# Patient Record
Sex: MALE | Race: WHITE | NOT HISPANIC OR LATINO | Employment: FULL TIME | URBAN - METROPOLITAN AREA
[De-identification: names, ages, dates, MRNs, and addresses within clinical notes are randomized per-mention and may not be internally consistent; named-entity substitution may affect disease eponyms.]

---

## 2017-03-15 ENCOUNTER — LAB (OUTPATIENT)
Dept: INTERNAL MEDICINE | Facility: CLINIC | Age: 62
End: 2017-03-15

## 2017-03-15 DIAGNOSIS — Z00.00 PERIODIC HEALTH ASSESSMENT, GENERAL SCREENING, ADULT: ICD-10-CM

## 2017-03-15 DIAGNOSIS — Z12.5 PROSTATE CANCER SCREENING: ICD-10-CM

## 2017-03-15 DIAGNOSIS — Z00.00 PERIODIC HEALTH ASSESSMENT, GENERAL SCREENING, ADULT: Primary | ICD-10-CM

## 2017-03-15 DIAGNOSIS — E34.9 TESTOSTERONE DEFICIENCY: ICD-10-CM

## 2017-03-15 LAB
ALBUMIN SERPL-MCNC: 4.05 G/DL (ref 3.4–4.6)
ALBUMIN/GLOB SERPL: 1.3 G/DL
ALP SERPL-CCNC: 57 U/L (ref 46–116)
ALT SERPL W P-5'-P-CCNC: 55 U/L (ref 16–63)
ANION GAP SERPL CALCULATED.3IONS-SCNC: 7 MMOL/L
AST SERPL-CCNC: 37 U/L (ref 7–37)
BILIRUB SERPL-MCNC: 0.6 MG/DL (ref 0.2–1)
BUN BLD-MCNC: 17 MG/DL (ref 6–22)
BUN/CREAT SERPL: 18.9 (ref 7–25)
CALCIUM SPEC-SCNC: 9 MG/DL (ref 8.6–10.5)
CHLORIDE SERPL-SCNC: 100 MMOL/L (ref 95–107)
CHOLEST SERPL-MCNC: 178 MG/DL (ref 0–200)
CO2 SERPL-SCNC: 30 MMOL/L (ref 23–32)
CREAT BLD-MCNC: 0.9 MG/DL (ref 0.7–1.3)
DEPRECATED RDW RBC AUTO: 40.1 FL (ref 37–54)
ERYTHROCYTE [DISTWIDTH] IN BLOOD BY AUTOMATED COUNT: 11.7 % (ref 11.5–15)
GFR SERPL CREATININE-BSD FRML MDRD: 86 ML/MIN/1.73
GLOBULIN UR ELPH-MCNC: 3.1 GM/DL
GLUCOSE BLD-MCNC: 95 MG/DL (ref 70–100)
HCT VFR BLD AUTO: 42.2 % (ref 40.1–51)
HDLC SERPL-MCNC: 65 MG/DL (ref 40–81)
HGB BLD-MCNC: 14 G/DL (ref 13.7–17.5)
LDLC SERPL CALC-MCNC: 101 MG/DL (ref 0–100)
LDLC/HDLC SERPL: 1.56 {RATIO}
MCH RBC QN AUTO: 31.3 PG (ref 26–34)
MCHC RBC AUTO-ENTMCNC: 33.2 G/DL (ref 31–37)
MCV RBC AUTO: 94.4 FL (ref 80–100)
PLATELET # BLD AUTO: 280 10*3/MM3 (ref 150–450)
PMV BLD AUTO: 9.3 FL (ref 6–12)
POTASSIUM BLD-SCNC: 4.2 MMOL/L (ref 3.3–5.3)
PROT SERPL-MCNC: 7.1 G/DL (ref 6.3–8.4)
PSA SERPL-MCNC: 2.03 NG/ML (ref 0.13–4)
RBC # BLD AUTO: 4.47 10*6/MM3 (ref 4.63–6.08)
SODIUM BLD-SCNC: 137 MMOL/L (ref 136–145)
TRIGL SERPL-MCNC: 58 MG/DL (ref 0–150)
VLDLC SERPL-MCNC: 11.6 MG/DL
WBC NRBC COR # BLD: 4.65 10*3/MM3 (ref 5–10)

## 2017-03-15 PROCEDURE — 80061 LIPID PANEL: CPT

## 2017-03-15 PROCEDURE — 36415 COLL VENOUS BLD VENIPUNCTURE: CPT

## 2017-03-15 PROCEDURE — 84153 ASSAY OF PSA TOTAL: CPT

## 2017-03-15 PROCEDURE — 85027 COMPLETE CBC AUTOMATED: CPT

## 2017-03-15 PROCEDURE — 80053 COMPREHEN METABOLIC PANEL: CPT

## 2017-03-16 LAB
CONV COMMENT: NORMAL
TESTOST SERPL-MCNC: 663 NG/DL (ref 348–1197)

## 2017-08-15 ENCOUNTER — TELEPHONE (OUTPATIENT)
Dept: INTERNAL MEDICINE | Facility: CLINIC | Age: 62
End: 2017-08-15

## 2017-08-15 NOTE — TELEPHONE ENCOUNTER
----- Message from Mai Bee sent at 8/15/2017  9:18 AM EDT -----  Contact: pt  Pt calling says that JUBLIA 10 % solution, will cost $600. Pt would like to know if there is a coupon he can get for this medication. Please advise.     # [phone]    [facility] [place] [address] [address] AT [facility] [address]  - [phone]  - [phone] FX

## 2017-08-18 RX ORDER — EFINACONAZOLE 100 MG/ML
1 SOLUTION TOPICAL DAILY
Qty: 8 ML | Refills: 0 | Status: SHIPPED | OUTPATIENT
Start: 2017-08-18 | End: 2018-01-13 | Stop reason: SDUPTHER

## 2017-08-18 NOTE — TELEPHONE ENCOUNTER
----- Message from Beata Major MA sent at 8/18/2017  8:20 AM EDT -----  Pt calling for refill one time to local pharmacy so he can use a coupon    Jublia    [facility] [place] #[phone]

## 2018-01-15 RX ORDER — EFINACONAZOLE 100 MG/ML
SOLUTION TOPICAL
Qty: 8 ML | Refills: 1 | Status: SHIPPED | OUTPATIENT
Start: 2018-01-15 | End: 2018-06-22

## 2018-02-14 ENCOUNTER — OFFICE VISIT (OUTPATIENT)
Dept: INTERNAL MEDICINE | Facility: CLINIC | Age: 63
End: 2018-02-14

## 2018-02-14 VITALS
BODY MASS INDEX: 19.88 KG/M2 | TEMPERATURE: 97.4 F | HEIGHT: 73 IN | DIASTOLIC BLOOD PRESSURE: 68 MMHG | OXYGEN SATURATION: 98 % | HEART RATE: 51 BPM | WEIGHT: 150 LBS | RESPIRATION RATE: 15 BRPM | SYSTOLIC BLOOD PRESSURE: 108 MMHG

## 2018-02-14 DIAGNOSIS — R35.1 NOCTURIA: ICD-10-CM

## 2018-02-14 DIAGNOSIS — Z12.5 PROSTATE CANCER SCREENING: ICD-10-CM

## 2018-02-14 DIAGNOSIS — D64.9 ANEMIA, UNSPECIFIED TYPE: ICD-10-CM

## 2018-02-14 DIAGNOSIS — Z00.00 ANNUAL PHYSICAL EXAM: Primary | ICD-10-CM

## 2018-02-14 DIAGNOSIS — Z12.11 COLON CANCER SCREENING: ICD-10-CM

## 2018-02-14 DIAGNOSIS — R35.0 FREQUENT URINATION: ICD-10-CM

## 2018-02-14 DIAGNOSIS — N52.9 ERECTILE DYSFUNCTION, UNSPECIFIED ERECTILE DYSFUNCTION TYPE: ICD-10-CM

## 2018-02-14 DIAGNOSIS — E78.49 OTHER HYPERLIPIDEMIA: ICD-10-CM

## 2018-02-14 LAB
ALBUMIN SERPL-MCNC: 4 G/DL (ref 3.5–5.2)
ALBUMIN/GLOB SERPL: 1.4 G/DL
ALP SERPL-CCNC: 50 U/L (ref 39–117)
ALT SERPL-CCNC: 40 U/L (ref 1–41)
AST SERPL-CCNC: 34 U/L (ref 1–40)
BACTERIA UR QL AUTO: ABNORMAL /HPF
BASOPHILS # BLD AUTO: 0.04 10*3/MM3 (ref 0–0.2)
BASOPHILS NFR BLD AUTO: 0.7 % (ref 0–2)
BILIRUB SERPL-MCNC: 0.4 MG/DL (ref 0.1–1.2)
BILIRUB UR QL STRIP: NEGATIVE
BUN SERPL-MCNC: 20 MG/DL (ref 8–23)
BUN/CREAT SERPL: 21.7 (ref 7–25)
CALCIUM SERPL-MCNC: 9.1 MG/DL (ref 8.6–10.5)
CHLORIDE SERPL-SCNC: 99 MMOL/L (ref 98–107)
CHOLEST SERPL-MCNC: 180 MG/DL (ref 0–200)
CLARITY UR: CLEAR
CO2 SERPL-SCNC: 28.1 MMOL/L (ref 22–29)
COLOR UR: YELLOW
CREAT SERPL-MCNC: 0.92 MG/DL (ref 0.76–1.27)
DEPRECATED RDW RBC AUTO: 40.8 FL (ref 37–54)
EOSINOPHIL # BLD AUTO: 0.34 10*3/MM3 (ref 0–0.7)
EOSINOPHIL NFR BLD AUTO: 6.4 % (ref 0–5)
ERYTHROCYTE [DISTWIDTH] IN BLOOD BY AUTOMATED COUNT: 11.9 % (ref 11.5–15)
GLOBULIN SER CALC-MCNC: 2.8 GM/DL
GLUCOSE SERPL-MCNC: 91 MG/DL (ref 65–99)
GLUCOSE UR STRIP-MCNC: NEGATIVE MG/DL
HCT VFR BLD AUTO: 40 % (ref 40.1–51)
HDLC SERPL-MCNC: 61 MG/DL (ref 40–60)
HEMOCCULT STL QL IA: NEGATIVE
HGB BLD-MCNC: 13.5 G/DL (ref 13.7–17.5)
HGB UR QL STRIP.AUTO: NEGATIVE
HYALINE CASTS UR QL AUTO: ABNORMAL /LPF
KETONES UR QL STRIP: NEGATIVE
LDLC SERPL CALC-MCNC: 110 MG/DL (ref 0–100)
LEUKOCYTE ESTERASE UR QL STRIP.AUTO: ABNORMAL
LYMPHOCYTES # BLD AUTO: 1.35 10*3/MM3 (ref 0.8–7)
LYMPHOCYTES NFR BLD AUTO: 25.2 % (ref 10–60)
MCH RBC QN AUTO: 32.1 PG (ref 26–34)
MCHC RBC AUTO-ENTMCNC: 33.8 G/DL (ref 31–37)
MCV RBC AUTO: 95 FL (ref 80–100)
MONOCYTES # BLD AUTO: 0.58 10*3/MM3 (ref 0–1)
MONOCYTES NFR BLD AUTO: 10.8 % (ref 0–13)
NEUTROPHILS # BLD AUTO: 3.04 10*3/MM3 (ref 1–11)
NEUTROPHILS NFR BLD AUTO: 56.9 % (ref 30–85)
NITRITE UR QL STRIP: NEGATIVE
PH UR STRIP.AUTO: 7 [PH] (ref 5–8)
PLATELET # BLD AUTO: 261 10*3/MM3 (ref 150–450)
PMV BLD AUTO: 9.3 FL (ref 6–12)
POTASSIUM SERPL-SCNC: 4.5 MMOL/L (ref 3.5–5.2)
PROT SERPL-MCNC: 6.8 G/DL (ref 6–8.5)
PROT UR QL STRIP: NEGATIVE
PSA SERPL-MCNC: 2.32 NG/ML (ref 0–4)
RBC # BLD AUTO: 4.21 10*6/MM3 (ref 4.63–6.08)
RBC # UR: ABNORMAL /HPF
REF LAB TEST METHOD: ABNORMAL
SODIUM SERPL-SCNC: 137 MMOL/L (ref 136–145)
SP GR UR STRIP: 1.01 (ref 1–1.03)
SQUAMOUS #/AREA URNS HPF: ABNORMAL /HPF
TRIGL SERPL-MCNC: 44 MG/DL (ref 0–150)
UROBILINOGEN UR QL STRIP: ABNORMAL
VLDLC SERPL-MCNC: 8.8 MG/DL (ref 5–40)
WBC NRBC COR # BLD: 5.35 10*3/MM3 (ref 5–10)
WBC UR QL AUTO: ABNORMAL /HPF

## 2018-02-14 PROCEDURE — 85025 COMPLETE CBC W/AUTO DIFF WBC: CPT | Performed by: INTERNAL MEDICINE

## 2018-02-14 PROCEDURE — 99396 PREV VISIT EST AGE 40-64: CPT | Performed by: INTERNAL MEDICINE

## 2018-02-14 PROCEDURE — G0103 PSA SCREENING: HCPCS | Performed by: INTERNAL MEDICINE

## 2018-02-14 PROCEDURE — 82274 ASSAY TEST FOR BLOOD FECAL: CPT | Performed by: INTERNAL MEDICINE

## 2018-02-14 PROCEDURE — 81001 URINALYSIS AUTO W/SCOPE: CPT | Performed by: INTERNAL MEDICINE

## 2018-02-14 RX ORDER — TAMSULOSIN HYDROCHLORIDE 0.4 MG/1
1 CAPSULE ORAL NIGHTLY
Qty: 30 CAPSULE | Refills: 12 | Status: SHIPPED | OUTPATIENT
Start: 2018-02-14 | End: 2018-06-22

## 2018-02-14 NOTE — PROGRESS NOTES
"Sharita Waterman is a 62 y.o. male here for   Chief Complaint   Patient presents with   • Annual Exam     New Patient   .    Vitals:    02/14/18 1013   BP: 108/68   BP Location: Left arm   Patient Position: Sitting   Cuff Size: Adult   Pulse: 51   Resp: 15   Temp: 97.4 °F (36.3 °C)   TempSrc: Temporal Artery    SpO2: 98%   Weight: 68 kg (150 lb)   Height: 185.4 cm (73\")       Body mass index is 19.79 kg/(m^2).    Urinary Frequency    This is a chronic problem. The current episode started more than 1 year ago. The problem has been unchanged. There has been no fever. Associated symptoms include frequency. Pertinent negatives include no chills, flank pain, hematuria, nausea, sweats, urgency or vomiting.        The following portions of the patient's history were reviewed and updated as appropriate: allergies, current medications, past social history and problem list.    Review of Systems   Constitutional: Negative for appetite change, chills, fatigue, fever and unexpected weight change.   HENT: Positive for postnasal drip. Negative for congestion, ear pain, mouth sores, sinus pain, sore throat, tinnitus, trouble swallowing and voice change.    Eyes: Negative for pain and visual disturbance.   Respiratory: Negative for cough, choking, shortness of breath and wheezing.    Cardiovascular: Negative for chest pain, palpitations and leg swelling.   Gastrointestinal: Negative for abdominal pain, blood in stool, constipation, diarrhea, nausea and vomiting.   Endocrine: Negative for cold intolerance, heat intolerance, polydipsia and polyuria.   Genitourinary: Positive for enuresis (2-3x) and frequency. Negative for difficulty urinating, dysuria, flank pain, hematuria, testicular pain and urgency.   Musculoskeletal: Negative for arthralgias, back pain, gait problem, joint swelling, myalgias, neck pain and neck stiffness.   Skin: Negative for color change and rash.   Allergic/Immunologic: Positive for environmental " allergies. Negative for food allergies and immunocompromised state.   Neurological: Negative for dizziness, tremors, seizures, syncope, speech difficulty, weakness, numbness and headaches.   Hematological: Negative for adenopathy. Does not bruise/bleed easily.   Psychiatric/Behavioral: Negative for agitation, confusion, decreased concentration, dysphoric mood, sleep disturbance and suicidal ideas. The patient is not nervous/anxious.        Objective   Physical Exam   Constitutional: He is oriented to person, place, and time. He appears well-developed and well-nourished. No distress.   HENT:   Head: Normocephalic and atraumatic.   Right Ear: External ear normal.   Left Ear: External ear normal.   Nose: Nose normal.   Mouth/Throat: Oropharynx is clear and moist.   Eyes: Conjunctivae and EOM are normal. Pupils are equal, round, and reactive to light. Right eye exhibits no discharge. Left eye exhibits no discharge. No scleral icterus.   Neck: Normal range of motion. Neck supple. No JVD present. No tracheal deviation present. No thyromegaly present.   Cardiovascular: Normal rate, regular rhythm, normal heart sounds and intact distal pulses.  Exam reveals no gallop and no friction rub.    No murmur heard.  Pulmonary/Chest: Effort normal and breath sounds normal. No respiratory distress. He has no wheezes. He has no rales. He exhibits no tenderness.   Abdominal: Soft. Bowel sounds are normal. He exhibits no distension and no mass. There is no tenderness. There is no rebound and no guarding. No hernia.   Genitourinary: Rectum normal and penis normal. Rectal exam shows guaiac negative stool. Prostate is enlarged.   Musculoskeletal: Normal range of motion. He exhibits no edema.   Lymphadenopathy:     He has no cervical adenopathy.   Neurological: He is alert and oriented to person, place, and time. He has normal reflexes. No cranial nerve deficit. He exhibits normal muscle tone. Coordination normal.   Skin: Skin is warm and  dry. No rash noted. No erythema.   Psychiatric: He has a normal mood and affect. His behavior is normal. Judgment and thought content normal.   Vitals reviewed.      Assessment/Plan   Diagnoses and all orders for this visit:    Annual physical exam  -     CBC Auto Differential; Future  -     Comprehensive Metabolic Panel; Future  -     Lipid Panel; Future  -     POC FECAL OCCULT BLOOD BY IMMUNOASSAY  -     Urinalysis With / Microscopic If Indicated - Urine, Clean Catch; Future  -     CBC Auto Differential  -     Comprehensive Metabolic Panel  -     Lipid Panel  -     Urinalysis With / Microscopic If Indicated - Urine, Clean Catch  -     Urinalysis, Microscopic Only - Urine, Clean Catch; Future  -     Urinalysis, Microscopic Only - Urine, Clean Catch    Frequent urination  Comments:  trial of flomax - will see urologist if sx persist    Other hyperlipidemia  Comments:  need diet & exercise    Erectile dysfunction, unspecified erectile dysfunction type  Comments:  ok to use occ viagra (but not on same day as flomax)    Nocturia  Comments:  will see urol if sx persist  Orders:  -     tamsulosin (FLOMAX) 0.4 MG capsule 24 hr capsule; Take 1 capsule by mouth Every Night.    Prostate cancer screening  -     PSA Screen; Future  -     PSA Screen    Colon cancer screening  -     POC FECAL OCCULT BLOOD BY IMMUNOASSAY       He needs Tdap & new shingles vaccine.     He had normal c-scope 2015 - I need old records.

## 2018-02-15 DIAGNOSIS — D64.9 ANEMIA, UNSPECIFIED TYPE: Primary | ICD-10-CM

## 2018-02-15 LAB
FERRITIN SERPL-MCNC: 166.9 NG/ML (ref 30–400)
FOLATE SERPL-MCNC: >20 NG/ML (ref 4.78–24.2)
SPECIMEN STATUS: NORMAL
VIT B12 SERPL-MCNC: 883 PG/ML (ref 211–946)

## 2018-02-16 LAB
REF LAB TEST METHOD: NORMAL
REF LAB TEST METHOD: NORMAL

## 2018-06-19 ENCOUNTER — TELEPHONE (OUTPATIENT)
Dept: INTERNAL MEDICINE | Facility: CLINIC | Age: 63
End: 2018-06-19

## 2018-06-19 NOTE — TELEPHONE ENCOUNTER
----- Message from Myrna BORIS Escamilla sent at 6/19/2018  9:31 AM EDT -----  Contact: Susan, spouse - Dr Gant's pt - RE: LAB order / Poss APPT Friday  Susan, spouse calling and would like to know if pt can have labs drawn for Testosterone & TSH. Pt informs is feeling sluggish, muscle aches, insomnia, feels awful (crappy). Pt comes in [facility] Thursday night and leaves for [place] on Friday evening. Could you please put lab order in pt's chart for pt to come have labs drawn on Friday while in [facility]? Pt wanted to be seen but no appt is available w/  or Kadie.Please advise. Thanks      Susan, spouse  # [phone]

## 2018-06-19 NOTE — TELEPHONE ENCOUNTER
Patients wife would like for her  to have his Testosterone and TSH checked due to him complaining insomnia, sluggish. Patient is a  and is seen via our occupational medicine. He travels a lot internationally.     Please advise.    Thank you,    Scott

## 2018-06-20 NOTE — TELEPHONE ENCOUNTER
I spoke to Mr. Mcintosh's wife and stated he would need to be seen prior to any testing.     I worked him in for 6/22/18 @ 8 AM.

## 2018-06-22 ENCOUNTER — OFFICE VISIT (OUTPATIENT)
Dept: INTERNAL MEDICINE | Facility: CLINIC | Age: 63
End: 2018-06-22

## 2018-06-22 VITALS
SYSTOLIC BLOOD PRESSURE: 102 MMHG | BODY MASS INDEX: 20.28 KG/M2 | HEIGHT: 73 IN | WEIGHT: 153 LBS | HEART RATE: 51 BPM | OXYGEN SATURATION: 100 % | DIASTOLIC BLOOD PRESSURE: 70 MMHG

## 2018-06-22 DIAGNOSIS — R35.1 NOCTURIA: ICD-10-CM

## 2018-06-22 DIAGNOSIS — R53.82 CHRONIC FATIGUE: ICD-10-CM

## 2018-06-22 DIAGNOSIS — F51.01 PRIMARY INSOMNIA: ICD-10-CM

## 2018-06-22 DIAGNOSIS — E78.49 OTHER HYPERLIPIDEMIA: Primary | ICD-10-CM

## 2018-06-22 DIAGNOSIS — R61 NIGHT SWEAT: ICD-10-CM

## 2018-06-22 LAB
ALBUMIN SERPL-MCNC: 4.3 G/DL (ref 3.5–5.2)
ALBUMIN/GLOB SERPL: 1.4 G/DL
ALP SERPL-CCNC: 51 U/L (ref 39–117)
ALT SERPL W P-5'-P-CCNC: 30 U/L (ref 1–41)
ANION GAP SERPL CALCULATED.3IONS-SCNC: 12 MMOL/L
AST SERPL-CCNC: 24 U/L (ref 1–40)
BASOPHILS # BLD AUTO: 0.02 10*3/MM3 (ref 0–0.2)
BASOPHILS NFR BLD AUTO: 0.4 % (ref 0–2)
BILIRUB SERPL-MCNC: 0.5 MG/DL (ref 0.1–1.2)
BUN BLD-MCNC: 18 MG/DL (ref 8–23)
BUN/CREAT SERPL: 18.6 (ref 7–25)
CALCIUM SPEC-SCNC: 9.5 MG/DL (ref 8.6–10.5)
CHLORIDE SERPL-SCNC: 100 MMOL/L (ref 98–107)
CHOLEST SERPL-MCNC: 185 MG/DL (ref 0–200)
CO2 SERPL-SCNC: 26 MMOL/L (ref 22–29)
CREAT BLD-MCNC: 0.97 MG/DL (ref 0.76–1.27)
CRP SERPL-MCNC: 0.28 MG/DL (ref 0–0.5)
DEPRECATED RDW RBC AUTO: 40.9 FL (ref 37–54)
EOSINOPHIL # BLD AUTO: 0.24 10*3/MM3 (ref 0–0.7)
EOSINOPHIL NFR BLD AUTO: 5.3 % (ref 0–5)
ERYTHROCYTE [DISTWIDTH] IN BLOOD BY AUTOMATED COUNT: 11.9 % (ref 11.5–15)
ERYTHROCYTE [SEDIMENTATION RATE] IN BLOOD: 9 MM/HR (ref 0–20)
GFR SERPL CREATININE-BSD FRML MDRD: 78 ML/MIN/1.73
GLOBULIN UR ELPH-MCNC: 3 GM/DL
GLUCOSE BLD-MCNC: 91 MG/DL (ref 65–99)
HCT VFR BLD AUTO: 41.3 % (ref 40.1–51)
HDLC SERPL-MCNC: 59 MG/DL (ref 40–60)
HGB BLD-MCNC: 14.1 G/DL (ref 13.7–17.5)
LDLC SERPL CALC-MCNC: 116 MG/DL (ref 0–100)
LDLC/HDLC SERPL: 1.97 {RATIO}
LYMPHOCYTES # BLD AUTO: 1.19 10*3/MM3 (ref 0.8–7)
LYMPHOCYTES NFR BLD AUTO: 26.4 % (ref 10–60)
MCH RBC QN AUTO: 32.5 PG (ref 26–34)
MCHC RBC AUTO-ENTMCNC: 34.1 G/DL (ref 31–37)
MCV RBC AUTO: 95.2 FL (ref 80–100)
MONOCYTES # BLD AUTO: 0.57 10*3/MM3 (ref 0–1)
MONOCYTES NFR BLD AUTO: 12.7 % (ref 0–13)
NEUTROPHILS # BLD AUTO: 2.48 10*3/MM3 (ref 1–11)
NEUTROPHILS NFR BLD AUTO: 55.2 % (ref 30–85)
PLATELET # BLD AUTO: 277 10*3/MM3 (ref 150–450)
PMV BLD AUTO: 9.6 FL (ref 6–12)
POTASSIUM BLD-SCNC: 4.2 MMOL/L (ref 3.5–5.2)
PROT SERPL-MCNC: 7.3 G/DL (ref 6–8.5)
RBC # BLD AUTO: 4.34 10*6/MM3 (ref 4.63–6.08)
SODIUM BLD-SCNC: 138 MMOL/L (ref 136–145)
TRIGL SERPL-MCNC: 49 MG/DL (ref 0–150)
TSH SERPL DL<=0.05 MIU/L-ACNC: 1.5 MIU/ML (ref 0.27–4.2)
VLDLC SERPL-MCNC: 9.8 MG/DL (ref 5–40)
WBC NRBC COR # BLD: 4.5 10*3/MM3 (ref 5–10)

## 2018-06-22 PROCEDURE — 36415 COLL VENOUS BLD VENIPUNCTURE: CPT | Performed by: INTERNAL MEDICINE

## 2018-06-22 PROCEDURE — 80053 COMPREHEN METABOLIC PANEL: CPT | Performed by: INTERNAL MEDICINE

## 2018-06-22 PROCEDURE — 86140 C-REACTIVE PROTEIN: CPT | Performed by: INTERNAL MEDICINE

## 2018-06-22 PROCEDURE — 85025 COMPLETE CBC W/AUTO DIFF WBC: CPT | Performed by: INTERNAL MEDICINE

## 2018-06-22 PROCEDURE — 84443 ASSAY THYROID STIM HORMONE: CPT | Performed by: INTERNAL MEDICINE

## 2018-06-22 PROCEDURE — 85652 RBC SED RATE AUTOMATED: CPT | Performed by: INTERNAL MEDICINE

## 2018-06-22 PROCEDURE — 99214 OFFICE O/P EST MOD 30 MIN: CPT | Performed by: INTERNAL MEDICINE

## 2018-06-22 PROCEDURE — 80061 LIPID PANEL: CPT | Performed by: INTERNAL MEDICINE

## 2018-06-22 NOTE — PROGRESS NOTES
"Sharita Waterman is a 62 y.o. male here for   Chief Complaint   Patient presents with   • Insomnia   • Fatigue   .    Vitals:    06/22/18 0811   BP: 102/70   BP Location: Left arm   Patient Position: Sitting   Cuff Size: Adult   Pulse: 51   SpO2: 100%   Weight: 69.4 kg (153 lb)   Height: 185.4 cm (73\")       Body mass index is 20.19 kg/m².    Insomnia   This is a chronic problem. The current episode started more than 1 month ago. The problem occurs intermittently. The problem has been gradually worsening. Associated symptoms include diaphoresis (nightly) and fatigue. Pertinent negatives include no abdominal pain, anorexia, arthralgias, change in bowel habit, chest pain, chills, congestion, coughing, fever, headaches, joint swelling, myalgias, nausea, numbness, rash, sore throat, swollen glands, visual change, vomiting or weakness.   Fatigue   This is a chronic problem. The current episode started more than 1 month ago. The problem has been unchanged. Associated symptoms include diaphoresis (nightly) and fatigue. Pertinent negatives include no abdominal pain, anorexia, arthralgias, change in bowel habit, chest pain, chills, congestion, coughing, fever, headaches, joint swelling, myalgias, nausea, numbness, rash, sore throat, swollen glands, visual change, vomiting or weakness.        The following portions of the patient's history were reviewed and updated as appropriate: allergies, current medications, past social history and problem list.    Review of Systems   Constitutional: Positive for diaphoresis (nightly) and fatigue. Negative for chills and fever.   HENT: Negative for congestion and sore throat.    Respiratory: Negative for cough, shortness of breath and wheezing.    Cardiovascular: Negative for chest pain, palpitations and leg swelling.   Gastrointestinal: Negative for abdominal pain, anorexia, change in bowel habit, nausea and vomiting.   Musculoskeletal: Negative for arthralgias, joint swelling " and myalgias.   Skin: Negative for rash.   Neurological: Negative for weakness, numbness and headaches.   Psychiatric/Behavioral: Positive for sleep disturbance. Negative for dysphoric mood. The patient has insomnia. The patient is not nervous/anxious.        Objective   Physical Exam   Constitutional: He appears well-developed and well-nourished. No distress.   Cardiovascular: Normal rate, regular rhythm and normal heart sounds.    Pulmonary/Chest: No respiratory distress. He has no wheezes. He has no rales. He exhibits no tenderness.   Musculoskeletal: He exhibits no edema.   Psychiatric: He has a normal mood and affect. His behavior is normal.   Nursing note and vitals reviewed.      Assessment/Plan   Diagnoses and all orders for this visit:    Other hyperlipidemia  Comments:  mild - need routine rechk  Orders:  -     Comprehensive Metabolic Panel; Future  -     Lipid Panel; Future    Nocturia  Comments:  call if worse    Chronic fatigue  Comments:  labs today - call if sx persist  Orders:  -     CBC Auto Differential; Future  -     TSH Rfx On Abnormal To Free T4; Future  -     C-reactive Protein; Future  -     Sedimentation Rate; Future  -     Testosterone, Free, Total; Future    Night sweat  Comments:  labs today - will need CXR if sx persist  Orders:  -     CBC Auto Differential; Future  -     TSH Rfx On Abnormal To Free T4; Future  -     C-reactive Protein; Future  -     Sedimentation Rate; Future    Primary insomnia  Comments:  call if worse

## 2018-06-24 LAB
TESTOST FREE SERPL-MCNC: 10.9 PG/ML (ref 6.6–18.1)
TESTOST SERPL-MCNC: 646 NG/DL (ref 264–916)

## 2018-07-24 ENCOUNTER — TELEPHONE (OUTPATIENT)
Dept: INTERNAL MEDICINE | Facility: CLINIC | Age: 63
End: 2018-07-24

## 2018-07-24 DIAGNOSIS — N52.9 ERECTILE DYSFUNCTION, UNSPECIFIED ERECTILE DYSFUNCTION TYPE: Primary | ICD-10-CM

## 2018-07-24 RX ORDER — SILDENAFIL 100 MG/1
100 TABLET, FILM COATED ORAL DAILY PRN
Qty: 10 TABLET | Refills: 1 | Status: ON HOLD | OUTPATIENT
Start: 2018-07-24 | End: 2019-01-01

## 2018-07-24 NOTE — TELEPHONE ENCOUNTER
----- Message from Marcy Chen sent at 7/24/2018  9:59 AM EDT -----  Contact: Patient  Patient requesting refill on    VIAGRA 100 MG tablet [[id number]] DISCONTINUED   Order Details   Dose, Route, Frequency: As Directed   Dispense Quantity:  10 tablet Refills:  4        Would like the generic form. Please advise    Patient:[phone]    Pharmacy:[place] [address] [address] [facility] [address]  - [phone] [facility] [phone] FX

## 2019-01-01 ENCOUNTER — OFFICE VISIT (OUTPATIENT)
Dept: NEUROLOGY | Facility: CLINIC | Age: 64
End: 2019-01-01

## 2019-01-01 ENCOUNTER — OFFICE VISIT (OUTPATIENT)
Dept: ONCOLOGY | Facility: CLINIC | Age: 64
End: 2019-01-01

## 2019-01-01 ENCOUNTER — LAB (OUTPATIENT)
Dept: LAB | Facility: HOSPITAL | Age: 64
End: 2019-01-01

## 2019-01-01 ENCOUNTER — DOCUMENTATION (OUTPATIENT)
Dept: RADIATION ONCOLOGY | Facility: HOSPITAL | Age: 64
End: 2019-01-01

## 2019-01-01 ENCOUNTER — TELEPHONE (OUTPATIENT)
Dept: NEUROLOGY | Facility: CLINIC | Age: 64
End: 2019-01-01

## 2019-01-01 ENCOUNTER — DOCUMENTATION (OUTPATIENT)
Dept: ONCOLOGY | Facility: CLINIC | Age: 64
End: 2019-01-01

## 2019-01-01 ENCOUNTER — APPOINTMENT (OUTPATIENT)
Dept: CT IMAGING | Facility: HOSPITAL | Age: 64
End: 2019-01-01

## 2019-01-01 ENCOUNTER — APPOINTMENT (OUTPATIENT)
Dept: RADIATION ONCOLOGY | Facility: HOSPITAL | Age: 64
End: 2019-01-01

## 2019-01-01 ENCOUNTER — ANESTHESIA (OUTPATIENT)
Dept: PERIOP | Facility: HOSPITAL | Age: 64
End: 2019-01-01

## 2019-01-01 ENCOUNTER — APPOINTMENT (OUTPATIENT)
Dept: MRI IMAGING | Facility: HOSPITAL | Age: 64
End: 2019-01-01

## 2019-01-01 ENCOUNTER — APPOINTMENT (OUTPATIENT)
Dept: ONCOLOGY | Facility: CLINIC | Age: 64
End: 2019-01-01

## 2019-01-01 ENCOUNTER — OFFICE VISIT (OUTPATIENT)
Dept: NEUROSURGERY | Facility: CLINIC | Age: 64
End: 2019-01-01

## 2019-01-01 ENCOUNTER — OFFICE VISIT (OUTPATIENT)
Dept: INTERNAL MEDICINE | Facility: CLINIC | Age: 64
End: 2019-01-01

## 2019-01-01 ENCOUNTER — HOSPITAL ENCOUNTER (INPATIENT)
Facility: HOSPITAL | Age: 64
LOS: 11 days | Discharge: NURSING, REHAB OR LONG-TERM CARE | End: 2019-04-16
Attending: EMERGENCY MEDICINE | Admitting: NEUROLOGICAL SURGERY

## 2019-01-01 ENCOUNTER — ANESTHESIA EVENT (OUTPATIENT)
Dept: PERIOP | Facility: HOSPITAL | Age: 64
End: 2019-01-01

## 2019-01-01 ENCOUNTER — APPOINTMENT (OUTPATIENT)
Dept: LAB | Facility: HOSPITAL | Age: 64
End: 2019-01-01

## 2019-01-01 ENCOUNTER — HOSPITAL ENCOUNTER (INPATIENT)
Facility: HOSPITAL | Age: 64
LOS: 3 days | Discharge: HOME | End: 2019-04-19
Attending: PHYSICAL MEDICINE & REHABILITATION | Admitting: PHYSICAL MEDICINE & REHABILITATION

## 2019-01-01 ENCOUNTER — TRANSCRIBE ORDERS (OUTPATIENT)
Dept: ADMINISTRATIVE | Facility: HOSPITAL | Age: 64
End: 2019-01-01

## 2019-01-01 ENCOUNTER — TELEPHONE (OUTPATIENT)
Dept: OTHER | Facility: HOSPITAL | Age: 64
End: 2019-01-01

## 2019-01-01 ENCOUNTER — TELEPHONE (OUTPATIENT)
Dept: INTERNAL MEDICINE | Facility: CLINIC | Age: 64
End: 2019-01-01

## 2019-01-01 ENCOUNTER — OFFICE VISIT (OUTPATIENT)
Dept: RADIATION ONCOLOGY | Facility: HOSPITAL | Age: 64
End: 2019-01-01

## 2019-01-01 ENCOUNTER — TELEPHONE (OUTPATIENT)
Dept: NEUROSURGERY | Facility: CLINIC | Age: 64
End: 2019-01-01

## 2019-01-01 ENCOUNTER — RADIATION ONCOLOGY WEEKLY ASSESSMENT (OUTPATIENT)
Dept: RADIATION ONCOLOGY | Facility: HOSPITAL | Age: 64
End: 2019-01-01

## 2019-01-01 ENCOUNTER — LAB (OUTPATIENT)
Dept: OTHER | Facility: HOSPITAL | Age: 64
End: 2019-01-01

## 2019-01-01 ENCOUNTER — DOCUMENTATION (OUTPATIENT)
Dept: NUTRITION | Facility: HOSPITAL | Age: 64
End: 2019-01-01

## 2019-01-01 ENCOUNTER — LAB (OUTPATIENT)
Dept: INTERNAL MEDICINE | Facility: CLINIC | Age: 64
End: 2019-01-01

## 2019-01-01 ENCOUNTER — HOSPITAL ENCOUNTER (OUTPATIENT)
Dept: MRI IMAGING | Facility: HOSPITAL | Age: 64
Discharge: HOME | End: 2019-09-04
Admitting: INTERNAL MEDICINE

## 2019-01-01 ENCOUNTER — TELEPHONE (OUTPATIENT)
Dept: GENERAL RADIOLOGY | Facility: HOSPITAL | Age: 64
End: 2019-01-01

## 2019-01-01 ENCOUNTER — TELEPHONE (OUTPATIENT)
Dept: RADIATION ONCOLOGY | Facility: HOSPITAL | Age: 64
End: 2019-01-01

## 2019-01-01 ENCOUNTER — HOSPITAL ENCOUNTER (OUTPATIENT)
Dept: MRI IMAGING | Facility: HOSPITAL | Age: 64
Discharge: HOME | End: 2019-07-22
Admitting: PHYSICIAN ASSISTANT

## 2019-01-01 VITALS
OXYGEN SATURATION: 100 % | SYSTOLIC BLOOD PRESSURE: 130 MMHG | DIASTOLIC BLOOD PRESSURE: 79 MMHG | HEART RATE: 56 BPM | HEIGHT: 73 IN | BODY MASS INDEX: 19.34 KG/M2

## 2019-01-01 VITALS
SYSTOLIC BLOOD PRESSURE: 108 MMHG | TEMPERATURE: 98.6 F | WEIGHT: 167.6 LBS | OXYGEN SATURATION: 98 % | BODY MASS INDEX: 22.95 KG/M2 | HEART RATE: 54 BPM | DIASTOLIC BLOOD PRESSURE: 72 MMHG

## 2019-01-01 VITALS
OXYGEN SATURATION: 97 % | HEIGHT: 73 IN | TEMPERATURE: 97.8 F | WEIGHT: 146.61 LBS | SYSTOLIC BLOOD PRESSURE: 112 MMHG | BODY MASS INDEX: 19.43 KG/M2 | DIASTOLIC BLOOD PRESSURE: 65 MMHG | HEART RATE: 51 BPM | RESPIRATION RATE: 18 BRPM

## 2019-01-01 VITALS
OXYGEN SATURATION: 98 % | BODY MASS INDEX: 23.03 KG/M2 | TEMPERATURE: 97 F | HEIGHT: 72 IN | HEART RATE: 52 BPM | SYSTOLIC BLOOD PRESSURE: 111 MMHG | WEIGHT: 170 LBS | RESPIRATION RATE: 16 BRPM | DIASTOLIC BLOOD PRESSURE: 73 MMHG

## 2019-01-01 VITALS
SYSTOLIC BLOOD PRESSURE: 118 MMHG | HEART RATE: 74 BPM | HEIGHT: 72 IN | DIASTOLIC BLOOD PRESSURE: 64 MMHG | WEIGHT: 163 LBS | OXYGEN SATURATION: 94 % | BODY MASS INDEX: 22.08 KG/M2

## 2019-01-01 VITALS
SYSTOLIC BLOOD PRESSURE: 102 MMHG | RESPIRATION RATE: 16 BRPM | TEMPERATURE: 98.1 F | BODY MASS INDEX: 20.99 KG/M2 | HEIGHT: 72 IN | DIASTOLIC BLOOD PRESSURE: 69 MMHG | HEART RATE: 59 BPM | OXYGEN SATURATION: 100 % | WEIGHT: 155 LBS

## 2019-01-01 VITALS
WEIGHT: 163.2 LBS | BODY MASS INDEX: 24.08 KG/M2 | HEIGHT: 71 IN | SYSTOLIC BLOOD PRESSURE: 128 MMHG | HEART RATE: 57 BPM | BODY MASS INDEX: 22.11 KG/M2 | SYSTOLIC BLOOD PRESSURE: 122 MMHG | DIASTOLIC BLOOD PRESSURE: 78 MMHG | HEIGHT: 72 IN | OXYGEN SATURATION: 97 % | HEART RATE: 56 BPM | TEMPERATURE: 96.8 F | RESPIRATION RATE: 15 BRPM | WEIGHT: 172 LBS | DIASTOLIC BLOOD PRESSURE: 70 MMHG | OXYGEN SATURATION: 99 %

## 2019-01-01 VITALS
SYSTOLIC BLOOD PRESSURE: 100 MMHG | WEIGHT: 152 LBS | BODY MASS INDEX: 20.59 KG/M2 | HEIGHT: 72 IN | DIASTOLIC BLOOD PRESSURE: 65 MMHG

## 2019-01-01 VITALS
DIASTOLIC BLOOD PRESSURE: 68 MMHG | WEIGHT: 153.7 LBS | HEART RATE: 59 BPM | BODY MASS INDEX: 20.37 KG/M2 | SYSTOLIC BLOOD PRESSURE: 110 MMHG | OXYGEN SATURATION: 98 % | HEIGHT: 73 IN

## 2019-01-01 VITALS
BODY MASS INDEX: 23.02 KG/M2 | RESPIRATION RATE: 18 BRPM | HEIGHT: 73 IN | SYSTOLIC BLOOD PRESSURE: 114 MMHG | DIASTOLIC BLOOD PRESSURE: 71 MMHG | WEIGHT: 173.72 LBS | HEART RATE: 64 BPM | TEMPERATURE: 98.3 F | OXYGEN SATURATION: 96 %

## 2019-01-01 VITALS
BODY MASS INDEX: 23.01 KG/M2 | WEIGHT: 168 LBS | DIASTOLIC BLOOD PRESSURE: 65 MMHG | SYSTOLIC BLOOD PRESSURE: 110 MMHG | HEART RATE: 57 BPM | OXYGEN SATURATION: 97 %

## 2019-01-01 VITALS
HEART RATE: 60 BPM | BODY MASS INDEX: 22.28 KG/M2 | OXYGEN SATURATION: 98 % | WEIGHT: 164.5 LBS | SYSTOLIC BLOOD PRESSURE: 98 MMHG | RESPIRATION RATE: 16 BRPM | HEIGHT: 72 IN | DIASTOLIC BLOOD PRESSURE: 60 MMHG | TEMPERATURE: 98.4 F

## 2019-01-01 VITALS
BODY MASS INDEX: 21.16 KG/M2 | DIASTOLIC BLOOD PRESSURE: 80 MMHG | OXYGEN SATURATION: 99 % | WEIGHT: 156 LBS | TEMPERATURE: 98.3 F | SYSTOLIC BLOOD PRESSURE: 118 MMHG | HEART RATE: 77 BPM

## 2019-01-01 VITALS
HEART RATE: 58 BPM | WEIGHT: 155 LBS | DIASTOLIC BLOOD PRESSURE: 63 MMHG | HEIGHT: 72 IN | BODY MASS INDEX: 20.99 KG/M2 | SYSTOLIC BLOOD PRESSURE: 96 MMHG

## 2019-01-01 VITALS
SYSTOLIC BLOOD PRESSURE: 128 MMHG | HEART RATE: 67 BPM | OXYGEN SATURATION: 93 % | BODY MASS INDEX: 24.5 KG/M2 | DIASTOLIC BLOOD PRESSURE: 74 MMHG | WEIGHT: 175 LBS | HEIGHT: 71 IN

## 2019-01-01 VITALS
HEART RATE: 56 BPM | SYSTOLIC BLOOD PRESSURE: 110 MMHG | OXYGEN SATURATION: 95 % | DIASTOLIC BLOOD PRESSURE: 77 MMHG | TEMPERATURE: 97.2 F | WEIGHT: 165 LBS | BODY MASS INDEX: 22.59 KG/M2

## 2019-01-01 VITALS
HEART RATE: 54 BPM | WEIGHT: 170 LBS | DIASTOLIC BLOOD PRESSURE: 78 MMHG | BODY MASS INDEX: 23.03 KG/M2 | SYSTOLIC BLOOD PRESSURE: 125 MMHG | HEIGHT: 72 IN | OXYGEN SATURATION: 98 % | TEMPERATURE: 97.3 F

## 2019-01-01 VITALS
HEART RATE: 63 BPM | WEIGHT: 175 LBS | SYSTOLIC BLOOD PRESSURE: 128 MMHG | DIASTOLIC BLOOD PRESSURE: 83 MMHG | HEIGHT: 71 IN | BODY MASS INDEX: 24.5 KG/M2

## 2019-01-01 VITALS
SYSTOLIC BLOOD PRESSURE: 110 MMHG | BODY MASS INDEX: 23.03 KG/M2 | HEIGHT: 72 IN | WEIGHT: 170 LBS | DIASTOLIC BLOOD PRESSURE: 70 MMHG | HEART RATE: 66 BPM | OXYGEN SATURATION: 97 %

## 2019-01-01 VITALS
HEIGHT: 72 IN | WEIGHT: 175 LBS | SYSTOLIC BLOOD PRESSURE: 110 MMHG | DIASTOLIC BLOOD PRESSURE: 72 MMHG | BODY MASS INDEX: 23.7 KG/M2

## 2019-01-01 VITALS
SYSTOLIC BLOOD PRESSURE: 114 MMHG | DIASTOLIC BLOOD PRESSURE: 68 MMHG | HEIGHT: 72 IN | BODY MASS INDEX: 22.35 KG/M2 | HEART RATE: 62 BPM | WEIGHT: 165 LBS

## 2019-01-01 VITALS
SYSTOLIC BLOOD PRESSURE: 112 MMHG | HEART RATE: 57 BPM | DIASTOLIC BLOOD PRESSURE: 74 MMHG | HEIGHT: 72 IN | BODY MASS INDEX: 22.94 KG/M2 | RESPIRATION RATE: 16 BRPM | OXYGEN SATURATION: 99 % | TEMPERATURE: 97.9 F | WEIGHT: 169.4 LBS

## 2019-01-01 DIAGNOSIS — N52.9 ERECTILE DYSFUNCTION, UNSPECIFIED ERECTILE DYSFUNCTION TYPE: ICD-10-CM

## 2019-01-01 DIAGNOSIS — C71.9 GLIOBLASTOMA (HCC): ICD-10-CM

## 2019-01-01 DIAGNOSIS — C71.9 GLIOBLASTOMA MULTIFORME (HCC): Primary | ICD-10-CM

## 2019-01-01 DIAGNOSIS — R74.8 ELEVATED LIVER ENZYMES: ICD-10-CM

## 2019-01-01 DIAGNOSIS — C71.9 GBM (GLIOBLASTOMA MULTIFORME) (HCC): Primary | ICD-10-CM

## 2019-01-01 DIAGNOSIS — R35.1 NOCTURIA: Primary | ICD-10-CM

## 2019-01-01 DIAGNOSIS — J01.90 ACUTE SINUSITIS, RECURRENCE NOT SPECIFIED, UNSPECIFIED LOCATION: Primary | ICD-10-CM

## 2019-01-01 DIAGNOSIS — C71.9 GLIOBLASTOMA (HCC): Primary | ICD-10-CM

## 2019-01-01 DIAGNOSIS — R12 HEARTBURN: ICD-10-CM

## 2019-01-01 DIAGNOSIS — R35.0 FREQUENCY OF URINATION: ICD-10-CM

## 2019-01-01 DIAGNOSIS — Z09 SURGERY FOLLOW-UP EXAMINATION: Primary | ICD-10-CM

## 2019-01-01 DIAGNOSIS — C71.1 GLIOBLASTOMA OF FRONTAL LOBE (HCC): Primary | ICD-10-CM

## 2019-01-01 DIAGNOSIS — R35.1 NOCTURIA: ICD-10-CM

## 2019-01-01 DIAGNOSIS — E78.49 OTHER HYPERLIPIDEMIA: ICD-10-CM

## 2019-01-01 DIAGNOSIS — Z96.0 URINARY CATHETER PRESENT: Primary | ICD-10-CM

## 2019-01-01 DIAGNOSIS — G93.5 NEOPLASM OF BRAIN CAUSING MASS EFFECT ON ADJACENT STRUCTURES (HCC): Primary | ICD-10-CM

## 2019-01-01 DIAGNOSIS — L98.9 SKIN LESION: Primary | ICD-10-CM

## 2019-01-01 DIAGNOSIS — E78.49 OTHER HYPERLIPIDEMIA: Primary | ICD-10-CM

## 2019-01-01 DIAGNOSIS — M54.50 ACUTE MIDLINE LOW BACK PAIN WITHOUT SCIATICA: ICD-10-CM

## 2019-01-01 DIAGNOSIS — Z09 SURGERY FOLLOW-UP EXAMINATION: ICD-10-CM

## 2019-01-01 DIAGNOSIS — D49.6 NEOPLASM OF BRAIN CAUSING MASS EFFECT ON ADJACENT STRUCTURES (HCC): Primary | ICD-10-CM

## 2019-01-01 DIAGNOSIS — R35.0 FREQUENT URINATION: ICD-10-CM

## 2019-01-01 DIAGNOSIS — G93.89 BRAIN MASS: ICD-10-CM

## 2019-01-01 DIAGNOSIS — Z12.11 COLON CANCER SCREENING: ICD-10-CM

## 2019-01-01 DIAGNOSIS — D64.9 ANEMIA, UNSPECIFIED TYPE: ICD-10-CM

## 2019-01-01 DIAGNOSIS — C71.9 GBM (GLIOBLASTOMA MULTIFORME) (HCC): ICD-10-CM

## 2019-01-01 DIAGNOSIS — R26.2 DIFFICULTY WALKING: ICD-10-CM

## 2019-01-01 DIAGNOSIS — G93.89 RIGHT FRONTAL LOBE MASS: ICD-10-CM

## 2019-01-01 DIAGNOSIS — M54.6 ACUTE BILATERAL THORACIC BACK PAIN: ICD-10-CM

## 2019-01-01 DIAGNOSIS — M54.50 ACUTE BILATERAL LOW BACK PAIN WITHOUT SCIATICA: Primary | ICD-10-CM

## 2019-01-01 DIAGNOSIS — C71.1 GLIOBLASTOMA OF FRONTAL LOBE (HCC): ICD-10-CM

## 2019-01-01 DIAGNOSIS — F51.01 PRIMARY INSOMNIA: ICD-10-CM

## 2019-01-01 DIAGNOSIS — R51.9 ACUTE INTRACTABLE HEADACHE, UNSPECIFIED HEADACHE TYPE: Primary | ICD-10-CM

## 2019-01-01 LAB
ABO GROUP BLD: NORMAL
ALBUMIN SERPL-MCNC: 3.7 G/DL (ref 3.5–5.2)
ALBUMIN SERPL-MCNC: 3.8 G/DL (ref 3.5–5.2)
ALBUMIN SERPL-MCNC: 3.9 G/DL (ref 3.5–5.2)
ALBUMIN SERPL-MCNC: 4.1 G/DL (ref 3.5–5.2)
ALBUMIN SERPL-MCNC: 4.1 G/DL (ref 3.5–5.2)
ALBUMIN SERPL-MCNC: 4.5 G/DL (ref 3.5–5.2)
ALBUMIN/GLOB SERPL: 1.3 G/DL
ALBUMIN/GLOB SERPL: 1.4 G/DL (ref 1.1–2.4)
ALBUMIN/GLOB SERPL: 1.5 G/DL (ref 1.1–2.4)
ALBUMIN/GLOB SERPL: 1.6 G/DL
ALBUMIN/GLOB SERPL: 1.6 G/DL
ALBUMIN/GLOB SERPL: 1.7 G/DL
ALP SERPL-CCNC: 47 U/L (ref 38–116)
ALP SERPL-CCNC: 48 U/L (ref 39–117)
ALP SERPL-CCNC: 52 U/L (ref 39–117)
ALP SERPL-CCNC: 53 U/L (ref 38–116)
ALP SERPL-CCNC: 71 U/L (ref 39–117)
ALP SERPL-CCNC: 77 U/L (ref 39–117)
ALT SERPL W P-5'-P-CCNC: 111 U/L (ref 1–41)
ALT SERPL W P-5'-P-CCNC: 39 U/L (ref 1–41)
ALT SERPL W P-5'-P-CCNC: 403 U/L (ref 1–41)
ALT SERPL W P-5'-P-CCNC: 47 U/L (ref 0–41)
ALT SERPL W P-5'-P-CCNC: 55 U/L (ref 0–41)
ALT SERPL W P-5'-P-CCNC: 78 U/L (ref 1–41)
ANION GAP SERPL CALCULATED.3IONS-SCNC: 10.7 MMOL/L
ANION GAP SERPL CALCULATED.3IONS-SCNC: 11.2 MMOL/L
ANION GAP SERPL CALCULATED.3IONS-SCNC: 12.4 MMOL/L
ANION GAP SERPL CALCULATED.3IONS-SCNC: 13 MMOL/L
ANION GAP SERPL CALCULATED.3IONS-SCNC: 14.1 MMOL/L
ANION GAP SERPL CALCULATED.3IONS-SCNC: 6.3 MMOL/L
ANION GAP SERPL CALCULATED.3IONS-SCNC: 8.9 MMOL/L
ANION GAP SERPL CALCULATED.3IONS-SCNC: 9.5 MMOL/L
ANION GAP SERPL CALCULATED.3IONS-SCNC: 9.9 MMOL/L
APPEARANCE UR: CLEAR
AST SERPL-CCNC: 22 U/L (ref 1–40)
AST SERPL-CCNC: 24 U/L (ref 0–40)
AST SERPL-CCNC: 25 U/L (ref 0–40)
AST SERPL-CCNC: 34 U/L (ref 1–40)
AST SERPL-CCNC: 69 U/L (ref 1–40)
AST SERPL-CCNC: 91 U/L (ref 1–40)
BASE EXCESS BLDA CALC-SCNC: 0 MMOL/L (ref -5–5)
BASOPHILS # BLD AUTO: 0.01 10*3/MM3 (ref 0–0.2)
BASOPHILS # BLD AUTO: 0.02 10*3/MM3 (ref 0–0.2)
BASOPHILS # BLD AUTO: 0.03 10*3/MM3 (ref 0–0.2)
BASOPHILS # BLD AUTO: 0.04 10*3/MM3 (ref 0–0.2)
BASOPHILS # BLD AUTO: 0.05 10*3/MM3 (ref 0–0.2)
BASOPHILS NFR BLD AUTO: 0.1 % (ref 0–1.5)
BASOPHILS NFR BLD AUTO: 0.2 % (ref 0–1.5)
BASOPHILS NFR BLD AUTO: 0.2 % (ref 0–1.5)
BASOPHILS NFR BLD AUTO: 0.3 % (ref 0–1.5)
BASOPHILS NFR BLD AUTO: 0.5 % (ref 0–1.5)
BASOPHILS NFR BLD AUTO: 0.5 % (ref 0–1.5)
BASOPHILS NFR BLD AUTO: 1 % (ref 0–1.5)
BILIRUB SERPL-MCNC: 0.2 MG/DL (ref 0.2–1.2)
BILIRUB SERPL-MCNC: 0.2 MG/DL (ref 0.2–1.2)
BILIRUB SERPL-MCNC: 0.3 MG/DL (ref 0.1–1.2)
BILIRUB SERPL-MCNC: 0.3 MG/DL (ref 0.2–1.2)
BILIRUB SERPL-MCNC: 0.5 MG/DL (ref 0.2–1.2)
BILIRUB SERPL-MCNC: 0.6 MG/DL (ref 0.1–1.2)
BILIRUB UR QL STRIP: NEGATIVE
BILIRUB UR QL STRIP: NEGATIVE
BLD GP AB SCN SERPL QL: NEGATIVE
BUN BLD-MCNC: 17 MG/DL (ref 8–23)
BUN BLD-MCNC: 17 MG/DL (ref 8–23)
BUN BLD-MCNC: 19 MG/DL (ref 6–20)
BUN BLD-MCNC: 19 MG/DL (ref 8–23)
BUN BLD-MCNC: 19 MG/DL (ref 8–23)
BUN BLD-MCNC: 20 MG/DL (ref 8–23)
BUN BLD-MCNC: 21 MG/DL (ref 8–23)
BUN BLD-MCNC: 22 MG/DL (ref 6–20)
BUN BLD-MCNC: 28 MG/DL (ref 8–23)
BUN/CREAT SERPL: 18.5 (ref 7–25)
BUN/CREAT SERPL: 19.8 (ref 7.3–30)
BUN/CREAT SERPL: 21.6 (ref 7.3–30)
BUN/CREAT SERPL: 23.8 (ref 7–25)
BUN/CREAT SERPL: 23.9 (ref 7–25)
BUN/CREAT SERPL: 25 (ref 7–25)
BUN/CREAT SERPL: 25.7 (ref 7–25)
BUN/CREAT SERPL: 27.6 (ref 7–25)
BUN/CREAT SERPL: 33.7 (ref 7–25)
CALCIUM SPEC-SCNC: 8.4 MG/DL (ref 8.6–10.5)
CALCIUM SPEC-SCNC: 8.6 MG/DL (ref 8.6–10.5)
CALCIUM SPEC-SCNC: 8.8 MG/DL (ref 8.6–10.5)
CALCIUM SPEC-SCNC: 8.8 MG/DL (ref 8.6–10.5)
CALCIUM SPEC-SCNC: 8.9 MG/DL (ref 8.6–10.5)
CALCIUM SPEC-SCNC: 9.2 MG/DL (ref 8.6–10.5)
CALCIUM SPEC-SCNC: 9.6 MG/DL (ref 8.5–10.2)
CALCIUM SPEC-SCNC: 9.7 MG/DL (ref 8.6–10.5)
CALCIUM SPEC-SCNC: 9.8 MG/DL (ref 8.5–10.2)
CHLORIDE SERPL-SCNC: 100 MMOL/L (ref 98–107)
CHLORIDE SERPL-SCNC: 100 MMOL/L (ref 98–107)
CHLORIDE SERPL-SCNC: 101 MMOL/L (ref 98–107)
CHLORIDE SERPL-SCNC: 102 MMOL/L (ref 98–107)
CHLORIDE SERPL-SCNC: 103 MMOL/L (ref 98–107)
CHLORIDE SERPL-SCNC: 103 MMOL/L (ref 98–107)
CHLORIDE SERPL-SCNC: 98 MMOL/L (ref 98–107)
CHLORIDE SERPL-SCNC: 99 MMOL/L (ref 98–107)
CHLORIDE SERPL-SCNC: 99 MMOL/L (ref 98–107)
CHOLEST SERPL-MCNC: 185 MG/DL (ref 0–200)
CLARITY UR: CLEAR
CO2 BLDA-SCNC: 24 MMOL/L (ref 24–29)
CO2 SERPL-SCNC: 23.9 MMOL/L (ref 22–29)
CO2 SERPL-SCNC: 25.1 MMOL/L (ref 22–29)
CO2 SERPL-SCNC: 26 MMOL/L (ref 22–29)
CO2 SERPL-SCNC: 26.6 MMOL/L (ref 22–29)
CO2 SERPL-SCNC: 27.5 MMOL/L (ref 22–29)
CO2 SERPL-SCNC: 27.8 MMOL/L (ref 22–29)
CO2 SERPL-SCNC: 28.3 MMOL/L (ref 22–29)
CO2 SERPL-SCNC: 29.1 MMOL/L (ref 22–29)
CO2 SERPL-SCNC: 29.7 MMOL/L (ref 22–29)
COLOR UR: YELLOW
COLOR UR: YELLOW
CONV BACTERIA IN URINE MICRO: NORMAL /HPF
CREAT BLD-MCNC: 0.71 MG/DL (ref 0.76–1.27)
CREAT BLD-MCNC: 0.74 MG/DL (ref 0.76–1.27)
CREAT BLD-MCNC: 0.76 MG/DL (ref 0.76–1.27)
CREAT BLD-MCNC: 0.8 MG/DL (ref 0.76–1.27)
CREAT BLD-MCNC: 0.8 MG/DL (ref 0.76–1.27)
CREAT BLD-MCNC: 0.83 MG/DL (ref 0.76–1.27)
CREAT BLD-MCNC: 0.92 MG/DL (ref 0.76–1.27)
CREAT BLD-MCNC: 0.96 MG/DL (ref 0.7–1.3)
CREAT BLD-MCNC: 1.02 MG/DL (ref 0.7–1.3)
CREAT BLDA-MCNC: 1 MG/DL (ref 0.6–1.3)
CREAT BLDA-MCNC: 1.3 MG/DL (ref 0.6–1.3)
CYTO UR: NORMAL
DEPRECATED RDW RBC AUTO: 40.5 FL (ref 37–54)
DEPRECATED RDW RBC AUTO: 41.9 FL (ref 37–54)
DEPRECATED RDW RBC AUTO: 43.1 FL (ref 37–54)
DEPRECATED RDW RBC AUTO: 43.4 FL (ref 37–54)
DEPRECATED RDW RBC AUTO: 44 FL (ref 37–54)
DEPRECATED RDW RBC AUTO: 48.2 FL (ref 37–54)
DEPRECATED RDW RBC AUTO: 49.3 FL (ref 37–54)
DEPRECATED RDW RBC AUTO: 49.4 FL (ref 37–54)
DEPRECATED RDW RBC AUTO: 50.7 FL (ref 37–54)
DEPRECATED RDW RBC AUTO: 50.7 FL (ref 37–54)
DEPRECATED RDW RBC AUTO: 51.6 FL (ref 37–54)
DEPRECATED RDW RBC AUTO: 51.9 FL (ref 37–54)
EOSINOPHIL # BLD AUTO: 0 10*3/MM3 (ref 0–0.4)
EOSINOPHIL # BLD AUTO: 0 10*3/MM3 (ref 0–0.4)
EOSINOPHIL # BLD AUTO: 0.02 10*3/MM3 (ref 0–0.4)
EOSINOPHIL # BLD AUTO: 0.03 10*3/MM3 (ref 0–0.4)
EOSINOPHIL # BLD AUTO: 0.04 10*3/MM3 (ref 0–0.4)
EOSINOPHIL # BLD AUTO: 0.05 10*3/MM3 (ref 0–0.4)
EOSINOPHIL # BLD AUTO: 0.05 10*3/MM3 (ref 0–0.4)
EOSINOPHIL # BLD AUTO: 0.06 10*3/MM3 (ref 0–0.4)
EOSINOPHIL # BLD AUTO: 0.08 10*3/MM3 (ref 0–0.4)
EOSINOPHIL # BLD AUTO: 0.08 10*3/MM3 (ref 0–0.4)
EOSINOPHIL # BLD AUTO: 0.31 10*3/MM3 (ref 0–0.4)
EOSINOPHIL NFR BLD AUTO: 0 % (ref 0.3–6.2)
EOSINOPHIL NFR BLD AUTO: 0 % (ref 0.3–6.2)
EOSINOPHIL NFR BLD AUTO: 0.2 % (ref 0.3–6.2)
EOSINOPHIL NFR BLD AUTO: 0.2 % (ref 0.3–6.2)
EOSINOPHIL NFR BLD AUTO: 0.3 % (ref 0.3–6.2)
EOSINOPHIL NFR BLD AUTO: 0.3 % (ref 0.3–6.2)
EOSINOPHIL NFR BLD AUTO: 0.5 % (ref 0.3–6.2)
EOSINOPHIL NFR BLD AUTO: 0.6 % (ref 0.3–6.2)
EOSINOPHIL NFR BLD AUTO: 0.6 % (ref 0.3–6.2)
EOSINOPHIL NFR BLD AUTO: 0.7 % (ref 0.3–6.2)
EOSINOPHIL NFR BLD AUTO: 6 % (ref 0.3–6.2)
EPI CELLS #/AREA URNS HPF: NORMAL /HPF
ERYTHROCYTE [DISTWIDTH] IN BLOOD BY AUTOMATED COUNT: 11.9 % (ref 12.3–15.4)
ERYTHROCYTE [DISTWIDTH] IN BLOOD BY AUTOMATED COUNT: 12 % (ref 12.3–15.4)
ERYTHROCYTE [DISTWIDTH] IN BLOOD BY AUTOMATED COUNT: 12.2 % (ref 12.3–15.4)
ERYTHROCYTE [DISTWIDTH] IN BLOOD BY AUTOMATED COUNT: 12.2 % (ref 12.3–15.4)
ERYTHROCYTE [DISTWIDTH] IN BLOOD BY AUTOMATED COUNT: 12.4 % (ref 12.3–15.4)
ERYTHROCYTE [DISTWIDTH] IN BLOOD BY AUTOMATED COUNT: 13.5 % (ref 12.3–15.4)
ERYTHROCYTE [DISTWIDTH] IN BLOOD BY AUTOMATED COUNT: 14 % (ref 12.3–15.4)
ERYTHROCYTE [DISTWIDTH] IN BLOOD BY AUTOMATED COUNT: 14 % (ref 12.3–15.4)
ERYTHROCYTE [DISTWIDTH] IN BLOOD BY AUTOMATED COUNT: 14.2 % (ref 12.3–15.4)
ERYTHROCYTE [DISTWIDTH] IN BLOOD BY AUTOMATED COUNT: 14.2 % (ref 12.3–15.4)
ERYTHROCYTE [DISTWIDTH] IN BLOOD BY AUTOMATED COUNT: 14.3 % (ref 12.3–15.4)
ERYTHROCYTE [DISTWIDTH] IN BLOOD BY AUTOMATED COUNT: 14.3 % (ref 12.3–15.4)
ERYTHROCYTE [SEDIMENTATION RATE] IN BLOOD: 32 MM/HR (ref 0–20)
GFR SERPL CREATININE-BSD FRML MDRD: 103 ML/MIN/1.73
GFR SERPL CREATININE-BSD FRML MDRD: 106 ML/MIN/1.73
GFR SERPL CREATININE-BSD FRML MDRD: 112 ML/MIN/1.73
GFR SERPL CREATININE-BSD FRML MDRD: 74 ML/MIN/1.73
GFR SERPL CREATININE-BSD FRML MDRD: 79 ML/MIN/1.73
GFR SERPL CREATININE-BSD FRML MDRD: 83 ML/MIN/1.73
GFR SERPL CREATININE-BSD FRML MDRD: 93 ML/MIN/1.73
GFR SERPL CREATININE-BSD FRML MDRD: 97 ML/MIN/1.73
GFR SERPL CREATININE-BSD FRML MDRD: 97 ML/MIN/1.73
GLOBULIN UR ELPH-MCNC: 2.2 GM/DL
GLOBULIN UR ELPH-MCNC: 2.4 GM/DL
GLOBULIN UR ELPH-MCNC: 2.8 GM/DL (ref 1.8–3.5)
GLOBULIN UR ELPH-MCNC: 2.9 GM/DL
GLOBULIN UR ELPH-MCNC: 3 GM/DL (ref 1.8–3.5)
GLOBULIN UR ELPH-MCNC: 3.1 GM/DL
GLUCOSE BLD-MCNC: 104 MG/DL (ref 74–124)
GLUCOSE BLD-MCNC: 105 MG/DL (ref 65–99)
GLUCOSE BLD-MCNC: 109 MG/DL (ref 74–124)
GLUCOSE BLD-MCNC: 110 MG/DL (ref 65–99)
GLUCOSE BLD-MCNC: 119 MG/DL (ref 65–99)
GLUCOSE BLD-MCNC: 135 MG/DL (ref 65–99)
GLUCOSE BLD-MCNC: 163 MG/DL (ref 65–99)
GLUCOSE BLD-MCNC: 91 MG/DL (ref 65–99)
GLUCOSE BLD-MCNC: 95 MG/DL (ref 65–99)
GLUCOSE BLDC GLUCOMTR-MCNC: 100 MG/DL (ref 70–130)
GLUCOSE BLDC GLUCOMTR-MCNC: 108 MG/DL (ref 70–130)
GLUCOSE BLDC GLUCOMTR-MCNC: 115 MG/DL (ref 70–130)
GLUCOSE BLDC GLUCOMTR-MCNC: 116 MG/DL (ref 70–130)
GLUCOSE BLDC GLUCOMTR-MCNC: 122 MG/DL (ref 70–130)
GLUCOSE BLDC GLUCOMTR-MCNC: 122 MG/DL (ref 70–130)
GLUCOSE BLDC GLUCOMTR-MCNC: 123 MG/DL (ref 70–130)
GLUCOSE BLDC GLUCOMTR-MCNC: 126 MG/DL (ref 70–130)
GLUCOSE BLDC GLUCOMTR-MCNC: 126 MG/DL (ref 70–130)
GLUCOSE BLDC GLUCOMTR-MCNC: 128 MG/DL (ref 70–130)
GLUCOSE BLDC GLUCOMTR-MCNC: 133 MG/DL (ref 70–130)
GLUCOSE BLDC GLUCOMTR-MCNC: 141 MG/DL (ref 70–130)
GLUCOSE BLDC GLUCOMTR-MCNC: 142 MG/DL (ref 70–130)
GLUCOSE BLDC GLUCOMTR-MCNC: 143 MG/DL (ref 70–130)
GLUCOSE BLDC GLUCOMTR-MCNC: 143 MG/DL (ref 70–130)
GLUCOSE BLDC GLUCOMTR-MCNC: 149 MG/DL (ref 70–130)
GLUCOSE BLDC GLUCOMTR-MCNC: 150 MG/DL (ref 70–130)
GLUCOSE BLDC GLUCOMTR-MCNC: 162 MG/DL (ref 70–130)
GLUCOSE BLDC GLUCOMTR-MCNC: 168 MG/DL (ref 70–130)
GLUCOSE BLDC GLUCOMTR-MCNC: 169 MG/DL (ref 70–130)
GLUCOSE BLDC GLUCOMTR-MCNC: 173 MG/DL (ref 70–130)
GLUCOSE BLDC GLUCOMTR-MCNC: 96 MG/DL (ref 70–130)
GLUCOSE BLDC GLUCOMTR-MCNC: 99 MG/DL (ref 70–130)
GLUCOSE UR QL: NEGATIVE
GLUCOSE UR STRIP-MCNC: NEGATIVE MG/DL
HCO3 BLDA-SCNC: 23 MMOL/L (ref 22–26)
HCT VFR BLD AUTO: 34 % (ref 37.5–51)
HCT VFR BLD AUTO: 35.2 % (ref 37.5–51)
HCT VFR BLD AUTO: 37.2 % (ref 37.5–51)
HCT VFR BLD AUTO: 38.2 % (ref 37.5–51)
HCT VFR BLD AUTO: 38.5 % (ref 37.5–51)
HCT VFR BLD AUTO: 40.1 % (ref 37.5–51)
HCT VFR BLD AUTO: 40.2 % (ref 37.5–51)
HCT VFR BLD AUTO: 40.4 % (ref 37.5–51)
HCT VFR BLD AUTO: 41.5 % (ref 37.5–51)
HCT VFR BLD AUTO: 41.9 % (ref 37.5–51)
HCT VFR BLD AUTO: 42.1 % (ref 37.5–51)
HCT VFR BLD AUTO: 42.7 % (ref 37.5–51)
HCT VFR BLDA CALC: 31 % (ref 38–51)
HDLC SERPL-MCNC: 61 MG/DL (ref 40–60)
HEMOCCULT STL QL IA: NEGATIVE
HGB BLD-MCNC: 11.2 G/DL (ref 13–17.7)
HGB BLD-MCNC: 11.5 G/DL (ref 13–17.7)
HGB BLD-MCNC: 12.3 G/DL (ref 13–17.7)
HGB BLD-MCNC: 12.4 G/DL (ref 13–17.7)
HGB BLD-MCNC: 12.7 G/DL (ref 13–17.7)
HGB BLD-MCNC: 13 G/DL (ref 13–17.7)
HGB BLD-MCNC: 13.6 G/DL (ref 13–17.7)
HGB BLD-MCNC: 13.8 G/DL (ref 13–17.7)
HGB BLD-MCNC: 13.9 G/DL (ref 13–17.7)
HGB BLD-MCNC: 13.9 G/DL (ref 13–17.7)
HGB BLD-MCNC: 14 G/DL (ref 13–17.7)
HGB BLD-MCNC: 14.2 G/DL (ref 13–17.7)
HGB BLDA-MCNC: 10.5 G/DL (ref 12–17)
HGB UR QL STRIP.AUTO: NEGATIVE
HGB UR QL STRIP: NEGATIVE
HOLD SPECIMEN: NORMAL
HOLD SPECIMEN: NORMAL
IMM GRANULOCYTES # BLD AUTO: 0.04 10*3/MM3 (ref 0–0.05)
IMM GRANULOCYTES # BLD AUTO: 0.06 10*3/MM3 (ref 0–0.05)
IMM GRANULOCYTES # BLD AUTO: 0.08 10*3/MM3 (ref 0–0.05)
IMM GRANULOCYTES # BLD AUTO: 0.13 10*3/MM3 (ref 0–0.05)
IMM GRANULOCYTES # BLD AUTO: 0.2 10*3/MM3 (ref 0–0.05)
IMM GRANULOCYTES # BLD AUTO: 0.3 10*3/MM3 (ref 0–0.05)
IMM GRANULOCYTES # BLD AUTO: 0.39 10*3/MM3 (ref 0–0.05)
IMM GRANULOCYTES # BLD AUTO: 0.48 10*3/MM3 (ref 0–0.05)
IMM GRANULOCYTES # BLD AUTO: 0.48 10*3/MM3 (ref 0–0.05)
IMM GRANULOCYTES # BLD AUTO: 0.7 10*3/MM3 (ref 0–0.05)
IMM GRANULOCYTES NFR BLD AUTO: 0.4 % (ref 0–0.5)
IMM GRANULOCYTES NFR BLD AUTO: 0.5 % (ref 0–0.5)
IMM GRANULOCYTES NFR BLD AUTO: 0.5 % (ref 0–0.5)
IMM GRANULOCYTES NFR BLD AUTO: 1.3 % (ref 0–0.5)
IMM GRANULOCYTES NFR BLD AUTO: 1.7 % (ref 0–0.5)
IMM GRANULOCYTES NFR BLD AUTO: 2.4 % (ref 0–0.5)
IMM GRANULOCYTES NFR BLD AUTO: 2.6 % (ref 0–0.5)
IMM GRANULOCYTES NFR BLD AUTO: 4.4 % (ref 0–0.5)
IMM GRANULOCYTES NFR BLD AUTO: 4.7 % (ref 0–0.5)
IMM GRANULOCYTES NFR BLD AUTO: 4.7 % (ref 0–0.5)
INR PPP: 1.16 (ref 0.9–1.1)
KETONES UR QL STRIP: NEGATIVE
KETONES UR QL STRIP: NEGATIVE
LAB AP CASE REPORT: NORMAL
LAB AP DIAGNOSIS COMMENT: NORMAL
LDLC SERPL CALC-MCNC: 112 MG/DL (ref 0–100)
LDLC/HDLC SERPL: 1.84 {RATIO}
LEUKOCYTE ESTERASE UR QL STRIP.AUTO: NEGATIVE
LEUKOCYTE ESTERASE UR QL STRIP: NEGATIVE
LYMPHOCYTES # BLD AUTO: 0.49 10*3/MM3 (ref 0.7–3.1)
LYMPHOCYTES # BLD AUTO: 0.49 10*3/MM3 (ref 0.7–3.1)
LYMPHOCYTES # BLD AUTO: 0.59 10*3/MM3 (ref 0.7–3.1)
LYMPHOCYTES # BLD AUTO: 0.62 10*3/MM3 (ref 0.7–3.1)
LYMPHOCYTES # BLD AUTO: 0.75 10*3/MM3 (ref 0.7–3.1)
LYMPHOCYTES # BLD AUTO: 0.76 10*3/MM3 (ref 0.7–3.1)
LYMPHOCYTES # BLD AUTO: 0.8 10*3/MM3 (ref 0.7–3.1)
LYMPHOCYTES # BLD AUTO: 1.02 10*3/MM3 (ref 0.7–3.1)
LYMPHOCYTES # BLD AUTO: 1.24 10*3/MM3 (ref 0.7–3.1)
LYMPHOCYTES # BLD AUTO: 1.52 10*3/MM3 (ref 0.7–3.1)
LYMPHOCYTES # BLD AUTO: 1.69 10*3/MM3 (ref 0.7–3.1)
LYMPHOCYTES NFR BLD AUTO: 16.6 % (ref 19.6–45.3)
LYMPHOCYTES NFR BLD AUTO: 29.2 % (ref 19.6–45.3)
LYMPHOCYTES NFR BLD AUTO: 4 % (ref 19.6–45.3)
LYMPHOCYTES NFR BLD AUTO: 4.2 % (ref 19.6–45.3)
LYMPHOCYTES NFR BLD AUTO: 4.8 % (ref 19.6–45.3)
LYMPHOCYTES NFR BLD AUTO: 5 % (ref 19.6–45.3)
LYMPHOCYTES NFR BLD AUTO: 6.4 % (ref 19.6–45.3)
LYMPHOCYTES NFR BLD AUTO: 6.9 % (ref 19.6–45.3)
LYMPHOCYTES NFR BLD AUTO: 7.4 % (ref 19.6–45.3)
LYMPHOCYTES NFR BLD AUTO: 7.7 % (ref 19.6–45.3)
LYMPHOCYTES NFR BLD AUTO: 7.8 % (ref 19.6–45.3)
Lab: NORMAL
MCH RBC QN AUTO: 30.8 PG (ref 26.6–33)
MCH RBC QN AUTO: 31.3 PG (ref 26.6–33)
MCH RBC QN AUTO: 31.9 PG (ref 26.6–33)
MCH RBC QN AUTO: 31.9 PG (ref 26.6–33)
MCH RBC QN AUTO: 32 PG (ref 26.6–33)
MCH RBC QN AUTO: 32.1 PG (ref 26.6–33)
MCH RBC QN AUTO: 32.1 PG (ref 26.6–33)
MCH RBC QN AUTO: 32.2 PG (ref 26.6–33)
MCH RBC QN AUTO: 32.6 PG (ref 26.6–33)
MCH RBC QN AUTO: 32.9 PG (ref 26.6–33)
MCHC RBC AUTO-ENTMCNC: 32.2 G/DL (ref 31.5–35.7)
MCHC RBC AUTO-ENTMCNC: 32.2 G/DL (ref 31.5–35.7)
MCHC RBC AUTO-ENTMCNC: 32.7 G/DL (ref 31.5–35.7)
MCHC RBC AUTO-ENTMCNC: 32.8 G/DL (ref 31.5–35.7)
MCHC RBC AUTO-ENTMCNC: 32.9 G/DL (ref 31.5–35.7)
MCHC RBC AUTO-ENTMCNC: 33 G/DL (ref 31.5–35.7)
MCHC RBC AUTO-ENTMCNC: 33 G/DL (ref 31.5–35.7)
MCHC RBC AUTO-ENTMCNC: 33.3 G/DL (ref 31.5–35.7)
MCHC RBC AUTO-ENTMCNC: 33.3 G/DL (ref 31.5–35.7)
MCHC RBC AUTO-ENTMCNC: 33.9 G/DL (ref 31.5–35.7)
MCHC RBC AUTO-ENTMCNC: 33.9 G/DL (ref 31.5–35.7)
MCHC RBC AUTO-ENTMCNC: 34.6 G/DL (ref 31.5–35.7)
MCV RBC AUTO: 94.6 FL (ref 79–97)
MCV RBC AUTO: 95.3 FL (ref 79–97)
MCV RBC AUTO: 95.7 FL (ref 79–97)
MCV RBC AUTO: 95.7 FL (ref 79–97)
MCV RBC AUTO: 96.1 FL (ref 79–97)
MCV RBC AUTO: 96.2 FL (ref 79–97)
MCV RBC AUTO: 96.7 FL (ref 79–97)
MCV RBC AUTO: 96.7 FL (ref 79–97)
MCV RBC AUTO: 96.9 FL (ref 79–97)
MCV RBC AUTO: 98.8 FL (ref 79–97)
MCV RBC AUTO: 99.3 FL (ref 79–97)
MCV RBC AUTO: 99.8 FL (ref 79–97)
MICRO URNS: NORMAL
MICRO URNS: NORMAL
MONOCYTES # BLD AUTO: 0.29 10*3/MM3 (ref 0.1–0.9)
MONOCYTES # BLD AUTO: 0.5 10*3/MM3 (ref 0.1–0.9)
MONOCYTES # BLD AUTO: 0.61 10*3/MM3 (ref 0.1–0.9)
MONOCYTES # BLD AUTO: 0.88 10*3/MM3 (ref 0.1–0.9)
MONOCYTES # BLD AUTO: 0.88 10*3/MM3 (ref 0.1–0.9)
MONOCYTES # BLD AUTO: 1.07 10*3/MM3 (ref 0.1–0.9)
MONOCYTES # BLD AUTO: 1.08 10*3/MM3 (ref 0.1–0.9)
MONOCYTES # BLD AUTO: 1.19 10*3/MM3 (ref 0.1–0.9)
MONOCYTES # BLD AUTO: 1.37 10*3/MM3 (ref 0.1–0.9)
MONOCYTES # BLD AUTO: 1.41 10*3/MM3 (ref 0.1–0.9)
MONOCYTES # BLD AUTO: 1.42 10*3/MM3 (ref 0.1–0.9)
MONOCYTES NFR BLD AUTO: 10.6 % (ref 5–12)
MONOCYTES NFR BLD AUTO: 11.7 % (ref 5–12)
MONOCYTES NFR BLD AUTO: 2.4 % (ref 5–12)
MONOCYTES NFR BLD AUTO: 5.1 % (ref 5–12)
MONOCYTES NFR BLD AUTO: 8.7 % (ref 5–12)
MONOCYTES NFR BLD AUTO: 8.8 % (ref 5–12)
MONOCYTES NFR BLD AUTO: 8.9 % (ref 5–12)
MONOCYTES NFR BLD AUTO: 9.1 % (ref 5–12)
MONOCYTES NFR BLD AUTO: 9.3 % (ref 5–12)
MONOCYTES NFR BLD AUTO: 9.5 % (ref 5–12)
MONOCYTES NFR BLD AUTO: 9.6 % (ref 5–12)
MUCOUS THREADS URNS QL MICRO: PRESENT /HPF
NEUTROPHILS # BLD AUTO: 10.08 10*3/MM3 (ref 1.7–7)
NEUTROPHILS # BLD AUTO: 11.31 10*3/MM3 (ref 1.4–7)
NEUTROPHILS # BLD AUTO: 11.82 10*3/MM3 (ref 1.7–7)
NEUTROPHILS # BLD AUTO: 12.58 10*3/MM3 (ref 1.7–7)
NEUTROPHILS # BLD AUTO: 13.46 10*3/MM3 (ref 1.4–7)
NEUTROPHILS # BLD AUTO: 2.72 10*3/MM3 (ref 1.4–7)
NEUTROPHILS # BLD AUTO: 6.87 10*3/MM3 (ref 1.7–7)
NEUTROPHILS # BLD AUTO: 7.94 10*3/MM3 (ref 1.7–7)
NEUTROPHILS # BLD AUTO: 7.96 10*3/MM3 (ref 1.4–7)
NEUTROPHILS # BLD AUTO: 8.7 10*3/MM3 (ref 1.7–7)
NEUTROPHILS # BLD AUTO: 9.67 10*3/MM3 (ref 1.7–7)
NEUTROPHILS NFR BLD AUTO: 52.1 % (ref 42.7–76)
NEUTROPHILS NFR BLD AUTO: 67.2 % (ref 42.7–76)
NEUTROPHILS NFR BLD AUTO: 78.2 % (ref 42.7–76)
NEUTROPHILS NFR BLD AUTO: 78.6 % (ref 42.7–76)
NEUTROPHILS NFR BLD AUTO: 80.3 % (ref 42.7–76)
NEUTROPHILS NFR BLD AUTO: 80.9 % (ref 42.7–76)
NEUTROPHILS NFR BLD AUTO: 81.9 % (ref 42.7–76)
NEUTROPHILS NFR BLD AUTO: 83.8 % (ref 42.7–76)
NEUTROPHILS NFR BLD AUTO: 86.6 % (ref 42.7–76)
NEUTROPHILS NFR BLD AUTO: 88.3 % (ref 42.7–76)
NEUTROPHILS NFR BLD AUTO: 92.2 % (ref 42.7–76)
NITRITE UR QL STRIP: NEGATIVE
NITRITE UR QL STRIP: NEGATIVE
NRBC BLD AUTO-RTO: 0 /100 WBC (ref 0–0)
NRBC BLD AUTO-RTO: 0 /100 WBC (ref 0–0.2)
PATH REPORT.ADDENDUM SPEC: NORMAL
PATH REPORT.FINAL DX SPEC: NORMAL
PATH REPORT.GROSS SPEC: NORMAL
PCO2 BLDA: 30.3 MM HG (ref 35–45)
PH BLDA: 7.49 PH UNITS (ref 7.35–7.6)
PH UR STRIP.AUTO: 5.5 [PH] (ref 5–7.5)
PH UR STRIP.AUTO: 6.5 [PH] (ref 5–8)
PLATELET # BLD AUTO: 157 10*3/MM3 (ref 140–450)
PLATELET # BLD AUTO: 255 10*3/MM3 (ref 140–450)
PLATELET # BLD AUTO: 260 10*3/MM3 (ref 140–450)
PLATELET # BLD AUTO: 277 10*3/MM3 (ref 140–450)
PLATELET # BLD AUTO: 286 10*3/MM3 (ref 140–450)
PLATELET # BLD AUTO: 288 10*3/MM3 (ref 140–450)
PLATELET # BLD AUTO: 292 10*3/MM3 (ref 140–450)
PLATELET # BLD AUTO: 295 10*3/MM3 (ref 140–450)
PLATELET # BLD AUTO: 300 10*3/MM3 (ref 140–450)
PLATELET # BLD AUTO: 303 10*3/MM3 (ref 140–450)
PLATELET # BLD AUTO: 305 10*3/MM3 (ref 140–450)
PLATELET # BLD AUTO: 307 10*3/MM3 (ref 140–450)
PMV BLD AUTO: 7.8 FL (ref 6–12)
PMV BLD AUTO: 8.2 FL (ref 6–12)
PMV BLD AUTO: 8.2 FL (ref 6–12)
PMV BLD AUTO: 8.3 FL (ref 6–12)
PMV BLD AUTO: 8.5 FL (ref 6–12)
PMV BLD AUTO: 8.5 FL (ref 6–12)
PMV BLD AUTO: 8.6 FL (ref 6–12)
PMV BLD AUTO: 9 FL (ref 6–12)
PMV BLD AUTO: 9.1 FL (ref 6–12)
PMV BLD AUTO: 9.3 FL (ref 6–12)
PMV BLD AUTO: 9.4 FL (ref 6–12)
PMV BLD AUTO: 9.9 FL (ref 6–12)
PO2 BLDA: 97 MMHG (ref 80–105)
POTASSIUM BLD-SCNC: 3.7 MMOL/L (ref 3.5–5.2)
POTASSIUM BLD-SCNC: 3.8 MMOL/L (ref 3.5–5.2)
POTASSIUM BLD-SCNC: 4 MMOL/L (ref 3.5–5.2)
POTASSIUM BLD-SCNC: 4.3 MMOL/L (ref 3.5–5.2)
POTASSIUM BLD-SCNC: 4.3 MMOL/L (ref 3.5–5.2)
POTASSIUM BLD-SCNC: 4.4 MMOL/L (ref 3.5–5.2)
POTASSIUM BLD-SCNC: 4.6 MMOL/L (ref 3.5–5.2)
POTASSIUM BLD-SCNC: 4.8 MMOL/L (ref 3.5–4.7)
POTASSIUM BLD-SCNC: 5 MMOL/L (ref 3.5–4.7)
POTASSIUM BLDA-SCNC: 3.8 MMOL/L (ref 3.5–4.9)
PROT SERPL-MCNC: 5.9 G/DL (ref 6–8.5)
PROT SERPL-MCNC: 6.2 G/DL (ref 6–8.5)
PROT SERPL-MCNC: 6.9 G/DL (ref 6.3–8)
PROT SERPL-MCNC: 7 G/DL (ref 6–8.5)
PROT SERPL-MCNC: 7.1 G/DL (ref 6.3–8)
PROT SERPL-MCNC: 7.4 G/DL (ref 6–8.5)
PROT UR QL STRIP: NEGATIVE
PROTEIN: NEGATIVE
PROTHROMBIN TIME: 14.5 SECONDS (ref 11.7–14.2)
PSA SERPL-MCNC: 2.3 NG/ML (ref 0–4)
RBC # BLD AUTO: 3.51 10*6/MM3 (ref 4.14–5.8)
RBC # BLD AUTO: 3.68 10*6/MM3 (ref 4.14–5.8)
RBC # BLD AUTO: 3.87 10*6/MM3 (ref 4.14–5.8)
RBC # BLD AUTO: 3.98 10*6/MM3 (ref 4.14–5.8)
RBC # BLD AUTO: 3.99 10*6/MM3 (ref 4.14–5.8)
RBC # BLD AUTO: 4.05 10*6/MM3 (ref 4.14–5.8)
RBC # BLD AUTO: 4.17 10*6/MM3 (ref 4.14–5.8)
RBC # BLD AUTO: 4.22 10*6/MM3 (ref 4.14–5.8)
RBC # BLD AUTO: 4.26 10*6/MM3 (ref 4.14–5.8)
RBC # BLD AUTO: 4.29 10*6/MM3 (ref 4.14–5.8)
RBC # BLD AUTO: 4.3 10*6/MM3 (ref 4.14–5.8)
RBC # BLD AUTO: 4.43 10*6/MM3 (ref 4.14–5.8)
RBC #/AREA URNS HPF: NORMAL /HPF
RH BLD: POSITIVE
SAO2 % BLDA: 98 % (ref 95–98)
SODIUM BLD-SCNC: 134 MMOL/L (ref 136–145)
SODIUM BLD-SCNC: 137 MMOL/L (ref 134–145)
SODIUM BLD-SCNC: 137 MMOL/L (ref 136–145)
SODIUM BLD-SCNC: 138 MMOL/L (ref 134–145)
SODIUM BLD-SCNC: 138 MMOL/L (ref 136–145)
SODIUM BLD-SCNC: 139 MMOL/L (ref 136–145)
SODIUM BLD-SCNC: 140 MMOL/L (ref 136–145)
SODIUM BLD-SCNC: 141 MMOL/L (ref 136–145)
SODIUM BLD-SCNC: 141 MMOL/L (ref 136–145)
SP GR UR STRIP: 1.02 (ref 1–1.03)
SP GR UR: 1.01 (ref 1–1.03)
T&S EXPIRATION DATE: NORMAL
TRIGL SERPL-MCNC: 58 MG/DL (ref 0–150)
TSH SERPL DL<=0.05 MIU/L-ACNC: 0.52 MIU/ML (ref 0.27–4.2)
URINALYSIS REFLEX: NORMAL
UROBILINOGEN UR QL STRIP: NORMAL
UROBILINOGEN UR STRIP-MCNC: 0.2 MG/DL (ref 0.2–1)
VLDLC SERPL-MCNC: 11.6 MG/DL (ref 5–40)
WBC #/AREA URNS HPF: NORMAL /HPF
WBC NRBC COR # BLD: 10.15 10*3/MM3 (ref 3.4–10.8)
WBC NRBC COR # BLD: 10.21 10*3/MM3 (ref 3.4–10.8)
WBC NRBC COR # BLD: 11.55 10*3/MM3 (ref 3.4–10.8)
WBC NRBC COR # BLD: 12.26 10*3/MM3 (ref 3.4–10.8)
WBC NRBC COR # BLD: 12.47 10*3/MM3 (ref 3.4–10.8)
WBC NRBC COR # BLD: 14.73 10*3/MM3 (ref 3.4–10.8)
WBC NRBC COR # BLD: 15.16 10*3/MM3 (ref 3.4–10.8)
WBC NRBC COR # BLD: 15.54 10*3/MM3 (ref 3.4–10.8)
WBC NRBC COR # BLD: 16.02 10*3/MM3 (ref 3.4–10.8)
WBC NRBC COR # BLD: 5.21 10*3/MM3 (ref 3.4–10.8)
WBC NRBC COR # BLD: 9.72 10*3/MM3 (ref 3.4–10.8)
WBC NRBC COR # BLD: 9.85 10*3/MM3 (ref 3.4–10.8)
WHOLE BLOOD HOLD SPECIMEN: NORMAL
WHOLE BLOOD HOLD SPECIMEN: NORMAL

## 2019-01-01 PROCEDURE — 77014 CHG CT GUIDANCE RADIATION THERAPY FLDS PLACEMENT: CPT | Performed by: RADIOLOGY

## 2019-01-01 PROCEDURE — 85025 COMPLETE CBC W/AUTO DIFF WBC: CPT | Performed by: INTERNAL MEDICINE

## 2019-01-01 PROCEDURE — 97110 THERAPEUTIC EXERCISES: CPT

## 2019-01-01 PROCEDURE — 25010000002 ALBUMIN HUMAN 5% PER 50 ML: Performed by: NURSE ANESTHETIST, CERTIFIED REGISTERED

## 2019-01-01 PROCEDURE — 77386: CPT | Performed by: RADIOLOGY

## 2019-01-01 PROCEDURE — G0515 COGNITIVE SKILLS DEVELOPMENT: HCPCS

## 2019-01-01 PROCEDURE — 36415 COLL VENOUS BLD VENIPUNCTURE: CPT | Performed by: INTERNAL MEDICINE

## 2019-01-01 PROCEDURE — 77386 CHG INTENSITY MODULATED RADIATION TX DLVR COMPLEX: CPT | Performed by: RADIOLOGY

## 2019-01-01 PROCEDURE — 88334 PATH CONSLTJ SURG CYTO XM EA: CPT | Performed by: NEUROLOGICAL SURGERY

## 2019-01-01 PROCEDURE — 99285 EMERGENCY DEPT VISIT HI MDM: CPT

## 2019-01-01 PROCEDURE — 77300 RADIATION THERAPY DOSE PLAN: CPT | Performed by: RADIOLOGY

## 2019-01-01 PROCEDURE — 97535 SELF CARE MNGMENT TRAINING: CPT

## 2019-01-01 PROCEDURE — 25010000002 HYDROMORPHONE PER 4 MG: Performed by: INTERNAL MEDICINE

## 2019-01-01 PROCEDURE — 97165 OT EVAL LOW COMPLEX 30 MIN: CPT

## 2019-01-01 PROCEDURE — 63710000001 DEXAMETHASONE PER 0.25 MG: Performed by: INTERNAL MEDICINE

## 2019-01-01 PROCEDURE — 99232 SBSQ HOSP IP/OBS MODERATE 35: CPT | Performed by: INTERNAL MEDICINE

## 2019-01-01 PROCEDURE — 0 GADOBENATE DIMEGLUMINE 529 MG/ML SOLUTION: Performed by: INTERNAL MEDICINE

## 2019-01-01 PROCEDURE — 25010000002 PROPOFOL 10 MG/ML EMULSION: Performed by: NURSE ANESTHETIST, CERTIFIED REGISTERED

## 2019-01-01 PROCEDURE — 77338 DESIGN MLC DEVICE FOR IMRT: CPT | Performed by: RADIOLOGY

## 2019-01-01 PROCEDURE — 97166 OT EVAL MOD COMPLEX 45 MIN: CPT | Performed by: OCCUPATIONAL THERAPIST

## 2019-01-01 PROCEDURE — 72110 X-RAY EXAM L-2 SPINE 4/>VWS: CPT | Performed by: RADIOLOGY

## 2019-01-01 PROCEDURE — 80053 COMPREHEN METABOLIC PANEL: CPT | Performed by: INTERNAL MEDICINE

## 2019-01-01 PROCEDURE — 25010000002 DEXAMETHASONE PER 1 MG: Performed by: PHYSICIAN ASSISTANT

## 2019-01-01 PROCEDURE — 82962 GLUCOSE BLOOD TEST: CPT

## 2019-01-01 PROCEDURE — 25010000002 DEXAMETHASONE PER 1 MG: Performed by: NURSE ANESTHETIST, CERTIFIED REGISTERED

## 2019-01-01 PROCEDURE — 25010000002 DEXAMETHASONE PER 1 MG: Performed by: NEUROLOGICAL SURGERY

## 2019-01-01 PROCEDURE — 25010000002 FUROSEMIDE PER 20 MG: Performed by: INTERNAL MEDICINE

## 2019-01-01 PROCEDURE — 36416 COLLJ CAPILLARY BLOOD SPEC: CPT | Performed by: INTERNAL MEDICINE

## 2019-01-01 PROCEDURE — C1713 ANCHOR/SCREW BN/BN,TIS/BN: HCPCS | Performed by: NEUROLOGICAL SURGERY

## 2019-01-01 PROCEDURE — 99214 OFFICE O/P EST MOD 30 MIN: CPT | Performed by: INTERNAL MEDICINE

## 2019-01-01 PROCEDURE — 00B70ZZ EXCISION OF CEREBRAL HEMISPHERE, OPEN APPROACH: ICD-10-PCS | Performed by: NEUROLOGICAL SURGERY

## 2019-01-01 PROCEDURE — 61781 SCAN PROC CRANIAL INTRA: CPT | Performed by: NEUROLOGICAL SURGERY

## 2019-01-01 PROCEDURE — 99213 OFFICE O/P EST LOW 20 MIN: CPT | Performed by: NEUROLOGICAL SURGERY

## 2019-01-01 PROCEDURE — 25010000002 ONDANSETRON PER 1 MG: Performed by: INTERNAL MEDICINE

## 2019-01-01 PROCEDURE — 25010000002 LEVETIRACETAM IN NACL 0.82% 500 MG/100ML SOLUTION: Performed by: PHYSICIAN ASSISTANT

## 2019-01-01 PROCEDURE — 82947 ASSAY GLUCOSE BLOOD QUANT: CPT

## 2019-01-01 PROCEDURE — 77263 THER RADIOLOGY TX PLNG CPLX: CPT | Performed by: RADIOLOGY

## 2019-01-01 PROCEDURE — 77427 RADIATION TX MANAGEMENT X5: CPT | Performed by: RADIOLOGY

## 2019-01-01 PROCEDURE — 80061 LIPID PANEL: CPT | Performed by: INTERNAL MEDICINE

## 2019-01-01 PROCEDURE — 77336 RADIATION PHYSICS CONSULT: CPT | Performed by: RADIOLOGY

## 2019-01-01 PROCEDURE — 25010000002 FENTANYL CITRATE (PF) 100 MCG/2ML SOLUTION: Performed by: NURSE ANESTHETIST, CERTIFIED REGISTERED

## 2019-01-01 PROCEDURE — 85025 COMPLETE CBC W/AUTO DIFF WBC: CPT | Performed by: NEUROLOGICAL SURGERY

## 2019-01-01 PROCEDURE — 99215 OFFICE O/P EST HI 40 MIN: CPT | Performed by: INTERNAL MEDICINE

## 2019-01-01 PROCEDURE — 63710000001 DEXAMETHASONE PER 0.25 MG: Performed by: PHYSICIAN ASSISTANT

## 2019-01-01 PROCEDURE — 97162 PT EVAL MOD COMPLEX 30 MIN: CPT

## 2019-01-01 PROCEDURE — 96125 COGNITIVE TEST BY HC PRO: CPT

## 2019-01-01 PROCEDURE — 71260 CT THORAX DX C+: CPT

## 2019-01-01 PROCEDURE — 80053 COMPREHEN METABOLIC PANEL: CPT | Performed by: PHYSICIAN ASSISTANT

## 2019-01-01 PROCEDURE — 63710000001 DEXAMETHASONE PER 0.25 MG: Performed by: PHYSICAL MEDICINE & REHABILITATION

## 2019-01-01 PROCEDURE — 97116 GAIT TRAINING THERAPY: CPT | Performed by: PHYSICAL THERAPIST

## 2019-01-01 PROCEDURE — 70450 CT HEAD/BRAIN W/O DYE: CPT

## 2019-01-01 PROCEDURE — 99214 OFFICE O/P EST MOD 30 MIN: CPT | Performed by: PSYCHIATRY & NEUROLOGY

## 2019-01-01 PROCEDURE — 81003 URINALYSIS AUTO W/O SCOPE: CPT | Performed by: INTERNAL MEDICINE

## 2019-01-01 PROCEDURE — 25010000002 PHENYLEPHRINE PER 1 ML: Performed by: NURSE ANESTHETIST, CERTIFIED REGISTERED

## 2019-01-01 PROCEDURE — 77334 RADIATION TREATMENT AID(S): CPT | Performed by: RADIOLOGY

## 2019-01-01 PROCEDURE — 25010000002 DEXAMETHASONE SODIUM PHOSPHATE 10 MG/ML SOLUTION 1 ML VIAL: Performed by: EMERGENCY MEDICINE

## 2019-01-01 PROCEDURE — 70553 MRI BRAIN STEM W/O & W/DYE: CPT

## 2019-01-01 PROCEDURE — 85014 HEMATOCRIT: CPT

## 2019-01-01 PROCEDURE — 36415 COLL VENOUS BLD VENIPUNCTURE: CPT

## 2019-01-01 PROCEDURE — 82803 BLOOD GASES ANY COMBINATION: CPT

## 2019-01-01 PROCEDURE — A9577 INJ MULTIHANCE: HCPCS | Performed by: INTERNAL MEDICINE

## 2019-01-01 PROCEDURE — 99024 POSTOP FOLLOW-UP VISIT: CPT | Performed by: NURSE PRACTITIONER

## 2019-01-01 PROCEDURE — 36415 COLL VENOUS BLD VENIPUNCTURE: CPT | Performed by: PSYCHIATRY & NEUROLOGY

## 2019-01-01 PROCEDURE — 85025 COMPLETE CBC W/AUTO DIFF WBC: CPT

## 2019-01-01 PROCEDURE — 25010000003 CEFAZOLIN IN DEXTROSE 2-4 GM/100ML-% SOLUTION: Performed by: NEUROLOGICAL SURGERY

## 2019-01-01 PROCEDURE — 25010000003 LEVETIRACETAM IN NACL 0.75% 1000 MG/100ML SOLUTION: Performed by: NEUROLOGICAL SURGERY

## 2019-01-01 PROCEDURE — 77301 RADIOTHERAPY DOSE PLAN IMRT: CPT | Performed by: RADIOLOGY

## 2019-01-01 PROCEDURE — 63710000001 INSULIN LISPRO (HUMAN) PER 5 UNITS: Performed by: INTERNAL MEDICINE

## 2019-01-01 PROCEDURE — 86900 BLOOD TYPING SEROLOGIC ABO: CPT | Performed by: ANESTHESIOLOGY

## 2019-01-01 PROCEDURE — 93005 ELECTROCARDIOGRAM TRACING: CPT | Performed by: NURSE PRACTITIONER

## 2019-01-01 PROCEDURE — 85018 HEMOGLOBIN: CPT

## 2019-01-01 PROCEDURE — 25010000002 IOPAMIDOL 61 % SOLUTION: Performed by: INTERNAL MEDICINE

## 2019-01-01 PROCEDURE — 99214 OFFICE O/P EST MOD 30 MIN: CPT | Performed by: RADIOLOGY

## 2019-01-01 PROCEDURE — 97161 PT EVAL LOW COMPLEX 20 MIN: CPT

## 2019-01-01 PROCEDURE — 84443 ASSAY THYROID STIM HORMONE: CPT | Performed by: INTERNAL MEDICINE

## 2019-01-01 PROCEDURE — 25010000002 FENTANYL CITRATE (PF) 100 MCG/2ML SOLUTION: Performed by: ANESTHESIOLOGY

## 2019-01-01 PROCEDURE — 80053 COMPREHEN METABOLIC PANEL: CPT

## 2019-01-01 PROCEDURE — 80048 BASIC METABOLIC PNL TOTAL CA: CPT | Performed by: NEUROLOGICAL SURGERY

## 2019-01-01 PROCEDURE — 99232 SBSQ HOSP IP/OBS MODERATE 35: CPT | Performed by: NEUROLOGICAL SURGERY

## 2019-01-01 PROCEDURE — 82565 ASSAY OF CREATININE: CPT

## 2019-01-01 PROCEDURE — 85025 COMPLETE CBC W/AUTO DIFF WBC: CPT | Performed by: PHYSICIAN ASSISTANT

## 2019-01-01 PROCEDURE — 99024 POSTOP FOLLOW-UP VISIT: CPT | Performed by: PHYSICIAN ASSISTANT

## 2019-01-01 PROCEDURE — 99213 OFFICE O/P EST LOW 20 MIN: CPT | Performed by: NURSE PRACTITIONER

## 2019-01-01 PROCEDURE — 61510 CRNEC TREPH EXC BRN TUM STTL: CPT | Performed by: NEUROLOGICAL SURGERY

## 2019-01-01 PROCEDURE — P9041 ALBUMIN (HUMAN),5%, 50ML: HCPCS | Performed by: NURSE ANESTHETIST, CERTIFIED REGISTERED

## 2019-01-01 PROCEDURE — 82274 ASSAY TEST FOR BLOOD FECAL: CPT | Performed by: INTERNAL MEDICINE

## 2019-01-01 PROCEDURE — 99254 IP/OBS CNSLTJ NEW/EST MOD 60: CPT | Performed by: PHYSICIAN ASSISTANT

## 2019-01-01 PROCEDURE — 25010000002 ONDANSETRON PER 1 MG: Performed by: EMERGENCY MEDICINE

## 2019-01-01 PROCEDURE — 97535 SELF CARE MNGMENT TRAINING: CPT | Performed by: OCCUPATIONAL THERAPIST

## 2019-01-01 PROCEDURE — 99254 IP/OBS CNSLTJ NEW/EST MOD 60: CPT | Performed by: INTERNAL MEDICINE

## 2019-01-01 PROCEDURE — 85027 COMPLETE CBC AUTOMATED: CPT | Performed by: NURSE PRACTITIONER

## 2019-01-01 PROCEDURE — 0 GADOBENATE DIMEGLUMINE 529 MG/ML SOLUTION: Performed by: PHYSICIAN ASSISTANT

## 2019-01-01 PROCEDURE — 93010 ELECTROCARDIOGRAM REPORT: CPT | Performed by: INTERNAL MEDICINE

## 2019-01-01 PROCEDURE — 86850 RBC ANTIBODY SCREEN: CPT | Performed by: ANESTHESIOLOGY

## 2019-01-01 PROCEDURE — 86901 BLOOD TYPING SEROLOGIC RH(D): CPT | Performed by: ANESTHESIOLOGY

## 2019-01-01 PROCEDURE — 69990 MICROSURGERY ADD-ON: CPT | Performed by: NEUROLOGICAL SURGERY

## 2019-01-01 PROCEDURE — 99253 IP/OBS CNSLTJ NEW/EST LOW 45: CPT | Performed by: RADIOLOGY

## 2019-01-01 PROCEDURE — 25010000002 HYDROMORPHONE 1 MG/ML SOLUTION: Performed by: EMERGENCY MEDICINE

## 2019-01-01 PROCEDURE — 25010000002 FUROSEMIDE PER 20 MG: Performed by: NURSE ANESTHETIST, CERTIFIED REGISTERED

## 2019-01-01 PROCEDURE — 88331 PATH CONSLTJ SURG 1 BLK 1SPC: CPT | Performed by: NEUROLOGICAL SURGERY

## 2019-01-01 PROCEDURE — 85610 PROTHROMBIN TIME: CPT | Performed by: PHYSICIAN ASSISTANT

## 2019-01-01 PROCEDURE — 25010000002 MIDAZOLAM PER 1 MG: Performed by: ANESTHESIOLOGY

## 2019-01-01 PROCEDURE — 72110 X-RAY EXAM L-2 SPINE 4/>VWS: CPT | Performed by: INTERNAL MEDICINE

## 2019-01-01 PROCEDURE — 99213 OFFICE O/P EST LOW 20 MIN: CPT | Performed by: INTERNAL MEDICINE

## 2019-01-01 PROCEDURE — 99253 IP/OBS CNSLTJ NEW/EST LOW 45: CPT | Performed by: PSYCHIATRY & NEUROLOGY

## 2019-01-01 PROCEDURE — 99233 SBSQ HOSP IP/OBS HIGH 50: CPT | Performed by: INTERNAL MEDICINE

## 2019-01-01 PROCEDURE — 74177 CT ABD & PELVIS W/CONTRAST: CPT

## 2019-01-01 PROCEDURE — 85652 RBC SED RATE AUTOMATED: CPT | Performed by: PHYSICIAN ASSISTANT

## 2019-01-01 PROCEDURE — 25010000002 NEOSTIGMINE PER 0.5 MG: Performed by: NURSE ANESTHETIST, CERTIFIED REGISTERED

## 2019-01-01 PROCEDURE — A9577 INJ MULTIHANCE: HCPCS | Performed by: PHYSICIAN ASSISTANT

## 2019-01-01 PROCEDURE — 85025 COMPLETE CBC W/AUTO DIFF WBC: CPT | Performed by: PSYCHIATRY & NEUROLOGY

## 2019-01-01 PROCEDURE — 99213 OFFICE O/P EST LOW 20 MIN: CPT | Performed by: PSYCHIATRY & NEUROLOGY

## 2019-01-01 PROCEDURE — 80048 BASIC METABOLIC PNL TOTAL CA: CPT | Performed by: INTERNAL MEDICINE

## 2019-01-01 PROCEDURE — G0463 HOSPITAL OUTPT CLINIC VISIT: HCPCS | Performed by: PSYCHIATRY & NEUROLOGY

## 2019-01-01 PROCEDURE — 25010000002 ONDANSETRON PER 1 MG: Performed by: NURSE ANESTHETIST, CERTIFIED REGISTERED

## 2019-01-01 PROCEDURE — 88307 TISSUE EXAM BY PATHOLOGIST: CPT | Performed by: NEUROLOGICAL SURGERY

## 2019-01-01 PROCEDURE — 99215 OFFICE O/P EST HI 40 MIN: CPT | Performed by: PSYCHIATRY & NEUROLOGY

## 2019-01-01 DEVICE — DURAGEN® PLUS DURAL REGENERATION MATRIX , 4 IN X 5 IN
Type: IMPLANTABLE DEVICE | Site: CRANIAL | Status: FUNCTIONAL
Brand: DURAGEN® PLUS

## 2019-01-01 DEVICE — PLT CMF LEVELONE LP NEURO CRV SQ SEG TI 3X2HL .6X1.5MM: Type: IMPLANTABLE DEVICE | Site: CRANIAL | Status: FUNCTIONAL

## 2019-01-01 DEVICE — DURASEAL® DURAL SEALANT SYSTEM 5ML 5 PACK
Type: IMPLANTABLE DEVICE | Site: CRANIAL | Status: FUNCTIONAL
Brand: DURASEAL®

## 2019-01-01 DEVICE — SCRW CRS/DRV DRL FREE MICRO TI 1.5X4MM: Type: IMPLANTABLE DEVICE | Site: CRANIAL | Status: FUNCTIONAL

## 2019-01-01 RX ORDER — ACETAMINOPHEN 325 MG/1
650 TABLET ORAL EVERY 4 HOURS PRN
Status: CANCELLED | OUTPATIENT
Start: 2019-01-01

## 2019-01-01 RX ORDER — ONDANSETRON 4 MG/1
4 TABLET, FILM COATED ORAL EVERY 6 HOURS PRN
Status: DISCONTINUED | OUTPATIENT
Start: 2019-01-01 | End: 2019-01-01 | Stop reason: HOSPADM

## 2019-01-01 RX ORDER — FENTANYL CITRATE 50 UG/ML
50 INJECTION, SOLUTION INTRAMUSCULAR; INTRAVENOUS
Status: DISCONTINUED | OUTPATIENT
Start: 2019-01-01 | End: 2019-01-01 | Stop reason: HOSPADM

## 2019-01-01 RX ORDER — FENTANYL CITRATE 50 UG/ML
INJECTION, SOLUTION INTRAMUSCULAR; INTRAVENOUS
Status: COMPLETED
Start: 2019-01-01 | End: 2019-01-01

## 2019-01-01 RX ORDER — SODIUM CHLORIDE, SODIUM ACETATE ANHYDROUS, SODIUM GLUCONATE, POTASSIUM CHLORIDE, AND MAGNESIUM CHLORIDE 526; 222; 502; 37; 30 MG/100ML; MG/100ML; MG/100ML; MG/100ML; MG/100ML
IRRIGANT IRRIGATION AS NEEDED
Status: DISCONTINUED | OUTPATIENT
Start: 2019-01-01 | End: 2019-01-01 | Stop reason: HOSPADM

## 2019-01-01 RX ORDER — PROMETHAZINE HYDROCHLORIDE 25 MG/ML
12.5 INJECTION, SOLUTION INTRAMUSCULAR; INTRAVENOUS ONCE AS NEEDED
Status: DISCONTINUED | OUTPATIENT
Start: 2019-01-01 | End: 2019-01-01 | Stop reason: HOSPADM

## 2019-01-01 RX ORDER — FLUMAZENIL 0.1 MG/ML
0.2 INJECTION INTRAVENOUS AS NEEDED
Status: DISCONTINUED | OUTPATIENT
Start: 2019-01-01 | End: 2019-01-01 | Stop reason: HOSPADM

## 2019-01-01 RX ORDER — SODIUM CHLORIDE 0.9 % (FLUSH) 0.9 %
1-10 SYRINGE (ML) INJECTION AS NEEDED
Status: DISCONTINUED | OUTPATIENT
Start: 2019-01-01 | End: 2019-01-01 | Stop reason: HOSPADM

## 2019-01-01 RX ORDER — ONDANSETRON 2 MG/ML
INJECTION INTRAMUSCULAR; INTRAVENOUS AS NEEDED
Status: DISCONTINUED | OUTPATIENT
Start: 2019-01-01 | End: 2019-01-01 | Stop reason: SURG

## 2019-01-01 RX ORDER — BUPIVACAINE HYDROCHLORIDE AND EPINEPHRINE 2.5; 5 MG/ML; UG/ML
INJECTION, SOLUTION INFILTRATION; PERINEURAL AS NEEDED
Status: DISCONTINUED | OUTPATIENT
Start: 2019-01-01 | End: 2019-01-01 | Stop reason: HOSPADM

## 2019-01-01 RX ORDER — ACETAMINOPHEN 160 MG
TABLET,DISINTEGRATING ORAL AS NEEDED
Status: DISCONTINUED | OUTPATIENT
Start: 2019-01-01 | End: 2019-01-01 | Stop reason: HOSPADM

## 2019-01-01 RX ORDER — HYDROCODONE BITARTRATE AND ACETAMINOPHEN 5; 325 MG/1; MG/1
1 TABLET ORAL EVERY 6 HOURS PRN
Status: DISCONTINUED | OUTPATIENT
Start: 2019-01-01 | End: 2019-01-01

## 2019-01-01 RX ORDER — SODIUM CHLORIDE 0.9 % (FLUSH) 0.9 %
10 SYRINGE (ML) INJECTION AS NEEDED
Status: DISCONTINUED | OUTPATIENT
Start: 2019-01-01 | End: 2019-01-01 | Stop reason: HOSPADM

## 2019-01-01 RX ORDER — SIMVASTATIN 10 MG
40 TABLET ORAL NIGHTLY
Status: ON HOLD | COMMUNITY
End: 2019-01-01

## 2019-01-01 RX ORDER — BACITRACIN, NEOMYCIN, POLYMYXIN B 400; 3.5; 5 [USP'U]/G; MG/G; [USP'U]/G
OINTMENT TOPICAL AS NEEDED
Status: DISCONTINUED | OUTPATIENT
Start: 2019-01-01 | End: 2019-01-01 | Stop reason: HOSPADM

## 2019-01-01 RX ORDER — ONDANSETRON 4 MG/1
4 TABLET, ORALLY DISINTEGRATING ORAL EVERY 6 HOURS PRN
Status: DISCONTINUED | OUTPATIENT
Start: 2019-01-01 | End: 2019-01-01 | Stop reason: HOSPADM

## 2019-01-01 RX ORDER — TEMOZOLOMIDE 5 MG/1
5 CAPSULE ORAL DAILY
Qty: 42 CAPSULE | Refills: 0 | Status: SHIPPED | OUTPATIENT
Start: 2019-01-01 | End: 2019-01-01 | Stop reason: SDUPTHER

## 2019-01-01 RX ORDER — METOPROLOL TARTRATE 50 MG/1
50 TABLET, FILM COATED ORAL 2 TIMES DAILY
Status: ON HOLD | COMMUNITY
End: 2019-01-01

## 2019-01-01 RX ORDER — ALBUMIN, HUMAN INJ 5% 5 %
SOLUTION INTRAVENOUS
Status: COMPLETED
Start: 2019-01-01 | End: 2019-01-01

## 2019-01-01 RX ORDER — DEXAMETHASONE 2 MG/1
2 TABLET ORAL
COMMUNITY
Start: 2019-01-01

## 2019-01-01 RX ORDER — SODIUM CHLORIDE 0.9 % (FLUSH) 0.9 %
10 SYRINGE (ML) INJECTION AS NEEDED
Status: CANCELLED | OUTPATIENT
Start: 2019-01-01

## 2019-01-01 RX ORDER — MIDAZOLAM HYDROCHLORIDE 1 MG/ML
2 INJECTION INTRAMUSCULAR; INTRAVENOUS
Status: DISCONTINUED | OUTPATIENT
Start: 2019-01-01 | End: 2019-01-01 | Stop reason: HOSPADM

## 2019-01-01 RX ORDER — HYDROMORPHONE HYDROCHLORIDE 1 MG/ML
0.5 INJECTION, SOLUTION INTRAMUSCULAR; INTRAVENOUS; SUBCUTANEOUS
Status: DISCONTINUED | OUTPATIENT
Start: 2019-01-01 | End: 2019-01-01 | Stop reason: HOSPADM

## 2019-01-01 RX ORDER — SODIUM CHLORIDE 0.9 % (FLUSH) 0.9 %
3-10 SYRINGE (ML) INJECTION AS NEEDED
Status: DISCONTINUED | OUTPATIENT
Start: 2019-01-01 | End: 2019-01-01 | Stop reason: HOSPADM

## 2019-01-01 RX ORDER — TAMSULOSIN HYDROCHLORIDE 0.4 MG/1
0.4 CAPSULE ORAL DAILY
Status: DISCONTINUED | OUTPATIENT
Start: 2019-01-01 | End: 2019-01-01 | Stop reason: HOSPADM

## 2019-01-01 RX ORDER — LEVETIRACETAM 5 MG/ML
500 INJECTION INTRAVASCULAR EVERY 12 HOURS SCHEDULED
Status: DISCONTINUED | OUTPATIENT
Start: 2019-01-01 | End: 2019-01-01

## 2019-01-01 RX ORDER — SULFAMETHOXAZOLE AND TRIMETHOPRIM 800; 160 MG/1; MG/1
1 TABLET ORAL EVERY OTHER DAY
COMMUNITY
End: 2019-01-01

## 2019-01-01 RX ORDER — FUROSEMIDE 10 MG/ML
40 INJECTION INTRAMUSCULAR; INTRAVENOUS ONCE
Status: COMPLETED | OUTPATIENT
Start: 2019-01-01 | End: 2019-01-01

## 2019-01-01 RX ORDER — SENNA AND DOCUSATE SODIUM 50; 8.6 MG/1; MG/1
2 TABLET, FILM COATED ORAL 2 TIMES DAILY
Status: DISCONTINUED | OUTPATIENT
Start: 2019-01-01 | End: 2019-01-01 | Stop reason: HOSPADM

## 2019-01-01 RX ORDER — GLYCOPYRROLATE 0.2 MG/ML
INJECTION INTRAMUSCULAR; INTRAVENOUS AS NEEDED
Status: DISCONTINUED | OUTPATIENT
Start: 2019-01-01 | End: 2019-01-01 | Stop reason: SURG

## 2019-01-01 RX ORDER — SENNA AND DOCUSATE SODIUM 50; 8.6 MG/1; MG/1
1 TABLET, FILM COATED ORAL NIGHTLY PRN
Status: CANCELLED | OUTPATIENT
Start: 2019-01-01

## 2019-01-01 RX ORDER — FAMOTIDINE 20 MG/1
20 TABLET, FILM COATED ORAL 2 TIMES DAILY
Status: DISCONTINUED | OUTPATIENT
Start: 2019-01-01 | End: 2019-01-01 | Stop reason: HOSPADM

## 2019-01-01 RX ORDER — TAMSULOSIN HYDROCHLORIDE 0.4 MG/1
0.4 CAPSULE ORAL DAILY
Qty: 30 CAPSULE | Refills: 0 | Status: SHIPPED | OUTPATIENT
Start: 2019-01-01 | End: 2019-01-01 | Stop reason: SDUPTHER

## 2019-01-01 RX ORDER — FAMOTIDINE 10 MG/ML
20 INJECTION, SOLUTION INTRAVENOUS ONCE
Status: COMPLETED | OUTPATIENT
Start: 2019-01-01 | End: 2019-01-01

## 2019-01-01 RX ORDER — DEXAMETHASONE 6 MG/1
6 TABLET ORAL EVERY 12 HOURS SCHEDULED
Qty: 14 TABLET | Refills: 0 | Status: SHIPPED | OUTPATIENT
Start: 2019-01-01 | End: 2019-01-01

## 2019-01-01 RX ORDER — DEXAMETHASONE 4 MG/1
4 TABLET ORAL EVERY 6 HOURS SCHEDULED
Status: DISCONTINUED | OUTPATIENT
Start: 2019-01-01 | End: 2019-01-01 | Stop reason: HOSPADM

## 2019-01-01 RX ORDER — DEXAMETHASONE SODIUM PHOSPHATE 10 MG/ML
INJECTION INTRAMUSCULAR; INTRAVENOUS AS NEEDED
Status: DISCONTINUED | OUTPATIENT
Start: 2019-01-01 | End: 2019-01-01 | Stop reason: SURG

## 2019-01-01 RX ORDER — KETOROLAC TROMETHAMINE 15 MG/ML
10 INJECTION, SOLUTION INTRAMUSCULAR; INTRAVENOUS ONCE
Status: DISCONTINUED | OUTPATIENT
Start: 2019-01-01 | End: 2019-01-01

## 2019-01-01 RX ORDER — HYDROMORPHONE HYDROCHLORIDE 1 MG/ML
0.5 INJECTION, SOLUTION INTRAMUSCULAR; INTRAVENOUS; SUBCUTANEOUS EVERY 4 HOURS PRN
Status: DISCONTINUED | OUTPATIENT
Start: 2019-01-01 | End: 2019-01-01

## 2019-01-01 RX ORDER — ZOLPIDEM TARTRATE 5 MG/1
5 TABLET ORAL NIGHTLY PRN
Status: CANCELLED | OUTPATIENT
Start: 2019-01-01 | End: 2019-01-01

## 2019-01-01 RX ORDER — SENNA AND DOCUSATE SODIUM 50; 8.6 MG/1; MG/1
2 TABLET, FILM COATED ORAL NIGHTLY
Status: DISCONTINUED | OUTPATIENT
Start: 2019-01-01 | End: 2019-01-01

## 2019-01-01 RX ORDER — LABETALOL HYDROCHLORIDE 5 MG/ML
5 INJECTION, SOLUTION INTRAVENOUS
Status: DISCONTINUED | OUTPATIENT
Start: 2019-01-01 | End: 2019-01-01 | Stop reason: HOSPADM

## 2019-01-01 RX ORDER — FAMOTIDINE 20 MG/1
20 TABLET, FILM COATED ORAL 2 TIMES DAILY
Qty: 60 TABLET | Refills: 0 | Status: SHIPPED | OUTPATIENT
Start: 2019-01-01 | End: 2019-01-01

## 2019-01-01 RX ORDER — LEVETIRACETAM 500 MG/1
500 TABLET ORAL EVERY 12 HOURS SCHEDULED
Status: CANCELLED | OUTPATIENT
Start: 2019-01-01

## 2019-01-01 RX ORDER — DEXTROSE MONOHYDRATE 25 G/50ML
25 INJECTION, SOLUTION INTRAVENOUS
Status: DISCONTINUED | OUTPATIENT
Start: 2019-01-01 | End: 2019-01-01

## 2019-01-01 RX ORDER — ROCURONIUM BROMIDE 10 MG/ML
INJECTION, SOLUTION INTRAVENOUS AS NEEDED
Status: DISCONTINUED | OUTPATIENT
Start: 2019-01-01 | End: 2019-01-01 | Stop reason: SURG

## 2019-01-01 RX ORDER — ONDANSETRON 2 MG/ML
4 INJECTION INTRAMUSCULAR; INTRAVENOUS EVERY 6 HOURS PRN
Status: DISCONTINUED | OUTPATIENT
Start: 2019-01-01 | End: 2019-01-01 | Stop reason: HOSPADM

## 2019-01-01 RX ORDER — FENTANYL CITRATE 50 UG/ML
INJECTION, SOLUTION INTRAMUSCULAR; INTRAVENOUS AS NEEDED
Status: DISCONTINUED | OUTPATIENT
Start: 2019-01-01 | End: 2019-01-01 | Stop reason: SURG

## 2019-01-01 RX ORDER — HYDROCODONE BITARTRATE AND ACETAMINOPHEN 5; 325 MG/1; MG/1
2 TABLET ORAL EVERY 6 HOURS PRN
Status: DISCONTINUED | OUTPATIENT
Start: 2019-01-01 | End: 2019-01-01 | Stop reason: HOSPADM

## 2019-01-01 RX ORDER — SODIUM CHLORIDE 0.9 % (FLUSH) 0.9 %
3 SYRINGE (ML) INJECTION EVERY 12 HOURS SCHEDULED
Status: DISCONTINUED | OUTPATIENT
Start: 2019-01-01 | End: 2019-01-01

## 2019-01-01 RX ORDER — CLONIDINE HYDROCHLORIDE 0.1 MG/1
0.1 TABLET ORAL 2 TIMES DAILY
Status: ON HOLD | COMMUNITY
End: 2019-01-01

## 2019-01-01 RX ORDER — FAMOTIDINE 10 MG/ML
20 INJECTION, SOLUTION INTRAVENOUS EVERY 12 HOURS SCHEDULED
Status: DISCONTINUED | OUTPATIENT
Start: 2019-01-01 | End: 2019-01-01

## 2019-01-01 RX ORDER — ONDANSETRON 2 MG/ML
4 INJECTION INTRAMUSCULAR; INTRAVENOUS ONCE
Status: COMPLETED | OUTPATIENT
Start: 2019-01-01 | End: 2019-01-01

## 2019-01-01 RX ORDER — NALOXONE HCL 0.4 MG/ML
0.4 VIAL (ML) INJECTION
Status: DISCONTINUED | OUTPATIENT
Start: 2019-01-01 | End: 2019-01-01 | Stop reason: HOSPADM

## 2019-01-01 RX ORDER — ACETAMINOPHEN 325 MG/1
650 TABLET ORAL EVERY 4 HOURS PRN
Status: DISCONTINUED | OUTPATIENT
Start: 2019-01-01 | End: 2019-01-01 | Stop reason: HOSPADM

## 2019-01-01 RX ORDER — NALOXONE HCL 0.4 MG/ML
0.4 VIAL (ML) INJECTION
Status: DISCONTINUED | OUTPATIENT
Start: 2019-01-01 | End: 2019-01-01

## 2019-01-01 RX ORDER — FAMOTIDINE 20 MG/1
20 TABLET, FILM COATED ORAL 2 TIMES DAILY
COMMUNITY

## 2019-01-01 RX ORDER — ACETAMINOPHEN 325 MG/1
650 TABLET ORAL ONCE AS NEEDED
Status: DISCONTINUED | OUTPATIENT
Start: 2019-01-01 | End: 2019-01-01 | Stop reason: HOSPADM

## 2019-01-01 RX ORDER — CEFAZOLIN SODIUM 2 G/100ML
2 INJECTION, SOLUTION INTRAVENOUS EVERY 8 HOURS
Status: COMPLETED | OUTPATIENT
Start: 2019-01-01 | End: 2019-01-01

## 2019-01-01 RX ORDER — TAMSULOSIN HYDROCHLORIDE 0.4 MG/1
CAPSULE ORAL
Qty: 60 CAPSULE | Refills: 12 | Status: SHIPPED | OUTPATIENT
Start: 2019-01-01

## 2019-01-01 RX ORDER — DOCUSATE SODIUM 100 MG/1
100 CAPSULE, LIQUID FILLED ORAL 2 TIMES DAILY PRN
Status: DISCONTINUED | OUTPATIENT
Start: 2019-01-01 | End: 2019-01-01

## 2019-01-01 RX ORDER — CALCIUM CARBONATE 200(500)MG
2 TABLET,CHEWABLE ORAL 3 TIMES DAILY PRN
Status: DISCONTINUED | OUTPATIENT
Start: 2019-01-01 | End: 2019-01-01

## 2019-01-01 RX ORDER — LOSARTAN POTASSIUM 50 MG/1
100 TABLET ORAL DAILY
Status: ON HOLD | COMMUNITY
End: 2019-01-01

## 2019-01-01 RX ORDER — EPHEDRINE SULFATE 50 MG/ML
5 INJECTION, SOLUTION INTRAVENOUS ONCE AS NEEDED
Status: DISCONTINUED | OUTPATIENT
Start: 2019-01-01 | End: 2019-01-01 | Stop reason: HOSPADM

## 2019-01-01 RX ORDER — SODIUM CHLORIDE 9 MG/ML
100 INJECTION, SOLUTION INTRAVENOUS CONTINUOUS
Status: DISCONTINUED | OUTPATIENT
Start: 2019-01-01 | End: 2019-01-01

## 2019-01-01 RX ORDER — DEXAMETHASONE SODIUM PHOSPHATE 4 MG/ML
INJECTION, SOLUTION INTRA-ARTICULAR; INTRALESIONAL; INTRAMUSCULAR; INTRAVENOUS; SOFT TISSUE
Status: DISPENSED
Start: 2019-01-01 | End: 2019-01-01

## 2019-01-01 RX ORDER — PROMETHAZINE HYDROCHLORIDE 25 MG/1
25 SUPPOSITORY RECTAL ONCE AS NEEDED
Status: DISCONTINUED | OUTPATIENT
Start: 2019-01-01 | End: 2019-01-01 | Stop reason: HOSPADM

## 2019-01-01 RX ORDER — FENTANYL CITRATE 50 UG/ML
50 INJECTION, SOLUTION INTRAMUSCULAR; INTRAVENOUS
Status: CANCELLED | OUTPATIENT
Start: 2019-01-01 | End: 2019-01-01

## 2019-01-01 RX ORDER — DEXAMETHASONE 4 MG/1
4 TABLET ORAL EVERY 6 HOURS SCHEDULED
Status: DISCONTINUED | OUTPATIENT
Start: 2019-01-01 | End: 2019-01-01

## 2019-01-01 RX ORDER — BISACODYL 10 MG
10 SUPPOSITORY, RECTAL RECTAL DAILY PRN
Status: CANCELLED | OUTPATIENT
Start: 2019-01-01

## 2019-01-01 RX ORDER — MIDAZOLAM HYDROCHLORIDE 1 MG/ML
1 INJECTION INTRAMUSCULAR; INTRAVENOUS
Status: DISCONTINUED | OUTPATIENT
Start: 2019-01-01 | End: 2019-01-01 | Stop reason: HOSPADM

## 2019-01-01 RX ORDER — NICOTINE POLACRILEX 4 MG
15 LOZENGE BUCCAL
Status: DISCONTINUED | OUTPATIENT
Start: 2019-01-01 | End: 2019-01-01

## 2019-01-01 RX ORDER — FAMOTIDINE 20 MG/1
20 TABLET, FILM COATED ORAL 2 TIMES DAILY
Status: CANCELLED | OUTPATIENT
Start: 2019-01-01

## 2019-01-01 RX ORDER — DIPHENHYDRAMINE HCL 25 MG
25 CAPSULE ORAL
Status: DISCONTINUED | OUTPATIENT
Start: 2019-01-01 | End: 2019-01-01 | Stop reason: HOSPADM

## 2019-01-01 RX ORDER — METHYLPREDNISOLONE 4 MG/1
TABLET ORAL
Qty: 21 TABLET | Refills: 0 | Status: ON HOLD | OUTPATIENT
Start: 2019-01-01 | End: 2019-01-01

## 2019-01-01 RX ORDER — DEXAMETHASONE 4 MG/1
2 TABLET ORAL
Qty: 60 TABLET | Refills: 1 | Status: SHIPPED | OUTPATIENT
Start: 2019-01-01 | End: 2019-01-01 | Stop reason: SDUPTHER

## 2019-01-01 RX ORDER — WARFARIN SODIUM 5 MG/1
5 TABLET ORAL
Status: ON HOLD | COMMUNITY
End: 2019-01-01

## 2019-01-01 RX ORDER — DILTIAZEM HYDROCHLORIDE 240 MG/1
240 CAPSULE, COATED, EXTENDED RELEASE ORAL DAILY
Status: ON HOLD | COMMUNITY
End: 2019-01-01

## 2019-01-01 RX ORDER — LEVETIRACETAM 500 MG/1
500 TABLET ORAL EVERY 12 HOURS SCHEDULED
Status: DISCONTINUED | OUTPATIENT
Start: 2019-01-01 | End: 2019-01-01 | Stop reason: HOSPADM

## 2019-01-01 RX ORDER — CALCIUM CARBONATE 200(500)MG
2 TABLET,CHEWABLE ORAL 3 TIMES DAILY PRN
Status: DISCONTINUED | OUTPATIENT
Start: 2019-01-01 | End: 2019-01-01 | Stop reason: HOSPADM

## 2019-01-01 RX ORDER — ZOLPIDEM TARTRATE 5 MG/1
5 TABLET ORAL NIGHTLY PRN
Status: DISCONTINUED | OUTPATIENT
Start: 2019-01-01 | End: 2019-01-01 | Stop reason: HOSPADM

## 2019-01-01 RX ORDER — DOCUSATE SODIUM 100 MG/1
100 CAPSULE, LIQUID FILLED ORAL 2 TIMES DAILY
Status: DISCONTINUED | OUTPATIENT
Start: 2019-01-01 | End: 2019-01-01

## 2019-01-01 RX ORDER — DEXAMETHASONE 1 MG
1 TABLET ORAL
Qty: 60 TABLET | Refills: 2 | Status: SHIPPED | OUTPATIENT
Start: 2019-01-01 | End: 2019-01-01

## 2019-01-01 RX ORDER — HYDROCODONE BITARTRATE AND ACETAMINOPHEN 5; 325 MG/1; MG/1
1 TABLET ORAL EVERY 4 HOURS PRN
Status: DISCONTINUED | OUTPATIENT
Start: 2019-01-01 | End: 2019-01-01

## 2019-01-01 RX ORDER — LIDOCAINE HYDROCHLORIDE 10 MG/ML
0.5 INJECTION, SOLUTION EPIDURAL; INFILTRATION; INTRACAUDAL; PERINEURAL ONCE AS NEEDED
Status: DISCONTINUED | OUTPATIENT
Start: 2019-01-01 | End: 2019-01-01 | Stop reason: HOSPADM

## 2019-01-01 RX ORDER — ONDANSETRON 4 MG/1
4 TABLET, ORALLY DISINTEGRATING ORAL EVERY 6 HOURS PRN
Status: CANCELLED | OUTPATIENT
Start: 2019-01-01

## 2019-01-01 RX ORDER — ISOSORBIDE MONONITRATE 30 MG/1
30 TABLET, EXTENDED RELEASE ORAL DAILY
Status: ON HOLD | COMMUNITY
End: 2019-01-01

## 2019-01-01 RX ORDER — ESCITALOPRAM OXALATE 10 MG/1
10 TABLET ORAL DAILY
Status: ON HOLD | COMMUNITY
End: 2019-01-01

## 2019-01-01 RX ORDER — SODIUM CHLORIDE, SODIUM LACTATE, POTASSIUM CHLORIDE, CALCIUM CHLORIDE 600; 310; 30; 20 MG/100ML; MG/100ML; MG/100ML; MG/100ML
9 INJECTION, SOLUTION INTRAVENOUS CONTINUOUS
Status: DISCONTINUED | OUTPATIENT
Start: 2019-01-01 | End: 2019-01-01

## 2019-01-01 RX ORDER — LIDOCAINE HYDROCHLORIDE 20 MG/ML
INJECTION, SOLUTION INFILTRATION; PERINEURAL AS NEEDED
Status: DISCONTINUED | OUTPATIENT
Start: 2019-01-01 | End: 2019-01-01 | Stop reason: SURG

## 2019-01-01 RX ORDER — ALBUMIN, HUMAN INJ 5% 5 %
SOLUTION INTRAVENOUS CONTINUOUS PRN
Status: DISCONTINUED | OUTPATIENT
Start: 2019-01-01 | End: 2019-01-01 | Stop reason: SURG

## 2019-01-01 RX ORDER — ONDANSETRON 4 MG/1
4 TABLET, ORALLY DISINTEGRATING ORAL EVERY 6 HOURS PRN
Qty: 30 TABLET | Refills: 0 | Status: SHIPPED | OUTPATIENT
Start: 2019-01-01 | End: 2019-01-01

## 2019-01-01 RX ORDER — DEXAMETHASONE 4 MG/1
4 TABLET ORAL 2 TIMES DAILY
Qty: 40 TABLET | Refills: 1 | Status: SHIPPED | OUTPATIENT
Start: 2019-01-01 | End: 2019-01-01 | Stop reason: SDUPTHER

## 2019-01-01 RX ORDER — TEMOZOLOMIDE 140 MG/1
140 CAPSULE ORAL DAILY
Qty: 42 CAPSULE | Refills: 0 | Status: SHIPPED | OUTPATIENT
Start: 2019-01-01 | End: 2019-01-01 | Stop reason: SDUPTHER

## 2019-01-01 RX ORDER — SODIUM CHLORIDE 0.9 % (FLUSH) 0.9 %
3-10 SYRINGE (ML) INJECTION AS NEEDED
Status: CANCELLED | OUTPATIENT
Start: 2019-01-01

## 2019-01-01 RX ORDER — ZOLPIDEM TARTRATE 5 MG/1
5 TABLET ORAL NIGHTLY PRN
Qty: 30 TABLET | Refills: 0 | Status: SHIPPED | OUTPATIENT
Start: 2019-01-01 | End: 2019-01-01

## 2019-01-01 RX ORDER — PROPOFOL 10 MG/ML
VIAL (ML) INTRAVENOUS AS NEEDED
Status: DISCONTINUED | OUTPATIENT
Start: 2019-01-01 | End: 2019-01-01 | Stop reason: SURG

## 2019-01-01 RX ORDER — HYDRALAZINE HYDROCHLORIDE 50 MG/1
50 TABLET, FILM COATED ORAL 3 TIMES DAILY
Status: ON HOLD | COMMUNITY
End: 2019-01-01

## 2019-01-01 RX ORDER — OXYCODONE AND ACETAMINOPHEN 7.5; 325 MG/1; MG/1
1 TABLET ORAL ONCE AS NEEDED
Status: DISCONTINUED | OUTPATIENT
Start: 2019-01-01 | End: 2019-01-01 | Stop reason: HOSPADM

## 2019-01-01 RX ORDER — ASPIRIN 81 MG/1
81 TABLET, CHEWABLE ORAL DAILY
Status: ON HOLD | COMMUNITY
End: 2019-01-01

## 2019-01-01 RX ORDER — HYDROCODONE BITARTRATE AND ACETAMINOPHEN 5; 325 MG/1; MG/1
2 TABLET ORAL EVERY 6 HOURS PRN
Status: CANCELLED | OUTPATIENT
Start: 2019-01-01 | End: 2019-01-01

## 2019-01-01 RX ORDER — MANNITOL 20 G/100ML
INJECTION, SOLUTION INTRAVENOUS CONTINUOUS PRN
Status: DISCONTINUED | OUTPATIENT
Start: 2019-01-01 | End: 2019-01-01 | Stop reason: SURG

## 2019-01-01 RX ORDER — DEXAMETHASONE 4 MG/1
4 TABLET ORAL EVERY 6 HOURS SCHEDULED
Status: CANCELLED | OUTPATIENT
Start: 2019-01-01

## 2019-01-01 RX ORDER — DEXAMETHASONE SODIUM PHOSPHATE 4 MG/ML
6 INJECTION, SOLUTION INTRA-ARTICULAR; INTRALESIONAL; INTRAMUSCULAR; INTRAVENOUS; SOFT TISSUE EVERY 6 HOURS
Status: DISCONTINUED | OUTPATIENT
Start: 2019-01-01 | End: 2019-01-01

## 2019-01-01 RX ORDER — PROMETHAZINE HYDROCHLORIDE 25 MG/1
25 TABLET ORAL ONCE AS NEEDED
Status: DISCONTINUED | OUTPATIENT
Start: 2019-01-01 | End: 2019-01-01 | Stop reason: HOSPADM

## 2019-01-01 RX ORDER — HYDROCODONE BITARTRATE AND ACETAMINOPHEN 5; 325 MG/1; MG/1
2 TABLET ORAL EVERY 4 HOURS PRN
Status: DISPENSED | OUTPATIENT
Start: 2019-01-01 | End: 2019-01-01

## 2019-01-01 RX ORDER — CLONIDINE 0.1 MG/24H
1 PATCH, EXTENDED RELEASE TRANSDERMAL WEEKLY
Status: ON HOLD | COMMUNITY
End: 2019-01-01

## 2019-01-01 RX ORDER — NALOXONE HCL 0.4 MG/ML
0.2 VIAL (ML) INJECTION AS NEEDED
Status: DISCONTINUED | OUTPATIENT
Start: 2019-01-01 | End: 2019-01-01 | Stop reason: HOSPADM

## 2019-01-01 RX ORDER — HYDROCODONE BITARTRATE AND ACETAMINOPHEN 5; 325 MG/1; MG/1
1 TABLET ORAL EVERY 6 HOURS PRN
Status: CANCELLED | OUTPATIENT
Start: 2019-01-01 | End: 2019-01-01

## 2019-01-01 RX ORDER — HYDROCODONE BITARTRATE AND ACETAMINOPHEN 7.5; 325 MG/1; MG/1
1 TABLET ORAL ONCE AS NEEDED
Status: DISCONTINUED | OUTPATIENT
Start: 2019-01-01 | End: 2019-01-01 | Stop reason: HOSPADM

## 2019-01-01 RX ORDER — ZOLPIDEM TARTRATE 5 MG/1
5 TABLET ORAL NIGHTLY PRN
Status: DISCONTINUED | OUTPATIENT
Start: 2019-01-01 | End: 2019-01-01

## 2019-01-01 RX ORDER — TEMOZOLOMIDE 140 MG/1
CAPSULE ORAL
Qty: 12 CAPSULE | Refills: 0 | Status: SHIPPED | OUTPATIENT
Start: 2019-01-01 | End: 2019-01-01

## 2019-01-01 RX ORDER — LEVETIRACETAM 500 MG/1
500 TABLET ORAL EVERY 12 HOURS SCHEDULED
Qty: 60 TABLET | Refills: 0 | Status: SHIPPED | OUTPATIENT
Start: 2019-01-01 | End: 2019-01-01

## 2019-01-01 RX ORDER — FUROSEMIDE 40 MG/1
40 TABLET ORAL 2 TIMES DAILY
Status: ON HOLD | COMMUNITY
End: 2019-01-01

## 2019-01-01 RX ORDER — CALCIUM CARBONATE 200(500)MG
2 TABLET,CHEWABLE ORAL 3 TIMES DAILY PRN
Status: CANCELLED | OUTPATIENT
Start: 2019-01-01

## 2019-01-01 RX ORDER — LEVETIRACETAM 10 MG/ML
1000 INJECTION INTRAVASCULAR ONCE
Status: COMPLETED | OUTPATIENT
Start: 2019-01-01 | End: 2019-01-01

## 2019-01-01 RX ORDER — SENNA AND DOCUSATE SODIUM 50; 8.6 MG/1; MG/1
2 TABLET, FILM COATED ORAL 2 TIMES DAILY
Status: CANCELLED | OUTPATIENT
Start: 2019-01-01

## 2019-01-01 RX ORDER — DIPHENHYDRAMINE HYDROCHLORIDE 50 MG/ML
12.5 INJECTION INTRAMUSCULAR; INTRAVENOUS
Status: DISCONTINUED | OUTPATIENT
Start: 2019-01-01 | End: 2019-01-01 | Stop reason: HOSPADM

## 2019-01-01 RX ORDER — HYDRALAZINE HYDROCHLORIDE 20 MG/ML
5 INJECTION INTRAMUSCULAR; INTRAVENOUS
Status: DISCONTINUED | OUTPATIENT
Start: 2019-01-01 | End: 2019-01-01 | Stop reason: HOSPADM

## 2019-01-01 RX ORDER — AMOXICILLIN 875 MG/1
875 TABLET, COATED ORAL 2 TIMES DAILY
Qty: 14 TABLET | Refills: 0 | Status: SHIPPED | OUTPATIENT
Start: 2019-01-01 | End: 2019-01-01

## 2019-01-01 RX ORDER — TAMSULOSIN HYDROCHLORIDE 0.4 MG/1
0.4 CAPSULE ORAL DAILY
Status: CANCELLED | OUTPATIENT
Start: 2019-01-01

## 2019-01-01 RX ORDER — ONDANSETRON 4 MG/1
4 TABLET, FILM COATED ORAL EVERY 6 HOURS PRN
Status: CANCELLED | OUTPATIENT
Start: 2019-01-01

## 2019-01-01 RX ORDER — DEXAMETHASONE SODIUM PHOSPHATE 4 MG/ML
4 INJECTION, SOLUTION INTRA-ARTICULAR; INTRALESIONAL; INTRAMUSCULAR; INTRAVENOUS; SOFT TISSUE EVERY 6 HOURS
Status: DISCONTINUED | OUTPATIENT
Start: 2019-01-01 | End: 2019-01-01

## 2019-01-01 RX ORDER — HYDROXYZINE HYDROCHLORIDE 25 MG/1
25 TABLET, FILM COATED ORAL EVERY 8 HOURS PRN
COMMUNITY
Start: 2019-01-01

## 2019-01-01 RX ORDER — CEFAZOLIN SODIUM 2 G/100ML
2 INJECTION, SOLUTION INTRAVENOUS ONCE
Status: COMPLETED | OUTPATIENT
Start: 2019-01-01 | End: 2019-01-01

## 2019-01-01 RX ORDER — FAMOTIDINE 40 MG/1
40 TABLET, FILM COATED ORAL DAILY
Status: DISCONTINUED | OUTPATIENT
Start: 2019-01-01 | End: 2019-01-01

## 2019-01-01 RX ORDER — ONDANSETRON 2 MG/ML
4 INJECTION INTRAMUSCULAR; INTRAVENOUS ONCE AS NEEDED
Status: DISCONTINUED | OUTPATIENT
Start: 2019-01-01 | End: 2019-01-01 | Stop reason: HOSPADM

## 2019-01-01 RX ORDER — HYDROCODONE BITARTRATE AND ACETAMINOPHEN 5; 325 MG/1; MG/1
2 TABLET ORAL EVERY 6 HOURS PRN
Status: DISCONTINUED | OUTPATIENT
Start: 2019-01-01 | End: 2019-01-01

## 2019-01-01 RX ORDER — DEXAMETHASONE 4 MG/1
4 TABLET ORAL EVERY 6 HOURS SCHEDULED
Qty: 120 TABLET | Refills: 0 | Status: SHIPPED | OUTPATIENT
Start: 2019-01-01 | End: 2019-01-01 | Stop reason: HOSPADM

## 2019-01-01 RX ORDER — LORATADINE 10 MG/1
1 CAPSULE, LIQUID FILLED ORAL DAILY
Qty: 7 EACH | Refills: 0
Start: 2019-01-01 | End: 2019-01-01

## 2019-01-01 RX ORDER — BISACODYL 10 MG
10 SUPPOSITORY, RECTAL RECTAL DAILY PRN
Status: DISCONTINUED | OUTPATIENT
Start: 2019-01-01 | End: 2019-01-01 | Stop reason: HOSPADM

## 2019-01-01 RX ORDER — SENNA AND DOCUSATE SODIUM 50; 8.6 MG/1; MG/1
1 TABLET, FILM COATED ORAL NIGHTLY PRN
Status: DISCONTINUED | OUTPATIENT
Start: 2019-01-01 | End: 2019-01-01 | Stop reason: HOSPADM

## 2019-01-01 RX ORDER — FUROSEMIDE 10 MG/ML
INJECTION INTRAMUSCULAR; INTRAVENOUS AS NEEDED
Status: DISCONTINUED | OUTPATIENT
Start: 2019-01-01 | End: 2019-01-01 | Stop reason: SURG

## 2019-01-01 RX ORDER — DEXAMETHASONE 1 MG
1 TABLET ORAL
Qty: 60 TABLET | Refills: 2 | Status: SHIPPED | OUTPATIENT
Start: 2019-01-01 | End: 2019-01-01 | Stop reason: SDUPTHER

## 2019-01-01 RX ORDER — FONDAPARINUX SODIUM 2.5 MG/.5ML
2.5 INJECTION SUBCUTANEOUS
Status: DISCONTINUED | OUTPATIENT
Start: 2019-01-01 | End: 2019-01-01

## 2019-01-01 RX ORDER — ONDANSETRON 2 MG/ML
4 INJECTION INTRAMUSCULAR; INTRAVENOUS EVERY 6 HOURS PRN
Status: CANCELLED | OUTPATIENT
Start: 2019-01-01

## 2019-01-01 RX ORDER — TEMOZOLOMIDE 5 MG/1
CAPSULE ORAL
Qty: 12 CAPSULE | Refills: 0 | Status: SHIPPED | OUTPATIENT
Start: 2019-01-01 | End: 2019-01-01

## 2019-01-01 RX ORDER — HYDROCODONE BITARTRATE AND ACETAMINOPHEN 5; 325 MG/1; MG/1
1 TABLET ORAL EVERY 6 HOURS PRN
Status: DISCONTINUED | OUTPATIENT
Start: 2019-01-01 | End: 2019-01-01 | Stop reason: HOSPADM

## 2019-01-01 RX ORDER — DIPHENHYDRAMINE HYDROCHLORIDE 50 MG/ML
25 INJECTION INTRAMUSCULAR; INTRAVENOUS ONCE
Status: DISCONTINUED | OUTPATIENT
Start: 2019-01-01 | End: 2019-01-01

## 2019-01-01 RX ADMIN — FAMOTIDINE 20 MG: 20 TABLET, FILM COATED ORAL at 08:15

## 2019-01-01 RX ADMIN — LEVETIRACETAM 500 MG: 500 TABLET, FILM COATED ORAL at 13:09

## 2019-01-01 RX ADMIN — DEXAMETHASONE 4 MG: 4 TABLET ORAL at 23:51

## 2019-01-01 RX ADMIN — FAMOTIDINE 20 MG: 20 TABLET, FILM COATED ORAL at 09:12

## 2019-01-01 RX ADMIN — FAMOTIDINE 20 MG: 20 TABLET, FILM COATED ORAL at 19:56

## 2019-01-01 RX ADMIN — LEVETIRACETAM 500 MG: 500 TABLET, FILM COATED ORAL at 20:43

## 2019-01-01 RX ADMIN — SENNOSIDES AND DOCUSATE SODIUM 2 TABLET: 8.6; 5 TABLET ORAL at 20:46

## 2019-01-01 RX ADMIN — DEXAMETHASONE 4 MG: 4 TABLET ORAL at 00:22

## 2019-01-01 RX ADMIN — DEXAMETHASONE SODIUM PHOSPHATE 10 MG: 10 INJECTION INTRAMUSCULAR; INTRAVENOUS at 07:12

## 2019-01-01 RX ADMIN — LEVETIRACETAM 500 MG: 500 TABLET, FILM COATED ORAL at 09:24

## 2019-01-01 RX ADMIN — SODIUM CHLORIDE 100 ML/HR: 9 INJECTION, SOLUTION INTRAVENOUS at 17:15

## 2019-01-01 RX ADMIN — POLYETHYLENE GLYCOL 3350 17 G: 17 POWDER, FOR SOLUTION ORAL at 09:24

## 2019-01-01 RX ADMIN — FAMOTIDINE 20 MG: 20 TABLET, FILM COATED ORAL at 08:42

## 2019-01-01 RX ADMIN — DEXAMETHASONE SODIUM PHOSPHATE 6 MG: 4 INJECTION, SOLUTION INTRAMUSCULAR; INTRAVENOUS at 16:29

## 2019-01-01 RX ADMIN — POLYETHYLENE GLYCOL 3350 17 G: 17 POWDER, FOR SOLUTION ORAL at 09:39

## 2019-01-01 RX ADMIN — TAMSULOSIN HYDROCHLORIDE 0.4 MG: 0.4 CAPSULE ORAL at 08:28

## 2019-01-01 RX ADMIN — DEXAMETHASONE 4 MG: 4 TABLET ORAL at 17:45

## 2019-01-01 RX ADMIN — HYDROCODONE BITARTRATE AND ACETAMINOPHEN 2 TABLET: 5; 325 TABLET ORAL at 17:51

## 2019-01-01 RX ADMIN — FUROSEMIDE 40 MG: 10 INJECTION, SOLUTION INTRAMUSCULAR; INTRAVENOUS at 00:00

## 2019-01-01 RX ADMIN — DEXAMETHASONE 4 MG: 4 TABLET ORAL at 23:08

## 2019-01-01 RX ADMIN — SODIUM CHLORIDE 100 ML/HR: 9 INJECTION, SOLUTION INTRAVENOUS at 22:22

## 2019-01-01 RX ADMIN — DEXAMETHASONE 4 MG: 4 TABLET ORAL at 11:22

## 2019-01-01 RX ADMIN — DEXAMETHASONE 4 MG: 4 TABLET ORAL at 12:56

## 2019-01-01 RX ADMIN — HYDROCODONE BITARTRATE AND ACETAMINOPHEN 2 TABLET: 5; 325 TABLET ORAL at 16:00

## 2019-01-01 RX ADMIN — ACETAMINOPHEN 650 MG: 325 TABLET, FILM COATED ORAL at 20:20

## 2019-01-01 RX ADMIN — DEXAMETHASONE SODIUM PHOSPHATE 6 MG: 4 INJECTION, SOLUTION INTRAMUSCULAR; INTRAVENOUS at 10:51

## 2019-01-01 RX ADMIN — TAMSULOSIN HYDROCHLORIDE 0.4 MG: 0.4 CAPSULE ORAL at 08:42

## 2019-01-01 RX ADMIN — LEVETIRACETAM 500 MG: 500 TABLET, FILM COATED ORAL at 08:28

## 2019-01-01 RX ADMIN — FAMOTIDINE 20 MG: 20 TABLET, FILM COATED ORAL at 20:32

## 2019-01-01 RX ADMIN — LEVETIRACETAM 500 MG: 5 INJECTION INTRAVENOUS at 08:37

## 2019-01-01 RX ADMIN — HYDROMORPHONE HYDROCHLORIDE 0.5 MG: 1 INJECTION, SOLUTION INTRAMUSCULAR; INTRAVENOUS; SUBCUTANEOUS at 22:41

## 2019-01-01 RX ADMIN — GADOBENATE DIMEGLUMINE 16 ML: 529 INJECTION, SOLUTION INTRAVENOUS at 14:18

## 2019-01-01 RX ADMIN — DEXAMETHASONE 4 MG: 4 TABLET ORAL at 01:13

## 2019-01-01 RX ADMIN — SENNOSIDES AND DOCUSATE SODIUM 2 TABLET: 8.6; 5 TABLET ORAL at 10:32

## 2019-01-01 RX ADMIN — SENNOSIDES AND DOCUSATE SODIUM 2 TABLET: 8.6; 5 TABLET ORAL at 19:57

## 2019-01-01 RX ADMIN — DEXAMETHASONE SODIUM PHOSPHATE 6 MG: 4 INJECTION, SOLUTION INTRAMUSCULAR; INTRAVENOUS at 23:18

## 2019-01-01 RX ADMIN — FENTANYL CITRATE 50 MCG: 50 INJECTION INTRAMUSCULAR; INTRAVENOUS at 13:40

## 2019-01-01 RX ADMIN — HYDROCODONE BITARTRATE AND ACETAMINOPHEN 1 TABLET: 5; 325 TABLET ORAL at 21:56

## 2019-01-01 RX ADMIN — DEXAMETHASONE SODIUM PHOSPHATE 6 MG: 4 INJECTION, SOLUTION INTRAMUSCULAR; INTRAVENOUS at 16:17

## 2019-01-01 RX ADMIN — DEXAMETHASONE 4 MG: 4 TABLET ORAL at 17:10

## 2019-01-01 RX ADMIN — GADOBENATE DIMEGLUMINE 15 ML: 529 INJECTION, SOLUTION INTRAVENOUS at 08:39

## 2019-01-01 RX ADMIN — SODIUM CHLORIDE, POTASSIUM CHLORIDE, SODIUM LACTATE AND CALCIUM CHLORIDE 9 ML/HR: 600; 310; 30; 20 INJECTION, SOLUTION INTRAVENOUS at 09:41

## 2019-01-01 RX ADMIN — HYDROCODONE BITARTRATE AND ACETAMINOPHEN 2 TABLET: 5; 325 TABLET ORAL at 15:54

## 2019-01-01 RX ADMIN — NEOSTIGMINE METHYLSULFATE 3 MG: 1 INJECTION INTRAMUSCULAR; INTRAVENOUS; SUBCUTANEOUS at 13:31

## 2019-01-01 RX ADMIN — DOCUSATE SODIUM 100 MG: 100 CAPSULE, LIQUID FILLED ORAL at 20:28

## 2019-01-01 RX ADMIN — DEXAMETHASONE 4 MG: 4 TABLET ORAL at 05:59

## 2019-01-01 RX ADMIN — HYDROCODONE BITARTRATE AND ACETAMINOPHEN 1 TABLET: 5; 325 TABLET ORAL at 10:51

## 2019-01-01 RX ADMIN — DOCUSATE SODIUM 100 MG: 100 CAPSULE, LIQUID FILLED ORAL at 09:39

## 2019-01-01 RX ADMIN — SENNOSIDES AND DOCUSATE SODIUM 2 TABLET: 8.6; 5 TABLET ORAL at 08:15

## 2019-01-01 RX ADMIN — ACETAMINOPHEN 650 MG: 325 TABLET, FILM COATED ORAL at 08:41

## 2019-01-01 RX ADMIN — TAMSULOSIN HYDROCHLORIDE 0.4 MG: 0.4 CAPSULE ORAL at 09:22

## 2019-01-01 RX ADMIN — POLYETHYLENE GLYCOL 3350 17 G: 17 POWDER, FOR SOLUTION ORAL at 08:37

## 2019-01-01 RX ADMIN — LEVETIRACETAM 500 MG: 500 TABLET, FILM COATED ORAL at 09:39

## 2019-01-01 RX ADMIN — DEXAMETHASONE SODIUM PHOSPHATE 6 MG: 4 INJECTION, SOLUTION INTRAMUSCULAR; INTRAVENOUS at 22:36

## 2019-01-01 RX ADMIN — LEVETIRACETAM 500 MG: 5 INJECTION INTRAVENOUS at 21:38

## 2019-01-01 RX ADMIN — DEXAMETHASONE SODIUM PHOSPHATE 6 MG: 4 INJECTION, SOLUTION INTRAMUSCULAR; INTRAVENOUS at 18:33

## 2019-01-01 RX ADMIN — FAMOTIDINE 20 MG: 10 INJECTION INTRAVENOUS at 13:48

## 2019-01-01 RX ADMIN — DEXAMETHASONE 4 MG: 4 TABLET ORAL at 11:25

## 2019-01-01 RX ADMIN — HYDROCODONE BITARTRATE AND ACETAMINOPHEN 1 TABLET: 5; 325 TABLET ORAL at 17:49

## 2019-01-01 RX ADMIN — SODIUM CHLORIDE, PRESERVATIVE FREE 3 ML: 5 INJECTION INTRAVENOUS at 21:09

## 2019-01-01 RX ADMIN — DOCUSATE SODIUM 100 MG: 100 CAPSULE, LIQUID FILLED ORAL at 09:12

## 2019-01-01 RX ADMIN — FENTANYL CITRATE 50 MCG: 50 INJECTION, SOLUTION INTRAMUSCULAR; INTRAVENOUS at 15:29

## 2019-01-01 RX ADMIN — POLYETHYLENE GLYCOL 3350 17 G: 17 POWDER, FOR SOLUTION ORAL at 09:54

## 2019-01-01 RX ADMIN — HYDROCODONE BITARTRATE AND ACETAMINOPHEN 1 TABLET: 5; 325 TABLET ORAL at 04:08

## 2019-01-01 RX ADMIN — LEVETIRACETAM 500 MG: 500 TABLET, FILM COATED ORAL at 08:42

## 2019-01-01 RX ADMIN — DOCUSATE SODIUM 100 MG: 100 CAPSULE, LIQUID FILLED ORAL at 09:54

## 2019-01-01 RX ADMIN — FAMOTIDINE 20 MG: 20 TABLET, FILM COATED ORAL at 09:54

## 2019-01-01 RX ADMIN — DEXAMETHASONE 4 MG: 4 TABLET ORAL at 00:40

## 2019-01-01 RX ADMIN — LEVETIRACETAM 500 MG: 5 INJECTION INTRAVENOUS at 22:35

## 2019-01-01 RX ADMIN — HYDROCODONE BITARTRATE AND ACETAMINOPHEN 2 TABLET: 5; 325 TABLET ORAL at 20:31

## 2019-01-01 RX ADMIN — TAMSULOSIN HYDROCHLORIDE 0.4 MG: 0.4 CAPSULE ORAL at 07:29

## 2019-01-01 RX ADMIN — LEVETIRACETAM 500 MG: 500 TABLET, FILM COATED ORAL at 21:07

## 2019-01-01 RX ADMIN — ACETAMINOPHEN 650 MG: 325 TABLET, FILM COATED ORAL at 08:20

## 2019-01-01 RX ADMIN — FAMOTIDINE 20 MG: 10 INJECTION INTRAVENOUS at 22:36

## 2019-01-01 RX ADMIN — LEVETIRACETAM 500 MG: 500 TABLET, FILM COATED ORAL at 07:35

## 2019-01-01 RX ADMIN — DEXAMETHASONE 4 MG: 4 TABLET ORAL at 13:09

## 2019-01-01 RX ADMIN — ACETAMINOPHEN 650 MG: 325 TABLET, FILM COATED ORAL at 04:36

## 2019-01-01 RX ADMIN — DEXAMETHASONE 4 MG: 4 TABLET ORAL at 12:04

## 2019-01-01 RX ADMIN — LEVETIRACETAM 500 MG: 500 TABLET, FILM COATED ORAL at 21:56

## 2019-01-01 RX ADMIN — LEVETIRACETAM 500 MG: 500 TABLET, FILM COATED ORAL at 09:22

## 2019-01-01 RX ADMIN — DEXAMETHASONE SODIUM PHOSPHATE 6 MG: 4 INJECTION, SOLUTION INTRAMUSCULAR; INTRAVENOUS at 11:08

## 2019-01-01 RX ADMIN — DEXAMETHASONE SODIUM PHOSPHATE 6 MG: 4 INJECTION, SOLUTION INTRAMUSCULAR; INTRAVENOUS at 04:52

## 2019-01-01 RX ADMIN — POLYETHYLENE GLYCOL 3350 17 G: 17 POWDER, FOR SOLUTION ORAL at 07:36

## 2019-01-01 RX ADMIN — SENNOSIDES AND DOCUSATE SODIUM 2 TABLET: 8.6; 5 TABLET ORAL at 20:32

## 2019-01-01 RX ADMIN — DEXAMETHASONE 4 MG: 4 TABLET ORAL at 23:24

## 2019-01-01 RX ADMIN — HYDROCODONE BITARTRATE AND ACETAMINOPHEN 1 TABLET: 5; 325 TABLET ORAL at 22:38

## 2019-01-01 RX ADMIN — DEXAMETHASONE 4 MG: 4 TABLET ORAL at 17:18

## 2019-01-01 RX ADMIN — DEXAMETHASONE 4 MG: 4 TABLET ORAL at 11:00

## 2019-01-01 RX ADMIN — CEFAZOLIN SODIUM 2 G: 2 INJECTION, SOLUTION INTRAVENOUS at 04:09

## 2019-01-01 RX ADMIN — PROPOFOL 150 MG: 10 INJECTION, EMULSION INTRAVENOUS at 10:36

## 2019-01-01 RX ADMIN — DEXAMETHASONE 4 MG: 4 TABLET ORAL at 16:54

## 2019-01-01 RX ADMIN — POLYETHYLENE GLYCOL 3350 17 G: 17 POWDER, FOR SOLUTION ORAL at 20:46

## 2019-01-01 RX ADMIN — SODIUM CHLORIDE 100 ML/HR: 9 INJECTION, SOLUTION INTRAVENOUS at 11:07

## 2019-01-01 RX ADMIN — FAMOTIDINE 20 MG: 20 TABLET, FILM COATED ORAL at 08:28

## 2019-01-01 RX ADMIN — DEXAMETHASONE 4 MG: 4 TABLET ORAL at 17:17

## 2019-01-01 RX ADMIN — FENTANYL CITRATE 50 MCG: 50 INJECTION INTRAMUSCULAR; INTRAVENOUS at 13:05

## 2019-01-01 RX ADMIN — DEXAMETHASONE SODIUM PHOSPHATE 10 MG: 10 INJECTION INTRAMUSCULAR; INTRAVENOUS at 10:44

## 2019-01-01 RX ADMIN — DOCUSATE SODIUM 100 MG: 100 CAPSULE, LIQUID FILLED ORAL at 21:56

## 2019-01-01 RX ADMIN — PHENYLEPHRINE HYDROCHLORIDE 50 MCG: 10 INJECTION INTRAVENOUS at 11:35

## 2019-01-01 RX ADMIN — ONDANSETRON 4 MG: 2 INJECTION INTRAMUSCULAR; INTRAVENOUS at 06:07

## 2019-01-01 RX ADMIN — DEXAMETHASONE SODIUM PHOSPHATE 10 MG: 10 INJECTION INTRAMUSCULAR; INTRAVENOUS at 14:43

## 2019-01-01 RX ADMIN — MANNITOL: 20 INJECTION, SOLUTION INTRAVENOUS at 11:14

## 2019-01-01 RX ADMIN — POLYETHYLENE GLYCOL 3350 17 G: 17 POWDER, FOR SOLUTION ORAL at 20:31

## 2019-01-01 RX ADMIN — DEXAMETHASONE 4 MG: 4 TABLET ORAL at 03:36

## 2019-01-01 RX ADMIN — SENNOSIDES AND DOCUSATE SODIUM 2 TABLET: 8.6; 5 TABLET ORAL at 21:07

## 2019-01-01 RX ADMIN — HYDROCODONE BITARTRATE AND ACETAMINOPHEN 2 TABLET: 5; 325 TABLET ORAL at 22:08

## 2019-01-01 RX ADMIN — INSULIN LISPRO 2 UNITS: 100 INJECTION, SOLUTION INTRAVENOUS; SUBCUTANEOUS at 11:53

## 2019-01-01 RX ADMIN — LEVETIRACETAM 500 MG: 500 TABLET, FILM COATED ORAL at 21:27

## 2019-01-01 RX ADMIN — FAMOTIDINE 20 MG: 20 TABLET, FILM COATED ORAL at 08:39

## 2019-01-01 RX ADMIN — HYDROCODONE BITARTRATE AND ACETAMINOPHEN 1 TABLET: 5; 325 TABLET ORAL at 11:09

## 2019-01-01 RX ADMIN — DEXAMETHASONE 4 MG: 4 TABLET ORAL at 17:38

## 2019-01-01 RX ADMIN — DEXAMETHASONE SODIUM PHOSPHATE 6 MG: 4 INJECTION, SOLUTION INTRAMUSCULAR; INTRAVENOUS at 05:51

## 2019-01-01 RX ADMIN — FAMOTIDINE 20 MG: 20 TABLET, FILM COATED ORAL at 07:29

## 2019-01-01 RX ADMIN — SENNOSIDES AND DOCUSATE SODIUM 1 TABLET: 8.6; 5 TABLET ORAL at 09:22

## 2019-01-01 RX ADMIN — LEVETIRACETAM 500 MG: 500 TABLET, FILM COATED ORAL at 22:13

## 2019-01-01 RX ADMIN — HYDROCODONE BITARTRATE AND ACETAMINOPHEN 1 TABLET: 5; 325 TABLET ORAL at 21:42

## 2019-01-01 RX ADMIN — GLYCOPYRROLATE 0.6 MG: 0.2 INJECTION INTRAMUSCULAR; INTRAVENOUS at 13:31

## 2019-01-01 RX ADMIN — HYDROCODONE BITARTRATE AND ACETAMINOPHEN 2 TABLET: 5; 325 TABLET ORAL at 20:45

## 2019-01-01 RX ADMIN — LEVETIRACETAM 500 MG: 5 INJECTION INTRAVENOUS at 22:22

## 2019-01-01 RX ADMIN — FAMOTIDINE 20 MG: 20 TABLET, FILM COATED ORAL at 20:31

## 2019-01-01 RX ADMIN — DEXAMETHASONE 4 MG: 4 TABLET ORAL at 07:35

## 2019-01-01 RX ADMIN — LEVETIRACETAM 500 MG: 500 TABLET, FILM COATED ORAL at 20:21

## 2019-01-01 RX ADMIN — FAMOTIDINE 20 MG: 20 TABLET, FILM COATED ORAL at 20:28

## 2019-01-01 RX ADMIN — CEFAZOLIN SODIUM 2 G: 2 INJECTION, SOLUTION INTRAVENOUS at 18:50

## 2019-01-01 RX ADMIN — FENTANYL CITRATE 50 MCG: 50 INJECTION INTRAMUSCULAR; INTRAVENOUS at 13:30

## 2019-01-01 RX ADMIN — SODIUM CHLORIDE 100 ML/HR: 9 INJECTION, SOLUTION INTRAVENOUS at 19:17

## 2019-01-01 RX ADMIN — LEVETIRACETAM 500 MG: 500 TABLET, FILM COATED ORAL at 09:12

## 2019-01-01 RX ADMIN — DEXAMETHASONE SODIUM PHOSPHATE 6 MG: 4 INJECTION, SOLUTION INTRAMUSCULAR; INTRAVENOUS at 22:22

## 2019-01-01 RX ADMIN — DEXAMETHASONE 4 MG: 4 TABLET ORAL at 23:43

## 2019-01-01 RX ADMIN — HYDROCODONE BITARTRATE AND ACETAMINOPHEN 1 TABLET: 5; 325 TABLET ORAL at 11:27

## 2019-01-01 RX ADMIN — DEXAMETHASONE 4 MG: 4 TABLET ORAL at 17:29

## 2019-01-01 RX ADMIN — FAMOTIDINE 20 MG: 20 TABLET, FILM COATED ORAL at 21:56

## 2019-01-01 RX ADMIN — HYDROCODONE BITARTRATE AND ACETAMINOPHEN 2 TABLET: 5; 325 TABLET ORAL at 11:10

## 2019-01-01 RX ADMIN — DEXAMETHASONE 4 MG: 4 TABLET ORAL at 06:24

## 2019-01-01 RX ADMIN — FAMOTIDINE 20 MG: 20 TABLET, FILM COATED ORAL at 20:21

## 2019-01-01 RX ADMIN — SODIUM CHLORIDE 1000 ML: 9 INJECTION, SOLUTION INTRAVENOUS at 05:48

## 2019-01-01 RX ADMIN — LIDOCAINE HYDROCHLORIDE 100 MG: 20 INJECTION, SOLUTION INFILTRATION; PERINEURAL at 10:36

## 2019-01-01 RX ADMIN — LEVETIRACETAM 500 MG: 5 INJECTION INTRAVENOUS at 11:10

## 2019-01-01 RX ADMIN — POLYETHYLENE GLYCOL 3350 17 G: 17 POWDER, FOR SOLUTION ORAL at 21:32

## 2019-01-01 RX ADMIN — DEXAMETHASONE 4 MG: 4 TABLET ORAL at 05:28

## 2019-01-01 RX ADMIN — LEVETIRACETAM 500 MG: 500 TABLET, FILM COATED ORAL at 08:39

## 2019-01-01 RX ADMIN — HYDROCODONE BITARTRATE AND ACETAMINOPHEN 2 TABLET: 5; 325 TABLET ORAL at 21:35

## 2019-01-01 RX ADMIN — DEXAMETHASONE 4 MG: 4 TABLET ORAL at 17:33

## 2019-01-01 RX ADMIN — CEFAZOLIN SODIUM 2 G: 2 INJECTION, SOLUTION INTRAVENOUS at 10:41

## 2019-01-01 RX ADMIN — HYDROMORPHONE HYDROCHLORIDE 0.5 MG: 1 INJECTION, SOLUTION INTRAMUSCULAR; INTRAVENOUS; SUBCUTANEOUS at 20:50

## 2019-01-01 RX ADMIN — SENNOSIDES AND DOCUSATE SODIUM 1 TABLET: 8.6; 5 TABLET ORAL at 07:29

## 2019-01-01 RX ADMIN — ROCURONIUM BROMIDE 10 MG: 10 INJECTION INTRAVENOUS at 11:09

## 2019-01-01 RX ADMIN — DEXAMETHASONE 4 MG: 4 TABLET ORAL at 11:50

## 2019-01-01 RX ADMIN — FAMOTIDINE 20 MG: 10 INJECTION INTRAVENOUS at 09:42

## 2019-01-01 RX ADMIN — FAMOTIDINE 20 MG: 20 TABLET, FILM COATED ORAL at 07:35

## 2019-01-01 RX ADMIN — HYDROCODONE BITARTRATE AND ACETAMINOPHEN 2 TABLET: 5; 325 TABLET ORAL at 07:35

## 2019-01-01 RX ADMIN — FENTANYL CITRATE 50 MCG: 50 INJECTION, SOLUTION INTRAMUSCULAR; INTRAVENOUS at 09:40

## 2019-01-01 RX ADMIN — TAMSULOSIN HYDROCHLORIDE 0.4 MG: 0.4 CAPSULE ORAL at 08:15

## 2019-01-01 RX ADMIN — FAMOTIDINE 20 MG: 20 TABLET, FILM COATED ORAL at 20:43

## 2019-01-01 RX ADMIN — SENNOSIDES AND DOCUSATE SODIUM 2 TABLET: 8.6; 5 TABLET ORAL at 20:31

## 2019-01-01 RX ADMIN — DOCUSATE SODIUM 100 MG: 100 CAPSULE, LIQUID FILLED ORAL at 22:13

## 2019-01-01 RX ADMIN — SODIUM CHLORIDE 100 ML/HR: 9 INJECTION, SOLUTION INTRAVENOUS at 05:51

## 2019-01-01 RX ADMIN — SENNOSIDES AND DOCUSATE SODIUM 2 TABLET: 8.6; 5 TABLET ORAL at 21:27

## 2019-01-01 RX ADMIN — DEXAMETHASONE 4 MG: 4 TABLET ORAL at 11:10

## 2019-01-01 RX ADMIN — LEVETIRACETAM 1000 MG: 10 INJECTION INTRAVENOUS at 10:57

## 2019-01-01 RX ADMIN — POLYETHYLENE GLYCOL 3350 17 G: 17 POWDER, FOR SOLUTION ORAL at 08:15

## 2019-01-01 RX ADMIN — LEVETIRACETAM 500 MG: 500 TABLET, FILM COATED ORAL at 20:46

## 2019-01-01 RX ADMIN — ONDANSETRON 4 MG: 2 INJECTION INTRAMUSCULAR; INTRAVENOUS at 13:27

## 2019-01-01 RX ADMIN — MIDAZOLAM 1 MG: 1 INJECTION INTRAMUSCULAR; INTRAVENOUS at 09:39

## 2019-01-01 RX ADMIN — FENTANYL CITRATE 50 MCG: 50 INJECTION, SOLUTION INTRAMUSCULAR; INTRAVENOUS at 15:17

## 2019-01-01 RX ADMIN — SENNOSIDES AND DOCUSATE SODIUM 2 TABLET: 8.6; 5 TABLET ORAL at 08:41

## 2019-01-01 RX ADMIN — DEXAMETHASONE 4 MG: 4 TABLET ORAL at 05:46

## 2019-01-01 RX ADMIN — SODIUM CHLORIDE, POTASSIUM CHLORIDE, SODIUM LACTATE AND CALCIUM CHLORIDE: 600; 310; 30; 20 INJECTION, SOLUTION INTRAVENOUS at 13:07

## 2019-01-01 RX ADMIN — TAMSULOSIN HYDROCHLORIDE 0.4 MG: 0.4 CAPSULE ORAL at 10:33

## 2019-01-01 RX ADMIN — HYDROCODONE BITARTRATE AND ACETAMINOPHEN 2 TABLET: 5; 325 TABLET ORAL at 13:49

## 2019-01-01 RX ADMIN — DEXAMETHASONE SODIUM PHOSPHATE 6 MG: 4 INJECTION, SOLUTION INTRAMUSCULAR; INTRAVENOUS at 06:15

## 2019-01-01 RX ADMIN — POLYETHYLENE GLYCOL 3350 17 G: 17 POWDER, FOR SOLUTION ORAL at 09:12

## 2019-01-01 RX ADMIN — LEVETIRACETAM 500 MG: 500 TABLET, FILM COATED ORAL at 20:32

## 2019-01-01 RX ADMIN — FAMOTIDINE 20 MG: 20 TABLET, FILM COATED ORAL at 09:21

## 2019-01-01 RX ADMIN — SENNOSIDES AND DOCUSATE SODIUM 2 TABLET: 8.6; 5 TABLET ORAL at 20:43

## 2019-01-01 RX ADMIN — FAMOTIDINE 20 MG: 20 TABLET, FILM COATED ORAL at 20:46

## 2019-01-01 RX ADMIN — ACETAMINOPHEN 650 MG: 325 TABLET, FILM COATED ORAL at 08:26

## 2019-01-01 RX ADMIN — SENNOSIDES AND DOCUSATE SODIUM 2 TABLET: 8.6; 5 TABLET ORAL at 08:28

## 2019-01-01 RX ADMIN — LEVETIRACETAM 500 MG: 500 TABLET, FILM COATED ORAL at 08:15

## 2019-01-01 RX ADMIN — FAMOTIDINE 20 MG: 20 TABLET, FILM COATED ORAL at 21:07

## 2019-01-01 RX ADMIN — FAMOTIDINE 20 MG: 20 TABLET, FILM COATED ORAL at 21:27

## 2019-01-01 RX ADMIN — FENTANYL CITRATE 50 MCG: 50 INJECTION INTRAMUSCULAR; INTRAVENOUS at 10:32

## 2019-01-01 RX ADMIN — DEXAMETHASONE 4 MG: 4 TABLET ORAL at 06:07

## 2019-01-01 RX ADMIN — FAMOTIDINE 20 MG: 10 INJECTION INTRAVENOUS at 21:00

## 2019-01-01 RX ADMIN — GADOBENATE DIMEGLUMINE 15 ML: 529 INJECTION, SOLUTION INTRAVENOUS at 17:56

## 2019-01-01 RX ADMIN — HYDROCODONE BITARTRATE AND ACETAMINOPHEN 1 TABLET: 5; 325 TABLET ORAL at 08:28

## 2019-01-01 RX ADMIN — HYDROMORPHONE HYDROCHLORIDE 0.5 MG: 1 INJECTION, SOLUTION INTRAMUSCULAR; INTRAVENOUS; SUBCUTANEOUS at 04:43

## 2019-01-01 RX ADMIN — DEXAMETHASONE 4 MG: 4 TABLET ORAL at 06:23

## 2019-01-01 RX ADMIN — HYDROMORPHONE HYDROCHLORIDE 1 MG: 1 INJECTION, SOLUTION INTRAMUSCULAR; INTRAVENOUS; SUBCUTANEOUS at 06:07

## 2019-01-01 RX ADMIN — LEVETIRACETAM 500 MG: 5 INJECTION INTRAVENOUS at 20:28

## 2019-01-01 RX ADMIN — POLYETHYLENE GLYCOL 3350 17 G: 17 POWDER, FOR SOLUTION ORAL at 21:07

## 2019-01-01 RX ADMIN — DEXAMETHASONE SODIUM PHOSPHATE 4 MG: 4 INJECTION, SOLUTION INTRAMUSCULAR; INTRAVENOUS at 11:27

## 2019-01-01 RX ADMIN — DEXAMETHASONE 4 MG: 4 TABLET ORAL at 18:08

## 2019-01-01 RX ADMIN — SENNOSIDES AND DOCUSATE SODIUM 2 TABLET: 8.6; 5 TABLET ORAL at 07:36

## 2019-01-01 RX ADMIN — DEXAMETHASONE 4 MG: 4 TABLET ORAL at 00:23

## 2019-01-01 RX ADMIN — DEXAMETHASONE SODIUM PHOSPHATE 6 MG: 4 INJECTION, SOLUTION INTRAMUSCULAR; INTRAVENOUS at 23:58

## 2019-01-01 RX ADMIN — POLYETHYLENE GLYCOL 3350 17 G: 17 POWDER, FOR SOLUTION ORAL at 20:39

## 2019-01-01 RX ADMIN — CEFAZOLIN SODIUM 2 G: 2 INJECTION, SOLUTION INTRAVENOUS at 10:50

## 2019-01-01 RX ADMIN — HYDROCODONE BITARTRATE AND ACETAMINOPHEN 1 TABLET: 5; 325 TABLET ORAL at 06:24

## 2019-01-01 RX ADMIN — DEXAMETHASONE 4 MG: 4 TABLET ORAL at 12:24

## 2019-01-01 RX ADMIN — LEVETIRACETAM 500 MG: 5 INJECTION INTRAVENOUS at 09:42

## 2019-01-01 RX ADMIN — DEXAMETHASONE 4 MG: 4 TABLET ORAL at 12:22

## 2019-01-01 RX ADMIN — SODIUM CHLORIDE, PRESERVATIVE FREE 3 ML: 5 INJECTION INTRAVENOUS at 12:00

## 2019-01-01 RX ADMIN — FAMOTIDINE 20 MG: 20 TABLET, FILM COATED ORAL at 09:24

## 2019-01-01 RX ADMIN — ROCURONIUM BROMIDE 40 MG: 10 INJECTION INTRAVENOUS at 10:36

## 2019-01-01 RX ADMIN — BISACODYL 10 MG: 10 SUPPOSITORY RECTAL at 10:33

## 2019-01-01 RX ADMIN — INSULIN LISPRO 2 UNITS: 100 INJECTION, SOLUTION INTRAVENOUS; SUBCUTANEOUS at 20:45

## 2019-01-01 RX ADMIN — LEVETIRACETAM 500 MG: 5 INJECTION INTRAVENOUS at 08:40

## 2019-01-01 RX ADMIN — ALBUMIN (HUMAN): 0.05 SOLUTION INTRAVENOUS at 12:15

## 2019-01-01 RX ADMIN — DEXAMETHASONE 4 MG: 4 TABLET ORAL at 05:53

## 2019-01-01 RX ADMIN — FAMOTIDINE 20 MG: 20 TABLET, FILM COATED ORAL at 09:39

## 2019-01-01 RX ADMIN — HYDROMORPHONE HYDROCHLORIDE 0.5 MG: 1 INJECTION, SOLUTION INTRAMUSCULAR; INTRAVENOUS; SUBCUTANEOUS at 01:03

## 2019-01-01 RX ADMIN — IOPAMIDOL 85 ML: 612 INJECTION, SOLUTION INTRAVENOUS at 13:04

## 2019-01-01 RX ADMIN — POLYETHYLENE GLYCOL 3350 17 G: 17 POWDER, FOR SOLUTION ORAL at 08:28

## 2019-01-01 RX ADMIN — FAMOTIDINE 20 MG: 10 INJECTION INTRAVENOUS at 08:37

## 2019-01-01 RX ADMIN — GADOBENATE DIMEGLUMINE 15 ML: 529 INJECTION, SOLUTION INTRAVENOUS at 11:30

## 2019-01-01 RX ADMIN — DEXAMETHASONE 4 MG: 4 TABLET ORAL at 05:57

## 2019-01-01 RX ADMIN — DEXAMETHASONE SODIUM PHOSPHATE 6 MG: 4 INJECTION, SOLUTION INTRAMUSCULAR; INTRAVENOUS at 06:29

## 2019-01-01 RX ADMIN — FUROSEMIDE 40 MG: 10 INJECTION, SOLUTION INTRAMUSCULAR; INTRAVENOUS at 11:45

## 2019-01-01 RX ADMIN — LEVETIRACETAM 500 MG: 500 TABLET, FILM COATED ORAL at 20:31

## 2019-01-01 RX ADMIN — POLYETHYLENE GLYCOL 3350 17 G: 17 POWDER, FOR SOLUTION ORAL at 07:29

## 2019-01-01 RX ADMIN — HYDROCODONE BITARTRATE AND ACETAMINOPHEN 1 TABLET: 5; 325 TABLET ORAL at 17:48

## 2019-01-01 RX ADMIN — POLYETHYLENE GLYCOL 3350 17 G: 17 POWDER, FOR SOLUTION ORAL at 09:21

## 2019-01-01 RX ADMIN — ONDANSETRON 4 MG: 2 INJECTION INTRAMUSCULAR; INTRAVENOUS at 21:46

## 2019-01-01 RX ADMIN — FAMOTIDINE 20 MG: 20 TABLET, FILM COATED ORAL at 22:13

## 2019-01-01 RX ADMIN — DEXAMETHASONE 4 MG: 4 TABLET ORAL at 23:31

## 2019-01-01 RX ADMIN — DEXAMETHASONE 4 MG: 4 TABLET ORAL at 11:28

## 2019-01-01 RX ADMIN — ACETAMINOPHEN 650 MG: 325 TABLET, FILM COATED ORAL at 20:02

## 2019-01-01 RX ADMIN — FAMOTIDINE 20 MG: 10 INJECTION INTRAVENOUS at 22:22

## 2019-01-01 RX ADMIN — ROCURONIUM BROMIDE 10 MG: 10 INJECTION INTRAVENOUS at 11:51

## 2019-01-01 RX ADMIN — LEVETIRACETAM 500 MG: 500 TABLET, FILM COATED ORAL at 07:29

## 2019-01-01 RX ADMIN — SODIUM CHLORIDE, PRESERVATIVE FREE 3 ML: 5 INJECTION INTRAVENOUS at 20:50

## 2019-01-01 RX ADMIN — LEVETIRACETAM 500 MG: 500 TABLET, FILM COATED ORAL at 19:56

## 2019-01-01 RX ADMIN — PHENYLEPHRINE HYDROCHLORIDE 50 MCG: 10 INJECTION INTRAVENOUS at 11:41

## 2019-01-01 RX ADMIN — ACETAMINOPHEN 650 MG: 325 TABLET, FILM COATED ORAL at 21:07

## 2019-01-01 RX ADMIN — FAMOTIDINE 20 MG: 10 INJECTION INTRAVENOUS at 09:37

## 2019-01-01 RX ADMIN — TAMSULOSIN HYDROCHLORIDE 0.4 MG: 0.4 CAPSULE ORAL at 07:35

## 2019-01-01 RX ADMIN — DEXAMETHASONE 4 MG: 4 TABLET ORAL at 05:11

## 2019-01-01 RX ADMIN — SODIUM CHLORIDE, PRESERVATIVE FREE 3 ML: 5 INJECTION INTRAVENOUS at 08:39

## 2019-01-01 RX ADMIN — HYDROCODONE BITARTRATE AND ACETAMINOPHEN 2 TABLET: 5; 325 TABLET ORAL at 06:23

## 2019-01-01 RX ADMIN — HYDROCODONE BITARTRATE AND ACETAMINOPHEN 2 TABLET: 5; 325 TABLET ORAL at 05:11

## 2019-01-01 RX ADMIN — FAMOTIDINE 20 MG: 10 INJECTION, SOLUTION INTRAVENOUS at 09:41

## 2019-01-01 RX ADMIN — PROPOFOL 50 MG: 10 INJECTION, EMULSION INTRAVENOUS at 10:55

## 2019-01-01 RX ADMIN — SENNOSIDES AND DOCUSATE SODIUM 2 TABLET: 8.6; 5 TABLET ORAL at 20:21

## 2019-01-01 RX ADMIN — HYDROCODONE BITARTRATE AND ACETAMINOPHEN 2 TABLET: 5; 325 TABLET ORAL at 10:26

## 2019-02-12 NOTE — PROGRESS NOTES
"Subjective   Hernandez Waterman is a 63 y.o. male here for   Chief Complaint   Patient presents with   • Hyperlipidemia   .    Vitals:    02/12/19 1035   BP: 100/65   Weight: 68.9 kg (152 lb)   Height: 182.9 cm (72\")       Body mass index is 20.61 kg/m².    Hyperlipidemia   This is a chronic problem. The current episode started more than 1 year ago. The problem is controlled. Recent lipid tests were reviewed and are normal. He has no history of diabetes or obesity. Pertinent negatives include no chest pain or shortness of breath.   Enuresis   This is a chronic problem. The current episode started more than 1 year ago. The problem occurs intermittently. The problem has been unchanged. Pertinent negatives include no chest pain, chills, coughing, fatigue or fever.        The following portions of the patient's history were reviewed and updated as appropriate: allergies, current medications, past social history and problem list.    Review of Systems   Constitutional: Negative for chills, fatigue and fever.   Respiratory: Negative for cough, shortness of breath and wheezing.    Cardiovascular: Negative for chest pain, palpitations and leg swelling.   Genitourinary: Positive for enuresis.   Psychiatric/Behavioral: Negative for dysphoric mood and sleep disturbance. The patient is not nervous/anxious.        Objective   Physical Exam   Constitutional: He is oriented to person, place, and time. He appears well-developed and well-nourished. No distress.   HENT:   Head: Normocephalic and atraumatic.   Right Ear: External ear normal.   Left Ear: External ear normal.   Nose: Nose normal.   Mouth/Throat: Oropharynx is clear and moist.   Eyes: Conjunctivae and EOM are normal. Pupils are equal, round, and reactive to light. Right eye exhibits no discharge. Left eye exhibits no discharge. No scleral icterus.   Neck: Normal range of motion. Neck supple. No JVD present. No tracheal deviation present. No thyromegaly present. "   Cardiovascular: Normal rate, regular rhythm, normal heart sounds and intact distal pulses. Exam reveals no gallop and no friction rub.   No murmur heard.  Pulmonary/Chest: Effort normal and breath sounds normal. No respiratory distress. He has no wheezes. He has no rales. He exhibits no tenderness.   Abdominal: Soft. Bowel sounds are normal. He exhibits no distension and no mass. There is no tenderness. There is no rebound and no guarding. No hernia.   Genitourinary: Rectum normal and penis normal. Rectal exam shows guaiac negative stool. Prostate is enlarged.   Musculoskeletal: Normal range of motion. He exhibits no edema.   Lymphadenopathy:     He has no cervical adenopathy.   Neurological: He is alert and oriented to person, place, and time. He has normal reflexes. No cranial nerve deficit. He exhibits normal muscle tone. Coordination normal.   Skin: Skin is warm and dry. No rash noted. No erythema.   Psychiatric: He has a normal mood and affect. His behavior is normal. Judgment and thought content normal.   Nursing note and vitals reviewed.      Assessment/Plan   Diagnoses and all orders for this visit:    Other hyperlipidemia  Comments:  continue diet/ex  Orders:  -     CBC Auto Differential; Future  -     Comprehensive Metabolic Panel; Future  -     Lipid Panel; Future    Nocturia  Comments:  call if needing meds  Orders:  -     PSA Screen; Future  -     Urinalysis With Microscopic If Indicated (No Culture) - Urine, Clean Catch; Future    Frequency of urination  -     PSA Screen; Future  -     Urinalysis With Microscopic If Indicated (No Culture) - Urine, Clean Catch; Future    Erectile dysfunction, unspecified erectile dysfunction type  Comments:  controlled    Colon cancer screening  -     POC FECAL OCCULT BLOOD BY IMMUNOASSAY

## 2019-02-15 NOTE — TELEPHONE ENCOUNTER
----- Message from Wendie Bowen sent at 2/15/2019  2:24 PM EST -----  Contact: pt  Patient was seen through [facility] last week but would like to go through the private side to get a referral for the dermatologist. Patient stated that he talked to Dr Gant about a raised crusty spot on his head and she stated that he should see a dermatologist for the problem. He would like to go to [facility], Dr Primitivo Damian. Please have Dr Gant put a referral in the patient's chart.     Please advise

## 2019-04-04 NOTE — PROGRESS NOTES
Subjective   Hernandez Waterman is a 63 y.o. male.     He has not been sleeping for the last couple days ago due to work obligations and sinus headache.       Sinus Problem   This is a new problem. The current episode started 1 to 4 weeks ago. The problem has been gradually worsening since onset. There has been no fever. His pain is at a severity of 6/10. The pain is mild. Associated symptoms include congestion, headaches and sinus pressure. Pertinent negatives include no chills, coughing, diaphoresis, ear pain, sneezing or sore throat. (Post nasal drainage ) Treatments tried: sudafed, advil, mucinex, flonase  The treatment provided mild relief.        The following portions of the patient's history were reviewed and updated as appropriate: allergies, current medications, past social history and problem list.    Review of Systems   Constitutional: Negative for chills, diaphoresis, fatigue and fever.   HENT: Positive for congestion, postnasal drip, rhinorrhea, sinus pressure and sinus pain. Negative for ear pain, sneezing and sore throat.    Eyes: Positive for itching (right eye only ). Negative for photophobia, pain, discharge, redness and visual disturbance.   Respiratory: Negative for cough and chest tightness.    Cardiovascular: Negative for chest pain, palpitations and leg swelling.   Allergic/Immunologic: Positive for environmental allergies.   Neurological: Positive for headaches. Negative for speech difficulty and weakness.   Psychiatric/Behavioral: Positive for confusion (hasn't slept for several hours.) and sleep disturbance. Negative for self-injury and suicidal ideas. The patient is not nervous/anxious.        Objective   Physical Exam   Constitutional: He appears well-developed and well-nourished. He is cooperative. He does not have a sickly appearance. He does not appear ill.   HENT:   Head: Normocephalic.   Right Ear: Hearing, tympanic membrane and external ear normal. No drainage, swelling or tenderness. No  mastoid tenderness. Tympanic membrane is not injected, not scarred, not erythematous and not bulging. Tympanic membrane mobility is normal. No middle ear effusion. No decreased hearing is noted.   Left Ear: Hearing, tympanic membrane and external ear normal. No drainage, swelling or tenderness. No mastoid tenderness. Tympanic membrane is not injected, not scarred, not erythematous and not bulging. Tympanic membrane mobility is normal.  No middle ear effusion. No decreased hearing is noted.   Nose: Rhinorrhea present. No mucosal edema, sinus tenderness or nasal deformity. Right sinus exhibits maxillary sinus tenderness and frontal sinus tenderness. Left sinus exhibits maxillary sinus tenderness and frontal sinus tenderness.   Mouth/Throat: Oropharynx is clear and moist and mucous membranes are normal. Normal dentition.   Eyes: Conjunctivae, EOM and lids are normal. Pupils are equal, round, and reactive to light. Right eye exhibits no discharge and no exudate. Left eye exhibits no discharge and no exudate.   Neck: Trachea normal and normal range of motion. No edema present. No thyroid mass and no thyromegaly present.   Cardiovascular: Regular rhythm, normal heart sounds and normal pulses.   No murmur heard.  Pulmonary/Chest: Breath sounds normal. No respiratory distress. He has no decreased breath sounds. He has no wheezes. He has no rhonchi. He has no rales.   Lymphadenopathy:        Head (right side): No submental, no submandibular, no tonsillar, no preauricular, no posterior auricular and no occipital adenopathy present.        Head (left side): No submental, no submandibular, no tonsillar, no preauricular, no posterior auricular and no occipital adenopathy present.     He has no cervical adenopathy.   Neurological: He is alert. He has normal strength. No cranial nerve deficit or sensory deficit.   Skin: Skin is warm, dry and intact. No cyanosis. Nails show no clubbing.       Assessment/Plan   Hernandez was seen today  for sinus problem.    Diagnoses and all orders for this visit:    Acute sinusitis, recurrence not specified, unspecified location  Comments:  continue with flonase, may add saline   Orders:  -     Loratadine (CLARITIN) 10 MG capsule; Take 1 tablet by mouth Daily for 7 days. Over the counter  -     amoxicillin (AMOXIL) 875 MG tablet; Take 1 tablet by mouth 2 (Two) Times a Day for 7 days.  -     methylPREDNISolone (MEDROL) 4 MG tablet; follow package directions    Primary insomnia  Comments:  may use melatonin     He has been advised to return to clinic if any worsening of symptoms. He is accompanied by his spouse who will call tomorrow to let me know how he is doing. Mild confusion and tired during exam. His spouse has been instructed if any neuro changes to take to ER if needed.

## 2019-04-05 PROBLEM — R51.9 ACUTE INTRACTABLE HEADACHE: Status: ACTIVE | Noted: 2019-01-01

## 2019-04-05 PROBLEM — D49.6 NEOPLASM OF BRAIN CAUSING MASS EFFECT ON ADJACENT STRUCTURES (HCC): Status: ACTIVE | Noted: 2019-01-01

## 2019-04-05 PROBLEM — G93.5 NEOPLASM OF BRAIN CAUSING MASS EFFECT ON ADJACENT STRUCTURES (HCC): Status: ACTIVE | Noted: 2019-01-01

## 2019-04-05 PROBLEM — G93.6 CEREBRAL EDEMA (HCC): Status: ACTIVE | Noted: 2019-01-01

## 2019-04-05 NOTE — PLAN OF CARE
Problem: Patient Care Overview  Goal: Plan of Care Review  Outcome: Ongoing (interventions implemented as appropriate)   04/05/19 1353   Coping/Psychosocial   Plan of Care Reviewed With patient;spouse   Plan of Care Review   Progress improving   OTHER   Outcome Summary c/o pain 6/10 prn meds given; gait unsteady up to bedside with assist x1; AAOx3 with periodic confusion; IVF NS at 100 cc/hr; tolerating regular diet; family at bedside      04/05/19 1353   Coping/Psychosocial   Plan of Care Reviewed With patient;spouse   Plan of Care Review   Progress improving   OTHER   Outcome Summary c/o pain 6/10 prn meds given; gait unsteady up to bedside with assist x1; AAOx3 with periodic confusion; IVF NS at 100 cc/hr; tolerating regular diet; family at bedside; plan for neuorsurgery on Sunday       Problem: Fall Risk (Adult)  Goal: Identify Related Risk Factors and Signs and Symptoms  Outcome: Ongoing (interventions implemented as appropriate)   04/05/19 1353   Fall Risk (Adult)   Related Risk Factors (Fall Risk) gait/mobility problems;fatigue/slow reaction;environment unfamiliar   Signs and Symptoms (Fall Risk) presence of risk factors     Goal: Absence of Fall  Outcome: Ongoing (interventions implemented as appropriate)   04/05/19 1353   Fall Risk (Adult)   Absence of Fall making progress toward outcome       Problem: Pain, Acute (Adult)  Goal: Identify Related Risk Factors and Signs and Symptoms  Outcome: Ongoing (interventions implemented as appropriate)   04/05/19 1353   Pain, Acute (Adult)   Related Risk Factors (Acute Pain) disease process   Signs and Symptoms (Acute Pain) fatigue/weakness;verbalization of pain descriptors     Goal: Acceptable Pain Control/Comfort Level  Outcome: Ongoing (interventions implemented as appropriate)   04/05/19 1353   Pain, Acute (Adult)   Acceptable Pain Control/Comfort Level making progress toward outcome       Problem: Confusion, Acute (Adult)  Goal: Identify Related Risk Factors and  Signs and Symptoms  Outcome: Outcome(s) achieved Date Met: 04/05/19 04/05/19 1353   Confusion, Acute (Adult)   Related Risk Factors (Acute Confusion) cognitive impairment;neurological impairment;mobility decreased   Signs and Symptoms (Acute Confusion) acute onset of symptoms;memory disturbed     Goal: Cognitive/Functional Impairments Minimized  Outcome: Ongoing (interventions implemented as appropriate)   04/05/19 1353   Confusion, Acute (Adult)   Cognitive/Functional Impairments Minimized making progress toward outcome     Goal: Safety  Outcome: Ongoing (interventions implemented as appropriate)   04/05/19 1353   Confusion, Acute (Adult)   Safety making progress toward outcome

## 2019-04-05 NOTE — H&P
Name: Hernandez Waterman ADMIT: 2019   : 1955  PCP: Megha Gant MD    MRN: [id number] LOS: 0 days   AGE/SEX: 63 y.o. male  ROOM: [facility]/     Chief Complaint   Patient presents with   • Weakness - Generalized   • Headache       History of present illness  Mr. Waterman is a 63 y.o. male previously healthy who started having headache on the right side about a week ago and radiating to right ear.  He also had 2 episodes of nausea no vomiting.  Patient denies any fever chills abdominal pain visual changes.  He works as a  for [employer].  The wife reports that his boss called her yesterday to pick him up from work because he was confused and he was saying things which did not make sense.  They arrived in the EMS because he fell yesterday 3 times.  No history of head injury.  He was seen in the immediate care center and was given amoxicillin and methylprednisone for sinus headache.  He had a CT which showed mass followed by an MRI which shows a large mass in the right frontal lobe with midline shift.  Dr. Brower has been consulted and the patient is been started on Keppra and Decadron.        Past Medical History:   Diagnosis Date   • ED (erectile dysfunction)      History reviewed. No pertinent surgical history.  Family History   Problem Relation Age of Onset   • Hypertension Mother    • Osteoporosis Mother    • Hypertension Father      Social History     Tobacco Use   • Smoking status: Never Smoker   • Smokeless tobacco: Never Used   Substance Use Topics   • Alcohol use: Yes     Comment: rarely   • Drug use: No     Medications Prior to Admission   Medication Sig Dispense Refill Last Dose   • amoxicillin (AMOXIL) 875 MG tablet Take 1 tablet by mouth 2 (Two) Times a Day for 7 days. 14 tablet 0 2019 at Unknown time   • ibuprofen (ADVIL,MOTRIN) 200 MG tablet Take 200 mg by mouth As Needed for Mild Pain .   2019 at Unknown time   • Loratadine (CLARITIN) 10 MG capsule Take 1 tablet by mouth Daily  for 7 days. Over the counter 7 each 0 4/4/2019 at Unknown time   • sildenafil (VIAGRA) 100 MG tablet Take 1 tablet by mouth Daily As Needed for erectile dysfunction. 10 tablet 1 Past Week at Unknown time   • methylPREDNISolone (MEDROL) 4 MG tablet follow package directions 21 tablet 0      Allergies:    Allergies   Allergen Reactions   • Sulfa Antibiotics Unknown (See Comments)     Unknown       Review of Systems   Constitutional: Positive for fatigue. Negative for activity change, appetite change, chills and fever.   HENT: Positive for ear pain (right). Negative for congestion, ear discharge, mouth sores, nosebleeds, sneezing, sore throat and trouble swallowing.    Eyes: Negative for discharge, itching and visual disturbance.   Respiratory: Negative for apnea, cough, chest tightness, shortness of breath, wheezing and stridor.    Cardiovascular: Negative for chest pain, palpitations and leg swelling.   Gastrointestinal: Negative for abdominal distention, abdominal pain, blood in stool, constipation, diarrhea, nausea and vomiting.   Endocrine: Negative for cold intolerance, heat intolerance and polydipsia.   Genitourinary: Negative for difficulty urinating and frequency.   Musculoskeletal: Negative for arthralgias, back pain, gait problem, joint swelling and neck stiffness.   Skin: Negative for color change, pallor and rash.   Neurological: Positive for headaches. Negative for dizziness, seizures, syncope, facial asymmetry, weakness and numbness.   Hematological: Negative for adenopathy. Does not bruise/bleed easily.   Psychiatric/Behavioral: Negative for agitation, behavioral problems and hallucinations.          Objective    Vital Signs  Temp:  [97.8 °F (36.6 °C)-98.2 °F (36.8 °C)] 98.2 °F (36.8 °C)  Heart Rate:  [42-55] 47  Resp:  [16-18] 16  BP: (100-114)/(56-75) 103/63  SpO2:  [95 %-99 %] 96 %  on   ;   Device (Oxygen Therapy): nasal cannula  Body mass index is 21.76 kg/m².    Physical Exam   Constitutional: He  is oriented to person, place, and time. He appears well-developed and well-nourished. No distress.   HENT:   Head: Normocephalic and atraumatic.   Nose: No epistaxis.   Mouth/Throat: Oropharynx is clear and moist.   Eyes: Conjunctivae and EOM are normal. Pupils are equal, round, and reactive to light.   Neck: Normal range of motion. Neck supple. No JVD present. No tracheal deviation and no edema present. No thyroid mass and no thyromegaly present.   Cardiovascular: Normal rate, regular rhythm and normal heart sounds.   No murmur heard.  Pulmonary/Chest: Breath sounds normal. He has no decreased breath sounds. He has no wheezes.   Abdominal: Soft. Normal appearance and bowel sounds are normal. He exhibits no ascites. There is no hepatosplenomegaly.   Musculoskeletal: Normal range of motion. He exhibits no edema.   Neurological: He is alert and oriented to person, place, and time.   Skin: Skin is warm, dry and intact. No rash noted. No cyanosis or erythema. No pallor. Nails show no clubbing.   Psychiatric: He has a normal mood and affect. His behavior is normal.   Vitals reviewed.      Results Review:   I reviewed the patient's new clinical results.    Lab Results (last 24 hours)     Procedure Component Value Units Date/Time    CBC & Differential [[id number]] Collected:  04/05/19 0544    Specimen:  Blood Updated:  04/05/19 0608    Narrative:       The following orders were created for panel order CBC & Differential.  Procedure                               Abnormality         Status                     ---------                               -----------         ------                     CBC Auto Differential[[id number]]        Abnormal            Final result                 Please view results for these tests on the individual orders.    Comprehensive Metabolic Panel [[id number]]  (Abnormal) Collected:  04/05/19 0544    Specimen:  Blood Updated:  04/05/19 0632     Glucose 119 mg/dL      BUN 19 mg/dL      Creatinine  0.80 mg/dL      Sodium 139 mmol/L      Potassium 4.4 mmol/L      Chloride 100 mmol/L      CO2 27.8 mmol/L      Calcium 9.2 mg/dL      Total Protein 7.0 g/dL      Albumin 3.90 g/dL      ALT (SGPT) 111 U/L      AST (SGOT) 69 U/L      Alkaline Phosphatase 71 U/L      Total Bilirubin 0.5 mg/dL      eGFR Non African Amer 97 mL/min/1.73      Globulin 3.1 gm/dL      A/G Ratio 1.3 g/dL      BUN/Creatinine Ratio 23.8     Anion Gap 11.2 mmol/L     Narrative:       GFR Normal >60  Chronic Kidney Disease <60  Kidney Failure <15    Sedimentation Rate [[id number]]  (Abnormal) Collected:  04/05/19 0544    Specimen:  Blood Updated:  04/05/19 0704     Sed Rate 32 mm/hr     CBC Auto Differential [[id number]]  (Abnormal) Collected:  04/05/19 0544    Specimen:  Blood Updated:  04/05/19 0608     WBC 9.72 10*3/mm3      RBC 3.99 10*6/mm3      Hemoglobin 12.3 g/dL      Hematocrit 38.2 %      MCV 95.7 fL      MCH 30.8 pg      MCHC 32.2 g/dL      RDW 12.2 %      RDW-SD 43.4 fl      MPV 9.0 fL      Platelets 305 10*3/mm3      Neutrophil % 81.9 %      Lymphocyte % 7.8 %      Monocyte % 9.1 %      Eosinophil % 0.3 %      Basophil % 0.5 %      Immature Grans % 0.4 %      Neutrophils, Absolute 7.96 10*3/mm3      Lymphocytes, Absolute 0.76 10*3/mm3      Monocytes, Absolute 0.88 10*3/mm3      Eosinophils, Absolute 0.03 10*3/mm3      Basophils, Absolute 0.05 10*3/mm3      Immature Grans, Absolute 0.04 10*3/mm3      nRBC 0.0 /100 WBC               MRI Brain With & Without Contrast   Final Result       There is a 6.8 x 4.3 x 4.6 cm heterogeneous necrotic contrast enhancing   mass centered within the right frontal lobe with circumscribing signal   abnormality noted predominantly within the right frontal lobe, but also   extending into the genu of the corpus callosum. Note is made of marked   mass effect with sulcal and ventricular effacement in addition to   midline shift to the left by approximately 8 mm, entrapment of the left   lateral ventricle, and  subfalcine herniation of the anterior and medial   portion of the right frontal lobe. 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       This report was finalized on 4/5/2019 10:14 AM by Dr. Michael Harris M.D.          CT Head Without Contrast   Final Result   Large area of vasogenic edema identified within the right frontal lobe.   Underlying mass lesion is suspected. Both primary brain neoplasm and   metastatic disease would be in the differential. Further evaluation with   MRI of the brain without and with contrast is recommended. Midline shift   measures up to 1.3 cm, and there is significant mass effect upon the   right lateral ventricle. Neurosurgical consultation is recommended.   Findings were relayed to Herve Jane, the physician assistant at 5:45   AM.       Radiation dose reduction techniques were utilized, including automated   exposure control and exposure modulation based on body size.       This report was finalized on 4/5/2019 5:52 AM by Dr. Niki Beatty M.D.          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         Mr. Waterman is a 63 y.o. male who a large mass occupying the right frontal lobe with a midline shift and cerebral edema.  Admit the patient and continue steroids and Keppra.  Stress ulcer prophylaxis with Pepcid  DVT prophylaxis SCDs  Consult neurosurgery  Patient is a full code        I discussed the patients findings and my recommendations with patient, family and nursing staff.      Zev Weldon MD  [facility]  04/05/19

## 2019-04-05 NOTE — ED NOTES
"Pt c/o \"a sinus headache for a week.\" States headache is behind his eyes, mostly on the right side. States had clear nasal drainage. Denies fever or cough. Wife at bedside states \"his boss called me yesterday to pick him up from work because he was confused. He was saying some things that didn't make sense.\" Wife also states \"I called EMS because he fell three times yesterday.\" States the pt did not hit his head. Pt states not on blood thinners and denies any injuries from the fall. States seen by his doctors acute clinic yesterday who gave him prescriptions for amoxicillin and methylprednisolone. States has not started the methylprednisolone. States has been taking Sudafed and Advil and some Tylenol and Claritin that helped \"just enough to get me to sleep.\" C/o generalized weakness x 1 wk. States vomited a few times the last couple days.    VSS, NAD, A&O x4. Respirations even and unlabored. Denies any other acute complaints. Call light in reach. Will continue to monitor. MD to treat.     Batsheva Couch, RN  04/05/19 5670       Batsheva Couch, RN  04/05/19 1936    "

## 2019-04-05 NOTE — CONSULTS
Inpatient Neurosurgery Consult  Consult performed by: Lidia Solares PA-C  Consult ordered by: Zev Weldon MD  Reason for consult: HA, N/V, confusion, weakness with brain mass  Assessment/Recommendations: Mr. Waterman is a very pleasant 63-year-old male without any significant past medical history who states that a few weeks ago he began to notice some headaches that were unusual for him and that they were constant and fairly bothersome.  The headaches became precipitously more severe about 10 days ago and he has had significant confusion as well as nausea vomiting and gait difficulties over the last 3-4 days.  His wife was at the bedside and confirms this history.  They both deny any changes in vision or speech.  He admits to a sense of weakness on the left but attributes it to a previous shoulder injury.  He denies any seizure-like activity or loss of consciousness.  No personal history of neoplasm.  He is quite active at baseline.  No history of tobacco abuse.    The patient's symptoms again have been getting progressively worse particularly over the last few days which prompted him to go to the emergency room yesterday evening.  Unfortunately a brain CT and subsequent brain MRI confirmed the presence of a nearly 7 cm right frontal mass with significant vasogenic edema.  The findings are very worrisome for high-grade glioma.  The lesion does extend into the corpus callosum and has about 8 mm of associated midline shift.    His exam does reveal left upper extremity greater than lower extremity weakness as well as drowsiness and mild confusion.  Significant drift on the left as well.    I had a lengthy discussion with the patient and his wife and explained the MRI findings.  We have started him on Decadron 6 mg IV every 6 hours as well as Keppra 500 mg twice daily.  They understand the need to evacuate the mass to both decompress the brain and also determine pathology.  Again this does look consistent  with a high-grade glioma rather than metastases.  However I will go ahead and order a CT of the chest abdomen and pelvis just to rule out any other potential neoplastic lesions.  I did speak with Dr. Weldon who is following for medicine and we both agree that given his health history that specific cardiac clearance is not required preoperatively.    The patient himself again is somewhat confused and it is difficult to determine how much of this information he was able to absorb/process.  However his wife voiced understanding and she does wish to proceed with surgery.  Surgery will likely be on Sunday.  Dr. Brower will be by tomorrow to discuss surgery with the patient and his wife.  In the interim again we will continue with Decadron and Keppra.          Patient Care Team:  Megha Gant MD as PCP - General (Internal Medicine)    Chief complaint: HA, confusion with R frontal glioma    Subjective     Brain CT and MRI reviewed and discussed above      ..Lab             04/05/19                       0544          WBC          9.72          HEMOGLOBIN   12.3*         HEMATOCRIT   38.2          PLATELETS    305               ..Lab             04/05/19                       0544          SODIUM       139           POTASSIUM    4.4           CHLORIDE     100           CO2          27.8          BUN          19            CREATININE   0.80          GLUCOSE      119*          CALCIUM      9.2                     Weakness - Generalized   This is a new problem. The current episode started in the past 7 days. The problem occurs constantly. The problem has been gradually worsening. Associated symptoms include headaches, nausea, vomiting and weakness. Pertinent negatives include no fever, numbness, rash or visual change.   Headache    This is a new problem. The current episode started 1 to 4 weeks ago. The problem occurs constantly. The problem has been gradually worsening. The pain is located in the bilateral  region. The pain does not radiate. The pain quality is not similar to prior headaches. The quality of the pain is described as throbbing. The pain is at a severity of 6/10. Associated symptoms include nausea, vomiting and weakness. Pertinent negatives include no dizziness, fever, numbness, seizures or visual change.   Brain Tumor   This is a new problem. Associated symptoms include headaches, nausea, vomiting and weakness. Pertinent negatives include no fever, numbness, rash or visual change.       Review of Systems   Constitutional: Negative for fever.   Gastrointestinal: Positive for nausea and vomiting.   Skin: Negative for rash.   Neurological: Positive for weakness and headaches. Negative for dizziness, tremors, seizures, syncope, facial asymmetry, speech difficulty, light-headedness and numbness.   Psychiatric/Behavioral: Positive for confusion.   All other systems reviewed and are negative.       Past Medical History:   Diagnosis Date   • ED (erectile dysfunction)    , History reviewed. No pertinent surgical history.,   Family History   Problem Relation Age of Onset   • Hypertension Mother    • Osteoporosis Mother    • Hypertension Father    ,   Social History     Tobacco Use   • Smoking status: Never Smoker   • Smokeless tobacco: Never Used   Substance Use Topics   • Alcohol use: Yes     Comment: rarely   • Drug use: No   ,   Medications Prior to Admission   Medication Sig Dispense Refill Last Dose   • amoxicillin (AMOXIL) 875 MG tablet Take 1 tablet by mouth 2 (Two) Times a Day for 7 days. 14 tablet 0 4/4/2019 at Unknown time   • ibuprofen (ADVIL,MOTRIN) 200 MG tablet Take 200 mg by mouth As Needed for Mild Pain .   4/4/2019 at Unknown time   • Loratadine (CLARITIN) 10 MG capsule Take 1 tablet by mouth Daily for 7 days. Over the counter 7 each 0 4/4/2019 at Unknown time   • sildenafil (VIAGRA) 100 MG tablet Take 1 tablet by mouth Daily As Needed for erectile dysfunction. 10 tablet 1 Past Week at Unknown  time   • methylPREDNISolone (MEDROL) 4 MG tablet follow package directions 21 tablet 0    , Scheduled Meds:    dexamethasone 6 mg Intravenous Q6H   famotidine 20 mg Intravenous Q12H   insulin lispro 0-7 Units Subcutaneous 4x Daily With Meals & Nightly   levETIRAcetam 500 mg Intravenous Q12H   sodium chloride 3 mL Intravenous Q12H   , Continuous Infusions:    sodium chloride 100 mL/hr Last Rate: 100 mL/hr (04/05/19 1107)   , PRN Meds:  •  acetaminophen  •  dextrose  •  dextrose  •  glucagon (human recombinant)  •  HYDROcodone-acetaminophen  •  HYDROmorphone **AND** naloxone  •  ondansetron **OR** ondansetron ODT **OR** ondansetron  •  [COMPLETED] Insert peripheral IV **AND** sodium chloride  •  [COMPLETED] Insert peripheral IV **AND** sodium chloride  •  sodium chloride and Allergies:  Sulfa antibiotics    Objective      Vital Signs  Temp:  [97.8 °F (36.6 °C)-98.3 °F (36.8 °C)] 98.2 °F (36.8 °C)  Heart Rate:  [42-77] 47  Resp:  [16-18] 16  BP: (100-118)/(56-80) 103/63    Physical Exam   Constitutional: He appears well-developed and well-nourished. He appears lethargic.   HENT:   Head: Normocephalic and atraumatic.   Right Ear: External ear normal.   Left Ear: External ear normal.   Eyes: Conjunctivae and EOM are normal. Pupils are equal, round, and reactive to light. Right eye exhibits no discharge. Left eye exhibits no discharge.   Neck: Normal range of motion. Neck supple. No tracheal deviation present.   Cardiovascular: Intact distal pulses.   Bradycardic, HR 47. Pt very active, this is fairly typical for him per pt and wife.    Pulmonary/Chest: Effort normal. No stridor. No respiratory distress.   Musculoskeletal: Normal range of motion. He exhibits no edema, tenderness or deformity.   Neurological: He has normal reflexes. He appears lethargic. He is disoriented. He displays no atrophy, no tremor and normal reflexes. No cranial nerve deficit or sensory deficit. He exhibits normal muscle tone. He displays a  negative Romberg sign. He displays no seizure activity. Coordination and gait normal. GCS eye subscore is 4. GCS verbal subscore is 4. GCS motor subscore is 6.   No long tract signs    LUE weakness 4+/5, LLE weakness 5-/5.  L pronator drift.      Skin: Skin is warm and dry.   Psychiatric: He has a normal mood and affect. His behavior is normal. Judgment and thought content normal.   Nursing note and vitals reviewed.      Results Review:    I reviewed the patient's new clinical results.  I reviewed the patient's new imaging results and agree with the interpretation.        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     Plan:   (As above    Cont with decadron and keppra, GI prophylaxis with pepcid    Even though this looks like high grade glioma will check CT C/A/P to be sure no other neoplasm    OR Sunday).       I discussed the patients findings and my recommendations with patient, family and nursing staff    Lidia Solares PA-C  04/05/19  11:27 AM    Time: 50min

## 2019-04-05 NOTE — ED TRIAGE NOTES
Pt brought by EMS from home for generalized weakness and HA x 10 days with n/v. Pt was seen at [facility] yesterday and prescribed amoxicillin and steroid which he has not started yet.

## 2019-04-05 NOTE — ED NOTES
MRI screening sheet filled out by pts wife. MRI tech called to review sheet at this time.     Anya Woodard RN  04/05/19 0761

## 2019-04-05 NOTE — ED NOTES
Nursing report ED to floor  Hernandez Waterman  63 y.o.  male    HPI (triage note):   Chief Complaint   Patient presents with   • Weakness - Generalized   • Headache       Admitting doctor:   Zev Weldon MD    Admitting diagnosis:   The primary encounter diagnosis was Acute intractable headache, unspecified headache type. A diagnosis of Brain mass was also pertinent to this visit.    Code status:   Current Code Status     This patient does not have a recorded code status. Please follow your organizational policy for patients in this situation.          Allergies:   Sulfa antibiotics    Weight:       04/05/19  0545   Weight: 74.8 kg (165 lb)       Most recent vitals:   Vitals:    04/05/19 0608 04/05/19 0637 04/05/19 0707 04/05/19 0738   BP: 113/75 108/65 114/67 111/64   Pulse: (!) 48 (!) 44 50 (!) 42   Resp: 16  16 16   Temp:       TempSrc:       SpO2: 99% 95% 97% 95%   Weight:       Height:           Active LDAs/IV Access:   Lines, Drains & Airways    Active LDAs     Name:   Placement date:   Placement time:   Site:   Days:    Peripheral IV 04/05/19 0542 Left Antecubital   04/05/19 0542    Antecubital   less than 1                Labs (abnormal labs have a star):   Labs Reviewed   COMPREHENSIVE METABOLIC PANEL - Abnormal; Notable for the following components:       Result Value    Glucose 119 (*)     ALT (SGPT) 111 (*)     AST (SGOT) 69 (*)     All other components within normal limits    Narrative:     GFR Normal >60  Chronic Kidney Disease <60  Kidney Failure <15   SEDIMENTATION RATE - Abnormal; Notable for the following components:    Sed Rate 32 (*)     All other components within normal limits   CBC WITH AUTO DIFFERENTIAL - Abnormal; Notable for the following components:    RBC 3.99 (*)     Hemoglobin 12.3 (*)     RDW 12.2 (*)     Neutrophil % 81.9 (*)     Lymphocyte % 7.8 (*)     Neutrophils, Absolute 7.96 (*)     All other components within normal limits   RAINBOW DRAW    Narrative:     The following  orders were created for panel order [place] Draw.  Procedure                               Abnormality         Status                     ---------                               -----------         ------                     Light Blue Top[[id number]]                                   Final result               Green Top (Gel)[[id number]]                                  Final result               Lavender Top[[id number]]                                     Final result               Gold Top - SST[[id number]]                                   Final result                 Please view results for these tests on the individual orders.   URINALYSIS W/ MICROSCOPIC IF INDICATED (NO CULTURE)   CBC AND DIFFERENTIAL    Narrative:     The following orders were created for panel order CBC & Differential.  Procedure                               Abnormality         Status                     ---------                               -----------         ------                     CBC Auto Differential[[id number]]        Abnormal            Final result                 Please view results for these tests on the individual orders.   LIGHT BLUE TOP   GREEN TOP   LAVENDER TOP   GOLD TOP - SST       EKG:   No orders to display       Meds given in ED:   Medications   sodium chloride 0.9 % flush 10 mL (not administered)   sodium chloride 0.9 % flush 10 mL (not administered)   sodium chloride 0.9 % bolus 1,000 mL (0 mL Intravenous Stopped 4/5/19 0712)   HYDROmorphone (DILAUDID) injection 1 mg (1 mg Intravenous Given 4/5/19 0607)   ondansetron (ZOFRAN) injection 4 mg (4 mg Intravenous Given 4/5/19 0607)   dexamethasone (DECADRON) 10 mg/20ml NS IV syringe (10 mg Intravenous Given 4/5/19 0712)       Imaging results:  Ct Head Without Contrast    Result Date: 4/5/2019  Large area of vasogenic edema identified within the right frontal lobe. Underlying mass lesion is suspected. Both primary brain neoplasm and metastatic disease would be in the  differential. Further evaluation with MRI of the brain without and with contrast is recommended. Midline shift measures up to 1.3 cm, and there is significant mass effect upon the right lateral ventricle. Neurosurgical consultation is recommended. Findings were relayed to Herve Jane, the physician assistant at 5:45 AM.  Radiation dose reduction techniques were utilized, including automated exposure control and exposure modulation based on body size.  This report was finalized on 4/5/2019 5:52 AM by Dr. Niki Beatty M.D.        Ambulatory status:   - bedrest    Social issues:   Social History     Socioeconomic History   • Marital status:      Spouse name: Not on file   • Number of children: Not on file   • Years of education: Not on file   • Highest education level: Not on file   Tobacco Use   • Smoking status: Never Smoker   • Smokeless tobacco: Never Used   Substance and Sexual Activity   • Alcohol use: Yes     Comment: rarely   • Drug use: No   • Sexual activity: Yes     Partners: Female        Kalpana June RN  04/05/19 0742

## 2019-04-05 NOTE — ED PROVIDER NOTES
Pt presents to ED c/o generalized weakness and R frontal HA onset 7-10 days ago. Pt also c/o nausea and vomiting, but denies SOA.    Upon exam, pt is A&O x3, he is in no respiratory distress, no focal neurological deficits, heart is RRR, and lungs are CTAB.    Discussed with pt plan to admit. Pt understands and agrees with the plan, all questions answered.    MD ATTESTATION NOTE    The SURAJ and I have discussed this patient's history, physical exam, and treatment plan.  I have reviewed the documentation and personally had a face to face interaction with the patient. I affirm the documentation and agree with the treatment and plan.  The attached note describes my personal findings.    Documentation assistance provided by starr Huntley for Dr. Cantrell.  Information recorded by the scribe was done at my direction and has been verified and validated by me.     Isis Huntley  04/05/19 0614       Primitivo Cantrell MD  04/05/19 0756

## 2019-04-05 NOTE — ED PROVIDER NOTES
" EMERGENCY DEPARTMENT ENCOUNTER    CHIEF COMPLAINT  Chief Complaint: HA  History given by: patient   History limited by: nothing   Room Number: 25/25  PMD: Megha Gant MD      HPI:  Pt is a 63 y.o. male who presents to the ED complaining of R-sided HA that started about a week ago. Pt reports the pain radiates to his R ear. Pt admits mild nausea and vomiting (2 episodes). Pt denies fever, chills, abd pain, and any visual changes. Pt reports today he took Tylenol and Sudafed and only received mild pain relief. Pt reports a hx of sinus headaches. There are no other complaints at this time.     Duration: 1 week  Onset: gradual  Timing: constant   Location: R-sided headache  Radiation: R ear  Quality: \"pain\"  Intensity/Severity: moderate  Progression: unchanged   Associated Symptoms: pt admits mild nausea and vomiting (2 episodes)  Aggravating Factors: none mentioned   Alleviating Factors: none mentioned   Previous Episodes: pt reports a hx of sinus headaches   Treatment before arrival: pt reports today he took Tylenol and Sudafed and only received mild pain relief    PAST MEDICAL HISTORY  Active Ambulatory Problems     Diagnosis Date Noted   • Pure hypercholesterolemia 05/22/2016   • Testicular hypofunction 05/22/2016   • ED (erectile dysfunction)    • Frequency of urination 02/14/2018   • Other hyperlipidemia 02/14/2018   • Nocturia 02/14/2018   • Primary insomnia 06/22/2018   • Chronic fatigue 06/22/2018   • Night sweat 06/22/2018     Resolved Ambulatory Problems     Diagnosis Date Noted   • No Resolved Ambulatory Problems     Past Medical History:   Diagnosis Date   • ED (erectile dysfunction)        PAST SURGICAL HISTORY  History reviewed. No pertinent surgical history.    FAMILY HISTORY  Family History   Problem Relation Age of Onset   • Hypertension Mother    • Osteoporosis Mother    • Hypertension Father        SOCIAL HISTORY  Social History     Socioeconomic History   • Marital status:      " Spouse name: Not on file   • Number of children: Not on file   • Years of education: Not on file   • Highest education level: Not on file   Tobacco Use   • Smoking status: Never Smoker   • Smokeless tobacco: Never Used   Substance and Sexual Activity   • Alcohol use: Yes     Comment: rarely   • Drug use: No   • Sexual activity: Yes     Partners: Female       ALLERGIES  Sulfa antibiotics    REVIEW OF SYSTEMS  Review of Systems   Constitutional: Negative for activity change, appetite change, chills and fever.   HENT: Negative for congestion and sore throat.    Eyes: Negative.  Negative for visual disturbance.   Respiratory: Negative for cough and shortness of breath.    Cardiovascular: Negative for chest pain and leg swelling.   Gastrointestinal: Positive for nausea (mild) and vomiting (2 episodes). Negative for abdominal pain and diarrhea.   Endocrine: Negative.    Genitourinary: Negative for decreased urine volume and dysuria.   Musculoskeletal: Negative for neck pain.   Skin: Negative for rash and wound.   Allergic/Immunologic: Negative.    Neurological: Positive for headaches (R sided). Negative for weakness and numbness.   Hematological: Negative.    Psychiatric/Behavioral: Negative.    All other systems reviewed and are negative.      PHYSICAL EXAM  ED Triage Vitals [04/05/19 0447]   Temp Heart Rate Resp BP SpO2   97.8 °F (36.6 °C) 55 18 110/70 97 %      Temp src Heart Rate Source Patient Position BP Location FiO2 (%)   Tympanic -- -- -- --       Physical Exam   Constitutional: He is oriented to person, place, and time. No distress.   HENT:   Head: Normocephalic and atraumatic.   No temporal artery tenderness   Eyes: EOM are normal. Pupils are equal, round, and reactive to light.   Neck: Normal range of motion. Neck supple. No neck rigidity.   Cardiovascular: Normal rate, regular rhythm and normal heart sounds.   Pulmonary/Chest: Effort normal and breath sounds normal. No respiratory distress.   Abdominal: Soft.  There is no tenderness. There is no rebound and no guarding.   Musculoskeletal: Normal range of motion. He exhibits no edema.   Neurological: He is alert and oriented to person, place, and time. He has normal sensation and normal strength.   Cranial nerves intact    Skin: Skin is warm and dry.   Psychiatric: Mood and affect normal.   Nursing note and vitals reviewed.      LAB RESULTS  Lab Results (last 24 hours)     Procedure Component Value Units Date/Time    CBC & Differential [[id number]] Collected:  04/05/19 0544    Specimen:  Blood Updated:  04/05/19 0608    Narrative:       The following orders were created for panel order CBC & Differential.  Procedure                               Abnormality         Status                     ---------                               -----------         ------                     CBC Auto Differential[[id number]]        Abnormal            Final result                 Please view results for these tests on the individual orders.    Comprehensive Metabolic Panel [[id number]]  (Abnormal) Collected:  04/05/19 0544    Specimen:  Blood Updated:  04/05/19 0632     Glucose 119 mg/dL      BUN 19 mg/dL      Creatinine 0.80 mg/dL      Sodium 139 mmol/L      Potassium 4.4 mmol/L      Chloride 100 mmol/L      CO2 27.8 mmol/L      Calcium 9.2 mg/dL      Total Protein 7.0 g/dL      Albumin 3.90 g/dL      ALT (SGPT) 111 U/L      AST (SGOT) 69 U/L      Alkaline Phosphatase 71 U/L      Total Bilirubin 0.5 mg/dL      eGFR Non African Amer 97 mL/min/1.73      Globulin 3.1 gm/dL      A/G Ratio 1.3 g/dL      BUN/Creatinine Ratio 23.8     Anion Gap 11.2 mmol/L     Narrative:       GFR Normal >60  Chronic Kidney Disease <60  Kidney Failure <15    Sedimentation Rate [[id number]] Collected:  04/05/19 0544    Specimen:  Blood Updated:  04/05/19 0601    CBC Auto Differential [[id number]]  (Abnormal) Collected:  04/05/19 0544    Specimen:  Blood Updated:  04/05/19 0608     WBC 9.72 10*3/mm3      RBC 3.99  10*6/mm3      Hemoglobin 12.3 g/dL      Hematocrit 38.2 %      MCV 95.7 fL      MCH 30.8 pg      MCHC 32.2 g/dL      RDW 12.2 %      RDW-SD 43.4 fl      MPV 9.0 fL      Platelets 305 10*3/mm3      Neutrophil % 81.9 %      Lymphocyte % 7.8 %      Monocyte % 9.1 %      Eosinophil % 0.3 %      Basophil % 0.5 %      Immature Grans % 0.4 %      Neutrophils, Absolute 7.96 10*3/mm3      Lymphocytes, Absolute 0.76 10*3/mm3      Monocytes, Absolute 0.88 10*3/mm3      Eosinophils, Absolute 0.03 10*3/mm3      Basophils, Absolute 0.05 10*3/mm3      Immature Grans, Absolute 0.04 10*3/mm3      nRBC 0.0 /100 WBC           I ordered the above labs and reviewed the results    RADIOLOGY  CT Head Without Contrast   Final Result   Large area of vasogenic edema identified within the right frontal lobe.   Underlying mass lesion is suspected. Both primary brain neoplasm and   metastatic disease would be in the differential. Further evaluation with   MRI of the brain without and with contrast is recommended. Midline shift   measures up to 1.3 cm, and there is significant mass effect upon the   right lateral ventricle. Neurosurgical consultation is recommended.   Findings were relayed to Herve Jane, the physician assistant at 5:45   AM.       Radiation dose reduction techniques were utilized, including automated   exposure control and exposure modulation based on body size.       This report was finalized on 4/5/2019 5:52 AM by Dr. Niki Beatty M.D.          MRI Brain With & Without Contrast    (Results Pending)        I ordered the above noted radiological studies. Interpreted by radiologist. Reviewed by me in PACS.       PROCEDURES  Procedures      PROGRESS AND CONSULTS       0517   Ordered CT head and urinalysis for further evaluation.     0518  Ordered IVF bolus for hydration and ordered Toradol, Benadryl, and Compazine for headache.     0551  Placed a call to Neurosurgery.     0555  Discussed pt case w Dr. Brower  (neurosurgery) who would like me to try and consult medicine to see if they will take it and if not he agrees to admit and he would like me to get an MRI of brain.     0559  Ordered MRI brain for further evaluation.     0612  Placed a call to [facility].     0630:  Patient Admitted to [facility] ([facility]).  Dr Brower will see in consult.     MEDICAL DECISION MAKING  Results were reviewed/discussed with the patient and they were also made aware of online access. Pt also made aware that some labs, such as cultures, will not be resulted during ER visit and follow up with PMD is necessary.     MDM  Number of Diagnoses or Management Options     Amount and/or Complexity of Data Reviewed  Tests in the radiology section of CPT®: ordered and reviewed (CT head= large area of vasogenic edema identified within the R frontal lobe. Underlying mass lesion is suspected. Both primary brain neoplasm and metastatic disease would be in the differential.)  Decide to obtain previous medical records or to obtain history from someone other than the patient: yes  Review and summarize past medical records: yes (Pt's previous medical records show pt had an office visit yesterday with internal medicine and was dx w acute sinusitis and primary insomnia.)           DIAGNOSIS  Final diagnoses:   Acute intractable headache, unspecified headache type   Brain mass       DISPOSITION  Admit    Latest Documented Vital Signs:  As of 6:40 AM  BP- 113/75 HR- (!) 48 Temp- 97.8 °F (36.6 °C) (Tympanic) O2 sat- 99%    --  Documentation assistance provided by starr Hernandez for Herve Jane PA-C.  Information recorded by the starr was done at my direction and has been verified and validated by me.     Federica Hernandez  04/05/19 0615       Armand Jane III, PA  04/05/19 0624       Armand Jane III, PA  04/05/19 0640

## 2019-04-05 NOTE — ED NOTES
"Pt states \"I can walk to the bathroom\" to provide the urine sample. Pt unable to sit up or stand independently. Pt encouraged to use stay in bed to provide the urine sample. Pt agrees to stay in bed at this time. Wife states she will assist the pt to urinate in the cup.     Batsheva Couch, RN  04/05/19 0553    "

## 2019-04-06 NOTE — PLAN OF CARE
Problem: Patient Care Overview  Goal: Plan of Care Review  Outcome: Ongoing (interventions implemented as appropriate)   04/06/19 0532   Coping/Psychosocial   Plan of Care Reviewed With patient   Plan of Care Review   Progress improving   OTHER   Outcome Summary no falls. vital signs stable. no neuro changes noted. A&O x4; forgetful at times. IVF per MAR. denies pain; medication offered throughout shift. await update from [facility].       Problem: Fall Risk (Adult)  Goal: Absence of Fall  Outcome: Ongoing (interventions implemented as appropriate)      Problem: Pain, Acute (Adult)  Goal: Acceptable Pain Control/Comfort Level  Outcome: Ongoing (interventions implemented as appropriate)      Problem: Confusion, Acute (Adult)  Goal: Safety  Outcome: Ongoing (interventions implemented as appropriate)

## 2019-04-06 NOTE — PROGRESS NOTES
Name: Hernandez Waterman ADMIT: 2019   : 1955  PCP: Megha Gant MD    MRN: [id number] LOS: 1 days   AGE/SEX: 63 y.o. male    ROOM: [facility]   Subjective   Patient is having hiccups  No new problems  Plan for surgery on     Brief hospital course since admission:  Brain metastases  With vasogenic edema    I have reviewed past medical history, family history, social history and allergies.  No changes from admission note.      Review of Systems   Constitutional: Positive for fatigue. Negative for fever.   Respiratory: Negative for shortness of breath and wheezing.    Gastrointestinal: Negative for nausea and vomiting.   Neurological: Positive for headaches.          Objective   Vital Signs  Temp:  [97.9 °F (36.6 °C)-98.7 °F (37.1 °C)] 97.9 °F (36.6 °C)  Heart Rate:  [40-60] 60  Resp:  [18] 18  BP: ()/(47-70) 115/69  SpO2:  [98 %-100 %] 98 %  on   ;   Device (Oxygen Therapy): room air  Body mass index is 21.76 kg/m².    Intake/Output Summary (Last 24 hours) at 2019 1459  Last data filed at 2019 0557  Gross per 24 hour   Intake 2348.33 ml   Output 200 ml   Net 2148.33 ml       Physical Exam   Constitutional: He is oriented to person, place, and time. He appears well-developed and well-nourished. No distress.   HENT:   Head: Normocephalic and atraumatic.   Eyes: Pupils are equal, round, and reactive to light. No scleral icterus.   Cardiovascular: Normal rate and regular rhythm.   Pulmonary/Chest: Effort normal. No respiratory distress. He has no decreased breath sounds. He has no wheezes.   Abdominal: Soft. Bowel sounds are normal. There is no hepatosplenomegaly.   Neurological: He is alert and oriented to person, place, and time.   Skin: No cyanosis. Nails show no clubbing.   Psychiatric: He has a normal mood and affect. His behavior is normal.       Results Review:    Results from last 7 days   Lab Units 19  0544   WBC 10*3/mm3 9.72   HEMOGLOBIN g/dL 12.3*   HEMATOCRIT % 38.2    PLATELETS 10*3/mm3 305     Results from last 7 days   Lab Units 04/06/19  0638 04/05/19  0544   SODIUM mmol/L 138 139   POTASSIUM mmol/L 4.3 4.4   CHLORIDE mmol/L 103 100   CO2 mmol/L 25.1 27.8   BUN mg/dL 17 19   CREATININE mg/dL 0.71* 0.80   GLUCOSE mg/dL 163* 119*   CALCIUM mg/dL 8.8 9.2     Results from last 7 days   Lab Units 04/06/19  0638   INR  1.16*     Hemoglobin A1C:No results found for: HGBA1C  Glucose Range:  Glucose   Date/Time Value Ref Range Status   04/06/2019 1118 150 (H) 70 - 130 mg/dL Final   04/06/2019 0624 143 (H) 70 - 130 mg/dL Final   04/05/2019 2127 142 (H) 70 - 130 mg/dL Final   04/05/2019 1717 126 70 - 130 mg/dL Final         dexamethasone 6 mg Intravenous Q6H   famotidine 20 mg Intravenous Q12H   insulin lispro 0-7 Units Subcutaneous 4x Daily With Meals & Nightly   levETIRAcetam 500 mg Intravenous Q12H   sodium chloride 3 mL Intravenous Q12H       sodium chloride 100 mL/hr Last Rate: 100 mL/hr (04/05/19 2222)   Diet Regular  Assessment/Plan       Assessment/Plan      Active Hospital Problems    Diagnosis  POA   • **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     Continue Decadron  GI prophylaxis with Pepcid  Plan for surgery on [place] morning noted        Zev Weldon MD  [facility]  04/06/19

## 2019-04-06 NOTE — PROGRESS NOTES
"   LOS: 1 day   Patient Care Team:  Megha Gant MD as PCP - General (Internal Medicine)    Chief Complaint: Progressive  headaches resulting from a right frontal brain tumor    Subjective     This unfortunate gentleman has a history of headaches and some balance problems over the last few weeks.  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  He is scheduled for surgery tomorrow.  I had an extended conversation with his wife and with him about the nature of this problem and that we are probably dealing with a glioma and probably will need radiation and chemotherapy.  All questions were answered.  Will be move forward with surgery tomorrow.        Subjective:  Symptoms:  Stable.    Diet:  Adequate intake.    Activity level: Impaired due to weakness.    Pain:  He reports no pain.        History taken from: patient chart family RN    Objective     Vital Signs  Temp:  [97.9 °F (36.6 °C)-98.7 °F (37.1 °C)] 97.9 °F (36.6 °C)  Heart Rate:  [40-60] 60  Resp:  [18] 18  BP: ()/(47-70) 115/69    Objective:  General Appearance:  Comfortable.    Vital signs: (most recent): Blood pressure 115/69, pulse 60, temperature 97.9 °F (36.6 °C), temperature source Oral, resp. rate 18, height 185.4 cm (72.99\"), weight 74.8 kg (164 lb 14.5 oz), SpO2 98 %.  Vital signs are normal.  No fever.    Output: Producing urine and producing stool.    HEENT: Normal HEENT exam.    Lungs:  Normal effort.    Heart: Normal rate.    Chest: Asymmetric chest wall expansion.   Abdomen: Abdomen is soft.  Bowel sounds are normal.     Extremities: Normal range of motion.    Pulses: Distal pulses are intact.    Neurological: Patient is alert and oriented to person, place and time.  Normal strength.  Patient has normal reflexes, normal muscle tone and normal coordination.    Pupils:  Pupils are equal, round, and reactive to light.    Skin:  Warm.              Results Review:     I reviewed the patient's new clinical " results.  I reviewed the patient's new imaging results and agree with the interpretation.  I reviewed the patient's other test results and agree with the interpretation    Medication Review:   Current Facility-Administered Medications:   •  acetaminophen (TYLENOL) tablet 650 mg, 650 mg, Oral, Q4H PRN, Zev Weldon MD  •  dexamethasone (DECADRON) injection 6 mg, 6 mg, Intravenous, Q6H, Lidia Solares PA-C, 6 mg at 04/06/19 1617  •  dextrose (D50W) 25 g/ 50mL Intravenous Solution 25 g, 25 g, Intravenous, Q15 Min PRN, Zev Weldon MD  •  dextrose (GLUTOSE) oral gel 15 g, 15 g, Oral, Q15 Min PRN, Zev Weldon MD  •  famotidine (PEPCID) injection 20 mg, 20 mg, Intravenous, Q12H, Lidia Solares PA-C, 20 mg at 04/06/19 0837  •  glucagon (human recombinant) (GLUCAGEN DIAGNOSTIC) injection 1 mg, 1 mg, Subcutaneous, PRN, Zev Weldon MD  •  HYDROcodone-acetaminophen (NORCO) 5-325 MG per tablet 1 tablet, 1 tablet, Oral, Q4H PRN, Zev Weldon MD, 1 tablet at 04/06/19 1109  •  HYDROmorphone (DILAUDID) injection 0.5 mg, 0.5 mg, Intravenous, Q4H PRN **AND** naloxone (NARCAN) injection 0.4 mg, 0.4 mg, Intravenous, Q5 Min PRN, Zev Weldon MD  •  insulin lispro (humaLOG) injection 0-7 Units, 0-7 Units, Subcutaneous, 4x Daily With Meals & Nightly, Zev Wledon MD  •  levETIRAcetam in NaCl 0.82% (KEPPRA) IVPB 500 mg, 500 mg, Intravenous, Q12H, Lidia Solares PA-C, 500 mg at 04/06/19 0837  •  ondansetron (ZOFRAN) tablet 4 mg, 4 mg, Oral, Q6H PRN **OR** ondansetron ODT (ZOFRAN-ODT) disintegrating tablet 4 mg, 4 mg, Oral, Q6H PRN **OR** ondansetron (ZOFRAN) injection 4 mg, 4 mg, Intravenous, Q6H PRN, Zev Weldon MD  •  [COMPLETED] Insert peripheral IV, , , Once **AND** sodium chloride 0.9 % flush 10 mL, 10 mL, Intravenous, PRN, Armand Jane III, PA  •  [COMPLETED] Insert peripheral IV, , , Once **AND** sodium chloride 0.9 % flush 10 mL, 10 mL,  Intravenous, PRN, Armand Jane III, PA  •  sodium chloride 0.9 % flush 3 mL, 3 mL, Intravenous, Q12H, Zev Weldon MD, 3 mL at 04/06/19 0839  •  sodium chloride 0.9 % flush 3-10 mL, 3-10 mL, Intravenous, PRN, Zev Weldon MD  •  sodium chloride 0.9 % infusion, 100 mL/hr, Intravenous, Continuous, Zev Weldon MD, Last Rate: 100 mL/hr at 04/05/19 2222, 100 mL/hr at 04/05/19 2222  •  zolpidem (AMBIEN) tablet 5 mg, 5 mg, Oral, Nightly PRN, Zev Weldon MD   Lab Results   Component Value Date    GLUCOSE 163 (H) 04/06/2019    BUN 17 04/06/2019    CREATININE 0.71 (L) 04/06/2019    EGFRIFNONA 112 04/06/2019    EGFRIFAFRI 101 02/14/2018    BCR 23.9 04/06/2019    K 4.3 04/06/2019    CO2 25.1 04/06/2019    CALCIUM 8.8 04/06/2019    PROTENTOTREF 6.8 02/14/2018    ALBUMIN 3.90 04/05/2019    LABIL2 1.4 02/14/2018    AST 69 (H) 04/05/2019     (H) 04/05/2019     WBC   Date Value Ref Range Status   04/05/2019 9.72 3.40 - 10.80 10*3/mm3 Final     RBC   Date Value Ref Range Status   04/05/2019 3.99 (L) 4.14 - 5.80 10*6/mm3 Final     Hemoglobin   Date Value Ref Range Status   04/05/2019 12.3 (L) 13.0 - 17.7 g/dL Final     Hematocrit   Date Value Ref Range Status   04/05/2019 38.2 37.5 - 51.0 % Final     MCV   Date Value Ref Range Status   04/05/2019 95.7 79.0 - 97.0 fL Final     MCH   Date Value Ref Range Status   04/05/2019 30.8 26.6 - 33.0 pg Final     MCHC   Date Value Ref Range Status   04/05/2019 32.2 31.5 - 35.7 g/dL Final     RDW   Date Value Ref Range Status   04/05/2019 12.2 (L) 12.3 - 15.4 % Final     RDW-SD   Date Value Ref Range Status   04/05/2019 43.4 37.0 - 54.0 fl Final     MPV   Date Value Ref Range Status   04/05/2019 9.0 6.0 - 12.0 fL Final     Platelets   Date Value Ref Range Status   04/05/2019 305 140 - 450 10*3/mm3 Final     Neutrophil %   Date Value Ref Range Status   04/05/2019 81.9 (H) 42.7 - 76.0 % Final     Lymphocyte %   Date Value Ref Range Status    04/05/2019 7.8 (L) 19.6 - 45.3 % Final     Monocyte %   Date Value Ref Range Status   04/05/2019 9.1 5.0 - 12.0 % Final     Eosinophil %   Date Value Ref Range Status   04/05/2019 0.3 0.3 - 6.2 % Final     Basophil %   Date Value Ref Range Status   04/05/2019 0.5 0.0 - 1.5 % Final     Immature Grans %   Date Value Ref Range Status   04/05/2019 0.4 0.0 - 0.5 % Final     Neutrophils, Absolute   Date Value Ref Range Status   04/05/2019 7.96 (H) 1.40 - 7.00 10*3/mm3 Final     Lymphocytes, Absolute   Date Value Ref Range Status   04/05/2019 0.76 0.70 - 3.10 10*3/mm3 Final     Monocytes, Absolute   Date Value Ref Range Status   04/05/2019 0.88 0.10 - 0.90 10*3/mm3 Final     Eosinophils, Absolute   Date Value Ref Range Status   04/05/2019 0.03 0.00 - 0.40 10*3/mm3 Final     Basophils, Absolute   Date Value Ref Range Status   04/05/2019 0.05 0.00 - 0.20 10*3/mm3 Final     Immature Grans, Absolute   Date Value Ref Range Status   04/05/2019 0.04 0.00 - 0.05 10*3/mm3 Final     nRBC   Date Value Ref Range Status   04/05/2019 0.0 0.0 - 0.0 /100 WBC Final         Assessment/Plan       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 (CMS/HCC)    Acute intractable headache    Cerebral edema (CMS/HCC)      Assessment:    Condition: In stable condition.  Unchanged.   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  We will proceed with surgery tomorrow.).     Plan:   (I described and recommended a right frontal craniotomy for biopsy and debulking of this presumed high-grade glioma.  The goal of surgery histological diagnosis and resection of as much tumor as possible within the margins of safety.  The risks include, but are not limited to, infection, hemorrhage requiring transfusion or reoperation, CSF leak requiring reoperation, incomplete relief of symptoms, inability to make a diagnosis, potential need for additional surgeries in the future, stroke, paralysis, coma, and death.  He agrees to proceed.  He will  likely need to be seen by neuro oncology as well as radiation oncology afterwards.).       Keshawn Brower MD  04/06/19  4:20 PM    Time:

## 2019-04-07 NOTE — PROGRESS NOTES
Name: Hernandez Waterman ADMIT: 2019   : 1955  PCP: Megha Gant MD    MRN: [id number] LOS: 2 days   AGE/SEX: 63 y.o. male    ROOM: [facility] OR/MAIN OR   Subjective   Status post craniotomy and debulking of glioblastoma  Postoperatively patient is moving all the extremities but has weakness on the left side  Patient is going to get a CAT scan and then moved to ICU for close monitoring    Brief hospital course since admission:  Brain metastases  With vasogenic edema    I have reviewed past medical history, family history, social history and allergies.  No changes from admission note.      Review of Systems   Constitutional: Negative for fatigue and fever.   Respiratory: Negative for shortness of breath and wheezing.    Gastrointestinal: Negative for nausea and vomiting.   Neurological: Negative for headaches.          Objective   Vital Signs  Temp:  [97.7 °F (36.5 °C)-98.5 °F (36.9 °C)] 98.1 °F (36.7 °C)  Heart Rate:  [44-71] 61  Resp:  [10-18] 16  BP: ()/(52-81) 126/73  Arterial Line BP: (104-140)/(56-77) 135/71  SpO2:  [91 %-99 %] 96 %  on  Flow (L/min):  [2-4] 2;   Device (Oxygen Therapy): room air  Body mass index is 22.92 kg/m².    Intake/Output Summary (Last 24 hours) at 2019 1651  Last data filed at 2019 1610  Gross per 24 hour   Intake 3950 ml   Output 3970 ml   Net -20 ml       Physical Exam   Constitutional: He is oriented to person, place, and time. He appears well-developed and well-nourished. No distress.   HENT:   Head: Normocephalic and atraumatic.   Eyes: Pupils are equal, round, and reactive to light. No scleral icterus.   Cardiovascular: Normal rate and regular rhythm.   Pulmonary/Chest: Effort normal. No respiratory distress. He has no decreased breath sounds. He has no wheezes.   Abdominal: Soft. Bowel sounds are normal. There is no hepatosplenomegaly.   Neurological: He is alert and oriented to person, place, and time.   Skin: No cyanosis. Nails show no clubbing.    Psychiatric: He has a normal mood and affect. His behavior is normal.       Results Review:    Results from last 7 days   Lab Units 04/07/19  1353 04/05/19  0544   WBC 10*3/mm3 12.26* 9.72   HEMOGLOBIN g/dL 11.2* 12.3*   HEMATOCRIT % 34.0* 38.2   PLATELETS 10*3/mm3 292 305     Results from last 7 days   Lab Units 04/07/19  1353 04/06/19  0638 04/05/19  0544   SODIUM mmol/L 141 138 139   POTASSIUM mmol/L 3.8 4.3 4.4   CHLORIDE mmol/L 102 103 100   CO2 mmol/L 26.0 25.1 27.8   BUN mg/dL 20 17 19   CREATININE mg/dL 0.80 0.71* 0.80   GLUCOSE mg/dL 135* 163* 119*   CALCIUM mg/dL 8.4* 8.8 9.2     Results from last 7 days   Lab Units 04/06/19  0638   INR  1.16*     Hemoglobin A1C:No results found for: HGBA1C  Glucose Range:  Glucose   Date/Time Value Ref Range Status   04/07/2019 0546 108 70 - 130 mg/dL Final   04/06/2019 2102 141 (H) 70 - 130 mg/dL Final   04/06/2019 1610 168 (H) 70 - 130 mg/dL Final   04/06/2019 1118 150 (H) 70 - 130 mg/dL Final   04/06/2019 0624 143 (H) 70 - 130 mg/dL Final   04/05/2019 2127 142 (H) 70 - 130 mg/dL Final         dexamethasone 10 mg Intravenous Once   [MAR Hold] dexamethasone 6 mg Intravenous Q6H   dexamethasone sodium phosphate      famotidine 20 mg Intravenous Q12H   [MAR Hold] insulin lispro 0-7 Units Subcutaneous 4x Daily With Meals & Nightly   levETIRAcetam 500 mg Intravenous Q12H   [MAR Hold] sodium chloride 3 mL Intravenous Q12H       lactated ringers 9 mL/hr Last Rate: 9 mL/hr (04/07/19 0941)   sodium chloride 100 mL/hr Last Rate: 100 mL/hr (04/07/19 0551)   NPO Diet NPO Except: Sips With Meds  Assessment/Plan       Assessment/Plan      Active Hospital Problems    Diagnosis  POA   • **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     Continue Decadron  Glioblastoma status post debulking and  craniotomy with biopsy  Monitor in ICU  Oncology consultation        Zev Weldon MD  [facility]  04/07/19

## 2019-04-07 NOTE — ANESTHESIA PROCEDURE NOTES
Arterial Line    Pre-sedation assessment completed: 4/7/2019 10:55 AM    Patient location during procedure: holding area  Start time: 4/7/2019 10:40 AM  Stop Time:4/7/2019 10:50 AM       Line placed for hemodynamic monitoring and ABGs/Labs/ISTAT.  Performed By   Anesthesiologist: Jannet Turner MD  Preanesthetic Checklist  Completed: patient identified, site marked, pre-op evaluation, timeout performed, IV checked, risks and benefits discussed and monitors and equipment checked  Arterial Line Prep   Sterile Tech: cap, gloves and sterile barriers  Prep: ChloraPrep  Patient monitoring: blood pressure monitoring, continuous pulse oximetry and EKG  Arterial Line Procedure   Laterality:left  Location:  radial artery  Catheter size: 20 G   Guidance: ultrasound guided, landmark technique and palpation technique  Number of attempts: 1  Successful placement: yes          Post Assessment   Dressing Type: occlusive dressing applied, secured with tape and wrist guard applied.   Complications no  Circ/Move/Sens Assessment: normal.   Patient Tolerance: patient tolerated the procedure well with no apparent complications

## 2019-04-07 NOTE — PERIOPERATIVE NURSING NOTE
No answer to spouse cell phone, no one in waiting area.  Attempts to give update to family at 1500, 1520 and again at 1550.  No calls returned,

## 2019-04-07 NOTE — OP NOTE
Preoperative diagnosis: Right frontal brain tumor    Postoperative diagnosis: Same as above    Procedures performed: Right frontal craniotomy for biopsy, debulking, and partial right frontal lobectomy for high grade glioma with [place] frameless navigation    Surgeon: Inocente    First Assistant: Jennifer Cárdenas  (She greatly assisted in the exposure, visualization of neural structures, control of bleeding, retraction, and closure of the incision.)    Anesthesia: GET    EBL: 500 cc    Complications: none    Specimen sent: specimen sent for frozen, c/w GBM; permanent tissue sent    Drains: 7 mm flat CORRIE subgaleal drain    Findings: high grade glioma, c/w GBM    Postoperative condition: good    Indications for the operation: The patient is a 63-year-old healthy male with a several week history of headaches and balance problems.  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  He was brought to the operating room today for biopsy, debulking and a planned partial right frontal lobectomy.        Informed consent:He and his wife understood that the goal of surgery was histological diagnosis and removal of as much of the tumor as possible within the boundaries of safety.  The risks include, but are not limited to, infection, hemorrhage requiring hemorrhage or reoperation, CSF leak requiring reoperation, incomplete relief of symptoms, potential need for additional surgery in the future, stroke, paralysis, coma and death.  He agreed to proceed.        Details of the operation:The patient was taken to the operating room and placed on the operating room table in supine position.  After induction and endotracheal intubation, he got 2 grams of Kefzol as per the SKIP protocol.  He also was given 10 mg of Decadron, a gram of Keppra and 50 mg of Mannitol to skin incision.  A Lobo catheter was placed.  SCDs were placed.  Venous access was  secured.  We did the [employer] navigation registration process with accuracy.  He was put in three point fixation with the Donner headrest.  The whole head was shaved.  The right frontal region was prepped and draped in the usual sterile fashion.  We did a surgical timeout.  I injected 5 mL of 0.25% marcaine with epinephrine into the scalp.  An upside down U shaped incision was made in the right frontal area with a #10 skin knife.  Mary clips were applied.  A single myocutaneous flap was reflected inferiorly.  A single billy hole was then made in the right frontal region and then a large right frontal craniotomy was performed with a craniotome and a B1 foot plate using the [employer] pneumatic system.  This was done again with navigation planning the incision and the approach.  The dura was opened up in a trapdoor fashion and tacked up with 4-0 Nurolon sutures.  The juan m halo was applied.  The microscope was draped and brought into the field.  I made a corticectomy incision in the right frontal lobe corresponding to a location over the tumor.  Refractory blades were advanced under magnification and with the use of [employer] navigation, I navigated to the center of the tumor and got specimen and sent it off to frozen section.  It was consistent with a high grade glioma as expected.  The plan was to do a partial right frontal lobectomy.  Most of the tumor was occupied in the frontal lobe anyway and I felt this was the best chance to get a good of a debulking as possible.  I mapped out the limits of the lobectomy and I went posterior to the enhancing portion of the tumor.  Under magnification, I used bipolar cautery and truncated the frontal lobe, starting with the cortex and going to a depth planned with the navigation just underneath the enhancing portion tumor at the level of the top of the basal ganglia and medially bounded by the falx and the corpus collosum.  I went around the periphery of the tumor and in the  process also debulked it internally with sonopet ultrasound guided aspiration.  More permanent specimen was sent off.  There was obvious tumor that I sent off during the process of the lobectomy that was consistent with the high grade glioma diagnosis.  I left the gyrus rectus in place because the tumor did not extend that far down, but I again truncated just medial to the Sylvian fissure and as far back as the frontal horn.  I did get a little CSF when I entered the frontal horn, but that was my guide post for the most posterior extent of the lobectomy.  Once most of the frontal lobe was out, I got hemostasis with thrombin soaked gelfoam and cotton ball soaked in half strength peroxide.  Floseal was used.  Hemostasis was complete.  The dura was reapproximated with 4-0 interrupted Nurolon sutures.  A layer of Duragen was used followed by Duraseal.  The bone flap was replaced with KLS plates.  A 7 mm flat Rickie Mcnair drain was left in the subgaleal space and secured with 2-0 silk.  The galea was reapproximated with 2-0 interrupted Vicryl suture.  The skin was closed with running 4-0 Vicryl subcuticular.  Steri-Strips and a clean and dry dressing was placed.  Patient was taken out of three point fixation.  His head was wrapped with Kerlix Eugene and a stockinette dressing and he was extubated and taken to recovery in satisfactory condition.

## 2019-04-07 NOTE — PERIOPERATIVE NURSING NOTE
Call to Dr Rivera concerning pt movement of extremities.  Pt freely moving rt side, arm and leg, when asked to move lt side, pt moves the right, seems unable to move lt arm or leg.  T.O. Received to give Decadron 10mg IV at this time.

## 2019-04-07 NOTE — BRIEF OP NOTE
CRANIOTOMY FOR TUMOR  Progress Note    Hernandez Waterman  4/5/2019 - 4/7/2019    Pre-op Diagnosis: Right frontal tumor         Post-Op Diagnosis Codes: Same as above       Procedure/CPT® Codes:      Procedure(s):  RIGHT FRONTAL CRANIOTOMY FOR TUMOR REMOVAL AND  BIOPSY  WITH [place] NAVIGATTION AND PARTIAL RIGHT FRONTAL LOBE LOBECTOMY    Surgeon(s):  Keshawn Brower MD    Anesthesia: General    Staff:   Circulator: Monet Rivers, RACHANA; Kei Porter RN  Scrub Person: Sharon Johnson  Vendor Representative: Sturgeon, Whitney  Assistant: Jennifer Cárdenas CSA    Estimated Blood Loss: 500 mL    Urine Voided: 1600 mL    Specimens:                Specimens     ID Source Type Tests Collected By Collected At Frozen?      A Brain Tissue · TISSUE PATHOLOGY EXAM   Keshawn Brower MD 4/7/19 1143 Yes     Description: RIGHT FRONTAL LOBE  MASS    B Brain Tissue · TISSUE PATHOLOGY EXAM   Keshawn Brower MD 4/7/19 1244      Description:  RIGHT FRONTAL LOBE MASS                Drains:   Closed/Suction Drain 1 Right  Bulb (Active)       Urethral Catheter Non-latex (Active)       Findings: high grade glioma c/w GBM    Complications: none      Keshawn Brower MD     Date: 4/7/2019  Time: 1:16 PM

## 2019-04-07 NOTE — ANESTHESIA PROCEDURE NOTES
Airway  Urgency: elective    Airway not difficult    General Information and Staff    Patient location during procedure: OR  Anesthesiologist: Jannet Turner MD  CRNA: Chris Chandra CRNA    Indications and Patient Condition  Indications for airway management: airway protection    Preoxygenated: yes  MILS maintained throughout  Mask difficulty assessment: 1 - vent by mask    Final Airway Details  Final airway type: endotracheal airway      Successful airway: ETT  Cuffed: yes   Successful intubation technique: direct laryngoscopy  Facilitating devices/methods: intubating stylet  Endotracheal tube insertion site: oral  Blade: Kyleigh  Blade size: 4  ETT size (mm): 7.5  Cormack-Lehane Classification: grade I - full view of glottis  Placement verified by: chest auscultation and capnometry   Measured from: lips  ETT to lips (cm): 21  Number of attempts at approach: 1    Additional Comments  Teeth checked, no damage. Atraumatic.

## 2019-04-07 NOTE — ANESTHESIA PREPROCEDURE EVALUATION
Anesthesia Evaluation     Patient summary reviewed and Nursing notes reviewed   NPO Solid Status: > 8 hours  NPO Liquid Status: > 8 hours           Airway   Mallampati: II  TM distance: >3 FB  Neck ROM: full  No difficulty expected  Dental - normal exam     Pulmonary - negative pulmonary ROS and normal exam    breath sounds clear to auscultation  Cardiovascular - normal exam    ECG reviewed  Rhythm: regular  Rate: normal    (+) hyperlipidemia,       Neuro/Psych  (+) headaches,       ROS Comment: Brain tumor  GI/Hepatic/Renal/Endo - negative ROS     Musculoskeletal (-) negative ROS    Abdominal  - normal exam   Substance History - negative use     OB/GYN negative ob/gyn ROS         Other - negative ROS                       Anesthesia Plan    ASA 3     general   (A-line)  intravenous induction   Anesthetic plan, all risks, benefits, and alternatives have been provided, discussed and informed consent has been obtained with: patient.

## 2019-04-07 NOTE — PLAN OF CARE
Problem: Patient Care Overview  Goal: Plan of Care Review  Outcome: Ongoing (interventions implemented as appropriate)   04/07/19 0008 04/07/19 0145   Coping/Psychosocial   Plan of Care Reviewed With patient --    Plan of Care Review   Progress --  no change   OTHER   Outcome Summary --  no falls. vital signs stable. no neuro changes noted. NPO past midnight for surgery. plan for crani today. will continue to monitor.        Problem: Fall Risk (Adult)  Goal: Absence of Fall  Outcome: Ongoing (interventions implemented as appropriate)      Problem: Pain, Acute (Adult)  Goal: Acceptable Pain Control/Comfort Level  Outcome: Ongoing (interventions implemented as appropriate)      Problem: Confusion, Acute (Adult)  Goal: Safety  Outcome: Ongoing (interventions implemented as appropriate)

## 2019-04-07 NOTE — PERIOPERATIVE NURSING NOTE
Call for update from Dr Rivera at 1550, pt able to move all extremities at will and upon command.  No new orders received.

## 2019-04-08 PROBLEM — K59.00 CONSTIPATION: Status: ACTIVE | Noted: 2019-01-01

## 2019-04-08 PROBLEM — D72.823 LEUKEMOID REACTION: Status: ACTIVE | Noted: 2019-01-01

## 2019-04-08 PROBLEM — D64.9 ANEMIA: Status: ACTIVE | Noted: 2019-01-01

## 2019-04-08 PROBLEM — C71.9 GBM (GLIOBLASTOMA MULTIFORME) (HCC): Status: ACTIVE | Noted: 2019-01-01

## 2019-04-08 NOTE — PROGRESS NOTES
Discharge Planning Assessment  [facility]     Patient Name: Hernandez Waterman  MRN: [id number]  Today's Date: 4/8/2019    Admit Date: 4/5/2019    Discharge Needs Assessment     Row Name 04/08/19 1303       Living Environment    Lives With  spouse    Name(s) of Who Lives With Patient  Mony Waterman wife [phone]    Current Living Arrangements  home/apartment/condo    Primary Care Provided by  self    Provides Primary Care For  no one    Family Caregiver if Needed  spouse    Family Caregiver Names  Mony Waterman wife [phone]    Quality of Family Relationships  unable to assess    Able to Return to Prior Arrangements  yes       Resource/Environmental Concerns    Resource/Environmental Concerns  none    Transportation Concerns  car, none       Transition Planning    Patient/Family Anticipates Transition to  home with family    Patient/Family Anticipated Services at Transition      Transportation Anticipated  family or friend will provide       Discharge Needs Assessment    Readmission Within the Last 30 Days  no previous admission in last 30 days    Concerns to be Addressed  no discharge needs identified    Equipment Currently Used at Home  none    Offered/Gave Vendor List  no        Discharge Plan     Row Name 04/08/19 8767       Plan    Plan  Home with family, denies needs.     Patient/Family in Agreement with Plan  yes    Plan Comments  Met with patient and family member at bedside, patient granted me permission to speak while family stayed in the room.  Face sheet verified.  Prior to admission patient was independent with all aspects of his ADL's including working full time as a  for [employer].  Patient does not use any special or adaptive equipment. Patient uses the [facility] pharmacy on [address], denies issues obtaining, taking or affording medications.  Patient does not have POA or living will on file. Discharge plans are return home with family, denies any needs at this time. Family will be able  to transport home.  Will continue to monitor for new or changing discharge plans.        Destination      No service coordination in this encounter.      Durable Medical Equipment      No service coordination in this encounter.      Dialysis/Infusion      No service coordination in this encounter.      Home Medical Care      No service coordination in this encounter.      Therapy      No service coordination in this encounter.      Community Resources      No service coordination in this encounter.          Demographic Summary     Row Name 04/08/19 1301       General Information    Admission Type  inpatient    Arrived From  home    Referral Source  admission list    Reason for Consult  discharge planning    Preferred Language  English     Used During This Interaction  no       Contact Information    Permission Granted to Share Info With  family/designee Mony Waterman wife [phone]        Functional Status     Row Name 04/08/19 1302       Functional Status    Usual Activity Tolerance  excellent    Current Activity Tolerance  good       Functional Status, IADL    Medications  independent       Mental Status    General Appearance WDL  WDL       Mental Status Summary    Recent Changes in Mental Status/Cognitive Functioning  no changes       Employment/    Employment Status  employed full time    Current or Previous Occupation  other (see comments) [employer]         Psychosocial    No documentation.       Abuse/Neglect    No documentation.       Legal    No documentation.       Substance Abuse    No documentation.       Patient Forms    No documentation.           Lila Ramos RN

## 2019-04-08 NOTE — PROGRESS NOTES
Postoperative visit      No complaints. Patient has spoken with Radiation Oncology.       .  Vitals:    04/08/19 1403   BP: 111/72   Pulse: 56   Resp: 14   Temp:    SpO2: 95%        4/8/2019 04:30   WBC 15.54 (H)   Hemoglobin 11.5 (L)   Hematocrit 35.2 (L)       Surgical pathology is still pending.        CORRIE drain 60 cc last 8 hours    POD 1 right frontal craniotomy; partial right frontal lobectomy for removal and biopsy of tumor  Cerebral edema        MRI brain with and without contrast today  Keep drain  Rad/Onc has seen; consult Heme/Onc  Up in chair  NO ARIXTRA  CBC in am  Continue same dose decadron; keppra  Out of ICU tomorrow

## 2019-04-08 NOTE — PROGRESS NOTES
Name: Hernandez Waterman ADMIT: 2019   : 1955  PCP: Megha Gant MD    MRN: [id number] LOS: 3 days   AGE/SEX: 63 y.o. male    ROOM: [facility]   Subjective   Status post craniotomy and debulking of glioblastoma  Patient is awake and alert  Moves all extremities   Speach intact    Brief hospital course since admission:  Brain metastases  With vasogenic edema    I have reviewed past medical history, family history, social history and allergies.  No changes from admission note.      Review of Systems   Constitutional: Negative for fatigue and fever.   Respiratory: Negative for shortness of breath and wheezing.    Gastrointestinal: Negative for nausea and vomiting.   Neurological: Negative for headaches.          Objective   Vital Signs  Temp:  [97.2 °F (36.2 °C)-98.3 °F (36.8 °C)] 98.1 °F (36.7 °C)  Heart Rate:  [41-70] 55  Resp:  [10-16] 14  BP: ()/(38-81) 101/63  Arterial Line BP: (102-149)/(48-77) 115/54  SpO2:  [91 %-100 %] 96 %  on  Flow (L/min):  [2-4] 2;   Device (Oxygen Therapy): room air  Body mass index is 22.92 kg/m².    Intake/Output Summary (Last 24 hours) at 2019 1349  Last data filed at 2019 1203  Gross per 24 hour   Intake 660 ml   Output 4830 ml   Net -4170 ml       Physical Exam   Constitutional: He is oriented to person, place, and time. He appears well-developed and well-nourished. No distress.   HENT:   Head: Normocephalic and atraumatic.   Eyes: Pupils are equal, round, and reactive to light. No scleral icterus.   Cardiovascular: Normal rate and regular rhythm.   Pulmonary/Chest: Effort normal. No respiratory distress. He has no decreased breath sounds. He has no wheezes.   Abdominal: Soft. Bowel sounds are normal. There is no hepatosplenomegaly.   Neurological: He is alert and oriented to person, place, and time.   Skin: No cyanosis. Nails show no clubbing.   Psychiatric: He has a normal mood and affect. His behavior is normal.       Results Review:    Results from last  7 days   Lab Units 04/08/19  0430 04/07/19  1353 04/05/19  0544   WBC 10*3/mm3 15.54* 12.26* 9.72   HEMOGLOBIN g/dL 11.5* 11.2* 12.3*   HEMATOCRIT % 35.2* 34.0* 38.2   PLATELETS 10*3/mm3 307 292 305     Results from last 7 days   Lab Units 04/08/19  0430 04/07/19  1353 04/06/19  0638   SODIUM mmol/L 141 141 138   POTASSIUM mmol/L 4.0 3.8 4.3   CHLORIDE mmol/L 103 102 103   CO2 mmol/L 23.9 26.0 25.1   BUN mg/dL 19 20 17   CREATININE mg/dL 0.74* 0.80 0.71*   GLUCOSE mg/dL 110* 135* 163*   CALCIUM mg/dL 8.6 8.4* 8.8     Results from last 7 days   Lab Units 04/06/19  0638   INR  1.16*     Hemoglobin A1C:No results found for: HGBA1C  Glucose Range:  Glucose   Date/Time Value Ref Range Status   04/08/2019 1111 169 (H) 70 - 130 mg/dL Final   04/07/2019 2028 115 70 - 130 mg/dL Final   04/07/2019 1658 123 70 - 130 mg/dL Final   04/07/2019 0546 108 70 - 130 mg/dL Final   04/06/2019 2102 141 (H) 70 - 130 mg/dL Final   04/06/2019 1610 168 (H) 70 - 130 mg/dL Final         dexamethasone 10 mg Intravenous Once   dexamethasone 6 mg Intravenous Q6H   famotidine 20 mg Intravenous Q12H   fondaparinux 2.5 mg Subcutaneous Q24H   insulin lispro 0-7 Units Subcutaneous 4x Daily With Meals & Nightly   levETIRAcetam 500 mg Intravenous Q12H       niCARdipine 5-15 mg/hr   Diet Regular  Assessment/Plan       Assessment/Plan      Active Hospital Problems    Diagnosis  POA   • **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     Continue Decadron and Keppra  Glioblastoma status post debulking and craniotomy with biopsy  Monitor in ICU  Oncology consultation    OK to transfer out of ICU to 5 PT, when cleared by Dr Stacy Weldon MD  [facility]  04/08/19

## 2019-04-08 NOTE — PROGRESS NOTES
[facility]     Mar/chart reviewed  F/u critical care management.  No new complaints. Some minor post operative discomfort as expected, no visual changes.      Vital Sign Min/Max for last 24 hours  Temp  Min: 97.2 °F (36.2 °C)  Max: 98.3 °F (36.8 °C)   BP  Min: 81/42  Max: 131/71   Pulse  Min: 41  Max: 69   Resp  Min: 12  Max: 16   SpO2  Min: 93 %  Max: 100 %   No Data Recorded   No Data Recorded     Appears ill, axox3  perrl, eomi, no icterus,  mmm, no jvd, trachea midline, neck supple,  chest cta bilaterally, no crackles, no wheezes,   rrr,   soft, nt, nd +bs,  no c/c/ e    Labs:  Na 141  k 4  biarb 23.9  Wbc 15  hgb 11.5  plts 307    Ct head: reviewed, fairly stable    A:    Right frontal brain tumor S/p debulking/ frontal lobectomy   Persistent headache  Vasogenic edema  Brain compression  Incidental sub-6 mm right upper pulmonary nodule  Anasarca     RECOMMENDATIONS:  Patient is status post debulking and frontal lobectomy now and is doing fairly well.  No significant new neurological deficits noted.  Decadron per neurosurgery  Oncology consultation noted for suspected GBM  Pain control with as needed medication as ordered  Repeat CT head reviewed and postop changes noted.  No significant drainage from the CORRIE drain.  Noted anasarca likely due to volume resuscitation.  Will give diuretic.   continue to watch the patient  ICU.  Likely will be able to transfer out of ICU in the a.m.  Full code  DVT prophylaxis-mechanical    D/w RN

## 2019-04-08 NOTE — PROGRESS NOTES
Clinical Pharmacy Services: Medication History    Hernandez Waterman is a 63 y.o. male presenting to [facility] for Brain mass [G93.9]  Acute intractable headache, unspecified headache type [R51]    He  has a past medical history of ED (erectile dysfunction).    Allergies as of 04/05/2019 - Reviewed 04/05/2019   Allergen Reaction Noted   • Sulfa antibiotics Unknown (See Comments) 04/05/2016       Medication information was obtained from: [facility] [address] [place] [address] (Juli[place])  Pharmacy and Phone Number: [facility], [phone]    Prior to Admission Medications     Prescriptions Last Dose Informant Patient Reported? Taking?    amoxicillin (AMOXIL) 875 MG tablet 4/4/2019  No Yes    Take 1 tablet by mouth 2 (Two) Times a Day for 7 days.    calcium citrate-vitamin d (CITRACAL) 200-250 MG-UNIT tablet tablet  Self Yes Yes    Take 1 tablet by mouth Daily.    ibuprofen (ADVIL,MOTRIN) 200 MG tablet 4/4/2019  Yes Yes    Take 200 mg by mouth As Needed for Mild Pain .    Loratadine (CLARITIN) 10 MG capsule 4/4/2019  No Yes    Take 1 tablet by mouth Daily for 7 days. Over the counter    Misc Natural Products (PROSTATE HEALTH PO)  Self Yes Yes    Take 1 tablet by mouth Daily.    Multiple Vitamins-Minerals (MENS MULTIVITAMIN PLUS PO)  Self Yes Yes    Take 1 tablet by mouth Daily.    sildenafil (VIAGRA) 100 MG tablet Past Week  No Yes    Take 1 tablet by mouth Daily As Needed for erectile dysfunction.    methylPREDNISolone (MEDROL) 4 MG tablet   No No    follow package directions            Medication notes:   -Called [facility] pharmacy and verified current medication list  -Spoke to patient and wife to verify medication list  -Patient reported taking OTCs: men's multivitamin, prostate health, and Citracal. Added to medication list  -Patient stated that he only took one dose of amoxicillin and zero doses of medrol prior to admission.    This medication list is complete to the best  of my knowledge as of 4/8/2019    Please call if questions.    Jessica Sotelo, PharmD Candidate  4/8/2019 11:47 AM

## 2019-04-08 NOTE — CONSULTS
.     REASON FOR CONSULTATION:     Provide an opinion on any further workup or treatment of GBM status post a right craniotomy on 4/7/2019.                              REQUESTING PHYSICIAN:  Keshawn Brower MD      RECORDS OBTAINED:  Records of the patients history including those obtained from the referring provider were reviewed and summarized in detail.    HISTORY OF PRESENT ILLNESS:  The patient is a 63 y.o. 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 This was progressively getting worse. The patient originally thought this was due to sinusitis which he had previously. He denies vision changes however, he does have significant nausea and with a few episodes of vomiting with clear gastric content. He denies fever, sweating or chills. Nevertheless this patient had imaging studies obtained in the early morning on 04/05/2019 initially with CT of head without contrast and this described a large right frontal lobe mass measuring 6.1 x 3.9 cm and midline shift 1.3 cm. He had brain MRI examination with and without contrast a few hours later, which showed necrotic enhancing right frontal lobe mass measuring 6.8 x 4.3 x 4.6 cm, with vasogenic edema, extending into the genu of the corpus collosum. There is a marked mass effect in addition to midline shift by 8 mm and entrapment of the left lateral ventricle, subfalcine herniation of the anterior and medial portion of the right frontal lobe. This is highly suspicious for glioblastoma multiforme. The patient subsequently had a CT of the abdomen and pelvis with IV contrast and there was no definite evidence of primary or metastatic disease.     The patient was seen by Neurosurgeon, Dr. Brower and started on dexamethasone and Keppra for anti-seizure purpose. This patient went on to have right craniotomy on 04/07/2019.      Preliminary pathology evaluation reported high grade glioma, fits with GBM. According to pathologist Dr. Macias, final pathology evaluation is still pending and likely the sample will be sent to [facility] for further molecular study.     Prior to this admission, patient was physically active, has regular exercise on a daily basis prior to this hospitalization.  Does not take prescribed medication.    No family history of malignancy.        Past Medical History:   Diagnosis Date   • ED (erectile dysfunction)      Past Surgical History:   Procedure Laterality Date   • CRANIOTOMY FOR TUMOR Right 4/7/2019    Procedure: RIGHT SIDE CRANIOTOMY FOR TUMOR REMOVAL AND  BIOPSY  WITH SIDNEY NAVIGATTION AND PARTIAL RIGHT FRONTAL LOBE LOBECTOMY;  Surgeon: Keshawn Brower MD;  Location: [facility];  Service: Neurosurgery       HEMATOLOGIC/ONCOLOGIC HISTORY:  (History from previous dates can be found in the separate document.)  See HPI    MEDICATIONS    Current Facility-Administered Medications:   •  acetaminophen (TYLENOL) tablet 650 mg, 650 mg, Oral, Q4H PRN, Zev Weldon MD  •  dexamethasone (DECADRON) 10 mg/20ml NS IV syringe, 10 mg, Intravenous, Once, Keshawn Brower MD  •  dexamethasone (DECADRON) injection 6 mg, 6 mg, Intravenous, Q6H, Lidia Solares PA-C, 6 mg at 04/08/19 1051  •  dextrose (D50W) 25 g/ 50mL Intravenous Solution 25 g, 25 g, Intravenous, Q15 Min PRN, Zev Weldon MD  •  dextrose (GLUTOSE) oral gel 15 g, 15 g, Oral, Q15 Min PRN, Zev Weldon MD  •  docusate sodium (COLACE) capsule 100 mg, 100 mg, Oral, BID PRN, Keshawn Brower MD  •  famotidine (PEPCID) tablet 20 mg, 20 mg, Oral, BID, Zev Weldon MD  •  fentaNYL citrate (PF) (SUBLIMAZE) injection 50 mcg, 50 mcg, Intravenous, Q2H PRN, Zev Weldon MD  •  glucagon (human recombinant) (GLUCAGEN DIAGNOSTIC) injection 1 mg, 1 mg, Subcutaneous, PRN, Zev Weldon MD  •   HYDROcodone-acetaminophen (NORCO) 5-325 MG per tablet 1 tablet, 1 tablet, Oral, Q6H PRN, 1 tablet at 04/08/19 0408 **OR** HYDROcodone-acetaminophen (NORCO) 5-325 MG per tablet 2 tablet, 2 tablet, Oral, Q6H PRN, Tonny Haley MD  •  HYDROcodone-acetaminophen (NORCO) 5-325 MG per tablet 2 tablet, 2 tablet, Oral, Q4H PRN, Keshawn Brower MD, 2 tablet at 04/08/19 1349  •  HYDROmorphone (DILAUDID) injection 0.5 mg, 0.5 mg, Intravenous, Q2H PRN, 0.5 mg at 04/08/19 0443 **AND** naloxone (NARCAN) injection 0.4 mg, 0.4 mg, Intravenous, Q5 Min PRN, Tonny Haley MD  •  insulin lispro (humaLOG) injection 0-7 Units, 0-7 Units, Subcutaneous, 4x Daily With Meals & Nightly, Zev Weldon MD, 2 Units at 04/08/19 1153  •  levETIRAcetam in NaCl 0.82% (KEPPRA) IVPB 500 mg, 500 mg, Intravenous, Q12H, Lidia Solares PA-C, 500 mg at 04/08/19 0942  •  niCARdipine (CARDENE) 25 mg/250 mL (0.1 mg/mL) NS infusion kit, 5-15 mg/hr, Intravenous, Titrated, Keshawn Brower MD  •  ondansetron (ZOFRAN) tablet 4 mg, 4 mg, Oral, Q6H PRN **OR** ondansetron ODT (ZOFRAN-ODT) disintegrating tablet 4 mg, 4 mg, Oral, Q6H PRN **OR** ondansetron (ZOFRAN) injection 4 mg, 4 mg, Intravenous, Q6H PRN, Zev Weldon MD, 4 mg at 04/07/19 8536  •  sennosides-docusate sodium (SENOKOT-S) 8.6-50 MG tablet 1 tablet, 1 tablet, Oral, Nightly PRN, Keshawn Brower MD  •  [COMPLETED] Insert peripheral IV, , , Once **AND** sodium chloride 0.9 % flush 10 mL, 10 mL, Intravenous, PRN, Armand Jane III, PA  •  [COMPLETED] Insert peripheral IV, , , Once **AND** sodium chloride 0.9 % flush 10 mL, 10 mL, Intravenous, PRN, Armand Jane III, PA  •  sodium chloride 0.9 % flush 3-10 mL, 3-10 mL, Intravenous, PRN, Zev Weldon MD    ALLERGIES:     Allergies   Allergen Reactions   • Sulfa Antibiotics Unknown (See Comments)     Unknown       SOCIAL HISTORY:       Patient is , [employer] .  Never smoked  cigarettes.  Very rare social drinker.        FAMILY HISTORY:  Father is alive in his middle 90s, has hypertension and BPH, with him during Lobo catheter, and episodes of urinary tract infection.  Mother in her mid 90s with arthritis and osteoporosis and hypertension.  No family history of malignancy.      REVIEW OF SYSTEMS:  Review of Systems   Constitutional: Negative for appetite change, diaphoresis, fatigue, fever and unexpected weight change.   HENT: Positive for sinus pressure. Negative for facial swelling.    Eyes: Negative for photophobia and visual disturbance.   Respiratory: Negative for cough and shortness of breath.    Cardiovascular: Negative for chest pain and palpitations.   Gastrointestinal: Positive for constipation (No bowel movement in the past several days.) and nausea (This has much improved). Negative for abdominal pain and blood in stool.   Endocrine: Negative for cold intolerance.   Genitourinary: Negative for hematuria.   Musculoskeletal: Negative for arthralgias and joint swelling.   Allergic/Immunologic: Negative for food allergies.   Neurological: Positive for headaches. Negative for seizures, syncope and weakness.   Hematological: Negative for adenopathy. Does not bruise/bleed easily.   Psychiatric/Behavioral: Negative for agitation and hallucinations.              Vitals:    04/08/19 1203 04/08/19 1303 04/08/19 1333 04/08/19 1403   BP: 101/63 108/64 118/68 111/72   BP Location: Right arm Right arm  Right arm   Patient Position: Lying Lying  Lying   Pulse: 55 59 58 56   Resp: 14 14  14   Temp:       TempSrc:       SpO2: 96% 95% 96% 95%   Weight:       Height:         No flowsheet data found.   PHYSICAL EXAM:      CONSTITUTIONAL: Well-developed well-nourished male.  Vital signs reviewed.  No distress, status post craniectomy with dressing in place.  EYES:  Conjunctiva and lids unremarkable.   EARS,NOSE,MOUTH,THROAT:  Nose appear unremarkable.  Lips appear unremarkable.  RESPIRATORY:   Normal respiratory effort.  Lungs clear to auscultation bilaterally.  CARDIOVASCULAR: Regular rhythm and rate.  Normal S1, S2.  No murmurs rubs or gallops.  No significant lower extremity edema.  GASTROINTESTINAL: Abdomen appears unremarkable.  Nontender.  No hepatomegaly.  No splenomegaly.  LYMPHATIC:  No cervical, supraclavicular, axillary lymphadenopathy.  MUSCULOSKELETAL:   Unremarkable digits/nails.  No cyanosis or clubbing.  SKIN:  Warm.  No rashes.  PSYCHIATRIC:  Normal judgment and insight.  Normal mood and affect.      RECENT LABS:        WBC   Date Value Ref Range Status   04/08/2019 15.54 (H) 3.40 - 10.80 10*3/mm3 Final   04/07/2019 12.26 (H) 3.40 - 10.80 10*3/mm3 Final     Hemoglobin   Date Value Ref Range Status   04/08/2019 11.5 (L) 13.0 - 17.7 g/dL Final   04/07/2019 11.2 (L) 13.0 - 17.7 g/dL Final     Platelets   Date Value Ref Range Status   04/08/2019 307 140 - 450 10*3/mm3 Final   04/07/2019 292 140 - 450 10*3/mm3 Final     Lab Results   Component Value Date    NEUTROABS 13.46 (H) 04/08/2019       Results from last 7 days   Lab Units 04/08/19  0430 04/07/19  1353 04/06/19  0638 04/05/19  0544   SODIUM mmol/L 141 141 138 139   POTASSIUM mmol/L 4.0 3.8 4.3 4.4   CHLORIDE mmol/L 103 102 103 100   CO2 mmol/L 23.9 26.0 25.1 27.8   BUN mg/dL 19 20 17 19   CREATININE mg/dL 0.74* 0.80 0.71* 0.80   CALCIUM mg/dL 8.6 8.4* 8.8 9.2   BILIRUBIN mg/dL  --   --   --  0.5   ALK PHOS U/L  --   --   --  71   ALT (SGPT) U/L  --   --   --  111*   AST (SGOT) U/L  --   --   --  69*   GLUCOSE mg/dL 110* 135* 163* 119*         Assessment/Plan     1.  Patient is a 63 years old male was in good health condition before this admission, he looks younger than his stated age.  He has new onset headache and now newly diagnosed GBM, with a large right frontal lobe mass with surrounding edema, status post right craniotomy on 4/7/2019.  Final pathology results is pending.  His sample likely will be sent to [facility] for molecular  study.     Patient was already seen by radiation oncologist Dr. Philippe today.     I discussed with the patient and his wife today, that pending further molecular genetic study, we will decide whether this patient will be a good candidate for clinical trial, or going straight with standard concurrent chemoradiation therapy. The patient was already seen by radiation oncologist Shanti Philippe MD earlier this afternoon. I briefly discussed with them for concurrent chemotherapy using temozolomide. He will have subsequent maintenance temozolomide treatment for 5 days every 4 weeks.     We will follow along with him for further pathology results, to finalize treatment plan.  I also discussed with them that if they prefer to have a second opinion from other Center, will be happy to make appointment.     2.  Leukocytosis, this is secondary to steroids use.     3.  Mild anemia, this is post surgery.  This patient previously has borderline normal hemoglobin fluctuating around 14.0 g in the past few years.    4.  Constipation for the past several days.  Will switch as needed Colace to scheduled twice a day.  We will also start the patient on MiraLAX once a day.    Discussed with the patient and his wife today.     I discussed with pathologist Dr. Macias today.    Thanks for letting us participating in the care of this patient!  4/8/2019    JUSTIN FROST M.D., Ph.D.     4/8/2019      Dictated using Dragon Dictation.

## 2019-04-08 NOTE — CONSULTS
DIAGNOSIS and REASON FOR CONSULTATION: brain tumor/presumed gbm  -  for advice and recommendations for this diagnosis    Referring Provider:  Zev Weldon MD    HISTORY OF PRESENT ILLNESS:  The patient is a 63 y.o. male who presented to the ER on 4/5/18 with a 3 week hx of headaches and 3-4 day hx of confusion, nausea and vomiting.  He had a head CT in the ER which showed a right frontal lobe mass with significant vasogenic edema.  He then had a brain MRI on 4/5/18 which showed a necrotic heterogenously enhancing mass in the anterior right frontal lobe, 6.8 x 4.3 x 4.6cm with areas of T2 hyperintensity compatible with hyperintense signal abnormality in the right frontal lobe extending into the genu of corpus callosum as well.  An 8mm midline shift was noted as well as effacement and entrapment of left lateral ventricle.      He had a CT chest, abd and pelvis on 4/5/19 with no evidence of primary or distant metastatic disease.  A 6mm noncalcified nodule was ntoed in the right upper lobe and ill defined mesenteric stranding and fluid layering suggestive of diffuse anasarca.      He was started on IV decadron, 6mg every 6 hours and keppra 500mg bid.  He was evaluated by Dr. Brower with neurosurgery and underwent right frontal craniotomy and debulking/resection of the tumor on 4/7/19 with frozen path positive for GBM.  He had a CT head on 4/8/19 which showed s/p right frontal craniotomy with right frontal extra-axial hemorrhage, largest collection measuring 3.7 x 1.1cm, persistant vasogenic edema right frontal lobe, stable, and hemorrhage within operative bed, unchanged, with persistant midline shift of 1cm. His final pathology is still pending.      He is sitting upright in the bed and denies any headaches, nausea or vomiting.  He denies any weakness of his extremities or numbness.  He is a [employer]  and works full time.  He is very active, exercises routinely and denies any significant medical  comorbidities.       I was asked to see the patient at the request of the referring provider noted above for advice and recommendations regarding this diagnosis.    Past Medical History: he  has a past medical history of ED (erectile dysfunction).    Past Surgical History:  he has a past surgical history that includes Craniotomy for Tumor (Right, 4/7/2019).    Meds:    Current Facility-Administered Medications:   •  acetaminophen (TYLENOL) tablet 650 mg, 650 mg, Oral, Q4H PRN, Zev Weldon MD  •  dexamethasone (DECADRON) 10 mg/20ml NS IV syringe, 10 mg, Intravenous, Once, Keshawn Brower MD  •  dexamethasone (DECADRON) injection 6 mg, 6 mg, Intravenous, Q6H, Lidia Solares PA-C, 6 mg at 04/08/19 1051  •  dextrose (D50W) 25 g/ 50mL Intravenous Solution 25 g, 25 g, Intravenous, Q15 Min PRN, Zev Weldon MD  •  dextrose (GLUTOSE) oral gel 15 g, 15 g, Oral, Q15 Min PRN, Zev Weldon MD  •  docusate sodium (COLACE) capsule 100 mg, 100 mg, Oral, BID PRN, Keshawn Brower MD  •  famotidine (PEPCID) injection 20 mg, 20 mg, Intravenous, Q12H, Lidia Solares PA-C, 20 mg at 04/08/19 0942  •  fentaNYL citrate (PF) (SUBLIMAZE) injection 50 mcg, 50 mcg, Intravenous, Q2H PRN, Zev Weldon MD  •  fondaparinux (ARIXTRA) injection 2.5 mg, 2.5 mg, Subcutaneous, Q24H, Keshawn Brower MD  •  glucagon (human recombinant) (GLUCAGEN DIAGNOSTIC) injection 1 mg, 1 mg, Subcutaneous, PRN, Zev Weldon MD  •  HYDROcodone-acetaminophen (NORCO) 5-325 MG per tablet 1 tablet, 1 tablet, Oral, Q6H PRN, 1 tablet at 04/08/19 0408 **OR** HYDROcodone-acetaminophen (NORCO) 5-325 MG per tablet 2 tablet, 2 tablet, Oral, Q6H PRN, Nidadavolu, Tonny Cali, MD  •  HYDROcodone-acetaminophen (NORCO) 5-325 MG per tablet 2 tablet, 2 tablet, Oral, Q4H PRN, Keshawn Brower MD, 2 tablet at 04/08/19 5563  •  HYDROmorphone (DILAUDID) injection 0.5 mg, 0.5 mg, Intravenous, Q2H PRN, 0.5 mg at 04/08/19 7192  **AND** naloxone (NARCAN) injection 0.4 mg, 0.4 mg, Intravenous, Q5 Min PRN, Tonny Haley MD  •  insulin lispro (humaLOG) injection 0-7 Units, 0-7 Units, Subcutaneous, 4x Daily With Meals & Nightly, Zev Weldon MD, 2 Units at 04/08/19 1153  •  levETIRAcetam in NaCl 0.82% (KEPPRA) IVPB 500 mg, 500 mg, Intravenous, Q12H, Lidia Solares PA-C, 500 mg at 04/08/19 0942  •  niCARdipine (CARDENE) 25 mg/250 mL (0.1 mg/mL) NS infusion kit, 5-15 mg/hr, Intravenous, Titrated, Keshawn Brower MD  •  ondansetron (ZOFRAN) tablet 4 mg, 4 mg, Oral, Q6H PRN **OR** ondansetron ODT (ZOFRAN-ODT) disintegrating tablet 4 mg, 4 mg, Oral, Q6H PRN **OR** ondansetron (ZOFRAN) injection 4 mg, 4 mg, Intravenous, Q6H PRN, Zev Weldon MD, 4 mg at 04/07/19 2146  •  sennosides-docusate sodium (SENOKOT-S) 8.6-50 MG tablet 1 tablet, 1 tablet, Oral, Nightly PRN, Keshawn Brower MD  •  [COMPLETED] Insert peripheral IV, , , Once **AND** sodium chloride 0.9 % flush 10 mL, 10 mL, Intravenous, PRN, Armand Jane III, PA  •  [COMPLETED] Insert peripheral IV, , , Once **AND** sodium chloride 0.9 % flush 10 mL, 10 mL, Intravenous, PRN, Armand Jane III, PA  •  sodium chloride 0.9 % flush 3-10 mL, 3-10 mL, Intravenous, PRN, Zev Weldon MD    Allergies:    Allergies   Allergen Reactions   • Sulfa Antibiotics Unknown (See Comments)     Unknown       Family History:  his family history includes Hypertension in his father and mother; Osteoporosis in his mother.    Social History:  he  reports that he has never smoked. He has never used smokeless tobacco. He reports that he drinks alcohol. He reports that he does not use drugs.    Pertinent Findings on   Review of Systems - Oncology:  Gen: no weight loss, change in appetite or energy, +nausea  HEENT: headaches  CV: no chest pain  REsp: no soa or cough  NEURO: headaches, confusion, denies visual changes or weakness of extremities  Vitals:     04/08/19 1133 04/08/19 1203 04/08/19 1303 04/08/19 1333   BP: 103/58 101/63 108/64 118/68   BP Location:  Right arm Right arm    Patient Position:  Lying Lying    Pulse: 58 55 59 58   Resp:  14 14    Temp:       TempSrc:       SpO2: 97% 96% 95% 96%   Weight:       Height:           Performance Status: (0) Fully active, able to carry on all predisease performance without restriction      Pertinent Findings on  Physical Exam:    Gen: aao, nad  HEENT: dressing around head, no bleeding, eomi,   NEURO: aao, CN intact, moving all extremities, strength 5/5 in upper and lower ext, conversant  EXT: no clubbing, cyanosis    Assessment: 63 year old male with newly diagnosed GBM of right frontal lobe, post op day 1 s/p debulking/resection    Plan:   I discussed with Mr. Velazquez and his wife the role of radiation with concurrent temodar for GBM.  I discussed the risks, benefits and rationale of brain radiation for the treatment of GBM to include but not limited to the following:     Acute:   skin erythema, loss of hair, nausea, vomiting, fatigue, headaches, ringing in the ears, and the possibility of increased edema resulting in seizures or death, loss of vision in right eye    Late:  permanent skin changes, alopecia, loss of vision in right eye, radionecrosis,, neurocognitive deficits including short term memory loss or difficulty concentrating, and the remote risk of late edema or hemorrhage resulting in seizures or death, loss of vision or second malignancies.      Mr. Waterman and his wife voiced understanding and were given an opportunity to ask questions which were answered to their satisfaction. We will continue to follow for final pathology and give him an appointment for approximately 2 weeks for CT simulation and treatment planning, April 22.  We will also await recommendation by neurooncology for possible concurrent temodar.  He and his wife are also interested in obtaining a second opinion at [facility] and I am  happy to help coordinate that if necessary.        I spent greater than 45 minutes on the unit and of that time 30 minutes was spent in counseling and coordination of care, including my review of imaging and pathology; indications, goals, logistics, alternatives and risks - both common and rare - for my recommendations as well as surveillance and potential outcomes.

## 2019-04-08 NOTE — CONSULTS
Group: [facility]         CONSULT NOTE    Patient Identification:    Hernandez Waterman  63 y.o.  male  1955  [id number]            Patient Care Team:  Megha Gant MD as PCP - General (Internal Medicine)    Requesting physician:      Reason for Consultation:  Post op care/ ICU management     CC: HA    History of Present Illness: Patient is a pleasant 63-year-old fairly healthy male with no significant past history who was noted to have new onset headaches which progressively got worse.  He started noticing increasing confusion along with gait difficulties.  CT head was done which showed evidence of large right frontal mass along with significant vasogenic edema along with midline shift.  He was taken to the OR for resection today and is admitted to the ICU postoperatively.  Patient seems to be doing well now and does not have any new neurological deficits.  Complains of some headache and just got some pain medication.    Objective     Review of Systems:  Unable to obtain due to postop confusion    Past Medical History:  Past Medical History:   Diagnosis Date   • ED (erectile dysfunction)        Past Surgical History:  History reviewed. No pertinent surgical history.     Home Meds:  Medications Prior to Admission   Medication Sig Dispense Refill Last Dose   • amoxicillin (AMOXIL) 875 MG tablet Take 1 tablet by mouth 2 (Two) Times a Day for 7 days. 14 tablet 0 4/4/2019 at Unknown time   • ibuprofen (ADVIL,MOTRIN) 200 MG tablet Take 200 mg by mouth As Needed for Mild Pain .   4/4/2019 at Unknown time   • Loratadine (CLARITIN) 10 MG capsule Take 1 tablet by mouth Daily for 7 days. Over the counter 7 each 0 4/4/2019 at Unknown time   • sildenafil (VIAGRA) 100 MG tablet Take 1 tablet by mouth Daily As Needed for erectile dysfunction. 10 tablet 1 Past Week at Unknown time   • methylPREDNISolone (MEDROL) 4 MG tablet follow package directions 21 tablet 0        Allergies:  Allergies  "  Allergen Reactions   • Sulfa Antibiotics Unknown (See Comments)     Unknown       Social History:   Social History     Socioeconomic History   • Marital status:      Spouse name: Not on file   • Number of children: Not on file   • Years of education: Not on file   • Highest education level: Not on file   Tobacco Use   • Smoking status: Never Smoker   • Smokeless tobacco: Never Used   Substance and Sexual Activity   • Alcohol use: Yes     Comment: rarely   • Drug use: No   • Sexual activity: Yes     Partners: Female       Family History:  Family History   Problem Relation Age of Onset   • Hypertension Mother    • Osteoporosis Mother    • Hypertension Father        Physical Exam:  /68   Pulse 58   Temp 98.1 °F (36.7 °C) (Oral)   Resp 12   Ht 185.4 cm (72.99\")   Wt 78.8 kg (173 lb 11.6 oz)   SpO2 96%   BMI 22.92 kg/m²  Body mass index is 22.92 kg/m². 96% 78.8 kg (173 lb 11.6 oz)    Physical Exam     Constitutional: No acute distress, no accessory muscle use  Head: -Surgical bandage - C/D/I, CORRIE drain noted  Eyes: No pallor, Anicteric conjunctiva, EOMI.  ENMT:  Mallampati 2, no oral thrush. No palpable cervical lymphadenopathy  Heart: RRR, no murmur  Lungs: SANJEEV +, No wheezes/crackles heard    Abdomen: Soft. No tenderness, guarding or rigidity  Extremities: Extremities warm and well perfused, 1+ pitting edema  Neuro: Conscious,  Drowsy, no gross focal neuro deficits    Lab Review:   LABS:  Lab Results   Component Value Date    CALCIUM 8.4 (L) 04/07/2019     Results from last 7 days   Lab Units 04/07/19  1353 04/06/19  0638 04/05/19  0544   SODIUM mmol/L 141 138 139   POTASSIUM mmol/L 3.8 4.3 4.4   CHLORIDE mmol/L 102 103 100   CO2 mmol/L 26.0 25.1 27.8   BUN mg/dL 20 17 19   CREATININE mg/dL 0.80 0.71* 0.80   GLUCOSE mg/dL 135* 163* 119*   CALCIUM mg/dL 8.4* 8.8 9.2   WBC 10*3/mm3 12.26*  --  9.72   HEMOGLOBIN g/dL 11.2*  --  12.3*   PLATELETS 10*3/mm3 292  --  305   ALT (SGPT) U/L  --   --  111* "   AST (SGOT) U/L  --   --  69*     No results found for: CKTOTAL, CKMB, CKMBINDEX, TROPONINI, TROPONINT          Results from last 7 days   Lab Units 04/06/19  0638   INR  1.16*                 Imaging: I personally visualized the images of chest scans/ x-rays performed within last 3 days and summarized below.    Assessment   Right frontal brain tumor S/p debulking/ frontal lobectomy   Persistent headache  Vasogenic edema  Brain compression  Incidental sub-6 mm right upper pulmonary nodule  Anasarca    RECOMMENDATIONS:  Patient is status post debulking and frontal lobectomy now and is doing fairly well.  No significant new neurological deficits noted.  Decadron per neurosurgery  Oncology consultation noted for suspected GBM  Pain control with as needed medication as ordered  Repeat CT head reviewed and postop changes noted.  No significant drainage from the CORRIE drain.  Noted anasarca likely due to volume resuscitation.  Will give diuretic.  We will continue to watch the patient overnight in ICU.  Likely will be able to transfer out of ICU in the a.m.  Full code  DVT prophylaxis-mechanical    Thank you for letting me participate in the care of this pleasant patient.  I have discussed my findings and recommendations with patient and nursing staff.     Tonny Haley MD  4/7/2019  10:54 PM

## 2019-04-08 NOTE — PAYOR COMM NOTE
"Darryl Fisher (63 y.o. Male)                    ATTENTION;   AUTH PENDING [id number], CLINICAL FOR YOUR REVIEW, REPLY TO UR DEPT                  FERNANDO WALDEN N  OR UR  [phone]       Date of Birth Social Security Number Address Home Phone MRN    1955  [id number] [address] [phone] [id number]    [employer] Marital Status          None        Admission Date Admission Type Admitting Provider Attending Provider Department, Room/Bed    19 Emergency Zev Weldon MD Ramaswamy, Rajanna B, MD [facility] INTENSIVE CARE, I381/1    Discharge Date Discharge Disposition Discharge Destination                       Attending Provider:  Zev Weldon MD    Allergies:  Sulfa Antibiotics    Isolation:  None   Infection:  None   Code Status:  CPR    Ht:  185.4 cm (72.99\")   Wt:  78.8 kg (173 lb 11.6 oz)    Admission Cmt:  None   Principal Problem:  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 (CMS/HCC) [D49.6]                 Active Insurance as of 2019     Primary Coverage     Payor Plan Insurance Group Employer/Plan Group    [employer]HE[employer] BLUE CROSS AN[employer]M BLUE CROSS BLUE SHIELD PPO [id number]     Payor Plan Address Payor Plan Phone Number Payor Plan Fax Number Effective Dates    [address] [phone]  2018 - None Entered    [address] [place] [address]       Subscriber Name Subscriber Birth Date Member ID       DARRYL FISHER 1955 [id number]                 Emergency Contacts      (Rel.) Home Phone Work Phone Mobile Phone    Mony Fisher (Spouse) [phone] -- [phone]               History & Physical      Zev Weldon MD at 2019 10:49 AM              Name: Darryl Fisher ADMIT: 2019   : 1955  PCP: Megha Gant MD    MRN: [id number] LOS: 0 days   AGE/SEX: 63 y.o. male  ROOM: [facility]/     Chief Complaint   Patient presents with   • Weakness - Generalized   • " Headache       History of present illness  Mr. Waterman is a 63 y.o. male previously healthy who started having headache on the right side about a week ago and radiating to right ear.  He also had 2 episodes of nausea no vomiting.  Patient denies any fever chills abdominal pain visual changes.  He works as a  for [employer].  The wife reports that his boss called her yesterday to pick him up from work because he was confused and he was saying things which did not make sense.  They arrived in the EMS because he fell yesterday 3 times.  No history of head injury.  He was seen in the immediate care center and was given amoxicillin and methylprednisone for sinus headache.  He had a CT which showed mass followed by an MRI which shows a large mass in the right frontal lobe with midline shift.  Dr. Brower has been consulted and the patient is been started on Keppra and Decadron.        Past Medical History:   Diagnosis Date   • ED (erectile dysfunction)      History reviewed. No pertinent surgical history.  Family History   Problem Relation Age of Onset   • Hypertension Mother    • Osteoporosis Mother    • Hypertension Father      Social History     Tobacco Use   • Smoking status: Never Smoker   • Smokeless tobacco: Never Used   Substance Use Topics   • Alcohol use: Yes     Comment: rarely   • Drug use: No     Medications Prior to Admission   Medication Sig Dispense Refill Last Dose   • amoxicillin (AMOXIL) 875 MG tablet Take 1 tablet by mouth 2 (Two) Times a Day for 7 days. 14 tablet 0 4/4/2019 at Unknown time   • ibuprofen (ADVIL,MOTRIN) 200 MG tablet Take 200 mg by mouth As Needed for Mild Pain .   4/4/2019 at Unknown time   • Loratadine (CLARITIN) 10 MG capsule Take 1 tablet by mouth Daily for 7 days. Over the counter 7 each 0 4/4/2019 at Unknown time   • sildenafil (VIAGRA) 100 MG tablet Take 1 tablet by mouth Daily As Needed for erectile dysfunction. 10 tablet 1 Past Week at Unknown time   • methylPREDNISolone  (MEDROL) 4 MG tablet follow package directions 21 tablet 0      Allergies:    Allergies   Allergen Reactions   • Sulfa Antibiotics Unknown (See Comments)     Unknown       Review of Systems   Constitutional: Positive for fatigue. Negative for activity change, appetite change, chills and fever.   HENT: Positive for ear pain (right). Negative for congestion, ear discharge, mouth sores, nosebleeds, sneezing, sore throat and trouble swallowing.    Eyes: Negative for discharge, itching and visual disturbance.   Respiratory: Negative for apnea, cough, chest tightness, shortness of breath, wheezing and stridor.    Cardiovascular: Negative for chest pain, palpitations and leg swelling.   Gastrointestinal: Negative for abdominal distention, abdominal pain, blood in stool, constipation, diarrhea, nausea and vomiting.   Endocrine: Negative for cold intolerance, heat intolerance and polydipsia.   Genitourinary: Negative for difficulty urinating and frequency.   Musculoskeletal: Negative for arthralgias, back pain, gait problem, joint swelling and neck stiffness.   Skin: Negative for color change, pallor and rash.   Neurological: Positive for headaches. Negative for dizziness, seizures, syncope, facial asymmetry, weakness and numbness.   Hematological: Negative for adenopathy. Does not bruise/bleed easily.   Psychiatric/Behavioral: Negative for agitation, behavioral problems and hallucinations.          Objective    Vital Signs  Temp:  [97.8 °F (36.6 °C)-98.2 °F (36.8 °C)] 98.2 °F (36.8 °C)  Heart Rate:  [42-55] 47  Resp:  [16-18] 16  BP: (100-114)/(56-75) 103/63  SpO2:  [95 %-99 %] 96 %  on   ;   Device (Oxygen Therapy): nasal cannula  Body mass index is 21.76 kg/m².    Physical Exam   Constitutional: He is oriented to person, place, and time. He appears well-developed and well-nourished. No distress.   HENT:   Head: Normocephalic and atraumatic.   Nose: No epistaxis.   Mouth/Throat: Oropharynx is clear and moist.   Eyes:  Conjunctivae and EOM are normal. Pupils are equal, round, and reactive to light.   Neck: Normal range of motion. Neck supple. No JVD present. No tracheal deviation and no edema present. No thyroid mass and no thyromegaly present.   Cardiovascular: Normal rate, regular rhythm and normal heart sounds.   No murmur heard.  Pulmonary/Chest: Breath sounds normal. He has no decreased breath sounds. He has no wheezes.   Abdominal: Soft. Normal appearance and bowel sounds are normal. He exhibits no ascites. There is no hepatosplenomegaly.   Musculoskeletal: Normal range of motion. He exhibits no edema.   Neurological: He is alert and oriented to person, place, and time.   Skin: Skin is warm, dry and intact. No rash noted. No cyanosis or erythema. No pallor. Nails show no clubbing.   Psychiatric: He has a normal mood and affect. His behavior is normal.   Vitals reviewed.      Results Review:   I reviewed the patient's new clinical results.    Lab Results (last 24 hours)     Procedure Component Value Units Date/Time    CBC & Differential [[id number]] Collected:  04/05/19 0544    Specimen:  Blood Updated:  04/05/19 0608    Narrative:       The following orders were created for panel order CBC & Differential.  Procedure                               Abnormality         Status                     ---------                               -----------         ------                     CBC Auto Differential[[id number]]        Abnormal            Final result                 Please view results for these tests on the individual orders.    Comprehensive Metabolic Panel [[id number]]  (Abnormal) Collected:  04/05/19 0544    Specimen:  Blood Updated:  04/05/19 0632     Glucose 119 mg/dL      BUN 19 mg/dL      Creatinine 0.80 mg/dL      Sodium 139 mmol/L      Potassium 4.4 mmol/L      Chloride 100 mmol/L      CO2 27.8 mmol/L      Calcium 9.2 mg/dL      Total Protein 7.0 g/dL      Albumin 3.90 g/dL      ALT (SGPT) 111 U/L      AST (SGOT) 69  U/L      Alkaline Phosphatase 71 U/L      Total Bilirubin 0.5 mg/dL      eGFR Non African Amer 97 mL/min/1.73      Globulin 3.1 gm/dL      A/G Ratio 1.3 g/dL      BUN/Creatinine Ratio 23.8     Anion Gap 11.2 mmol/L     Narrative:       GFR Normal >60  Chronic Kidney Disease <60  Kidney Failure <15    Sedimentation Rate [[id number]]  (Abnormal) Collected:  04/05/19 0544    Specimen:  Blood Updated:  04/05/19 0704     Sed Rate 32 mm/hr     CBC Auto Differential [[id number]]  (Abnormal) Collected:  04/05/19 0544    Specimen:  Blood Updated:  04/05/19 0608     WBC 9.72 10*3/mm3      RBC 3.99 10*6/mm3      Hemoglobin 12.3 g/dL      Hematocrit 38.2 %      MCV 95.7 fL      MCH 30.8 pg      MCHC 32.2 g/dL      RDW 12.2 %      RDW-SD 43.4 fl      MPV 9.0 fL      Platelets 305 10*3/mm3      Neutrophil % 81.9 %      Lymphocyte % 7.8 %      Monocyte % 9.1 %      Eosinophil % 0.3 %      Basophil % 0.5 %      Immature Grans % 0.4 %      Neutrophils, Absolute 7.96 10*3/mm3      Lymphocytes, Absolute 0.76 10*3/mm3      Monocytes, Absolute 0.88 10*3/mm3      Eosinophils, Absolute 0.03 10*3/mm3      Basophils, Absolute 0.05 10*3/mm3      Immature Grans, Absolute 0.04 10*3/mm3      nRBC 0.0 /100 WBC             MRI Brain With & Without Contrast   Final Result       There is a 6.8 x 4.3 x 4.6 cm heterogeneous necrotic contrast enhancing   mass centered within the right frontal lobe with circumscribing signal   abnormality noted predominantly within the right frontal lobe, but also   extending into the genu of the corpus callosum. Note is made of marked   mass effect with sulcal and ventricular effacement in addition to   midline shift to the left by approximately 8 mm, entrapment of the left   lateral ventricle, and subfalcine herniation of the anterior and medial   portion of the right frontal lobe. 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       This report was finalized on  4/5/2019 10:14 AM by Dr. Michael Harris M.D.          CT Head Without Contrast   Final Result   Large area of vasogenic edema identified within the right frontal lobe.   Underlying mass lesion is suspected. Both primary brain neoplasm and   metastatic disease would be in the differential. Further evaluation with   MRI of the brain without and with contrast is recommended. Midline shift   measures up to 1.3 cm, and there is significant mass effect upon the   right lateral ventricle. Neurosurgical consultation is recommended.   Findings were relayed to Herve aJne, the physician assistant at 5:45   AM.       Radiation dose reduction techniques were utilized, including automated   exposure control and exposure modulation based on body size.       This report was finalized on 4/5/2019 5:52 AM by Dr. Niki Beatty M.D.          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         Mr. Waterman is a 63 y.o. male who a large mass occupying the right frontal lobe with a midline shift and cerebral edema.  Admit the patient and continue steroids and Keppra.  Stress ulcer prophylaxis with Pepcid  DVT prophylaxis SCDs  Consult neurosurgery  Patient is a full code        I discussed the patients findings and my recommendations with patient, family and nursing staff.      Zev Weldon MD  [facility]  04/05/19    Electronically signed by Zev Weldon MD at 4/5/2019 11:48 AM          Emergency Department Notes      Anya Cedillo, RN at 4/5/2019  4:40 AM        Pt brought by EMS from home for generalized weakness and HA x 10  "days with n/v. Pt was seen at [facility] yesterday and prescribed amoxicillin and steroid which he has not started yet.     Electronically signed by Anya Cedillo RN at 4/5/2019  4:47 AM     Batsheva Couch RN at 4/5/2019  4:55 AM        Pt c/o \"a sinus headache for a week.\" States headache is behind his eyes, mostly on the right side. States had clear nasal drainage. Denies fever or cough. Wife at bedside states \"his boss called me yesterday to pick him up from work because he was confused. He was saying some things that didn't make sense.\" Wife also states \"I called EMS because he fell three times yesterday.\" States the pt did not hit his head. Pt states not on blood thinners and denies any injuries from the fall. States seen by his doctors acute clinic yesterday who gave him prescriptions for amoxicillin and methylprednisolone. States has not started the methylprednisolone. States has been taking Sudafed and Advil and some Tylenol and Claritin that helped \"just enough to get me to sleep.\" C/o generalized weakness x 1 wk. States vomited a few times the last couple days.    VSS, NAD, A&O x4. Respirations even and unlabored. Denies any other acute complaints. Call light in reach. Will continue to monitor. MD to treat.     Batsheva Couch, RN  04/05/19 0505       Batsheva Couch RN  04/05/19 0507      Electronically signed by Batsheva Couch RN at 4/5/2019  5:07 AM     Armand Jane III, PA at 4/5/2019  5:05 AM     Attestation signed by Primitivo Cantrell MD at 4/5/2019  7:57 AM    I supervised care provided by the midlevel provider.    We have discussed this patient's history, physical exam, and treatment plan.   I have reviewed the note and personally had a face to face encounter with the patient and agree with the plan of care. See my attending note.    I agree with the PA's or NP's findings and plan.  I reviewed the PA's or NP's note.  Primitivo Cantrell MD                    EMERGENCY DEPARTMENT ENCOUNTER    CHIEF " "COMPLAINT  Chief Complaint: HA  History given by: patient   History limited by: nothing   Room Number: 25/25  PMD: Megha Gant MD      HPI:  Pt is a 63 y.o. male who presents to the ED complaining of R-sided HA that started about a week ago. Pt reports the pain radiates to his R ear. Pt admits mild nausea and vomiting (2 episodes). Pt denies fever, chills, abd pain, and any visual changes. Pt reports today he took Tylenol and Sudafed and only received mild pain relief. Pt reports a hx of sinus headaches. There are no other complaints at this time.     Duration: 1 week  Onset: gradual  Timing: constant   Location: R-sided headache  Radiation: R ear  Quality: \"pain\"  Intensity/Severity: moderate  Progression: unchanged   Associated Symptoms: pt admits mild nausea and vomiting (2 episodes)  Aggravating Factors: none mentioned   Alleviating Factors: none mentioned   Previous Episodes: pt reports a hx of sinus headaches   Treatment before arrival: pt reports today he took Tylenol and Sudafed and only received mild pain relief    PAST MEDICAL HISTORY  Active Ambulatory Problems     Diagnosis Date Noted   • Pure hypercholesterolemia 05/22/2016   • Testicular hypofunction 05/22/2016   • ED (erectile dysfunction)    • Frequency of urination 02/14/2018   • Other hyperlipidemia 02/14/2018   • Nocturia 02/14/2018   • Primary insomnia 06/22/2018   • Chronic fatigue 06/22/2018   • Night sweat 06/22/2018     Resolved Ambulatory Problems     Diagnosis Date Noted   • No Resolved Ambulatory Problems     Past Medical History:   Diagnosis Date   • ED (erectile dysfunction)        PAST SURGICAL HISTORY  History reviewed. No pertinent surgical history.    FAMILY HISTORY  Family History   Problem Relation Age of Onset   • Hypertension Mother    • Osteoporosis Mother    • Hypertension Father        SOCIAL HISTORY  Social History     Socioeconomic History   • Marital status:      Spouse name: Not on file   • Number of " children: Not on file   • Years of education: Not on file   • Highest education level: Not on file   Tobacco Use   • Smoking status: Never Smoker   • Smokeless tobacco: Never Used   Substance and Sexual Activity   • Alcohol use: Yes     Comment: rarely   • Drug use: No   • Sexual activity: Yes     Partners: Female       ALLERGIES  Sulfa antibiotics    REVIEW OF SYSTEMS  Review of Systems   Constitutional: Negative for activity change, appetite change, chills and fever.   HENT: Negative for congestion and sore throat.    Eyes: Negative.  Negative for visual disturbance.   Respiratory: Negative for cough and shortness of breath.    Cardiovascular: Negative for chest pain and leg swelling.   Gastrointestinal: Positive for nausea (mild) and vomiting (2 episodes). Negative for abdominal pain and diarrhea.   Endocrine: Negative.    Genitourinary: Negative for decreased urine volume and dysuria.   Musculoskeletal: Negative for neck pain.   Skin: Negative for rash and wound.   Allergic/Immunologic: Negative.    Neurological: Positive for headaches (R sided). Negative for weakness and numbness.   Hematological: Negative.    Psychiatric/Behavioral: Negative.    All other systems reviewed and are negative.      PHYSICAL EXAM  ED Triage Vitals [04/05/19 0447]   Temp Heart Rate Resp BP SpO2   97.8 °F (36.6 °C) 55 18 110/70 97 %      Temp src Heart Rate Source Patient Position BP Location FiO2 (%)   Tympanic -- -- -- --       Physical Exam   Constitutional: He is oriented to person, place, and time. No distress.   HENT:   Head: Normocephalic and atraumatic.   No temporal artery tenderness   Eyes: EOM are normal. Pupils are equal, round, and reactive to light.   Neck: Normal range of motion. Neck supple. No neck rigidity.   Cardiovascular: Normal rate, regular rhythm and normal heart sounds.   Pulmonary/Chest: Effort normal and breath sounds normal. No respiratory distress.   Abdominal: Soft. There is no tenderness. There is no  rebound and no guarding.   Musculoskeletal: Normal range of motion. He exhibits no edema.   Neurological: He is alert and oriented to person, place, and time. He has normal sensation and normal strength.   Cranial nerves intact    Skin: Skin is warm and dry.   Psychiatric: Mood and affect normal.   Nursing note and vitals reviewed.      LAB RESULTS  Lab Results (last 24 hours)     Procedure Component Value Units Date/Time    CBC & Differential [[id number]] Collected:  04/05/19 0544    Specimen:  Blood Updated:  04/05/19 0608    Narrative:       The following orders were created for panel order CBC & Differential.  Procedure                               Abnormality         Status                     ---------                               -----------         ------                     CBC Auto Differential[[id number]]        Abnormal            Final result                 Please view results for these tests on the individual orders.    Comprehensive Metabolic Panel [[id number]]  (Abnormal) Collected:  04/05/19 0544    Specimen:  Blood Updated:  04/05/19 0632     Glucose 119 mg/dL      BUN 19 mg/dL      Creatinine 0.80 mg/dL      Sodium 139 mmol/L      Potassium 4.4 mmol/L      Chloride 100 mmol/L      CO2 27.8 mmol/L      Calcium 9.2 mg/dL      Total Protein 7.0 g/dL      Albumin 3.90 g/dL      ALT (SGPT) 111 U/L      AST (SGOT) 69 U/L      Alkaline Phosphatase 71 U/L      Total Bilirubin 0.5 mg/dL      eGFR Non African Amer 97 mL/min/1.73      Globulin 3.1 gm/dL      A/G Ratio 1.3 g/dL      BUN/Creatinine Ratio 23.8     Anion Gap 11.2 mmol/L     Narrative:       GFR Normal >60  Chronic Kidney Disease <60  Kidney Failure <15    Sedimentation Rate [[id number]] Collected:  04/05/19 0544    Specimen:  Blood Updated:  04/05/19 0601    CBC Auto Differential [[id number]]  (Abnormal) Collected:  04/05/19 0544    Specimen:  Blood Updated:  04/05/19 0608     WBC 9.72 10*3/mm3      RBC 3.99 10*6/mm3      Hemoglobin 12.3 g/dL       Hematocrit 38.2 %      MCV 95.7 fL      MCH 30.8 pg      MCHC 32.2 g/dL      RDW 12.2 %      RDW-SD 43.4 fl      MPV 9.0 fL      Platelets 305 10*3/mm3      Neutrophil % 81.9 %      Lymphocyte % 7.8 %      Monocyte % 9.1 %      Eosinophil % 0.3 %      Basophil % 0.5 %      Immature Grans % 0.4 %      Neutrophils, Absolute 7.96 10*3/mm3      Lymphocytes, Absolute 0.76 10*3/mm3      Monocytes, Absolute 0.88 10*3/mm3      Eosinophils, Absolute 0.03 10*3/mm3      Basophils, Absolute 0.05 10*3/mm3      Immature Grans, Absolute 0.04 10*3/mm3      nRBC 0.0 /100 WBC           I ordered the above labs and reviewed the results    RADIOLOGY  CT Head Without Contrast   Final Result   Large area of vasogenic edema identified within the right frontal lobe.   Underlying mass lesion is suspected. Both primary brain neoplasm and   metastatic disease would be in the differential. Further evaluation with   MRI of the brain without and with contrast is recommended. Midline shift   measures up to 1.3 cm, and there is significant mass effect upon the   right lateral ventricle. Neurosurgical consultation is recommended.   Findings were relayed to Herve Jane, the physician assistant at 5:45   AM.       Radiation dose reduction techniques were utilized, including automated   exposure control and exposure modulation based on body size.       This report was finalized on 4/5/2019 5:52 AM by Dr. Niki Beatty M.D.          MRI Brain With & Without Contrast    (Results Pending)        I ordered the above noted radiological studies. Interpreted by radiologist. Reviewed by me in PACS.       PROCEDURES  Procedures      PROGRESS AND CONSULTS       0517   Ordered CT head and urinalysis for further evaluation.     0518  Ordered IVF bolus for hydration and ordered Toradol, Benadryl, and Compazine for headache.     0551  Placed a call to Neurosurgery.     0555  Discussed pt case w Dr. Brower (neurosurgery) who would like me to try and  "consult medicine to see if they will take it and if not he agrees to admit and he would like me to get an MRI of brain.     0559  Ordered MRI brain for further evaluation.     0612  Placed [facility] call to [facility].     0630:  Patient Admitted to [facility] ([facility]).  Dr Brower will see in consult.     MEDICAL DECISION MAKING  Results were reviewed/discussed with the patient and they were also made aware of online access. Pt also made aware that some labs, such as cultures, will not be resulted during ER visit and follow up with PMD is necessary.     MDM  Number of Diagnoses or Management Options     Amount and/or Complexity of Data Reviewed  Tests in the radiology section of CPT®:  ordered and reviewed (CT head= large area of vasogenic edema identified within the R frontal lobe. Underlying mass lesion is suspected. Both primary brain neoplasm and metastatic disease would be in the differential.)  Decide to obtain previous medical records or to obtain history from someone other than the patient: yes  Review and summarize past medical records: yes (Pt's previous medical records show pt had an office visit yesterday with internal medicine and was dx w acute sinusitis and primary insomnia.)           DIAGNOSIS  Final diagnoses:   Acute intractable headache, unspecified headache type   Brain mass       DISPOSITION  Admit    Latest Documented Vital Signs:  As of 6:40 AM  BP- 113/75 HR- (!) 48 Temp- 97.8 °F (36.6 °C) (Tympanic) O2 sat- 99%    --  Documentation assistance provided by starr Hernandez for Herve Jane PA-C.  Information recorded by the scribe was done at my direction and has been verified and validated by me.     Federica Hernandez  04/05/19 0615       Armand Jane III, PA  04/05/19 0624       Armand Jane III, PA  04/05/19 0640      Electronically signed by Primitivo Cantrell MD at 4/5/2019  7:57 AM     Batsheva Couch RN at 4/5/2019  5:25 AM        Pt states \"I can walk to the bathroom\" to provide the urine " sample. Pt unable to sit up or stand independently. Pt encouraged to use stay in bed to provide the urine sample. Pt agrees to stay in bed at this time. Wife states she will assist the pt to urinate in the cup.     Batsheva Couch RN  04/05/19 0553      Electronically signed by Batsheva Couch RN at 4/5/2019  5:53 AM     Primitivo Cantrell MD at 4/5/2019  6:10 AM        Pt presents to ED c/o generalized weakness and R frontal HA onset 7-10 days ago. Pt also c/o nausea and vomiting, but denies SOA.    Upon exam, pt is A&O x3, he is in no respiratory distress, no focal neurological deficits, heart is RRR, and lungs are CTAB.    Discussed with pt plan to admit. Pt understands and agrees with the plan, all questions answered.    MD ATTESTATION NOTE    The SURAJ and I have discussed this patient's history, physical exam, and treatment plan.  I have reviewed the documentation and personally had a face to face interaction with the patient. I affirm the documentation and agree with the treatment and plan.  The attached note describes my personal findings.    Documentation assistance provided by starr Huntley for Dr. Cantrell.  Information recorded by the scribe was done at my direction and has been verified and validated by me.     Isis Huntley  04/05/19 0614       Primitivo Cantrell MD  04/05/19 0756      Electronically signed by Primitivo Cantrell MD at 4/5/2019  7:56 AM     Anya Woodard RN at 4/5/2019  7:21 AM        MRI screening sheet filled out by pts wife. MRI tech called to review sheet at this time.     Anya Woodard RN  04/05/19 0722      Electronically signed by Anya Woodard RN at 4/5/2019  7:22 AM    Electronically signed by Kalpana June RN at 4/5/2019  7:42 AM       ctive LDAs     Name:   Placement date:   Placement time:   Site:   Days:    Peripheral IV 04/05/19 0542 Left Antecubital   04/05/19    0542    Antecubital   3    Peripheral IV 04/07/19 1230 Right Hand   04/07/19    1230    Hand   less than 1     Closed/Suction Drain 1 Right  Bulb   04/07/19    1243    -- HEAD   less than 1    Urethral Catheter Non-latex   04/07/19    1039     1    Arterial Line 04/07/19 Left Radial   04/07/19    1040 created via procedure documentation    Radial   1         Inactive LDAs     Name:   Placement date:   Placement time:   Removal date:   Removal time:   Site:   Days:    [REMOVED] ETT    04/07/19    1043 created via procedure documentation    04/07/19    1337     less than 1                Hospital Medications (all)       Dose Frequency Start End    acetaminophen (TYLENOL) tablet 650 mg 650 mg Every 4 Hours PRN 4/5/2019     Sig - Route: Take 2 tablets by mouth Every 4 (Four) Hours As Needed for Mild Pain . - Oral    albumin human 5 % solution  - ADS Override Pull   4/7/2019 4/7/2019    Notes to Pharmacy: Created by cabinet override    ceFAZolin in dextrose (ANCEF) IVPB solution 2 g 2 g Once 4/7/2019 4/7/2019    Sig - Route: Infuse 100 mL into a venous catheter 1 (One) Time. - Intravenous    ceFAZolin in dextrose (ANCEF) IVPB solution 2 g 2 g Every 8 Hours 4/7/2019 4/8/2019    Sig - Route: Infuse 100 mL into a venous catheter Every 8 (Eight) Hours. - Intravenous    dexamethasone (DECADRON) 10 mg/20ml NS IV syringe 10 mg Once 4/5/2019 4/5/2019    Sig - Route: Infuse 10 mg into a venous catheter 1 (One) Time. - Intravenous    dexamethasone (DECADRON) 10 mg/20ml NS IV syringe 10 mg Once 4/7/2019     Sig - Route: Infuse 10 mg into a venous catheter 1 (One) Time. - Intravenous    dexamethasone (DECADRON) injection 6 mg 6 mg Every 6 Hours 4/5/2019     Sig - Route: Infuse 1.5 mL into a venous catheter Every 6 (Six) Hours. - Intravenous    Cosign for Ordering: Accepted by Keshawn Brower MD on 4/5/2019  1:13 PM    dexamethasone sodium phosphate 20 MG/5ML injection  - ADS Override Pull   4/7/2019 4/8/2019    Notes to Pharmacy: Created by cabinet override    dextrose (D50W) 25 g/ 50mL Intravenous Solution 25 g 25 g Every 15 Minutes PRN  4/5/2019     Sig - Route: Infuse 50 mL into a venous catheter Every 15 (Fifteen) Minutes As Needed for Low Blood Sugar (Blood Sugar Less Than 70). - Intravenous    dextrose (GLUTOSE) oral gel 15 g 15 g Every 15 Minutes PRN 4/5/2019     Sig - Route: Take 15 g by mouth Every 15 (Fifteen) Minutes As Needed for Low Blood Sugar (Blood sugar less than 70). - Oral    docusate sodium (COLACE) capsule 100 mg 100 mg 2 Times Daily PRN 4/7/2019     Sig - Route: Take 1 capsule by mouth 2 (Two) Times a Day As Needed for Constipation. - Oral    famotidine (PEPCID) injection 20 mg 20 mg Every 12 Hours Scheduled 4/5/2019     Sig - Route: Infuse 2 mL into a venous catheter Every 12 (Twelve) Hours. - Intravenous    Cosign for Ordering: Accepted by Keshawn Brower MD on 4/5/2019  1:13 PM    famotidine (PEPCID) injection 20 mg 20 mg Once 4/7/2019 4/7/2019    Sig - Route: Infuse 2 mL into a venous catheter 1 (One) Time. - Intravenous    fentaNYL (SUBLIMAZE) 0.05 MG/ML injection  - ADS Override Pull   4/7/2019 4/7/2019    Notes to Pharmacy: Created by cabinet override    fentaNYL citrate (PF) (SUBLIMAZE) injection 50 mcg 50 mcg Every 2 Hours PRN 4/7/2019 4/17/2019    Sig - Route: Infuse 1 mL into a venous catheter Every 2 (Two) Hours As Needed for Severe Pain . - Intravenous    fondaparinux (ARIXTRA) injection 2.5 mg 2.5 mg Every 24 Hours Scheduled 4/8/2019     Sig - Route: Inject 0.5 mL under the skin into the appropriate area as directed Daily. - Subcutaneous    furosemide (LASIX) injection 40 mg 40 mg Once 4/8/2019 4/8/2019    Sig - Route: Infuse 4 mL into a venous catheter 1 (One) Time. - Intravenous    gadobenate dimeglumine (MULTIHANCE) injection 15 mL 15 mL Once in Imaging 4/5/2019 4/5/2019    Sig - Route: Infuse 15 mL into a venous catheter Once. - Intravenous    glucagon (human recombinant) (GLUCAGEN DIAGNOSTIC) injection 1 mg 1 mg As Needed 4/5/2019     Sig - Route: Inject 1 mg under the skin into the appropriate area as  "directed As Needed (Blood Glucose Less Than 70). - Subcutaneous    HYDROcodone-acetaminophen (NORCO) 5-325 MG per tablet 1 tablet 1 tablet Every 6 Hours PRN 4/8/2019 4/18/2019    Sig - Route: Take 1 tablet by mouth Every 6 (Six) Hours As Needed for Moderate Pain . - Oral    Linked Group 1:  \"Or\" Linked Group Details        HYDROcodone-acetaminophen (NORCO) 5-325 MG per tablet 2 tablet 2 tablet Every 4 Hours PRN 4/7/2019 4/15/2019    Sig - Route: Take 2 tablets by mouth Every 4 (Four) Hours As Needed for Moderate Pain . - Oral    HYDROcodone-acetaminophen (NORCO) 5-325 MG per tablet 2 tablet 2 tablet Every 6 Hours PRN 4/8/2019 4/18/2019    Sig - Route: Take 2 tablets by mouth Every 6 (Six) Hours As Needed for Moderate Pain . - Oral    Linked Group 1:  \"Or\" Linked Group Details        HYDROmorphone (DILAUDID) injection 0.5 mg 0.5 mg Every 2 Hours PRN 4/7/2019     Sig - Route: Infuse 0.5 mL into a venous catheter Every 2 (Two) Hours As Needed for Moderate Pain  or Severe Pain . - Intravenous    Linked Group 2:  \"And\" Linked Group Details        HYDROmorphone (DILAUDID) injection 1 mg 1 mg Once 4/5/2019 4/5/2019    Sig - Route: Infuse 1 mL into a venous catheter 1 (One) Time. - Intravenous    insulin lispro (humaLOG) injection 0-7 Units 0-7 Units 4 Times Daily With Meals & Nightly 4/5/2019     Sig - Route: Inject 0-7 Units under the skin into the appropriate area as directed 4 (Four) Times a Day With Meals & at Bedtime. - Subcutaneous    iopamidol (ISOVUE-300) 61 % injection 100 mL 100 mL Once in Imaging 4/5/2019 4/5/2019    Sig - Route: Infuse 100 mL into a venous catheter Once. - Intravenous    levETIRAcetam in NaCl 0.75% (KEPPRA) IVPB 1,000 mg 1,000 mg Once 4/7/2019 4/7/2019    Sig - Route: Infuse 100 mL into a venous catheter 1 (One) Time. - Intravenous    levETIRAcetam in NaCl 0.82% (KEPPRA) IVPB 500 mg 500 mg Every 12 Hours Scheduled 4/5/2019     Sig - Route: Infuse 100 mL into a venous catheter Every 12 (Twelve) " "Hours. - Intravenous    Cosign for Ordering: Accepted by Keshawn Brower MD on 4/5/2019 10:12 AM    naloxone (NARCAN) injection 0.4 mg 0.4 mg Every 5 Minutes PRN 4/7/2019     Sig - Route: Infuse 1 mL into a venous catheter Every 5 (Five) Minutes As Needed for Respiratory Depression. - Intravenous    Linked Group 2:  \"And\" Linked Group Details        niCARdipine (CARDENE) 25 mg/250 mL (0.1 mg/mL) NS infusion kit 5-15 mg/hr Titrated 4/7/2019     Sig - Route: Infuse 5-15 mg/hr into a venous catheter Dose Adjusted By Provider As Needed. - Intravenous    ondansetron (ZOFRAN) injection 4 mg 4 mg Once 4/5/2019 4/5/2019    Sig - Route: Infuse 2 mL into a venous catheter 1 (One) Time. - Intravenous    ondansetron (ZOFRAN) injection 4 mg 4 mg Every 6 Hours PRN 4/5/2019     Sig - Route: Infuse 2 mL into a venous catheter Every 6 (Six) Hours As Needed for Nausea or Vomiting. - Intravenous    Linked Group 3:  \"Or\" Linked Group Details        ondansetron (ZOFRAN) tablet 4 mg 4 mg Every 6 Hours PRN 4/5/2019     Sig - Route: Take 1 tablet by mouth Every 6 (Six) Hours As Needed for Nausea or Vomiting. - Oral    Linked Group 3:  \"Or\" Linked Group Details        ondansetron ODT (ZOFRAN-ODT) disintegrating tablet 4 mg 4 mg Every 6 Hours PRN 4/5/2019     Sig - Route: Take 1 tablet by mouth Every 6 (Six) Hours As Needed for Nausea or Vomiting. - Oral    Linked Group 3:  \"Or\" Linked Group Details        sennosides-docusate sodium (SENOKOT-S) 8.6-50 MG tablet 1 tablet 1 tablet Nightly PRN 4/7/2019     Sig - Route: Take 1 tablet by mouth At Night As Needed for Constipation. - Oral    sodium chloride 0.9 % bolus 1,000 mL 1,000 mL Once 4/5/2019 4/5/2019    Sig - Route: Infuse 1,000 mL into a venous catheter 1 (One) Time. - Intravenous    Cosign for Ordering: Accepted by Primitivo Cantrell MD on 4/5/2019  7:52 AM    sodium chloride 0.9 % flush 10 mL 10 mL As Needed 4/5/2019     Sig - Route: Infuse 10 mL into a venous catheter As Needed for Line " "Care. - Intravenous    Cosign for Ordering: Accepted by Primitivo Cantrell MD on 4/5/2019  7:52 AM    Linked Group 4:  \"And\" Linked Group Details        sodium chloride 0.9 % flush 10 mL 10 mL As Needed 4/5/2019     Sig - Route: Infuse 10 mL into a venous catheter As Needed for Line Care. - Intravenous    Cosign for Ordering: Accepted by Primitivo Cantrell MD on 4/5/2019  7:52 AM    Linked Group 5:  \"And\" Linked Group Details        sodium chloride 0.9 % flush 3-10 mL 3-10 mL As Needed 4/5/2019     Sig - Route: Infuse 3-10 mL into a venous catheter As Needed for Line Care. - Intravenous    acetaminophen (TYLENOL) suppository 650 mg (Discontinued) 650 mg Once As Needed 4/7/2019 4/7/2019    Sig - Route: Insert 2 suppositories into the rectum 1 (One) Time As Needed for Mild Pain . - Rectal    Reason for Discontinue: Patient Transfer    Linked Group 6:  \"Or\" Linked Group Details        acetaminophen (TYLENOL) tablet 650 mg (Discontinued) 650 mg Once As Needed 4/7/2019 4/7/2019    Sig - Route: Take 2 tablets by mouth 1 (One) Time As Needed for Mild Pain . - Oral    Reason for Discontinue: Patient Transfer    Linked Group 6:  \"Or\" Linked Group Details        bupivacaine-EPINEPHrine (MARCAINE w/EPI) injection (Discontinued)  As Needed 4/7/2019 4/7/2019    Sig: As Needed.    Reason for Discontinue: Patient Discharge    dexamethasone (DECADRON) 10 mg/20ml NS IV syringe (Discontinued) 10 mg Once 4/7/2019 4/7/2019    Sig - Route: Infuse 10 mg into a venous catheter 1 (One) Time. - Intravenous    dexamethasone (DECADRON) injection 4 mg (Discontinued) 4 mg Every 6 Hours 4/5/2019 4/5/2019    Sig - Route: Infuse 1 mL into a venous catheter Every 6 (Six) Hours. - Intravenous    Cosign for Ordering: Accepted by Keshawn Brower MD on 4/5/2019 10:12 AM    diphenhydrAMINE (BENADRYL) capsule 25 mg (Discontinued) 25 mg Every 30 Minutes PRN 4/7/2019 4/7/2019    Sig - Route: Take 1 capsule by mouth Every 30 (Thirty) Minutes As Needed for Itching " (May repeat x 1). - Oral    Reason for Discontinue: Patient Transfer    diphenhydrAMINE (BENADRYL) injection 12.5 mg (Discontinued) 12.5 mg Every 15 Minutes PRN 4/7/2019 4/7/2019    Sig - Route: Infuse 0.25 mL into a venous catheter Every 15 (Fifteen) Minutes As Needed for Itching (May repeat x 1). - Intravenous    Reason for Discontinue: Patient Transfer    diphenhydrAMINE (BENADRYL) injection 25 mg (Discontinued) 25 mg Once 4/5/2019 4/5/2019    Sig - Route: Infuse 0.5 mL into a venous catheter 1 (One) Time. - Intravenous    Cosign for Ordering: Accepted by Primitivo Cantrell MD on 4/5/2019  7:52 AM    ePHEDrine injection 5 mg (Discontinued) 5 mg Once As Needed 4/7/2019 4/7/2019    Sig - Route: Infuse 0.1 mL into a venous catheter 1 (One) Time As Needed (symptomatic hypotension - Notify attending anesthesiologist if this needs to be given). - Intravenous    Reason for Discontinue: Patient Transfer    famotidine (PEPCID) tablet 40 mg (Discontinued) 40 mg Daily 4/5/2019 4/5/2019    Sig - Route: Take 1 tablet by mouth Daily. - Oral    fentaNYL citrate (PF) (SUBLIMAZE) injection 50 mcg (Discontinued) 50 mcg Every 10 Minutes PRN 4/7/2019 4/7/2019    Sig - Route: Infuse 1 mL into a venous catheter Every 10 (Ten) Minutes As Needed for Severe Pain . - Intravenous    Reason for Discontinue: Patient Transfer    fentaNYL citrate (PF) (SUBLIMAZE) injection 50 mcg (Discontinued) 50 mcg Every 5 Minutes PRN 4/7/2019 4/7/2019    Sig - Route: Infuse 1 mL into a venous catheter Every 5 (Five) Minutes As Needed for Moderate Pain  or Severe Pain . - Intravenous    Reason for Discontinue: Patient Transfer    flumazenil (ROMAZICON) injection 0.2 mg (Discontinued) 0.2 mg As Needed 4/7/2019 4/7/2019    Sig - Route: Infuse 2 mL into a venous catheter As Needed (for benzodiazepine induced unresponsiveness or sedation). - Intravenous    Reason for Discontinue: Patient Transfer    gelatin absorbable (GELFOAM) sponge (Discontinued)  As Needed  "4/7/2019 4/7/2019    Sig: As Needed.    Reason for Discontinue: Patient Discharge    hydrALAZINE (APRESOLINE) injection 5 mg (Discontinued) 5 mg Every 10 Minutes PRN 4/7/2019 4/7/2019    Sig - Route: Infuse 0.25 mL into a venous catheter Every 10 (Ten) Minutes As Needed for High Blood Pressure (for systolic blood pressure greater than 180 mmHg or diastolic blood pressure greater than 105 mmHg). - Intravenous    Reason for Discontinue: Patient Transfer    HYDROcodone-acetaminophen (NORCO) 5-325 MG per tablet 1 tablet (Discontinued) 1 tablet Every 4 Hours PRN 4/5/2019 4/7/2019    Sig - Route: Take 1 tablet by mouth Every 4 (Four) Hours As Needed for Moderate Pain . - Oral    HYDROcodone-acetaminophen (NORCO) 7.5-325 MG per tablet 1 tablet (Discontinued) 1 tablet Once As Needed 4/7/2019 4/7/2019    Sig - Route: Take 1 tablet by mouth 1 (One) Time As Needed for Mild Pain . - Oral    Reason for Discontinue: Patient Transfer    hydrogen peroxide 3 % external solution (Discontinued)  As Needed 4/7/2019 4/7/2019    Sig: As Needed.    Reason for Discontinue: Patient Discharge    HYDROmorphone (DILAUDID) injection 0.5 mg (Discontinued) 0.5 mg Every 4 Hours PRN 4/5/2019 4/7/2019    Sig - Route: Infuse 0.5 mL into a venous catheter Every 4 (Four) Hours As Needed for Moderate Pain  or Severe Pain . - Intravenous    Linked Group 2:  \"And\" Linked Group Details        HYDROmorphone (DILAUDID) injection 0.5 mg (Discontinued) 0.5 mg Every 5 Minutes PRN 4/7/2019 4/7/2019    Sig - Route: Infuse 0.5 mL into a venous catheter Every 5 (Five) Minutes As Needed for Moderate Pain  or Severe Pain . - Intravenous    Reason for Discontinue: Patient Transfer    ketorolac (TORADOL) injection 10 mg (Discontinued) 10 mg Once 4/5/2019 4/5/2019    Sig - Route: Infuse 0.67 mL into a venous catheter 1 (One) Time. - Intravenous    Cosign for Ordering: Accepted by Primitivo Cantrell MD on 4/5/2019  7:52 AM    labetalol (NORMODYNE,TRANDATE) injection 5 mg " "(Discontinued) 5 mg Every 5 Minutes PRN 4/7/2019 4/7/2019    Sig - Route: Infuse 1 mL into a venous catheter Every 5 (Five) Minutes As Needed for High Blood Pressure (for systolic blood pressure greater than 180 mmHg or diastolic blood pressure greater than 105 mmHg). - Intravenous    Reason for Discontinue: Patient Transfer    lactated ringers infusion (Discontinued) 9 mL/hr Continuous 4/7/2019 4/7/2019    Sig - Route: Infuse 9 mL/hr into a venous catheter Continuous. - Intravenous    lidocaine PF 1% (XYLOCAINE) injection 0.5 mL (Discontinued) 0.5 mL Once As Needed 4/7/2019 4/7/2019    Sig - Route: Inject 0.5 mL as directed 1 (One) Time As Needed (IV Start). - Injection    Reason for Discontinue: Patient Transfer    midazolam (VERSED) injection 1 mg (Discontinued) 1 mg Every 5 Minutes PRN 4/7/2019 4/7/2019    Sig - Route: Infuse 1 mL into a venous catheter Every 5 (Five) Minutes As Needed for Anxiety (Max 4mg midazolam TOTAL). - Intravenous    Reason for Discontinue: Patient Transfer    Linked Group 7:  \"Or\" Linked Group Details        midazolam (VERSED) injection 2 mg (Discontinued) 2 mg Every 5 Minutes PRN 4/7/2019 4/7/2019    Sig - Route: Infuse 2 mL into a venous catheter Every 5 (Five) Minutes As Needed for Anxiety (Max 4mg midazolam TOTAL). - Intravenous    Reason for Discontinue: Patient Transfer    Linked Group 7:  \"Or\" Linked Group Details        naloxone (NARCAN) injection 0.2 mg (Discontinued) 0.2 mg As Needed 4/7/2019 4/7/2019    Sig - Route: Infuse 0.5 mL into a venous catheter As Needed for Opioid Reversal or Respiratory Depression (unresponsiveness, decrease oxygen saturation). - Intravenous    Reason for Discontinue: Patient Transfer    naloxone (NARCAN) injection 0.4 mg (Discontinued) 0.4 mg Every 5 Minutes PRN 4/5/2019 4/7/2019    Sig - Route: Infuse 1 mL into a venous catheter Every 5 (Five) Minutes As Needed for Respiratory Depression. - Intravenous    Linked Group 2:  \"And\" Linked Group Details " "       neomycin-bacitracin-polymyxin (NEOSPORIN) ointment (Discontinued)  As Needed 4/7/2019 4/7/2019    Sig: As Needed.    Reason for Discontinue: Patient Discharge    ondansetron (ZOFRAN) injection 4 mg (Discontinued) 4 mg Once As Needed 4/7/2019 4/7/2019    Sig - Route: Infuse 2 mL into a venous catheter 1 (One) Time As Needed for Nausea or Vomiting. - Intravenous    Reason for Discontinue: Patient Transfer    oxyCODONE-acetaminophen (PERCOCET) 7.5-325 MG per tablet 1 tablet (Discontinued) 1 tablet Once As Needed 4/7/2019 4/7/2019    Sig - Route: Take 1 tablet by mouth 1 (One) Time As Needed for Moderate Pain . - Oral    Reason for Discontinue: Patient Transfer    PHYSIOSOL IRRIGATION solution (Discontinued)  As Needed 4/7/2019 4/7/2019    Sig: As Needed.    Reason for Discontinue: Patient Discharge    prochlorperazine (COMPAZINE) injection 10 mg (Discontinued) 10 mg Once 4/5/2019 4/5/2019    Sig - Route: Infuse 2 mL into a venous catheter 1 (One) Time. - Intravenous    Cosign for Ordering: Accepted by Primitivo Cantrell MD on 4/5/2019  7:52 AM    promethazine (PHENERGAN) injection 12.5 mg (Discontinued) 12.5 mg Once As Needed 4/7/2019 4/7/2019    Sig - Route: Infuse 0.5 mL into a venous catheter 1 (One) Time As Needed for Nausea or Vomiting. - Intravenous    Reason for Discontinue: Patient Transfer    Linked Group 8:  \"Or\" Linked Group Details        promethazine (PHENERGAN) injection 12.5 mg (Discontinued) 12.5 mg Once As Needed 4/7/2019 4/7/2019    Sig - Route: Inject 0.5 mL into the appropriate muscle as directed by prescriber 1 (One) Time As Needed for Nausea or Vomiting. - Intramuscular    Reason for Discontinue: Patient Transfer    Linked Group 8:  \"Or\" Linked Group Details        promethazine (PHENERGAN) suppository 25 mg (Discontinued) 25 mg Once As Needed 4/7/2019 4/7/2019    Sig - Route: Insert 1 suppository into the rectum 1 (One) Time As Needed for Nausea or Vomiting. - Rectal    Reason for Discontinue: " "Patient Transfer    Linked Group 8:  \"Or\" Linked Group Details        promethazine (PHENERGAN) tablet 25 mg (Discontinued) 25 mg Once As Needed 4/7/2019 4/7/2019    Sig - Route: Take 1 tablet by mouth 1 (One) Time As Needed for Nausea or Vomiting. - Oral    Reason for Discontinue: Patient Transfer    Linked Group 8:  \"Or\" Linked Group Details        sodium chloride 0.9 % flush 1-10 mL (Discontinued) 1-10 mL As Needed 4/7/2019 4/7/2019    Sig - Route: Infuse 1-10 mL into a venous catheter As Needed for Line Care. - Intravenous    Reason for Discontinue: Patient Transfer    sodium chloride 0.9 % flush 3 mL (Discontinued) 3 mL Every 12 Hours Scheduled 4/5/2019 4/7/2019    Sig - Route: Infuse 3 mL into a venous catheter Every 12 (Twelve) Hours. - Intravenous    sodium chloride 0.9 % infusion (Discontinued) 100 mL/hr Continuous 4/5/2019 4/7/2019    Sig - Route: Infuse 100 mL/hr into a venous catheter Continuous. - Intravenous    sodium chloride 1,000 mL with bacitracin 50,000 Units irrigation (Discontinued)  As Needed 4/7/2019 4/7/2019    Sig: As Needed.    Reason for Discontinue: Patient Discharge    zolpidem (AMBIEN) tablet 5 mg (Discontinued) 5 mg Nightly PRN 4/6/2019 4/7/2019    Sig - Route: Take 1 tablet by mouth At Night As Needed for Sleep. - Oral          Orders (last 7 days)     Start     Ordered    04/08/19 1115  POC Glucose Once  Once      04/08/19 1111    04/08/19 1100  POC Glucose Finger 4x Daily AC & at Bedtime  4 Times Daily Before Meals & at Bedtime      04/08/19 0832    04/08/19 0900  fondaparinux (ARIXTRA) injection 2.5 mg  Every 24 Hours Scheduled      04/07/19 1715    04/08/19 0600  CBC & Differential  Morning Draw      04/07/19 1715    04/08/19 0600  Basic Metabolic Panel  Morning Draw      04/07/19 1715    04/08/19 0600  CBC Auto Differential  PROCEDURE ONCE      04/08/19 0003    04/08/19 0403  HYDROcodone-acetaminophen (NORCO) 5-325 MG per tablet 1 tablet  Every 6 Hours PRN      04/08/19 0404    " 04/08/19 0403  HYDROcodone-acetaminophen (NORCO) 5-325 MG per tablet 2 tablet  Every 6 Hours PRN      04/08/19 0404    04/08/19 0400  CT Head Without Contrast  1 Time Imaging      04/07/19 2041 04/08/19 0015  furosemide (LASIX) injection 40 mg  Once      04/07/19 2316    04/08/19 0000  Inpatient Medical Oncology Consult  Once     Specialty:  Medical Oncology  Provider:  Shanti Philippe MD    04/07/19 1715    04/08/19 0000  Inpatient Neurology Consult Other (see comments) (neurooncology)  Once     Specialty:  Neurology  Provider:  Caroline Luther MD    04/07/19 1715 04/07/19 2300  HYDROmorphone (DILAUDID) injection 0.5 mg  Every 2 Hours PRN      04/07/19 2257 04/07/19 2256  naloxone (NARCAN) injection 0.4 mg  Every 5 Minutes PRN      04/07/19 2257    04/07/19 2033  Inpatient Pulmonology Consult  Once     Specialty:  Pulmonary Disease  Provider:  Tonny Haley MD    04/07/19 2033    04/07/19 2030  POC Glucose Once  Once      04/07/19 2028 04/07/19 2000  Strict Intake and Output  Every 4 Hours      04/07/19 1715 04/07/19 1945  ceFAZolin in dextrose (ANCEF) IVPB solution 2 g  Every 8 Hours      04/07/19 1715 04/07/19 1815  niCARdipine (CARDENE) 25 mg/250 mL (0.1 mg/mL) NS infusion kit  Titrated      04/07/19 1715 04/07/19 1800  Incentive Spirometry  Every 2 Hours While Awake      04/07/19 1715 04/07/19 1751  fentaNYL citrate (PF) (SUBLIMAZE) injection 50 mcg  Every 2 Hours PRN      04/07/19 1751    04/07/19 1716  Vital Signs and Neurochecks  Continuous      04/07/19 1715 04/07/19 1716  Oxygen Therapy- Nasal Cannula; Titrate for SPO2: equal to or greater than, 92%, per policy  Continuous      04/07/19 1715    04/07/19 1716  Continuous Pulse Oximetry  Continuous      04/07/19 1715 04/07/19 1716  Advance Diet as Tolerated  Until Discontinued      04/07/19 1715    04/07/19 1716  Activity - Strict Bed Rest  Until Discontinued      04/07/19 1715    04/07/19 1716  Head of Bed   Until Discontinued      04/07/19 1715    04/07/19 1716  Empty Drain  Every Shift      04/07/19 1715    04/07/19 1716  Continue Indwelling Urinary Catheter  Once      04/07/19 1715    04/07/19 1716  Assess Need for Indwelling Urinary Catheter - Follow Removal Protocol  Continuous     Comments:  Indwelling Urinary Catheter Removal Criteria  Discontinue Indwelling Urinary Catheter Unless One of the Following is Present  Urinary Retention or Obstruction  Chronic Lobo Catheter Use  End of Life  Critical Illness with Strict I/O   Tract or Abdominal Surgery  Stage 3/4 Sacral / Perineal Wound  Required Activity Restriction: Trauma  Required Activity Restriction: Spine Surgery  If Patient is Being Followed by Urology Contact Them PRIOR to Removal  Do Not Remove Indwelling Urinary Catheter Order is Present with a CLINICAL REASON to Maintain the Catheter. Provider is Required to Include a Clinical Reason to Maintain a Urinary Catheter    Chronic Lobo Catheter Use (Present on Admission)  Assess for Continued Need & Document Medical Necessity  If Infection is Suspected, Contact the Provider        04/07/19 1715    04/07/19 1716  Catheter Care  Once      04/07/19 1715    04/07/19 1716  Notify Provider of Changes in Neuro Status  Until Discontinued      04/07/19 1715    04/07/19 1716  Diet Regular  Diet Effective Now      04/07/19 1715    04/07/19 1716  Inpatient Radiation Oncology Consult  Once     Specialty:  Radiation Oncology  Provider:  Lane Bassett MD    04/07/19 1715 04/07/19 1715  HYDROcodone-acetaminophen (NORCO) 5-325 MG per tablet 2 tablet  Every 4 Hours PRN      04/07/19 1715    04/07/19 1715  sennosides-docusate sodium (SENOKOT-S) 8.6-50 MG tablet 1 tablet  Nightly PRN      04/07/19 1715    04/07/19 1715  docusate sodium (COLACE) capsule 100 mg  2 Times Daily PRN      04/07/19 1715    04/07/19 1710  POC Glucose Once  Once      04/07/19 1658    04/07/19 1500  CT Head Without Contrast  1 Time Imaging      Comments:  Do on way to icu    04/07/19 1342    04/07/19 1438  dexamethasone (DECADRON) 10 mg/20ml NS IV syringe  Once      04/07/19 1436    04/07/19 1431  dexamethasone sodium phosphate 20 MG/5ML injection  - ADS Override Pull     Comments:  Created by cabinet override    04/07/19 1431    04/07/19 1430  dexamethasone (DECADRON) 10 mg/20ml NS IV syringe  Once,   Status:  Discontinued      04/07/19 1428    04/07/19 1342  CBC & Differential  STAT      04/07/19 1341    04/07/19 1342  Basic Metabolic Panel  STAT      04/07/19 1341    04/07/19 1342  CBC Auto Differential  PROCEDURE ONCE      04/07/19 1341    04/07/19 1333  VTE Prophylaxis Not Indicated: Recent Trauma and/or Surgery (2); </= 3 (Low Risk)  Once      04/07/19 1337    04/07/19 1308  Assess Need for Indwelling Urinary Catheter - Follow Removal Protocol  Continuous     Comments:  Indwelling Urinary Catheter Removal Criteria  Discontinue Indwelling Urinary Catheter Unless One of the Following is Present  Urinary Retention or Obstruction  Chronic Lobo Catheter Use  End of Life  Critical Illness with Strict I/O   Tract or Abdominal Surgery  Stage 3/4 Sacral / Perineal Wound  Required Activity Restriction: Trauma  Required Activity Restriction: Spine Surgery  If Patient is Being Followed by Urology Contact Them PRIOR to Removal  Do Not Remove Indwelling Urinary Catheter Order is Present with a CLINICAL REASON to Maintain the Catheter. Provider is Required to Include a Clinical Reason to Maintain a Urinary Catheter    Chronic Lobo Catheter Use (Present on Admission)  Assess for Continued Need & Document Medical Necessity  If Infection is Suspected, Contact the Provider        04/07/19 1307    04/07/19 1308  Catheter Care  Every Shift      04/07/19 1307    04/07/19 1227  Insert Lobo Catheter  Once,   Status:  Canceled      04/07/19 1227    04/07/19 1222  neomycin-bacitracin-polymyxin (NEOSPORIN) ointment  As Needed,   Status:  Discontinued      04/07/19 1222     04/07/19 1222  PHYSIOSOL IRRIGATION solution  As Needed,   Status:  Discontinued      04/07/19 1222    04/07/19 1220  gelatin absorbable (GELFOAM) sponge  As Needed,   Status:  Discontinued      04/07/19 1220    04/07/19 1220  hydrogen peroxide 3 % external solution  As Needed,   Status:  Discontinued      04/07/19 1221    04/07/19 1218  sodium chloride 1,000 mL with bacitracin 50,000 Units irrigation  As Needed,   Status:  Discontinued      04/07/19 1220    04/07/19 1207  Vital signs every 5 minutes for 15 minutes, every 15 minutes thereafter.  Once,   Status:  Canceled      04/07/19 1206    04/07/19 1207  Call Anesthesiologist for additional IV Fluid bolus for Hypotension/Tachycardia  Until Discontinued,   Status:  Canceled      04/07/19 1206    04/07/19 1207  Notify Anesthesia of Any Acute Changes in Patient Condition  Until Discontinued,   Status:  Canceled      04/07/19 1206    04/07/19 1207  Notify Anesthesia for Unrelieved Pain  Until Discontinued,   Status:  Canceled      04/07/19 1206    04/07/19 1207  Pulse Oximetry, Continuous  Continuous,   Status:  Canceled      04/07/19 1206    04/07/19 1207  Once DC criteria to floor met, follow surgeon's orders.  Until Discontinued,   Status:  Canceled      04/07/19 1206    04/07/19 1207  Discharge patient from PACU when discharge criteria is met.  Until Discontinued,   Status:  Canceled      04/07/19 1206    04/07/19 1206  Oxygen Therapy- Nasal Cannula; Titrate for SPO2: per policy  Continuous PRN,   Status:  Canceled      04/07/19 1206    04/07/19 1206  acetaminophen (TYLENOL) tablet 650 mg  Once As Needed,   Status:  Discontinued      04/07/19 1206    04/07/19 1206  acetaminophen (TYLENOL) suppository 650 mg  Once As Needed,   Status:  Discontinued      04/07/19 1206    04/07/19 1206  promethazine (PHENERGAN) injection 12.5 mg  Once As Needed,   Status:  Discontinued      04/07/19 1206    04/07/19 1206  promethazine (PHENERGAN) injection 12.5 mg  Once As  Needed,   Status:  Discontinued      04/07/19 1206    04/07/19 1206  promethazine (PHENERGAN) suppository 25 mg  Once As Needed,   Status:  Discontinued      04/07/19 1206    04/07/19 1206  promethazine (PHENERGAN) tablet 25 mg  Once As Needed,   Status:  Discontinued      04/07/19 1206    04/07/19 1206  POC Glucose PRN  As Needed,   Status:  Canceled     Comments:  On all diabetic patients...Notify Anesthesia if blood sugar is less than 80 mg/dL or greater than 220 mg/dL      04/07/19 1206    04/07/19 1206  HYDROcodone-acetaminophen (NORCO) 7.5-325 MG per tablet 1 tablet  Once As Needed,   Status:  Discontinued      04/07/19 1206    04/07/19 1206  HYDROmorphone (DILAUDID) injection 0.5 mg  Every 5 Minutes PRN,   Status:  Discontinued      04/07/19 1206    04/07/19 1206  oxyCODONE-acetaminophen (PERCOCET) 7.5-325 MG per tablet 1 tablet  Once As Needed,   Status:  Discontinued      04/07/19 1206    04/07/19 1206  fentaNYL citrate (PF) (SUBLIMAZE) injection 50 mcg  Every 5 Minutes PRN,   Status:  Discontinued      04/07/19 1206    04/07/19 1206  labetalol (NORMODYNE,TRANDATE) injection 5 mg  Every 5 Minutes PRN,   Status:  Discontinued      04/07/19 1206    04/07/19 1206  hydrALAZINE (APRESOLINE) injection 5 mg  Every 10 Minutes PRN,   Status:  Discontinued      04/07/19 1206    04/07/19 1206  ePHEDrine injection 5 mg  Once As Needed,   Status:  Discontinued      04/07/19 1206    04/07/19 1206  naloxone (NARCAN) injection 0.2 mg  As Needed,   Status:  Discontinued      04/07/19 1206    04/07/19 1206  flumazenil (ROMAZICON) injection 0.2 mg  As Needed,   Status:  Discontinued      04/07/19 1206    04/07/19 1206  ondansetron (ZOFRAN) injection 4 mg  Once As Needed,   Status:  Discontinued      04/07/19 1206    04/07/19 1206  diphenhydrAMINE (BENADRYL) injection 12.5 mg  Every 15 Minutes PRN,   Status:  Discontinued      04/07/19 1206    04/07/19 1206  diphenhydrAMINE (BENADRYL) capsule 25 mg  Every 30 Minutes PRN,    Status:  Discontinued      04/07/19 1206    04/07/19 1144  Tissue Pathology Exam      04/07/19 1144    04/07/19 1114  bupivacaine-EPINEPHrine (MARCAINE w/EPI) injection  As Needed,   Status:  Discontinued      04/07/19 1221    04/07/19 1053  levETIRAcetam in NaCl 0.75% (KEPPRA) IVPB 1,000 mg  Once      04/07/19 1051    04/07/19 1019  ceFAZolin in dextrose (ANCEF) IVPB solution 2 g  Once      04/07/19 1017    04/07/19 0939  lactated ringers infusion  Continuous,   Status:  Discontinued      04/07/19 0937    04/07/19 0939  famotidine (PEPCID) injection 20 mg  Once      04/07/19 0937    04/07/19 0937  Pulse Oximetry, Continuous  Per Hospital Policy,   Status:  Canceled      04/07/19 0937    04/07/19 0937  Insert Peripheral IV  Once,   Status:  Canceled      04/07/19 0937    04/07/19 0937  Saline Lock & Maintain IV Access  Continuous,   Status:  Canceled      04/07/19 0937    04/07/19 0937  May take Beta Blocker from home with sip of water.  Once,   Status:  Canceled     Comments:  Only applicable to patients on home Beta Blocker    04/07/19 0937    04/07/19 0937  Pulse Oximetry, Continuous  Continuous,   Status:  Canceled      04/07/19 0937    04/07/19 0937  POC Glucose Once  Once,   Status:  Canceled     Comments:  For all diabetic patients. Notify Anesthesiologist for blood sugar greater than 180 or less than 60..      04/07/19 0937    04/07/19 0936  Vital Signs - Per Anesthesia Protocol  As Needed,   Status:  Canceled      04/07/19 0937    04/07/19 0936  Oxygen Therapy- Nasal Cannula; Titrate for SPO2: Per Policy  Continuous PRN,   Status:  Canceled      04/07/19 0937    04/07/19 0936  POC Glucose PRN  As Needed,   Status:  Canceled     Comments:  For all diabetic patients. Notify Anesthesiologist for blood sugar > 180.      04/07/19 0937    04/07/19 0936  sodium chloride 0.9 % flush 1-10 mL  As Needed,   Status:  Discontinued      04/07/19 0937    04/07/19 0936  lidocaine PF 1% (XYLOCAINE) injection 0.5 mL  Once As  Needed,   Status:  Discontinued      04/07/19 0937    04/07/19 0936  fentaNYL citrate (PF) (SUBLIMAZE) injection 50 mcg  Every 10 Minutes PRN,   Status:  Discontinued      04/07/19 0937    04/07/19 0936  midazolam (VERSED) injection 1 mg  Every 5 Minutes PRN,   Status:  Discontinued      04/07/19 0937    04/07/19 0936  midazolam (VERSED) injection 2 mg  Every 5 Minutes PRN,   Status:  Discontinued      04/07/19 0937    04/07/19 0928  Type & Screen  Once      04/07/19 0928    04/07/19 0916  albumin human 5 % solution  - ADS Override Pull     Comments:  Created by cabinet override    04/07/19 0916    04/07/19 0915  fentaNYL (SUBLIMAZE) 0.05 MG/ML injection  - ADS Override Pull     Comments:  Created by cabinet override    04/07/19 0915    04/07/19 0547  POC Glucose Once  Once      04/07/19 0546    04/07/19 0000  NPO Diet NPO Except: Sips With Meds  Diet Effective Now,   Status:  Canceled     Comments:  For surgery    04/06/19 1620    04/06/19 2105  POC Glucose Once  Once      04/06/19 2102    04/06/19 1620  Obtain Informed Consent  Once      04/06/19 1620    04/06/19 1612  POC Glucose Once  Once      04/06/19 1610    04/06/19 1507  zolpidem (AMBIEN) tablet 5 mg  Nightly PRN,   Status:  Discontinued      04/06/19 1507    04/06/19 1121  POC Glucose Once  Once      04/06/19 1118    04/06/19 0628  POC Glucose Once  Once      04/06/19 0624    04/06/19 0600  Basic Metabolic Panel  Morning Draw      04/05/19 1111    04/06/19 0600  TSH  Morning Draw      04/05/19 1111    04/06/19 0600  Protime-INR  Morning Draw      04/05/19 1146    04/05/19 2133  POC Glucose Once  Once      04/05/19 2127    04/05/19 1719  POC Glucose Once  Once      04/05/19 1717    04/05/19 1700  POC Glucose 4x Daily AC & at Bedtime  4 Times Daily Before Meals & at Bedtime,   Status:  Canceled      04/05/19 1112    04/05/19 1700  dexamethasone (DECADRON) injection 6 mg  Every 6 Hours      04/05/19 1126    04/05/19 1401  Inpatient Case Management Social  Services Consult  Once     Provider:  (Not yet assigned)    04/05/19 1400    04/05/19 1345  iopamidol (ISOVUE-300) 61 % injection 100 mL  Once in Imaging      04/05/19 1259    04/05/19 1300  famotidine (PEPCID) tablet 40 mg  Daily,   Status:  Discontinued      04/05/19 1111    04/05/19 1300  famotidine (PEPCID) injection 20 mg  Every 12 Hours Scheduled      04/05/19 1126    04/05/19 1200  Vital Signs  Every 4 Hours      04/05/19 1111    04/05/19 1200  sodium chloride 0.9 % flush 3 mL  Every 12 Hours Scheduled,   Status:  Discontinued      04/05/19 1111    04/05/19 1200  insulin lispro (humaLOG) injection 0-7 Units  4 Times Daily With Meals & Nightly      04/05/19 1112    04/05/19 1146  CT Chest With Contrast  1 Time Imaging      04/05/19 1146    04/05/19 1146  CT Abdomen Pelvis With Contrast  1 Time Imaging      04/05/19 1146    04/05/19 1145  sodium chloride 0.9 % infusion  Continuous,   Status:  Discontinued      04/05/19 1046    04/05/19 1113  Do NOT Hold Basal Insulin When Patient is NPO, Hold Bolus Dose if NPO  Continuous      04/05/19 1112    04/05/19 1113  Follow [facility] Hypoglycemia Standing Orders For Blood Glucose Less Than 70 mg/dL  Until Discontinued      04/05/19 1112    04/05/19 1113  Hypoglycemia Treatment - Alert Patient That is Not NPO and Can Safely Swallow  Until Discontinued     Comments:  Administer 4 oz Fruit Juice OR 4 oz Regular Soda OR 8 oz Milk OR 15-30 grams (1 tube) of Glucose Gel.  Recheck Blood Glucose 15 Minutes After Ingestion, Repeat Treatment & Continue to Recheck Blood Sugar Every 15 Minutes Until Blood Glucose is 70 mg/dL or Higher.  Once Blood Glucose is 70 mg/dL or Higher and if It Will Be more than 60 Minutes Until the Next Meal, Provide Appropriate Snack (Including Carbohydrate Food) Based on Meal Plan Order. Give Meal Tray As Soon As Possible.    04/05/19 1112    04/05/19 1113  Hypoglycemia Treatment - Patient Has IV Access - Unresponsive, NPO or Unable To Safely Swallow  Until  Discontinued     Comments:  Administer 25g (50ml) D50W IV Push.  Recheck Blood Glucose 15 Minutes After Administration, if Blood Glucose Remains Less Than 70 mg/dl, Repeat Treatment   Recheck Blood Glucose 15 Minutes After 2nd Administration, if Blood Glucose Remains Less Than 70 mg/dL After 2nd Dose of D50W, Contact Provider for Further Treatment Orders & Consider Adding IVF With D5W for Maintenance    04/05/19 1112    04/05/19 1113  Hypoglycemia Treatment - Patient Without IV Access - Unresponsive, NPO or Unable To Safely Swallow  Until Discontinued     Comments:  Administer 1mg Glucagon SQ & Establish IV Access.  Turn Patient on Side - Nausea / Vomiting May Occur.  Recheck Blood Glucose 15 Minutes After Administration.  If Blood Glucose Remains Less Than 70, Administer 25g D50W IV Push (50ml).  Recheck Blood Glucose 15 Minutes After Administration of D50W, if Blood Glucose Remains Less Than 70 mg/dl, Contact Provider for Further Treatment Orders & Consider Adding IVF With D5 for Maintenance    04/05/19 1112    04/05/19 1113  Notify Provider of event. Communicate needs and document in EMR.  Once      04/05/19 1112    04/05/19 1113  Document event and patients response to treatment in EMR, any medications given to be documented on MAR.  Once      04/05/19 1112    04/05/19 1112  dextrose (GLUTOSE) oral gel 15 g  Every 15 Minutes PRN      04/05/19 1112    04/05/19 1112  dextrose (D50W) 25 g/ 50mL Intravenous Solution 25 g  Every 15 Minutes PRN      04/05/19 1112    04/05/19 1112  glucagon (human recombinant) (GLUCAGEN DIAGNOSTIC) injection 1 mg  As Needed      04/05/19 1112    04/05/19 1109  ondansetron (ZOFRAN) tablet 4 mg  Every 6 Hours PRN      04/05/19 1111    04/05/19 1109  ondansetron ODT (ZOFRAN-ODT) disintegrating tablet 4 mg  Every 6 Hours PRN      04/05/19 1111    04/05/19 1109  ondansetron (ZOFRAN) injection 4 mg  Every 6 Hours PRN      04/05/19 1111    04/05/19 1109  HYDROmorphone (DILAUDID) injection 0.5  mg  Every 4 Hours PRN,   Status:  Discontinued      04/05/19 1111    04/05/19 1109  Diet Regular  Diet Effective Now,   Status:  Canceled      04/05/19 1111    04/05/19 1108  naloxone (NARCAN) injection 0.4 mg  Every 5 Minutes PRN,   Status:  Discontinued      04/05/19 1111    04/05/19 1108  HYDROcodone-acetaminophen (NORCO) 5-325 MG per tablet 1 tablet  Every 4 Hours PRN,   Status:  Discontinued      04/05/19 1111    04/05/19 1108  acetaminophen (TYLENOL) tablet 650 mg  Every 4 Hours PRN      04/05/19 1111    04/05/19 1108  Code Status and Medical Interventions:  Continuous      04/05/19 1111    04/05/19 1108  Intake & Output  Every Shift      04/05/19 1111    04/05/19 1108  Weigh Patient  Once      04/05/19 1111    04/05/19 1108  Insert Peripheral IV  Once      04/05/19 1111    04/05/19 1108  Saline Lock & Maintain IV Access  Continuous,   Status:  Canceled      04/05/19 1111    04/05/19 1108  Place Sequential Compression Device  Once      04/05/19 1111    04/05/19 1108  Maintain Sequential Compression Device  Continuous      04/05/19 1111    04/05/19 1107  sodium chloride 0.9 % flush 3-10 mL  As Needed      04/05/19 1111    04/05/19 1107  Diet Regular  Diet Effective Now,   Status:  Canceled      04/05/19 1106    04/05/19 1100  dexamethasone (DECADRON) injection 4 mg  Every 6 Hours,   Status:  Discontinued      04/05/19 0946    04/05/19 1100  levETIRAcetam in NaCl 0.82% (KEPPRA) IVPB 500 mg  Every 12 Hours Scheduled      04/05/19 0946    04/05/19 1046  Inpatient Neurosurgery Consult  Once     Specialty:  Neurosurgery  Provider:  Keshawn Brower MD    04/05/19 1046    04/05/19 0826  gadobenate dimeglumine (MULTIHANCE) injection 15 mL  Once in Imaging      04/05/19 0824    04/05/19 0745  ECG 12 Lead  Once      04/05/19 0744    04/05/19 0641  Inpatient Admission  Once      04/05/19 0641    04/05/19 0613  [facility] (on-call MD unless specified)  Once     Specialty:  Internal Medicine  Provider:  (Not yet assigned)     04/05/19 0612    04/05/19 0604  HYDROmorphone (DILAUDID) injection 1 mg  Once      04/05/19 0602    04/05/19 0604  ondansetron (ZOFRAN) injection 4 mg  Once      04/05/19 0602    04/05/19 0604  dexamethasone (DECADRON) 10 mg/20ml NS IV syringe  Once      04/05/19 0602    04/05/19 0559  MRI Brain With & Without Contrast  1 Time Imaging      04/05/19 0559    04/05/19 0552  Neurosurgery (on-call MD unless specified)  Once     Specialty:  Neurosurgery  Provider:  (Not yet assigned)    04/05/19 0551    04/05/19 0544  Bena Draw  Once      04/05/19 0543    04/05/19 0544  Light Blue Top  PROCEDURE ONCE      04/05/19 0543    04/05/19 0544  Green Top (Gel)  PROCEDURE ONCE      04/05/19 0543    04/05/19 0544  Lavender Top  PROCEDURE ONCE      04/05/19 0543    04/05/19 0544  Gold Top - SST  PROCEDURE ONCE      04/05/19 0543    04/05/19 0520  prochlorperazine (COMPAZINE) injection 10 mg  Once,   Status:  Discontinued      04/05/19 0518    04/05/19 0520  diphenhydrAMINE (BENADRYL) injection 25 mg  Once,   Status:  Discontinued      04/05/19 0518    04/05/19 0520  ketorolac (TORADOL) injection 10 mg  Once,   Status:  Discontinued      04/05/19 0518    04/05/19 0520  sodium chloride 0.9 % bolus 1,000 mL  Once      04/05/19 0518    04/05/19 0518  Insert peripheral IV  Once      04/05/19 0518    04/05/19 0518  sodium chloride 0.9 % flush 10 mL  As Needed      04/05/19 0518    04/05/19 0517  Sedimentation Rate  Once      04/05/19 0517    04/05/19 0517  Urinalysis With Microscopic If Indicated (No Culture) - Urine, Clean Catch  Once      04/05/19 0517    04/05/19 0517  CT Head Without Contrast  1 Time Imaging      04/05/19 0517    04/05/19 0517  Insert peripheral IV  Once      04/05/19 0517    04/05/19 0516  sodium chloride 0.9 % flush 10 mL  As Needed      04/05/19 0517    04/05/19 0516  CBC & Differential  Once      04/05/19 0517    04/05/19 0516  Comprehensive Metabolic Panel  Once      04/05/19 0517    04/05/19 0516  CBC Auto  Differential  PROCEDURE ONCE      19 0517                   Zev Weldon MD at 2019  4:51 PM              Name: Hernandez Waterman ADMIT: 2019   : 1955  PCP: Megha Gant MD    MRN: [id number] LOS: 2 days   AGE/SEX: 63 y.o. male    ROOM: [facility] OR/MAIN OR   Subjective   Status post craniotomy and debulking of glioblastoma  Postoperatively patient is moving all the extremities but has weakness on the left side  Patient is going to get a CAT scan and then moved to ICU for close monitoring    Brief hospital course since admission:  Brain metastases  With vasogenic edema    I have reviewed past medical history, family history, social history and allergies.  No changes from admission note.      Review of Systems   Constitutional: Negative for fatigue and fever.   Respiratory: Negative for shortness of breath and wheezing.    Gastrointestinal: Negative for nausea and vomiting.   Neurological: Negative for headaches.          Objective   Vital Signs  Temp:  [97.7 °F (36.5 °C)-98.5 °F (36.9 °C)] 98.1 °F (36.7 °C)  Heart Rate:  [44-71] 61  Resp:  [10-18] 16  BP: ()/(52-81) 126/73  Arterial Line BP: (104-140)/(56-77) 135/71  SpO2:  [91 %-99 %] 96 %  on  Flow (L/min):  [2-4] 2;   Device (Oxygen Therapy): room air  Body mass index is 22.92 kg/m².    Intake/Output Summary (Last 24 hours) at 2019 1651  Last data filed at 2019 1610  Gross per 24 hour   Intake 3950 ml   Output 3970 ml   Net -20 ml       Physical Exam   Constitutional: He is oriented to person, place, and time. He appears well-developed and well-nourished. No distress.   HENT:   Head: Normocephalic and atraumatic.   Eyes: Pupils are equal, round, and reactive to light. No scleral icterus.   Cardiovascular: Normal rate and regular rhythm.   Pulmonary/Chest: Effort normal. No respiratory distress. He has no decreased breath sounds. He has no wheezes.   Abdominal: Soft. Bowel sounds are normal. There is no  hepatosplenomegaly.   Neurological: He is alert and oriented to person, place, and time.   Skin: No cyanosis. Nails show no clubbing.   Psychiatric: He has a normal mood and affect. His behavior is normal.       Results Review:    Results from last 7 days   Lab Units 04/07/19  1353 04/05/19  0544   WBC 10*3/mm3 12.26* 9.72   HEMOGLOBIN g/dL 11.2* 12.3*   HEMATOCRIT % 34.0* 38.2   PLATELETS 10*3/mm3 292 305     Results from last 7 days   Lab Units 04/07/19  1353 04/06/19  0638 04/05/19  0544   SODIUM mmol/L 141 138 139   POTASSIUM mmol/L 3.8 4.3 4.4   CHLORIDE mmol/L 102 103 100   CO2 mmol/L 26.0 25.1 27.8   BUN mg/dL 20 17 19   CREATININE mg/dL 0.80 0.71* 0.80   GLUCOSE mg/dL 135* 163* 119*   CALCIUM mg/dL 8.4* 8.8 9.2     Results from last 7 days   Lab Units 04/06/19  0638   INR  1.16*     Hemoglobin A1C:No results found for: HGBA1C  Glucose Range:  Glucose   Date/Time Value Ref Range Status   04/07/2019 0546 108 70 - 130 mg/dL Final   04/06/2019 2102 141 (H) 70 - 130 mg/dL Final   04/06/2019 1610 168 (H) 70 - 130 mg/dL Final   04/06/2019 1118 150 (H) 70 - 130 mg/dL Final   04/06/2019 0624 143 (H) 70 - 130 mg/dL Final   04/05/2019 2127 142 (H) 70 - 130 mg/dL Final         dexamethasone 10 mg Intravenous Once   [MAR Hold] dexamethasone 6 mg Intravenous Q6H   dexamethasone sodium phosphate      famotidine 20 mg Intravenous Q12H   [MAR Hold] insulin lispro 0-7 Units Subcutaneous 4x Daily With Meals & Nightly   levETIRAcetam 500 mg Intravenous Q12H   [MAR Hold] sodium chloride 3 mL Intravenous Q12H       lactated ringers 9 mL/hr Last Rate: 9 mL/hr (04/07/19 0941)   sodium chloride 100 mL/hr Last Rate: 100 mL/hr (04/07/19 0551)   NPO Diet NPO Except: Sips With Meds  Assessment/Plan       Assessment/Plan     Active Hospital Problems    Diagnosis  POA   • **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 (CMS/HCC) [D49.6]  Yes   • Acute intractable headache [R51]  Yes   • Cerebral edema (CMS/HCC) [G93.6]   "Unknown      Resolved Hospital Problems   No resolved problems to display.     Continue Decadron  Glioblastoma status post debulking and craniotomy with biopsy  Monitor in ICU  Oncology consultation        Zev Weldon MD  [facility]  04/07/19    Electronically signed by Zev Weldon MD at 4/7/2019  4:53 PM     Keshawn Brower MD at 4/6/2019  4:20 PM             LOS: 1 day   Patient Care Team:  Megha Gant MD as PCP - General (Internal Medicine)    Chief Complaint: Progressive  headaches resulting from a right frontal brain tumor    Subjective     This unfortunate gentleman has a history of headaches and some balance problems over the last few weeks.  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  He is scheduled for surgery tomorrow.  I had an extended conversation with his wife and with him about the nature of this problem and that we are probably dealing with a glioma and probably will need radiation and chemotherapy.  All questions were answered.  Will be move forward with surgery tomorrow.        Subjective:  Symptoms:  Stable.    Diet:  Adequate intake.    Activity level: Impaired due to weakness.    Pain:  He reports no pain.        History taken from: patient chart family RN    Objective     Vital Signs  Temp:  [97.9 °F (36.6 °C)-98.7 °F (37.1 °C)] 97.9 °F (36.6 °C)  Heart Rate:  [40-60] 60  Resp:  [18] 18  BP: ()/(47-70) 115/69    Objective:  General Appearance:  Comfortable.    Vital signs: (most recent): Blood pressure 115/69, pulse 60, temperature 97.9 °F (36.6 °C), temperature source Oral, resp. rate 18, height 185.4 cm (72.99\"), weight 74.8 kg (164 lb 14.5 oz), SpO2 98 %.  Vital signs are normal.  No fever.    Output: Producing urine and producing stool.    HEENT: Normal HEENT exam.    Lungs:  Normal effort.    Heart: Normal rate.    Chest: Asymmetric chest wall expansion.   Abdomen: Abdomen is soft.  Bowel sounds are " normal.     Extremities: Normal range of motion.    Pulses: Distal pulses are intact.    Neurological: Patient is alert and oriented to person, place and time.  Normal strength.  Patient has normal reflexes, normal muscle tone and normal coordination.    Pupils:  Pupils are equal, round, and reactive to light.    Skin:  Warm.              Results Review:     I reviewed the patient's new clinical results.  I reviewed the patient's new imaging results and agree with the interpretation.  I reviewed the patient's other test results and agree with the interpretation    Medication Review:   Current Facility-Administered Medications:   •  acetaminophen (TYLENOL) tablet 650 mg, 650 mg, Oral, Q4H PRN, Zev Weldon MD  •  dexamethasone (DECADRON) injection 6 mg, 6 mg, Intravenous, Q6H, Lidia Solares PA-C, 6 mg at 04/06/19 1617  •  dextrose (D50W) 25 g/ 50mL Intravenous Solution 25 g, 25 g, Intravenous, Q15 Min PRN, Zev Weldon MD  •  dextrose (GLUTOSE) oral gel 15 g, 15 g, Oral, Q15 Min PRN, Zev Weldon MD  •  famotidine (PEPCID) injection 20 mg, 20 mg, Intravenous, Q12H, Lidia Solares PA-C, 20 mg at 04/06/19 0837  •  glucagon (human recombinant) (GLUCAGEN DIAGNOSTIC) injection 1 mg, 1 mg, Subcutaneous, PRN, Zev Weldon MD  •  HYDROcodone-acetaminophen (NORCO) 5-325 MG per tablet 1 tablet, 1 tablet, Oral, Q4H PRN, Zev Weldon MD, 1 tablet at 04/06/19 1109  •  HYDROmorphone (DILAUDID) injection 0.5 mg, 0.5 mg, Intravenous, Q4H PRN **AND** naloxone (NARCAN) injection 0.4 mg, 0.4 mg, Intravenous, Q5 Min PRN, Zev Weldon MD  •  insulin lispro (humaLOG) injection 0-7 Units, 0-7 Units, Subcutaneous, 4x Daily With Meals & Nightly, Zev Weldon MD  •  levETIRAcetam in NaCl 0.82% (KEPPRA) IVPB 500 mg, 500 mg, Intravenous, Q12H, Lidia Solares PA-C, 500 mg at 04/06/19 0837  •  ondansetron (ZOFRAN) tablet 4 mg, 4 mg, Oral, Q6H PRN **OR** ondansetron ODT  (ZOFRAN-ODT) disintegrating tablet 4 mg, 4 mg, Oral, Q6H PRN **OR** ondansetron (ZOFRAN) injection 4 mg, 4 mg, Intravenous, Q6H PRN, Zev Weldon MD  •  [COMPLETED] Insert peripheral IV, , , Once **AND** sodium chloride 0.9 % flush 10 mL, 10 mL, Intravenous, PRN, Armand Jane III, PA  •  [COMPLETED] Insert peripheral IV, , , Once **AND** sodium chloride 0.9 % flush 10 mL, 10 mL, Intravenous, PRN, Armand Jane III, PA  •  sodium chloride 0.9 % flush 3 mL, 3 mL, Intravenous, Q12H, Zev Weldon MD, 3 mL at 04/06/19 0839  •  sodium chloride 0.9 % flush 3-10 mL, 3-10 mL, Intravenous, PRN, Zev Weldon MD  •  sodium chloride 0.9 % infusion, 100 mL/hr, Intravenous, Continuous, Zev Weldon MD, Last Rate: 100 mL/hr at 04/05/19 2222, 100 mL/hr at 04/05/19 2222  •  zolpidem (AMBIEN) tablet 5 mg, 5 mg, Oral, Nightly PRN, Zev Weldon MD   Lab Results   Component Value Date    GLUCOSE 163 (H) 04/06/2019    BUN 17 04/06/2019    CREATININE 0.71 (L) 04/06/2019    EGFRIFNONA 112 04/06/2019    EGFRIFAFRI 101 02/14/2018    BCR 23.9 04/06/2019    K 4.3 04/06/2019    CO2 25.1 04/06/2019    CALCIUM 8.8 04/06/2019    PROTENTOTREF 6.8 02/14/2018    ALBUMIN 3.90 04/05/2019    LABIL2 1.4 02/14/2018    AST 69 (H) 04/05/2019     (H) 04/05/2019     WBC   Date Value Ref Range Status   04/05/2019 9.72 3.40 - 10.80 10*3/mm3 Final     RBC   Date Value Ref Range Status   04/05/2019 3.99 (L) 4.14 - 5.80 10*6/mm3 Final     Hemoglobin   Date Value Ref Range Status   04/05/2019 12.3 (L) 13.0 - 17.7 g/dL Final     Hematocrit   Date Value Ref Range Status   04/05/2019 38.2 37.5 - 51.0 % Final     MCV   Date Value Ref Range Status   04/05/2019 95.7 79.0 - 97.0 fL Final     MCH   Date Value Ref Range Status   04/05/2019 30.8 26.6 - 33.0 pg Final     MCHC   Date Value Ref Range Status   04/05/2019 32.2 31.5 - 35.7 g/dL Final     RDW   Date Value Ref Range Status   04/05/2019 12.2 (L)  12.3 - 15.4 % Final     RDW-SD   Date Value Ref Range Status   04/05/2019 43.4 37.0 - 54.0 fl Final     MPV   Date Value Ref Range Status   04/05/2019 9.0 6.0 - 12.0 fL Final     Platelets   Date Value Ref Range Status   04/05/2019 305 140 - 450 10*3/mm3 Final     Neutrophil %   Date Value Ref Range Status   04/05/2019 81.9 (H) 42.7 - 76.0 % Final     Lymphocyte %   Date Value Ref Range Status   04/05/2019 7.8 (L) 19.6 - 45.3 % Final     Monocyte %   Date Value Ref Range Status   04/05/2019 9.1 5.0 - 12.0 % Final     Eosinophil %   Date Value Ref Range Status   04/05/2019 0.3 0.3 - 6.2 % Final     Basophil %   Date Value Ref Range Status   04/05/2019 0.5 0.0 - 1.5 % Final     Immature Grans %   Date Value Ref Range Status   04/05/2019 0.4 0.0 - 0.5 % Final     Neutrophils, Absolute   Date Value Ref Range Status   04/05/2019 7.96 (H) 1.40 - 7.00 10*3/mm3 Final     Lymphocytes, Absolute   Date Value Ref Range Status   04/05/2019 0.76 0.70 - 3.10 10*3/mm3 Final     Monocytes, Absolute   Date Value Ref Range Status   04/05/2019 0.88 0.10 - 0.90 10*3/mm3 Final     Eosinophils, Absolute   Date Value Ref Range Status   04/05/2019 0.03 0.00 - 0.40 10*3/mm3 Final     Basophils, Absolute   Date Value Ref Range Status   04/05/2019 0.05 0.00 - 0.20 10*3/mm3 Final     Immature Grans, Absolute   Date Value Ref Range Status   04/05/2019 0.04 0.00 - 0.05 10*3/mm3 Final     nRBC   Date Value Ref Range Status   04/05/2019 0.0 0.0 - 0.0 /100 WBC Final         Assessment/Plan       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 (CMS/HCC)    Acute intractable headache    Cerebral edema (CMS/HCC)      Assessment:    Condition: In stable condition.  Unchanged.   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  We will proceed with surgery tomorrow.).     Plan:   (I described and recommended a right frontal craniotomy for biopsy and debulking of this presumed high-grade glioma.  The goal of surgery histological  diagnosis and resection of as much tumor as possible within the margins of safety.  The risks include, but are not limited to, infection, hemorrhage requiring transfusion or reoperation, CSF leak requiring reoperation, incomplete relief of symptoms, inability to make a diagnosis, potential need for additional surgeries in the future, stroke, paralysis, coma, and death.  He agrees to proceed.  He will likely need to be seen by neuro oncology as well as radiation oncology afterwards.).       Keshawn Brower MD  19  4:20 PM    Time:       Electronically signed by Keshawn Brower MD at 2019  4:23 PM     Zev Weldon MD at 2019  2:59 PM              Name: Hernandez Waterman ADMIT: 2019   : 1955  PCP: Megha Gant MD    MRN: [id number] LOS: 1 days   AGE/SEX: 63 y.o. male    ROOM: [facility]   Subjective   Patient is having hiccups  No new problems  Plan for surgery on     Brief hospital course since admission:  Brain metastases  With vasogenic edema    I have reviewed past medical history, family history, social history and allergies.  No changes from admission note.      Review of Systems   Constitutional: Positive for fatigue. Negative for fever.   Respiratory: Negative for shortness of breath and wheezing.    Gastrointestinal: Negative for nausea and vomiting.   Neurological: Positive for headaches.          Objective   Vital Signs  Temp:  [97.9 °F (36.6 °C)-98.7 °F (37.1 °C)] 97.9 °F (36.6 °C)  Heart Rate:  [40-60] 60  Resp:  [18] 18  BP: ()/(47-70) 115/69  SpO2:  [98 %-100 %] 98 %  on   ;   Device (Oxygen Therapy): room air  Body mass index is 21.76 kg/m².    Intake/Output Summary (Last 24 hours) at 2019 1459  Last data filed at 2019 0557  Gross per 24 hour   Intake 2348.33 ml   Output 200 ml   Net 2148.33 ml       Physical Exam   Constitutional: He is oriented to person, place, and time. He appears well-developed and well-nourished. No distress.   HENT:    Head: Normocephalic and atraumatic.   Eyes: Pupils are equal, round, and reactive to light. No scleral icterus.   Cardiovascular: Normal rate and regular rhythm.   Pulmonary/Chest: Effort normal. No respiratory distress. He has no decreased breath sounds. He has no wheezes.   Abdominal: Soft. Bowel sounds are normal. There is no hepatosplenomegaly.   Neurological: He is alert and oriented to person, place, and time.   Skin: No cyanosis. Nails show no clubbing.   Psychiatric: He has a normal mood and affect. His behavior is normal.       Results Review:    Results from last 7 days   Lab Units 04/05/19  0544   WBC 10*3/mm3 9.72   HEMOGLOBIN g/dL 12.3*   HEMATOCRIT % 38.2   PLATELETS 10*3/mm3 305     Results from last 7 days   Lab Units 04/06/19  0638 04/05/19  0544   SODIUM mmol/L 138 139   POTASSIUM mmol/L 4.3 4.4   CHLORIDE mmol/L 103 100   CO2 mmol/L 25.1 27.8   BUN mg/dL 17 19   CREATININE mg/dL 0.71* 0.80   GLUCOSE mg/dL 163* 119*   CALCIUM mg/dL 8.8 9.2     Results from last 7 days   Lab Units 04/06/19  0638   INR  1.16*     Hemoglobin A1C:No results found for: HGBA1C  Glucose Range:  Glucose   Date/Time Value Ref Range Status   04/06/2019 1118 150 (H) 70 - 130 mg/dL Final   04/06/2019 0624 143 (H) 70 - 130 mg/dL Final   04/05/2019 2127 142 (H) 70 - 130 mg/dL Final   04/05/2019 1717 126 70 - 130 mg/dL Final         dexamethasone 6 mg Intravenous Q6H   famotidine 20 mg Intravenous Q12H   insulin lispro 0-7 Units Subcutaneous 4x Daily With Meals & Nightly   levETIRAcetam 500 mg Intravenous Q12H   sodium chloride 3 mL Intravenous Q12H       sodium chloride 100 mL/hr Last Rate: 100 mL/hr (04/05/19 2222)   Diet Regular  Assessment/Plan       Assessment/Plan     Active Hospital Problems    Diagnosis  POA   • **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 (CMS/HCC) [D49.6]  Yes   • Acute intractable headache [R51]  Yes   • Cerebral edema (CMS/HCC) [G93.6]  Unknown      Resolved Hospital Problems   No  resolved problems to display.     Continue Decadron  GI prophylaxis with Pepcid  Plan for surgery on Sunday morning noted        Zev Weldon MD  [facility]  04/06/19    Electronically signed by Zev Weldon MD at 4/6/2019  3:07 PM          Consult Notes (last 7 days) (Notes from 04/01/19 through 04/08/19)      Lidia Solares PA-C at 4/5/2019 11:27 AM      Consult Orders    1. Inpatient Neurosurgery Consult [[id number]] ordered by Zev Weldon MD at 04/05/19 1046           Attestation signed by Keshawn Brower MD at 4/5/2019  1:12 PM    I have reviewed the documentation above and agree.                    Inpatient Neurosurgery Consult  Consult performed by: Lidia Solares PA-C  Consult ordered by: Zev Weldon MD  Reason for consult: HA, N/V, confusion, weakness with brain mass  Assessment/Recommendations: Mr. Waterman is a very pleasant 63-year-old male without any significant past medical history who states that a few weeks ago he began to notice some headaches that were unusual for him and that they were constant and fairly bothersome.  The headaches became precipitously more severe about 10 days ago and he has had significant confusion as well as nausea vomiting and gait difficulties over the last 3-4 days.  His wife was at the bedside and confirms this history.  They both deny any changes in vision or speech.  He admits to a sense of weakness on the left but attributes it to a previous shoulder injury.  He denies any seizure-like activity or loss of consciousness.  No personal history of neoplasm.  He is quite active at baseline.  No history of tobacco abuse.    The patient's symptoms again have been getting progressively worse particularly over the last few days which prompted him to go to the emergency room yesterday evening.  Unfortunately a brain CT and subsequent brain MRI confirmed the presence of a nearly 7 cm right frontal mass with  significant vasogenic edema.  The findings are very worrisome for high-grade glioma.  The lesion does extend into the corpus callosum and has about 8 mm of associated midline shift.    His exam does reveal left upper extremity greater than lower extremity weakness as well as drowsiness and mild confusion.  Significant drift on the left as well.    I had a lengthy discussion with the patient and his wife and explained the MRI findings.  We have started him on Decadron 6 mg IV every 6 hours as well as Keppra 500 mg twice daily.  They understand the need to evacuate the mass to both decompress the brain and also determine pathology.  Again this does look consistent with a high-grade glioma rather than metastases.  However I will go ahead and order a CT of the chest abdomen and pelvis just to rule out any other potential neoplastic lesions.  I did speak with Dr. Weldon who is following for medicine and we both agree that given his health history that specific cardiac clearance is not required preoperatively.    The patient himself again is somewhat confused and it is difficult to determine how much of this information he was able to absorb/process.  However his wife voiced understanding and she does wish to proceed with surgery.  Surgery will likely be on Sunday.  Dr. Brower will be by tomorrow to discuss surgery with the patient and his wife.  In the interim again we will continue with Decadron and Keppra.          Patient Care Team:  Megha Gant MD as PCP - General (Internal Medicine)    Chief complaint: HA, confusion with R frontal glioma    Subjective     Brain CT and MRI reviewed and discussed above      ..Lab             04/05/19                       0544          WBC          9.72          HEMOGLOBIN   12.3*         HEMATOCRIT   38.2          PLATELETS    305               ..Lab             04/05/19 0544          SODIUM       139           POTASSIUM    4.4           CHLORIDE      100           CO2          27.8          BUN          19            CREATININE   0.80          GLUCOSE      119*          CALCIUM      9.2                     Weakness - Generalized   This is a new problem. The current episode started in the past 7 days. The problem occurs constantly. The problem has been gradually worsening. Associated symptoms include headaches, nausea, vomiting and weakness. Pertinent negatives include no fever, numbness, rash or visual change.   Headache    This is a new problem. The current episode started 1 to 4 weeks ago. The problem occurs constantly. The problem has been gradually worsening. The pain is located in the bilateral region. The pain does not radiate. The pain quality is not similar to prior headaches. The quality of the pain is described as throbbing. The pain is at a severity of 6/10. Associated symptoms include nausea, vomiting and weakness. Pertinent negatives include no dizziness, fever, numbness, seizures or visual change.   Brain Tumor   This is a new problem. Associated symptoms include headaches, nausea, vomiting and weakness. Pertinent negatives include no fever, numbness, rash or visual change.       Review of Systems   Constitutional: Negative for fever.   Gastrointestinal: Positive for nausea and vomiting.   Skin: Negative for rash.   Neurological: Positive for weakness and headaches. Negative for dizziness, tremors, seizures, syncope, facial asymmetry, speech difficulty, light-headedness and numbness.   Psychiatric/Behavioral: Positive for confusion.   All other systems reviewed and are negative.       Past Medical History:   Diagnosis Date   • ED (erectile dysfunction)    , History reviewed. No pertinent surgical history.,   Family History   Problem Relation Age of Onset   • Hypertension Mother    • Osteoporosis Mother    • Hypertension Father    ,   Social History     Tobacco Use   • Smoking status: Never Smoker   • Smokeless tobacco: Never Used   Substance Use  Topics   • Alcohol use: Yes     Comment: rarely   • Drug use: No   ,   Medications Prior to Admission   Medication Sig Dispense Refill Last Dose   • amoxicillin (AMOXIL) 875 MG tablet Take 1 tablet by mouth 2 (Two) Times a Day for 7 days. 14 tablet 0 4/4/2019 at Unknown time   • ibuprofen (ADVIL,MOTRIN) 200 MG tablet Take 200 mg by mouth As Needed for Mild Pain .   4/4/2019 at Unknown time   • Loratadine (CLARITIN) 10 MG capsule Take 1 tablet by mouth Daily for 7 days. Over the counter 7 each 0 4/4/2019 at Unknown time   • sildenafil (VIAGRA) 100 MG tablet Take 1 tablet by mouth Daily As Needed for erectile dysfunction. 10 tablet 1 Past Week at Unknown time   • methylPREDNISolone (MEDROL) 4 MG tablet follow package directions 21 tablet 0    , Scheduled Meds:    dexamethasone 6 mg Intravenous Q6H   famotidine 20 mg Intravenous Q12H   insulin lispro 0-7 Units Subcutaneous 4x Daily With Meals & Nightly   levETIRAcetam 500 mg Intravenous Q12H   sodium chloride 3 mL Intravenous Q12H   , Continuous Infusions:    sodium chloride 100 mL/hr Last Rate: 100 mL/hr (04/05/19 1107)   , PRN Meds:  •  acetaminophen  •  dextrose  •  dextrose  •  glucagon (human recombinant)  •  HYDROcodone-acetaminophen  •  HYDROmorphone **AND** naloxone  •  ondansetron **OR** ondansetron ODT **OR** ondansetron  •  [COMPLETED] Insert peripheral IV **AND** sodium chloride  •  [COMPLETED] Insert peripheral IV **AND** sodium chloride  •  sodium chloride and Allergies:  Sulfa antibiotics    Objective      Vital Signs  Temp:  [97.8 °F (36.6 °C)-98.3 °F (36.8 °C)] 98.2 °F (36.8 °C)  Heart Rate:  [42-77] 47  Resp:  [16-18] 16  BP: (100-118)/(56-80) 103/63    Physical Exam   Constitutional: He appears well-developed and well-nourished. He appears lethargic.   HENT:   Head: Normocephalic and atraumatic.   Right Ear: External ear normal.   Left Ear: External ear normal.   Eyes: Conjunctivae and EOM are normal. Pupils are equal, round, and reactive to light.  Right eye exhibits no discharge. Left eye exhibits no discharge.   Neck: Normal range of motion. Neck supple. No tracheal deviation present.   Cardiovascular: Intact distal pulses.   Bradycardic, HR 47. Pt very active, this is fairly typical for him per pt and wife.    Pulmonary/Chest: Effort normal. No stridor. No respiratory distress.   Musculoskeletal: Normal range of motion. He exhibits no edema, tenderness or deformity.   Neurological: He has normal reflexes. He appears lethargic. He is disoriented. He displays no atrophy, no tremor and normal reflexes. No cranial nerve deficit or sensory deficit. He exhibits normal muscle tone. He displays a negative Romberg sign. He displays no seizure activity. Coordination and gait normal. GCS eye subscore is 4. GCS verbal subscore is 4. GCS motor subscore is 6.   No long tract signs    LUE weakness 4+/5, LLE weakness 5-/5.  L pronator drift.      Skin: Skin is warm and dry.   Psychiatric: He has a normal mood and affect. His behavior is normal. Judgment and thought content normal.   Nursing note and vitals reviewed.      Results Review:    I reviewed the patient's new clinical results.  I reviewed the patient's new imaging results and agree with the interpretation.        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     Plan:   (As above    Cont with decadron and keppra, GI prophylaxis with pepcid    Even though this looks like high grade glioma will check CT C/A/P to be sure no other neoplasm    OR Sunday).       I discussed the patients findings and my recommendations with patient, family and nursing staff    Lidia Solares PA-C  04/05/19  11:27 AM    Time: 50min        Electronically signed by Keshawn Brower MD at 4/5/2019  1:12 PM     Tera  Tonny Leiva MD at 4/7/2019 10:54 PM      Consult Orders    1. Inpatient Pulmonology Consult [[id number]] ordered by Tonny Haley MD at 04/07/19 2033                Group: [facility]         CONSULT NOTE    Patient Identification:    Hernandez Waterman  63 y.o.  male  1955  [id number]            Patient Care Team:  Megha Gant MD as PCP - General (Internal Medicine)    Requesting physician:      Reason for Consultation:  Post op care/ ICU management     CC: HA    History of Present Illness: Patient is a pleasant 63-year-old fairly healthy male with no significant past history who was noted to have new onset headaches which progressively got worse.  He started noticing increasing confusion along with gait difficulties.  CT head was done which showed evidence of large right frontal mass along with significant vasogenic edema along with midline shift.  He was taken to the OR for resection today and is admitted to the ICU postoperatively.  Patient seems to be doing well now and does not have any new neurological deficits.  Complains of some headache and just got some pain medication.    Objective     Review of Systems:  Unable to obtain due to postop confusion    Past Medical History:  Past Medical History:   Diagnosis Date   • ED (erectile dysfunction)        Past Surgical History:  History reviewed. No pertinent surgical history.     Home Meds:  Medications Prior to Admission   Medication Sig Dispense Refill Last Dose   • amoxicillin (AMOXIL) 875 MG tablet Take 1 tablet by mouth 2 (Two) Times a Day for 7 days. 14 tablet 0 4/4/2019 at Unknown time   • ibuprofen (ADVIL,MOTRIN) 200 MG tablet Take 200 mg by mouth As Needed for Mild Pain .   4/4/2019 at Unknown time   • Loratadine (CLARITIN) 10 MG capsule Take 1 tablet by mouth Daily for 7 days. Over the counter 7 each 0 4/4/2019 at Unknown time   • sildenafil (VIAGRA) 100 MG tablet Take 1 tablet by mouth Daily As Needed for  "erectile dysfunction. 10 tablet 1 Past Week at Unknown time   • methylPREDNISolone (MEDROL) 4 MG tablet follow package directions 21 tablet 0        Allergies:  Allergies   Allergen Reactions   • Sulfa Antibiotics Unknown (See Comments)     Unknown       Social History:   Social History     Socioeconomic History   • Marital status:      Spouse name: Not on file   • Number of children: Not on file   • Years of education: Not on file   • Highest education level: Not on file   Tobacco Use   • Smoking status: Never Smoker   • Smokeless tobacco: Never Used   Substance and Sexual Activity   • Alcohol use: Yes     Comment: rarely   • Drug use: No   • Sexual activity: Yes     Partners: Female       Family History:  Family History   Problem Relation Age of Onset   • Hypertension Mother    • Osteoporosis Mother    • Hypertension Father        Physical Exam:  /68   Pulse 58   Temp 98.1 °F (36.7 °C) (Oral)   Resp 12   Ht 185.4 cm (72.99\")   Wt 78.8 kg (173 lb 11.6 oz)   SpO2 96%   BMI 22.92 kg/m²   Body mass index is 22.92 kg/m². 96% 78.8 kg (173 lb 11.6 oz)    Physical Exam     Constitutional: No acute distress, no accessory muscle use  Head: -Surgical bandage - C/D/I, CORRIE drain noted  Eyes: No pallor, Anicteric conjunctiva, EOMI.  ENMT:  Mallampati 2, no oral thrush. No palpable cervical lymphadenopathy  Heart: RRR, no murmur  Lungs: SANJEEV +, No wheezes/crackles heard    Abdomen: Soft. No tenderness, guarding or rigidity  Extremities: Extremities warm and well perfused, 1+ pitting edema  Neuro: Conscious,  Drowsy, no gross focal neuro deficits    Lab Review:   LABS:  Lab Results   Component Value Date    CALCIUM 8.4 (L) 04/07/2019     Results from last 7 days   Lab Units 04/07/19  1353 04/06/19  0638 04/05/19  0544   SODIUM mmol/L 141 138 139   POTASSIUM mmol/L 3.8 4.3 4.4   CHLORIDE mmol/L 102 103 100   CO2 mmol/L 26.0 25.1 27.8   BUN mg/dL 20 17 19   CREATININE mg/dL 0.80 0.71* 0.80   GLUCOSE mg/dL 135* " 163* 119*   CALCIUM mg/dL 8.4* 8.8 9.2   WBC 10*3/mm3 12.26*  --  9.72   HEMOGLOBIN g/dL 11.2*  --  12.3*   PLATELETS 10*3/mm3 292  --  305   ALT (SGPT) U/L  --   --  111*   AST (SGOT) U/L  --   --  69*     No results found for: CKTOTAL, CKMB, CKMBINDEX, TROPONINI, TROPONINT          Results from last 7 days   Lab Units 04/06/19  0638   INR  1.16*                 Imaging: I personally visualized the images of chest scans/ x-rays performed within last 3 days and summarized below.    Assessment   Right frontal brain tumor S/p debulking/ frontal lobectomy   Persistent headache  Vasogenic edema  Brain compression  Incidental sub-6 mm right upper pulmonary nodule  Anasarca    RECOMMENDATIONS:  Patient is status post debulking and frontal lobectomy now and is doing fairly well.  No significant new neurological deficits noted.  Decadron per neurosurgery  Oncology consultation noted for suspected GBM  Pain control with as needed medication as ordered  Repeat CT head reviewed and postop changes noted.  No significant drainage from the CORRIE drain.  Noted anasarca likely due to volume resuscitation.  Will give diuretic.  We will continue to watch the patient overnight in ICU.  Likely will be able to transfer out of ICU in the a.m.  Full code  DVT prophylaxis-mechanical    Thank you for letting me participate in the care of this pleasant patient.  I have discussed my findings and recommendations with patient and nursing staff.     Tonny Haley MD  4/7/2019  10:54 PM      Electronically signed by Tonny Haley MD at 4/7/2019 11:16 PM         Keshawn Brower MD   Physician   Neurosurgery   Op Note   Signed   Date of Service:  4/7/2019 11:14 AM   Creation Time:  4/7/2019  1:17 PM         Procedure: RIGHT SIDE CRANIOTOMY FOR TUMOR REMOVAL AND  BIOPSY  WITH SIDNEY NAVIGATTION AND PARTIAL RIGHT FRONTAL LOBE LOBECTOMY Case Time: 4/7/2019 11:14 AM Surgeon: Keshawn Brower MD            Signed                  Show:Clear all  [x]Manual[x]Template[]Copied    Added by:  [x]Keshawn Brower MD      [][place]on for details      Preoperative diagnosis: Right frontal brain tumor     Postoperative diagnosis: Same as above     Procedures performed: Right frontal craniotomy for biopsy, debulking, and partial right frontal lobectomy for high grade glioma with [place] frameless navigation     Surgeon: Ioncente     First Assistant: Jennifer Cárdenas  (She greatly assisted in the exposure, visualization of neural structures, control of bleeding, retraction, and closure of the incision.)     Anesthesia: GET     EBL: 500 cc     Complications: none     Specimen sent: specimen sent for frozen, c/w GBM; permanent tissue sent     Drains: 7 mm flat CORRIE subgaleal drain     Findings: high grade glioma, c/w GBM     Postoperative condition: good     Indications for the operation:   The patient is a 63-year-old healthy male with a several week history of headaches and balance problems.  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  He was brought to the operating room today for biopsy, debulking and a planned partial right frontal lobectomy.           Informed consent:  He and his wife understood that the goal of surgery was histological diagnosis and removal of as much of the tumor as possible within the boundaries of safety.  The risks include, but are not limited to, infection, hemorrhage requiring hemorrhage or reoperation, CSF leak requiring reoperation, incomplete relief of symptoms, potential need for additional surgery in the future, stroke, paralysis, coma and death.  He agreed to proceed.           Details of the operation:  The patient was taken to the operating room and placed on the operating room table in supine position.  After induction and endotracheal intubation, he got 2 grams of Kefzol as per the SKIP protocol.  He also was given 10  mg of Decadron, a gram of Keppra and 50 mg of Mannitol to skin incision.  A Lobo catheter was placed.  SCDs were placed.  Venous access was secured.  We did the CrownPeak navigation registration process with accuracy.  He was put in three point fixation with the Paige headrest.  The whole head was shaved.  The right frontal region was prepped and draped in the usual sterile fashion.  We did a surgical timeout.  I injected 5 mL of 0.25% marcaine with epinephrine into the scalp.  An upside down U shaped incision was made in the right frontal area with a #10 skin knife.  Mary clips were applied.  A single myocutaneous flap was reflected inferiorly.  A single billy hole was then made in the right frontal region and then a large right frontal craniotomy was performed with a craniotome and a B1 foot plate using the CrownPeak pneumatic system.  This was done again with navigation planning the incision and the approach.  The dura was opened up in a trapdoor fashion and tacked up with 4-0 Nurolon sutures.  The juan m halo was applied.  The microscope was draped and brought into the field.  I made a corticectomy incision in the right frontal lobe corresponding to a location over the tumor.  Refractory blades were advanced under magnification and with the use of CrownPeak navigation, I navigated to the center of the tumor and got specimen and sent it off to frozen section.  It was consistent with a high grade glioma as expected.  The plan was to do a partial right frontal lobectomy.  Most of the tumor was occupied in the frontal lobe anyway and I felt this was the best chance to get a good of a debulking as possible.  I mapped out the limits of the lobectomy and I went posterior to the enhancing portion of the tumor.  Under magnification, I used bipolar cautery and truncated the frontal lobe, starting with the cortex and going to a depth planned with the navigation just underneath the enhancing portion tumor at the level of the  top of the basal ganglia and medially bounded by the falx and the corpus collosum.  I went around the periphery of the tumor and in the process also debulked it internally with sonopet ultrasound guided aspiration.  More permanent specimen was sent off.  There was obvious tumor that I sent off during the process of the lobectomy that was consistent with the high grade glioma diagnosis.  I left the gyrus rectus in place because the tumor did not extend that far down, but I again truncated just medial to the Sylvian fissure and as far back as the frontal horn.  I did get a little CSF when I entered the frontal horn, but that was my guide post for the most posterior extent of the lobectomy.  Once most of the frontal lobe was out, I got hemostasis with thrombin soaked gelfoam and cotton ball soaked in half strength peroxide.  Floseal was used.  Hemostasis was complete.  The dura was reapproximated with 4-0 interrupted Nurolon sutures.  A layer of Duragen was used followed by Duraseal.  The bone flap was replaced with KLS plates.  A 7 mm flat Rickie Mcnair drain was left in the subgaleal space and secured with 2-0 silk.  The galea was reapproximated with 2-0 interrupted Vicryl suture.  The skin was closed with running 4-0 Vicryl subcuticular.  Steri-Strips and a clean and dry dressing was placed.  Patient was taken out of three point fixation.  His head was wrapped with Kerlix Eugene and a stockinette dressing and he was extubated and taken to recovery in satisfactory condition.                          ED to Hosp-Admission (Current) on 4/5/2019            Revision History            Detailed Report

## 2019-04-09 NOTE — PROGRESS NOTES
"Doing well. Affect rather flat.  C/o mild HA along R side.  No N/V, ate a little breakfast       Blood pressure 119/78, pulse (!) 43, temperature 98.4 °F (36.9 °C), temperature source Oral, resp. rate 14, height 185.4 cm (72.99\"), weight 78.8 kg (173 lb 11.6 oz), SpO2 95 %.      AA,Ox3  KELLER   Incision ok  CORRIE removed (20cc over night)      ..  Results from last 7 days   Lab Units 04/09/19  0502   WBC 10*3/mm3 15.16*   HEMOGLOBIN g/dL 12.4*   HEMATOCRIT % 37.2*   PLATELETS 10*3/mm3 295         Path pending      POD#2 s/p right frontal craniotomy; partial right frontal lobectomy for removal and biopsy of tumor  Cerebral edema    Drain removed  Decreased steroids to 4mg IV q 6 from 6q6  onc following, XRT onc to see  Mobilize, PT/OT, rehab eval  To floor  Follow path  CBC in am      "

## 2019-04-09 NOTE — THERAPY EVALUATION
Acute Care - Occupational Therapy Initial Evaluation  [facility]     Patient Name: Hernandez Waterman  : 1955  MRN: [id number]  Today's Date: 2019  Onset of Illness/Injury or Date of Surgery: 19  Date of Referral to OT: 19  Referring Physician: German    Admit Date: 2019       ICD-10-CM ICD-9-CM   1. Acute intractable headache, unspecified headache type R51 784.0   2. Brain mass G93.9 348.9   3. Right frontal lobe mass G93.9 348.9   4. 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 (CMS/HCC)   • Cerebral edema (CMS/HCC)   • GBM (glioblastoma multiforme) (CMS/HCC)   • Leukemoid reaction   • Anemia   • Constipation     Past Medical History:   Diagnosis Date   • ED (erectile dysfunction)      Past Surgical History:   Procedure Laterality Date   • CRANIOTOMY FOR TUMOR Right 2019    Procedure: RIGHT SIDE CRANIOTOMY FOR TUMOR REMOVAL AND  BIOPSY  WITH SIDNEY NAVIGATTION AND PARTIAL RIGHT FRONTAL LOBE LOBECTOMY;  Surgeon: Keshawn Brower MD;  Location: [facility];  Service: Neurosurgery          OT ASSESSMENT FLOWSHEET (last 12 hours)      Occupational Therapy Evaluation     Row Name 19 1528                   OT Evaluation Time/Intention    Subjective Information  no complaints  -SG        Document Type  evaluation  -SG        Mode of Treatment  individual therapy;occupational therapy  -SG        Patient Effort  good  -SG        Symptoms Noted During/After Treatment  none  -SG           General Information    Patient Profile Reviewed?  yes  -SG        Referring Physician  German  -AVIVA        Patient Observations  alert;cooperative;agree to therapy  -SG         General Observations of Patient  Pt supine in bed  -SG        Prior Level of Function  independent:;ADL's;work  -SG        Equipment Currently Used at Home  none  -SG        Pertinent History of Current Functional Problem  GBM, crani for R frontal mass  -SG        Existing Precautions/Restrictions  fall  -SG        Limitations/Impairments  safety/cognitive  -SG        Barriers to Rehab  medically complex  -SG           Relationship/Environment    Lives With  spouse  -SG           Cognitive Assessment/Intervention- PT/OT    Orientation Status (Cognition)  oriented x 3  -SG        Follows Commands (Cognition)  follows one step commands;75-90% accuracy;repetition of directions required  -SG        Safety Deficit (Cognitive)  mild deficit;insight into deficits/self awareness  -        Personal Safety Interventions  gait belt;fall prevention program maintained  -SG           Bed Mobility Assessment/Treatment    Supine-Sit [place] (Bed Mobility)  minimum assist (75% patient effort);verbal cues;nonverbal cues (demo/gesture)  -        Sit-Supine [place] (Bed Mobility)  not tested  -           Functional Mobility    Functional Mobility- Comment  Takes steps to chair modx2 A  -SG           Transfer Assessment/Treatment    Transfer Assessment/Treatment  sit-stand transfer;stand-sit transfer;bed-chair transfer  -           Bed-Chair Transfer    Bed-Chair [place] (Transfers)  2 person assist;verbal cues;nonverbal cues (demo/gesture);minimum assist (75% patient effort)  -SG        Assistive Device (Bed-Chair Transfers)  walker, front-wheeled  -SG           Sit-Stand Transfer    Sit-Stand [place] (Transfers)  minimum assist (75% patient effort);verbal cues;nonverbal cues (demo/gesture)  -        Assistive Device (Sit-Stand Transfers)  walker, front-wheeled  -SG           Stand-Sit Transfer    Stand-Sit [place] (Transfers)  minimum assist (75% patient effort);verbal cues;nonverbal cues  (demo/gesture)  -SG        Assistive Device (Stand-Sit Transfers)  walker, front-wheeled  -SG           ADL Assessment/Intervention    BADL Assessment/Intervention  lower body dressing  -SG           Lower Body Dressing Assessment/Training    Lower Body Dressing [place] Level  doff;don;socks;minimum assist (75% patient effort);verbal cues;nonverbal cues (demo/gesture)  -SG        Lower Body Dressing Position  supported sitting  -SG           General ROM    RT Upper Ext  Comments  -SG        LT Upper Ext  Comment  -SG           Right Upper Ext    Rt Upper Extremity Comments   WFL  -SG           Left Upper Ext    Lt Upper Extremity Comments   7/8 throughout, decreased attention to L side  -SG           MMT (Manual Muscle Testing)    Rt Upper Ext  Comments  -SG        Lt Upper Ext  Comments  -SG           MMT Right Upper Ext    Rt Upper Extremity Comments   4+/5  -SG           MMT Left Upper Ext    Lt Upper Extremity Comments   3+/5  -SG           Motor Assessment/Interventions    Additional Documentation  Therapeutic Exercise (Group);Fine Motor Testing & Training (Group)  -SG           Therapeutic Exercise    Therapeutic Exercise  seated, upper extremities  -SG        Additional Documentation  Therapeutic Exercise (Row)  -SG           Upper Extremity Seated Therapeutic Exercise    Performed, Seated Upper Extremity (Therapeutic Exercise)  shoulder flexion/extension  -SG        Exercise Type, Seated Upper Extremity (Therapeutic Exercise)  AAROM (active assistive range of motion)  -SG        Expected Outcomes, Seated Upper Extremity (Therapeutic Exercise)  improve functional tolerance, single extremity activity  -SG        Sets/Reps Detail, Seated Upper Extremity (Therapeutic Exercise)  1/10  -SG           Balance    Balance  static sitting balance;static standing balance  -SG           Static Sitting Balance    Level of [place] (Unsupported Sitting, Static Balance)  contact guard assist;minimal assist, 75%  patient effort  -SG        Sitting Position (Unsupported Sitting, Static Balance)  sitting on edge of bed  -SG        Comment (Unsupported Sitting, Static Balance)  Leans slightly to the L  -SG           Static Standing Balance    Level of [place] (Supported Standing, Static Balance)  minimal assist, 75% patient effort  -SG        Time Able to Maintain Position (Supported Standing, Static Balance)  15 to 30 seconds  -SG           Fine Motor Testing & Training    Comment, Fine Motor Coordination  L hand impairment, difficulty opening grooming bottles  -SG           Positioning and Restraints    Pre-Treatment Position  in bed  -SG        Post Treatment Position  chair  -SG        In Chair  sitting;call light within reach;encouraged to call for assist;notified nsg  -SG           Pain Scale: Numbers Pre/Post-Treatment    Pain Scale: Numbers, Pretreatment  0/10 - no pain  -SG           Wound 04/07/19 1233 Right head incision    Wound - Properties Group Date first assessed: 04/07/19  -LD Time first assessed: 1233  -LD Side: Right  -LD Location: head  -LD Type: incision  -LD       Plan of Care Review    Plan of Care Reviewed With  patient  -SG           Clinical Impression (OT)    Date of Referral to OT  04/08/19  -SG        OT Diagnosis  Need for assist with personal care  -SG        Criteria for Skilled Therapeutic Interventions Met (OT Eval)  yes;treatment indicated  -SG        Rehab Potential (OT Eval)  good, to achieve stated therapy goals  -SG        Therapy Frequency (OT Eval)  5 times/wk  -SG        Care Plan Review (OT)  evaluation/treatment results reviewed;care plan/treatment goals reviewed;patient/other agree to care plan  -SG        Anticipated Discharge Disposition (OT)  inpatient rehabilitation facility  -SG           Vital Signs    O2 Delivery Pre Treatment  room air  -SG           OT Goals    Transfer Goal Selection (OT)  transfer, OT goal 1  -SG        Strength Goal Selection (OT)  strength, OT goal  1  -SG        Coordination Goal Selection (OT)  coordination, OT goal 1  -SG        Additional Documentation  Strength Goal Selection (OT) (Row);Coordination Goal Selection (OT) (Row)  -SG           Transfer Goal 1 (OT)    Activity/Assistive Device (Transfer Goal 1, OT)  sit-to-stand/stand-to-sit;toilet  -SG        [place] Level/Cues Needed (Transfer Goal 1, OT)  contact guard assist  -SG        Time Frame (Transfer Goal 1, OT)  short term goal (STG);1 week  -SG        Progress/Outcome (Transfer Goal 1, OT)  goal ongoing  -SG           Strength Goal 1 (OT)    Strength Goal 1 (OT)  Pt to increase LUE strength to 4/5 to assist with ADLs.  -SG        Time Frame (Strength Goal 1, OT)  short term goal (STG);1 week  -SG        Progress/Outcome (Strength Goal 1, OT)  goal ongoing  -SG           Coordination Goal 1 (OT)    Activity/Assistive Device (Coordination Goal 1, OT)  FM task;GM task  -SG        [place] Level/Cues Needed (Coordination Goal 1, OT)  set-up required  -SG        Time Frame (Coordination Goal 1, OT)  short term goal (STG);1 week  -SG        Progress/Outcomes (Coordination Goal 1, OT)  goal ongoing  -SG          User Key  (r) = Recorded By, (t) = Taken By, (c) = Cosigned By    Initials Name Effective Dates     Louise Marlow OTR 12/26/18 -     Monet Puga, RN 06/16/16 -          Occupational Therapy Education     Title: PT OT SLP Therapies (In Progress)     Topic: Occupational Therapy (In Progress)     Point: ADL training (Done)     Description: Instruct learner(s) on proper safety adaptation and remediation techniques during self care or transfers.   Instruct in proper use of assistive devices.    Learning Progress Summary           Patient Acceptance, E,TB, VU by  at 4/9/2019  3:37 PM    Comment:  LUE SROM HEP                               User Key     Initials Effective Dates Name Provider Type Discipline     12/26/18 -  Louise Marlow OTR Occupational Therapist OT                   OT Recommendation and Plan  Outcome Summary/Treatment Plan (OT)  Anticipated Discharge Disposition (OT): inpatient rehabilitation facility  Therapy Frequency (OT Eval): 5 times/wk  Plan of Care Review  Plan of Care Reviewed With: patient  Plan of Care Reviewed With: patient  Outcome Summary: Pt seen for OT eval following crani for GBM; pt was indep PTA. This date pt demo L sided weakness and coordination deficits in the LUE. Pt reports decreased sensation in the LUE and shows decreased attention to L side. Pt with very flat affect. Pt requires min A for LBD and minx2A to stand pivot to the chair. Would recomment d/c to acute rehab and would benefit from acute OT while inpatient.    Outcome Measures     Row Name 04/09/19 1500 04/09/19 1100          How much help from another person do you currently need...    Turning from your back to your side while in flat bed without using bedrails?  --  3  -CH (r) CG (t) CH (c)     Moving from lying on back to sitting on the side of a flat bed without bedrails?  --  3  -CH (r) CG (t) CH (c)     Moving to and from a bed to a chair (including a wheelchair)?  --  3  -CH (r) CG (t) CH (c)     Standing up from a chair using your arms (e.g., wheelchair, bedside chair)?  --  3  -CH (r) CG (t) CH (c)     Climbing 3-5 steps with a railing?  --  2  -CH (r) CG (t) CH (c)     To walk in hospital room?  --  3  -CH (r) CG (t) CH (c)     AM-PAC 6 Clicks Score  --  17  -CH (r) CG (t)        How much help from another is currently needed...    Putting on and taking off regular lower body clothing?  2  -SG  --     Bathing (including washing, rinsing, and drying)  2  -SG  --     Toileting (which includes using toilet bed pan or urinal)  2  -SG  --     Putting on and taking off regular upper body clothing  3  -SG  --     Taking care of personal grooming (such as brushing teeth)  3  -SG  --     Eating meals  3  -SG  --     Score  15  -SG  --        Functional Assessment    Outcome Measure  Options  AM-PAC 6 Clicks Daily Activity (OT)  -  AM-PAC 6 Clicks Basic Mobility (PT)  -CH (r) CG (t) CH (c)       User Key  (r) = Recorded By, (t) = Taken By, (c) = Cosigned By    Initials Name Provider Type    Louise Snyder OTR Occupational Therapist    CH Peyton Berrios, PT Physical Therapist    CG James Bird, GURWINDER Student PT Student          Time Calculation:   Time Calculation- OT     Row Name 04/09/19 1540             Time Calculation- OT    OT Start Time  0838  -      OT Stop Time  0855  -      OT Time Calculation (min)  17 min  -      Total Timed Code Minutes- OT  10 minute(s)  -      OT Non-Billable Time (min)  17 min  -      OT Received On  04/09/19  -      OT Goal Re-Cert Due Date  04/16/19  -        User Key  (r) = Recorded By, (t) = Taken By, (c) = Cosigned By    Initials Name Provider Type     Louise Marlow OTR Occupational Therapist        Therapy Charges for Today     Code Description Service Date Service Provider Modifiers Qty    06869274696 HC OT THER SUPP EA 15 MIN 4/9/2019 Louise Marlow OTR GO 1    09340571255 HC OT SELF CARE/MGMT/TRAIN EA 15 MIN 4/9/2019 Louise Marlow OTR GO 1    28183052394 HC OT EVAL MOD COMPLEXITY 2 4/9/2019 Louise Marlow OTR GO 1               NOLVIA Patel  4/9/2019

## 2019-04-09 NOTE — PAYOR COMM NOTE
"Darryl Waterman (63 y.o. Male)                  ATTENTION;  NEW CONTINUED STAY CLINICALS CASE REF [id number] FOR REVIEW,                REPLY TO UR DEPT [facility]ERNANDO [facility]AL[facility] N  OR UR  [phone]       Date of Birth Social Security Number Address Home Phone MRN    1955  [id number] [address] [phone] [id number]    Jainism Marital Status          None        Admission Date Admission Type Admitting Provider Attending Provider Department, Room/Bed    4/5/19 Emergency Zev Weldon MD Ramaswamy, Rajanna B, MD [facility] INTENSIVE CARE, I381/1    Discharge Date Discharge Disposition Discharge Destination                       Attending Provider:  Zev Weldon MD    Allergies:  Sulfa Antibiotics    Isolation:  None   Infection:  None   Code Status:  CPR    Ht:  185.4 cm (72.99\")   Wt:  78.8 kg (173 lb 11.6 oz)    Admission Cmt:  None   Principal Problem:  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 (CMS/HCC) [D49.6]                 Active Insurance as of 4/5/2019     Primary Coverage     Payor Plan Insurance Group Employer/Plan Group    [employer]HE[employer] BLUE CROSS [employer] BLUE CROSS BLUE [employer] PPO [id number]     Payor Plan Address Payor Plan Phone Number Payor Plan Fax Number Effective Dates    [address] [phone]  1/1/2018 - None Entered    [place] [address]       Subscriber Name Subscriber Birth Date Member ID       DARRYL WATERMAN 1955 [id number]                 Emergency Contacts      (Rel.) Home Phone Work Phone Mobile Phone    Mony Waterman (Spouse) [phone] -- [phone]            Lines, Drains & Airways    Active LDAs     Name:   Placement date:   Placement time:   Site:   Days:    Peripheral IV 04/05/19 0542 Left Antecubital   04/05/19    0542    Antecubital   4    Peripheral IV 04/07/19 1230 Right Hand   04/07/19    1230    Hand   1    Closed/Suction Drain 1 Right  Bulb   " "04/07/19    1243    -- HEAD   1                   Physician Progress Notes      Lidia Solares PA-C at 4/9/2019  8:54 AM     Attestation signed by Keshawn Brower MD at 4/9/2019  9:46 AM    I have reviewed the documentation above and agree.                    Doing well. Affect rather flat.  C/o mild HA along R side.  No N/V, ate a little breakfast       Blood pressure 119/78, pulse (!) 43, temperature 98.4 °F (36.9 °C), temperature source Oral, resp. rate 14, height 185.4 cm (72.99\"), weight 78.8 kg (173 lb 11.6 oz), SpO2 95 %.      AA,Ox3  KELLER   Incision ok  CORRIE removed (20cc over night)      ..  Results from last 7 days   Lab Units 04/09/19  0502   WBC 10*3/mm3 15.16*   HEMOGLOBIN g/dL 12.4*   HEMATOCRIT % 37.2*   PLATELETS 10*3/mm3 295         Path pending      POD#2 s/p right frontal craniotomy; partial right frontal lobectomy for removal and biopsy of tumor  Cerebral edema    Drain removed  Decreased steroids to 4mg IV q 6 from 6q6  onc following, XRT onc to see  Mobilize, PT/OT, rehab eval  To floor  Follow path  CBC in am        Electronically signed by Keshawn Brower MD at 4/9/2019  9:46 AM     Jose Villegas MD at 4/8/2019  3:23 PM          [facility]     Mar/chart reviewed  F/u critical care management.  No new complaints. Some minor post operative discomfort as expected, no visual changes.      Vital Sign Min/Max for last 24 hours  Temp  Min: 97.2 °F (36.2 °C)  Max: 98.3 °F (36.8 °C)   BP  Min: 81/42  Max: 131/71   Pulse  Min: 41  Max: 69   Resp  Min: 12  Max: 16   SpO2  Min: 93 %  Max: 100 %   No Data Recorded   No Data Recorded     Appears ill, axox3  perrl, eomi, no icterus,  mmm, no jvd, trachea midline, neck supple,  chest cta bilaterally, no crackles, no wheezes,   rrr,   soft, nt, nd +bs,  no c/c/ e    Labs:  Na 141  k 4  biarb 23.9  Wbc 15  hgb 11.5  plts 307    Ct head: reviewed, fairly stable    A:    Right frontal brain tumor S/p debulking/ frontal lobectomy "   Persistent headache  Vasogenic edema  Brain compression  Incidental sub-6 mm right upper pulmonary nodule  Anasarca     RECOMMENDATIONS:  Patient is status post debulking and frontal lobectomy now and is doing fairly well.  No significant new neurological deficits noted.  Decadron per neurosurgery  Oncology consultation noted for suspected GBM  Pain control with as needed medication as ordered  Repeat CT head reviewed and postop changes noted.  No significant drainage from the CORRIE drain.  Noted anasarca likely due to volume resuscitation.  Will give diuretic.   continue to watch the patient  ICU.  Likely will be able to transfer out of ICU in the a.m.  Full code  DVT prophylaxis-mechanical    D/w RN          Electronically signed by Jose Villegas MD at 2019  3:30 PM     Beata Porter APRN at 2019  2:49 PM     Attestation signed by Keshawn Brower MD at 2019  6:05 PM    I have reviewed the documentation above and agree.                    Postoperative visit      No complaints. Patient has spoken with Radiation Oncology.       .  Vitals:    19 1403   BP: 111/72   Pulse: 56   Resp: 14   Temp:    SpO2: 95%        2019 04:30   WBC 15.54 (H)   Hemoglobin 11.5 (L)   Hematocrit 35.2 (L)       Surgical pathology is still pending.        CORRIE drain 60 cc last 8 hours    POD 1 right frontal craniotomy; partial right frontal lobectomy for removal and biopsy of tumor  Cerebral edema        MRI brain with and without contrast today  Keep drain  Rad/Onc has seen; consult Heme/Onc  Up in chair  NO ARIXTRA  CBC in am  Continue same dose decadron; keppra  Out of ICU tomorrow      Electronically signed by Keshawn Brower MD at 2019  6:05 PM     Zev Weldon MD at 2019  1:49 PM              Name: Hernandez Waterman ADMIT: 2019   : 1955  PCP: Megha Gant MD    MRN: [id number] LOS: 3 days   AGE/SEX: 63 y.o. male    ROOM: [facility]   Subjective   Status post craniotomy and  debulking of glioblastoma  Patient is awake and alert  Moves all extremities   Speach intact    Brief hospital course since admission:  Brain metastases  With vasogenic edema    I have reviewed past medical history, family history, social history and allergies.  No changes from admission note.      Review of Systems   Constitutional: Negative for fatigue and fever.   Respiratory: Negative for shortness of breath and wheezing.    Gastrointestinal: Negative for nausea and vomiting.   Neurological: Negative for headaches.          Objective   Vital Signs  Temp:  [97.2 °F (36.2 °C)-98.3 °F (36.8 °C)] 98.1 °F (36.7 °C)  Heart Rate:  [41-70] 55  Resp:  [10-16] 14  BP: ()/(38-81) 101/63  Arterial Line BP: (102-149)/(48-77) 115/54  SpO2:  [91 %-100 %] 96 %  on  Flow (L/min):  [2-4] 2;   Device (Oxygen Therapy): room air  Body mass index is 22.92 kg/m².    Intake/Output Summary (Last 24 hours) at 4/8/2019 1349  Last data filed at 4/8/2019 1203  Gross per 24 hour   Intake 660 ml   Output 4830 ml   Net -4170 ml       Physical Exam   Constitutional: He is oriented to person, place, and time. He appears well-developed and well-nourished. No distress.   HENT:   Head: Normocephalic and atraumatic.   Eyes: Pupils are equal, round, and reactive to light. No scleral icterus.   Cardiovascular: Normal rate and regular rhythm.   Pulmonary/Chest: Effort normal. No respiratory distress. He has no decreased breath sounds. He has no wheezes.   Abdominal: Soft. Bowel sounds are normal. There is no hepatosplenomegaly.   Neurological: He is alert and oriented to person, place, and time.   Skin: No cyanosis. Nails show no clubbing.   Psychiatric: He has a normal mood and affect. His behavior is normal.       Results Review:    Results from last 7 days   Lab Units 04/08/19  0430 04/07/19  1353 04/05/19  0544   WBC 10*3/mm3 15.54* 12.26* 9.72   HEMOGLOBIN g/dL 11.5* 11.2* 12.3*   HEMATOCRIT % 35.2* 34.0* 38.2   PLATELETS 10*3/mm3 307 292  305     Results from last 7 days   Lab Units 04/08/19  0430 04/07/19  1353 04/06/19  0638   SODIUM mmol/L 141 141 138   POTASSIUM mmol/L 4.0 3.8 4.3   CHLORIDE mmol/L 103 102 103   CO2 mmol/L 23.9 26.0 25.1   BUN mg/dL 19 20 17   CREATININE mg/dL 0.74* 0.80 0.71*   GLUCOSE mg/dL 110* 135* 163*   CALCIUM mg/dL 8.6 8.4* 8.8     Results from last 7 days   Lab Units 04/06/19  0638   INR  1.16*     Hemoglobin A1C:No results found for: HGBA1C  Glucose Range:  Glucose   Date/Time Value Ref Range Status   04/08/2019 1111 169 (H) 70 - 130 mg/dL Final   04/07/2019 2028 115 70 - 130 mg/dL Final   04/07/2019 1658 123 70 - 130 mg/dL Final   04/07/2019 0546 108 70 - 130 mg/dL Final   04/06/2019 2102 141 (H) 70 - 130 mg/dL Final   04/06/2019 1610 168 (H) 70 - 130 mg/dL Final         dexamethasone 10 mg Intravenous Once   dexamethasone 6 mg Intravenous Q6H   famotidine 20 mg Intravenous Q12H   fondaparinux 2.5 mg Subcutaneous Q24H   insulin lispro 0-7 Units Subcutaneous 4x Daily With Meals & Nightly   levETIRAcetam 500 mg Intravenous Q12H       niCARdipine 5-15 mg/hr   Diet Regular  Assessment/Plan       Assessment/Plan     Active Hospital Problems    Diagnosis  POA   • **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     Continue Decadron and Keppra  Glioblastoma status post debulking and craniotomy with biopsy  Monitor in ICU  Oncology consultation    OK to transfer out of ICU to 5 PT, when cleared by Dr Stacy LINK. MD [place]  [facility]  04/08/19    Electronically signed by Zev Weldon MD at 4/8/2019  1:51 PM

## 2019-04-09 NOTE — NURSING NOTE
Asked to review for our rehab program. Eval started. Talked with pt at bedside and then spouse by phone. Will follow progress for rehab needs. If acute rehab needed, will require insurance precert. Please order OT to eval and treat. Thanks, Ajay REICH rehab admission nurse [phone]

## 2019-04-09 NOTE — THERAPY EVALUATION
Acute Care - Physical Therapy Initial Evaluation  [facility]     Patient Name: Hernandez Waterman  : 1955  MRN: [id number]  Today's Date: 2019   Onset of Illness/Injury or Date of Surgery: (P) 19            Admit Date: 2019    Visit Dx:     ICD-10-CM ICD-9-CM   1. Acute intractable headache, unspecified headache type R51 784.0   2. Brain mass G93.9 348.9   3. Right frontal lobe mass G93.9 348.9   4. 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 (CMS/HCC)   • Cerebral edema (CMS/HCC)   • GBM (glioblastoma multiforme) (CMS/HCC)   • Leukemoid reaction   • Anemia   • Constipation     Past Medical History:   Diagnosis Date   • ED (erectile dysfunction)      Past Surgical History:   Procedure Laterality Date   • CRANIOTOMY FOR TUMOR Right 2019    Procedure: RIGHT SIDE CRANIOTOMY FOR TUMOR REMOVAL AND  BIOPSY  WITH SIDNEY NAVIGATTION AND PARTIAL RIGHT FRONTAL LOBE LOBECTOMY;  Surgeon: Keshawn Brower MD;  Location: [facility];  Service: Neurosurgery        PT ASSESSMENT (last 12 hours)      Physical Therapy Evaluation     Row Name 19 1041          PT Evaluation Time/Intention    Subjective Information  complains of;pain  (Pended)  headache  -CG     Document Type  evaluation  (Pended)   -CG     Mode of Treatment  individual therapy;physical therapy  (Pended)   -CG     Patient Effort  good  (Pended)   -CG     Symptoms Noted During/After Treatment  none  (Pended)   -CG     Comment  pt w/decreased safety awareness  (Pended)   -CG     Row Name 19 1041          General Information    Patient Profile Reviewed?  yes  (Pended)   -CG     Onset of Illness/Injury  or Date of Surgery  04/07/19  (Pended)   -CG     Patient Observations  alert;cooperative;agree to therapy  (Pended)   -CG     Patient/Family Observations  Pt seated in chair, no signs of acute distress at rest, sister-in-law present  (Pended)   -CG     Prior Level of Function  independent:;all household mobility;community mobility;gait;transfer  (Pended)   -CG     Equipment Currently Used at Home  none  (Pended)   -CG     Pertinent History of Current Functional Problem  Pt admitted w/glioblastoma on 4/5, s/p craniotomy for R frontal mass 4/7  (Pended)   -CG     Existing Precautions/Restrictions  fall  (Pended)   -CG     Barriers to Rehab  medically complex  (Pended)   -     Row Name 04/09/19 1041          Stairs Within Home, Primary    Stairs, Within Home, Primary  Tri-level home, 2nd floor BR  (Pended)   -CG     Number of Stairs, Within Home, Primary  six  (Pended)   -CG     Stair Railings, Within Home, Primary  railings safe and in good condition  (Pended)   -     Row Name 04/09/19 1041          Stairs Within Home, Secondary    Stairs, Within Home, Secondary  Tri-level home, kitchen downstairs  (Pended)   -CG     Number of Stairs, Within Home, Secondary  four  (Pended)   -CG     Stair Railings, Within Home, Secondary  railings safe and in good condition  (Pended)   -     Row Name 04/09/19 1041          Cognitive Assessment/Interventions    Additional Documentation  Cognitive Assessment/Intervention (Group)  (Pended)   -     Row Name 04/09/19 1041          Cognitive Assessment/Intervention- PT/OT    Orientation Status (Cognition)  oriented x 3  (Pended)   -CG     Follows Commands (Cognition)  follows one step commands;75-90% accuracy;repetition of directions required  (Pended)   -CG     Safety Deficit (Cognitive)  impulsivity;insight into deficits/self awareness  (Pended)   -CG     Personal Safety Interventions  nonskid shoes/slippers when out of bed;fall prevention program maintained;gait belt;muscle  strengthening facilitated  (Pended)   -     Row Name 04/09/19 1041          Bed Mobility Assessment/Treatment    Bed Mobility Assessment/Treatment  supine-sit;sit-supine;scooting/bridging  (Pended)   -     Scooting/Bridging [place] (Bed Mobility)  supervision  (Pended)   -     Supine-Sit [place] (Bed Mobility)  not tested  (Pended)  up in chair  -     Sit-Supine [place] (Bed Mobility)  minimum assist (75% patient effort);2 person assist;verbal cues;nonverbal cues (demo/gesture)  (Pended)   -     Row Name 04/09/19 1041          Transfer Assessment/Treatment    Transfer Assessment/Treatment  sit-stand transfer;stand-sit transfer  (Pended)   -     Sit-Stand [place] (Transfers)  minimum assist (75% patient effort);2 person assist;verbal cues;nonverbal cues (demo/gesture)  (Pended)   -     Stand-Sit [place] (Transfers)  minimum assist (75% patient effort);2 person assist;verbal cues;nonverbal cues (demo/gesture)  (Pended)   -     Row Name 04/09/19 1041          Sit-Stand Transfer    Assistive Device (Sit-Stand Transfers)  other (see comments)  (Pended)  HHAx2  -     Row Name 04/09/19 1041          Stand-Sit Transfer    Assistive Device (Stand-Sit Transfers)  other (see comments)  (Pended)  HHAx2  -     Row Name 04/09/19 1041          Gait/Stairs Assessment/Training    Gait/Stairs Assessment/Training  gait/ambulation independence  (Pended)   -CG     [place] Level (Gait)  minimum assist (75% patient effort);2 person assist;verbal cues;nonverbal cues (demo/gesture)  (Pended)   -     Assistive Device (Gait)  other (see comments)  (Pended)  HHAx2  -     Distance in Feet (Gait)  75'  (Pended)  standing rest break, 25' standing rest break, 50'  -CG     Pattern (Gait)  step-through  (Pended)   -CG     Deviations/Abnormal Patterns (Gait)  base of support, narrow;esdras decreased;gait speed decreased;stride length decreased;left sided deviations  (Pended)   -CG     Bilateral  Gait Deviations  forward flexed posture;heel strike decreased  (Pended)   -     Left Sided Gait Deviations  foot drop/toe drag  (Pended)   -CG     Comment (Gait/Stairs)  Pt w/moderately strong L lateral and posterior lean that increases w/ fatigue. 2 standing rest breaks required d/t fatigue, inc lean and L foot drag  (Pended)   -[facility] Name 04/09/19 1041          General ROM    GENERAL ROM COMMENTS  grossly appears WFL for age throughout all 4 extremities  (Pended)   -CG     Row Name 04/09/19 1041          MMT (Manual Muscle Testing)    General MMT Comments  R UE and LE grossly 4+/5, L UE and LE grossly 4-/5  (Pended)   -CG     Row Name 04/09/19 1041          Motor Assessment/Intervention    Additional Documentation  Balance (Group)  (Pended)   -CG     Row Name 04/09/19 1041          Balance    Balance  dynamic standing balance;static standing balance  (Pended)   -CG     Row Name 04/09/19 1041          Static Standing Balance    Level of [place] (Supported Standing, Static Balance)  contact guard assist;minimal assist, 75% patient effort;1 person assist  (Pended)   -CG     Comment (Unsupported Standing, Static Balance)  L lateral lean increases w/fatigue  (Pended)   -CG     Row Name 04/09/19 1041          Dynamic Standing Balance    Level of [place], Reaches Outside Midline (Standing, Dynamic Balance)  minimal assist, 75% patient effort;2 person assist  (Pended)   -CG     Row Name 04/09/19 1041          Pain Assessment    Additional Documentation  Pain Scale: Numbers Pre/Post-Treatment (Group)  (Pended)   -CG     Row Name 04/09/19 1041          Pain Scale: Numbers Pre/Post-Treatment    Pain Scale: Numbers, Pretreatment  7/10  (Pended)   -     Pain Scale: Numbers, Post-Treatment  7/10  (Pended)   -     Pain Location - Side  Right  (Pended)   -     Pain Location  head  (Pended)   -CG     Pre/Post Treatment Pain Comment  Nsg notified  (Pended)   -     Pain Intervention(s)   Repositioned;Ambulation/increased activity  (Pended)   -CG     Row Name             Wound 04/07/19 1233 Right head incision    Wound - Properties Group Date first assessed: 04/07/19  -LD Time first assessed: 1233  -LD Side: Right  -LD Location: head  -LD Type: incision  -LD    Row Name 04/09/19 1041          Plan of Care Review    Plan of Care Reviewed With  patient  (Pended)   -CG     Row Name 04/09/19 1041          Physical Therapy Clinical Impression    Patient/Family Goals Statement (PT Clinical Impression)  To return to PLOF  (Pended)   -CG     Criteria for Skilled Interventions Met (PT Clinical Impression)  yes;treatment indicated  (Pended)   -CG     Impairments Found (describe specific impairments)  aerobic capacity/endurance;gait, locomotion, and balance;muscle performance  (Pended)   -CG     Rehab Potential (PT Clinical Summary)  good, to achieve stated therapy goals  (Pended)   -     Row Name 04/09/19 1041          Vital Signs    O2 Delivery Pre Treatment  room air  (Pended)   -CG     O2 Delivery Intra Treatment  room air  (Pended)   -CG     O2 Delivery Post Treatment  room air  (Pended)   -     Row Name 04/09/19 1041          Physical Therapy Goals    Bed Mobility Goal Selection (PT)  bed mobility, PT goal 1  (Pended)   -CG     Transfer Goal Selection (PT)  transfer, PT goal 1  (Pended)   -CG     Gait Training Goal Selection (PT)  gait training, PT goal 1  (Pended)   -     Row Name 04/09/19 1041          Bed Mobility Goal 1 (PT)    Activity/Assistive Device (Bed Mobility Goal 1, PT)  sit to supine;supine to sit  (Pended)   -CG     [place] Level/Cues Needed (Bed Mobility Goal 1, PT)  contact guard assist;verbal cues required;tactile cues required  (Pended)   -CG     Time Frame (Bed Mobility Goal 1, PT)  1 week  (Pended)   -     Row Name 04/09/19 1041          Transfer Goal 1 (PT)    Activity/Assistive Device (Transfer Goal 1, PT)  sit-to-stand/stand-to-sit  (Pended)   -CG     [place]  Level/Cues Needed (Transfer Goal 1, PT)  contact guard assist;verbal cues required;tactile cues required  (Pended)   -CG     Time Frame (Transfer Goal 1, PT)  1 week  (Pended)   -CG     Row Name 04/09/19 1041          Gait Training Goal 1 (PT)    Activity/Assistive Device (Gait Training Goal 1, PT)  gait (walking locomotion)  (Pended)   -CG     [place] Level (Gait Training Goal 1, PT)  contact guard assist;verbal cues required;tactile cues required  (Pended)   -CG     Distance (Gait Goal 1, PT)  200  (Pended)   -CG     Time Frame (Gait Training Goal 1, PT)  1 week  (Pended)   -CG     Row Name 04/09/19 1041          Positioning and Restraints    Pre-Treatment Position  sitting in chair/recliner  (Pended)   -CG     Post Treatment Position  bed  (Pended)   -CG     In Bed  notified nsg;supine;call light within reach;encouraged to call for assist;exit alarm on  (Pended)   -     Row Name 04/09/19 1041          Living Environment    Home Accessibility  stairs within home  (Pended)   -CG       User Key  (r) = Recorded By, (t) = Taken By, (c) = Cosigned By    Initials Name Provider Type    Monet Puga RN Registered Nurse    James Chan PT Student PT Student        Physical Therapy Education     Title: PT OT SLP Therapies (Done)     Topic: Physical Therapy (Done)     Point: Mobility training (Done)     Learning Progress Summary           Patient Acceptance, E,TB, VU,NR by CG at 4/9/2019 11:04 AM                   Point: Home exercise program (Done)     Learning Progress Summary           Patient Acceptance, E,TB, VU,NR by CG at 4/9/2019 11:04 AM                   Point: Body mechanics (Done)     Learning Progress Summary           Patient Acceptance, E,TB, VU,NR by CG at 4/9/2019 11:04 AM                   Point: Precautions (Done)     Learning Progress Summary           Patient Acceptance, E,TB, VU,NR by  at 4/9/2019 11:04 AM                               User Key     Initials Effective Dates Name  Provider Type Discipline    CG 02/04/19 -  James Bird, PT Student PT Student PT              PT Recommendation and Plan  Anticipated Discharge Disposition (PT): (P) inpatient rehabilitation facility  Planned Therapy Interventions (PT Eval): (P) balance training, bed mobility training, gait training, home exercise program, patient/family education, strengthening  Therapy Frequency (PT Clinical Impression): (P) daily  Outcome Summary/Treatment Plan (PT)  Anticipated Discharge Disposition (PT): (P) inpatient rehabilitation facility  Plan of Care Reviewed With: (P) patient  Outcome Summary: (P) Pt presented to physical therapy with L sided weakness, decreased functional mobility, decreased balance and difficulty walking following hospital admission for glioblastoma, and R frontal mass craniotomy 4/7. Pt was previously independent with all mobility and transferred to stand w/min Ax2, and ambulated 75' w/min Ax2 before requiring a rest break. L lateral and posterior lean were exhibited in sitting and standing, and increased with fatigue, as well as L foot drag in ambulation. PT will continue to follow to address strength, gait and balance deficits. Recommend DC to IRF at this time.  Outcome Measures     Row Name 04/09/19 1100             How much help from another person do you currently need...    Turning from your back to your side while in flat bed without using bedrails?  3  (Pended)   -CG      Moving from lying on back to sitting on the side of a flat bed without bedrails?  3  (Pended)   -CG      Moving to and from a bed to a chair (including a wheelchair)?  3  (Pended)   -CG      Standing up from a chair using your arms (e.g., wheelchair, bedside chair)?  3  (Pended)   -CG      Climbing 3-5 steps with a railing?  2  (Pended)   -CG      To walk in hospital room?  3  (Pended)   -CG      AM-PAC 6 Clicks Score  17  (Pended)   -CG         Functional Assessment    Outcome Measure Options  AM-PAC 6 Clicks Basic  Mobility (PT)  (Pended)   -CG        User Key  (r) = Recorded By, (t) = Taken By, (c) = Cosigned By    Initials Name Provider Type    CG James Bird, PT Student PT Student         Time Calculation:   PT Charges     Row Name 04/09/19 1109             Time Calculation    Start Time  1023  (Pended)   -CG      Stop Time  1041  (Pended)   -CG      Time Calculation (min)  18 min  (Pended)   -CG      PT Received On  04/09/19  (Pended)   -CG      PT - Next Appointment  04/10/19  (Pended)   -CG      PT Goal Re-Cert Due Date  04/16/19  (Pended)   -CG         Time Calculation- PT    Total Timed Code Minutes- PT  8 minute(s)  (Pended)   -CG        User Key  (r) = Recorded By, (t) = Taken By, (c) = Cosigned By    Initials Name Provider Type    CG James Bird, PT Student PT Student        Therapy Charges for Today     Code Description Service Date Service Provider Modifiers Qty    67792731787 HC PT EVAL MOD COMPLEXITY 2 4/9/2019 James Bird, PT Student GP 1    96176457775 HC PT THER PROC EA 15 MIN 4/9/2019 James Bird, PT Student GP 1    79550771059 HC PT THER SUPP EA 15 MIN 4/9/2019 James Bird, PT Student GP 1          PT G-Codes  Outcome Measure Options: (P) AM-PAC 6 Clicks Basic Mobility (PT)  AM-PAC 6 Clicks Score: (P) 17      James Bird PT Student  4/9/2019

## 2019-04-09 NOTE — PLAN OF CARE
Problem: Patient Care Overview  Goal: Plan of Care Review   04/09/19 4808   Coping/Psychosocial   Plan of Care Reviewed With patient   OTHER   Outcome Summary Pt seen for OT eval following crani for GBM; pt was indep PTA. This date pt demo L sided weakness and coordination deficits in the LUE. Pt reports decreased sensation in the LUE and shows decreased attention to L side. Pt with very flat affect. Pt requires min A for LBD and minx2A to stand pivot to the chair. Would recomment d/c to acute rehab and would benefit from acute OT while inpatient.

## 2019-04-09 NOTE — PROGRESS NOTES
REASON FOR CONSULTATION:     Provide an opinion on any further workup or treatment of GBM status post a right craniotomy on 4/7/2019.                              INTERVAL HISTORY: Patient is recovering from surgery.      HISTORY OF PRESENT ILLNESS:  The patient is a 63 y.o. 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 This was progressively getting worse. The patient originally thought this was due to sinusitis which he had previously. He denies vision changes however, he does have significant nausea and with a few episodes of vomiting with clear gastric content. He denies fever, sweating or chills. Nevertheless this patient had imaging studies obtained in the early morning on 04/05/2019 initially with CT of head without contrast and this described a large right frontal lobe mass measuring 6.1 x 3.9 cm and midline shift 1.3 cm. He had brain MRI examination with and without contrast a few hours later, which showed necrotic enhancing right frontal lobe mass measuring 6.8 x 4.3 x 4.6 cm, with vasogenic edema, extending into the genu of the corpus collosum. There is a marked mass effect in addition to midline shift by 8 mm and entrapment of the left lateral ventricle, subfalcine herniation of the anterior and medial portion of the right frontal lobe. This is highly suspicious for glioblastoma multiforme. The patient subsequently had a CT of the abdomen and pelvis with IV contrast and there was no definite evidence of primary or metastatic disease.      The patient was seen by Neurosurgeon, Dr. Brower and started on dexamethasone and Keppra for anti-seizure purpose. This patient went on to have right craniotomy on 04/07/2019.      Preliminary pathology evaluation reported high grade glioma, fits with GBM. According to pathologist Dr. Macias, final pathology evaluation is  still pending and likely the sample will be sent to [facility] for further molecular study.      Prior to this admission, patient was physically active, has regular exercise on a daily basis prior to this hospitalization.  Does not take prescribed medication.     No family history of malignancy.           Medical History        Past Medical History:   Diagnosis Date   • ED (erectile dysfunction)           Surgical History         Past Surgical History:   Procedure Laterality Date   • CRANIOTOMY FOR TUMOR Right 4/7/2019     Procedure: RIGHT SIDE CRANIOTOMY FOR TUMOR REMOVAL AND  BIOPSY  WITH SIDNEY NAVIGATTION AND PARTIAL RIGHT FRONTAL LOBE LOBECTOMY;  Surgeon: Keshawn Brower MD;  Location: [facility];  Service: Neurosurgery            HEMATOLOGIC/ONCOLOGIC HISTORY:  (History from previous dates can be found in the separate document.)  See HPI     MEDICATIONS: see EMR      ALLERGIES:           Allergies   Allergen Reactions   • Sulfa Antibiotics Unknown (See Comments)       Unknown         SOCIAL HISTORY:       Patient is , [employer] .  Never smoked cigarettes.  Very rare social drinker.         FAMILY HISTORY:  Father is alive in his middle 90s, has hypertension and BPH, with him during Lobo catheter, and episodes of urinary tract infection.  Mother in her mid 90s with arthritis and osteoporosis and hypertension.  No family history of malignancy.        REVIEW OF SYSTEMS:  Review of Systems   Constitutional: Negative for appetite change, diaphoresis, fatigue, fever and unexpected weight change.   HENT: Positive for sinus pressure. Negative for facial swelling.    Eyes: Negative for photophobia and visual disturbance.   Respiratory: Negative for cough and shortness of breath.    Cardiovascular: Negative for chest pain and palpitations.   Gastrointestinal: Positive for constipation (No bowel movement in the past several days.) and nausea (This has much improved). Negative for abdominal pain and blood  in stool.   Endocrine: Negative for cold intolerance.   Genitourinary: Negative for hematuria.   Musculoskeletal: Negative for arthralgias and joint swelling.   Allergic/Immunologic: Negative for food allergies.   Neurological: Positive for headaches. Negative for seizures, syncope and weakness.   Hematological: Negative for adenopathy. Does not bruise/bleed easily.   Psychiatric/Behavioral: Negative for agitation and hallucinations.                 Objective        Vitals:    04/09/19 1402 04/09/19 1503 04/09/19 1602 04/09/19 1703   BP: 109/66 108/79 104/61 103/60   Pulse:  60 59 59   Resp:       Temp:  97.9 °F (36.6 °C)     TempSrc:  Oral     SpO2: 98% 98% 98% 99%   Weight:       Height:           PHYSICAL EXAM:       CONSTITUTIONAL: Well-developed well-nourished male.  Vital signs reviewed.  No distress, status post craniectomy with dressing in place.  EYES:  Conjunctiva and lids unremarkable.   EARS,NOSE,MOUTH,THROAT:  Nose appear unremarkable.  Lips appear unremarkable.  RESPIRATORY:  Normal respiratory effort.  Lungs clear to auscultation bilaterally.  CARDIOVASCULAR: Regular rhythm and rate.  Normal S1, S2.  No murmurs rubs or gallops.  No significant lower extremity edema.  GASTROINTESTINAL: Abdomen appears unremarkable.  Nontender.  No hepatomegaly.  No splenomegaly.  LYMPHATIC:  No cervical, supraclavicular, axillary lymphadenopathy.  MUSCULOSKELETAL:   Unremarkable digits/nails.  No cyanosis or clubbing.  SKIN:  Warm.  No rashes.  PSYCHIATRIC:  Normal judgment and insight.  Normal mood and affect.        RECENT LABS:     Results from last 7 days   Lab Units 04/09/19  0502 04/08/19  0430 04/07/19  1353   WBC 10*3/mm3 15.16* 15.54* 12.26*   HEMOGLOBIN g/dL 12.4* 11.5* 11.2*   HEMATOCRIT % 37.2* 35.2* 34.0*   PLATELETS 10*3/mm3 295 307 292     Results from last 7 days   Lab Units 04/08/19  0430 04/07/19  1353 04/06/19  0638 04/05/19  0544   SODIUM mmol/L 141 141 138 139   POTASSIUM mmol/L 4.0 3.8 4.3 4.4    CHLORIDE mmol/L 103 102 103 100   CO2 mmol/L 23.9 26.0 25.1 27.8   BUN mg/dL 19 20 17 19   CREATININE mg/dL 0.74* 0.80 0.71* 0.80   CALCIUM mg/dL 8.6 8.4* 8.8 9.2   BILIRUBIN mg/dL  --   --   --  0.5   ALK PHOS U/L  --   --   --  71   ALT (SGPT) U/L  --   --   --  111*   AST (SGOT) U/L  --   --   --  69*   GLUCOSE mg/dL 110* 135* 163* 119*     Results from last 7 days   Lab Units 04/06/19  0638   INR  1.16*        Assessment/Plan         1.  Patient is a 63 years old male was in good health condition before this admission, he looks younger than his stated age.  He has new onset headache and now newly diagnosed GBM, with a large right frontal lobe mass with surrounding edema, status post right craniotomy on 4/7/2019.  Final pathology results is pending.  His sample likely will be sent to [facility] for molecular study.      Patient was already seen by radiation oncologist Dr. Philippe on 4/8/2019.      On 4/8/2019, I discussed with the patient and his wife today, that pending further molecular genetic study, we will decide whether this patient will be a good candidate for clinical trial, or going straight with standard concurrent chemoradiation therapy. The patient was already seen by radiation oncologist Shanti Philippe MD earlier this afternoon. I briefly discussed with them for concurrent chemotherapy using temozolomide. He will have subsequent maintenance temozolomide treatment for 5 days every 4 weeks.      We will follow along with him for further pathology results, to finalize treatment plan.  I also discussed with them that if they prefer to have a second opinion from other Center, will be happy to make appointment.     Pathology evaluation reported GBM grade 4.  Sent for further center to [facility] for evaluation.    I discussed with the patient again today on 4/9/2019 about plan for future treatment.  Patient voiced he does want to have a second opinion evaluation at [facility] [place].  I think  that is reasonable.  I will ask my office to arrange appointment with [facility] [place] towards the end of next week, expecting patient will be discharged later this week.  And I will arrange patient come back to see me in 2 weeks, after he has been seen at [facility] [place].  Patient is agreeable with this arrangement.         2.  Leukocytosis, this is secondary to steroids use.      3.  Mild anemia, this is post surgery.  This patient previously has borderline normal hemoglobin fluctuating around 14.0 g in the past few years.     4.  Constipation for the past several days.  Switched as needed Colace to scheduled twice a day.  We also started patient on MiraLAX once a day.     Discussed with the patient today.      I discussed with his nurse today for referral to [facility].     We will follow as needed.      JUSTIN FROST M.D., Ph.D.     4/9/2019        Dictated using Dragon Dictation.

## 2019-04-09 NOTE — PROGRESS NOTES
Name: Hernandez Waterman ADMIT: 2019   : 1955  PCP: Megha Gant MD    MRN: [id number] LOS: 4 days   AGE/SEX: 63 y.o. male    ROOM: [facility]   Subjective   Status post craniotomy and debulking of glioblastoma  Patient is awake and alert  Moves all extremities   Speech intact  Drain out    Brief hospital course since admission:  Brain metastases  With vasogenic edema    I have reviewed past medical history, family history, social history and allergies.  No changes from admission note.      Review of Systems   Constitutional: Negative for fatigue and fever.   Respiratory: Negative for shortness of breath and wheezing.    Gastrointestinal: Negative for nausea and vomiting.   Neurological: Negative for headaches.          Objective   Vital Signs  Temp:  [97.4 °F (36.3 °C)-98.8 °F (37.1 °C)] 97.9 °F (36.6 °C)  Heart Rate:  [43-65] 59  BP: (103-149)/(60-94) 103/60  SpO2:  [92 %-100 %] 99 %  on  Flow (L/min):  [2] 2;   Device (Oxygen Therapy): room air  Body mass index is 22.92 kg/m².    Intake/Output Summary (Last 24 hours) at 2019 1827  Last data filed at 2019 0600  Gross per 24 hour   Intake 545 ml   Output 920 ml   Net -375 ml       Physical Exam   Constitutional: He is oriented to person, place, and time. He appears well-developed and well-nourished. No distress.   HENT:   Head: Normocephalic and atraumatic.   Eyes: Pupils are equal, round, and reactive to light. No scleral icterus.   Cardiovascular: Normal rate and regular rhythm.   Pulmonary/Chest: Effort normal. No respiratory distress. He has no decreased breath sounds. He has no wheezes.   Abdominal: Soft. Bowel sounds are normal. There is no hepatosplenomegaly.   Neurological: He is alert and oriented to person, place, and time.   Skin: No cyanosis. Nails show no clubbing.   Psychiatric: He has a normal mood and affect. His behavior is normal.       Results Review:    Results from last 7 days   Lab Units 19  0502 19  0430  04/07/19  1353   WBC 10*3/mm3 15.16* 15.54* 12.26*   HEMOGLOBIN g/dL 12.4* 11.5* 11.2*   HEMATOCRIT % 37.2* 35.2* 34.0*   PLATELETS 10*3/mm3 295 307 292     Results from last 7 days   Lab Units 04/08/19  0430 04/07/19  1353 04/06/19  0638   SODIUM mmol/L 141 141 138   POTASSIUM mmol/L 4.0 3.8 4.3   CHLORIDE mmol/L 103 102 103   CO2 mmol/L 23.9 26.0 25.1   BUN mg/dL 19 20 17   CREATININE mg/dL 0.74* 0.80 0.71*   GLUCOSE mg/dL 110* 135* 163*   CALCIUM mg/dL 8.6 8.4* 8.8     Results from last 7 days   Lab Units 04/06/19  0638   INR  1.16*     Hemoglobin A1C:No results found for: HGBA1C  Glucose Range:  Glucose   Date/Time Value Ref Range Status   04/09/2019 1540 149 (H) 70 - 130 mg/dL Final   04/09/2019 1102 143 (H) 70 - 130 mg/dL Final   04/09/2019 0722 126 70 - 130 mg/dL Final   04/08/2019 2027 173 (H) 70 - 130 mg/dL Final   04/08/2019 1832 122 70 - 130 mg/dL Final   04/08/2019 1111 169 (H) 70 - 130 mg/dL Final         dexamethasone 10 mg Intravenous Once   dexamethasone 4 mg Oral Q6H   docusate sodium 100 mg Oral BID   famotidine 20 mg Oral BID   insulin lispro 0-7 Units Subcutaneous 4x Daily With Meals & Nightly   levETIRAcetam 500 mg Oral Q12H   polyethylene glycol 17 g Oral Daily       niCARdipine 5-15 mg/hr   Diet Regular  Assessment/Plan       Assessment/Plan      Active Hospital Problems    Diagnosis  POA   • **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 (CMS/HCC) [D49.6]  Yes   • GBM (glioblastoma multiforme) (CMS/HCC) [C71.9]  Unknown   • Leukemoid reaction [D72.823]  Unknown   • Anemia [D64.9]  Unknown   • Constipation [K59.00]  Unknown   • Acute intractable headache [R51]  Yes   • Cerebral edema (CMS/HCC) [G93.6]  Unknown      Resolved Hospital Problems   No resolved problems to display.     Continue Decadron and Keppra  Glioblastoma status post debulking and craniotomy with biopsy  Monitor in ICU  Oncology consultation    Transfer out of ICU      Zev Weldon MD  [facility]  04/09/19

## 2019-04-09 NOTE — PLAN OF CARE
Problem: Patient Care Overview  Goal: Plan of Care Review  Outcome: Ongoing (interventions implemented as appropriate)   04/09/19 1104   Coping/Psychosocial   Plan of Care Reviewed With patient   OTHER   Outcome Summary Pt presented to physical therapy with L sided weakness, decreased functional mobility, decreased balance and difficulty walking following hospital admission for glioblastoma, and R frontal mass craniotomy 4/7. Pt was previously independent with all mobility and transferred to stand w/min Ax2, and ambulated 75' w/min Ax2 before requiring a rest break. L lateral and posterior lean were exhibited in sitting and standing, and increased with fatigue. L toe drag was also noted in ambulation. PT will continue to follow to address strength, gait and balance deficits. Recommend DC to IRF at this time.

## 2019-04-10 NOTE — PLAN OF CARE
Problem: Patient Care Overview  Goal: Plan of Care Review  Outcome: Ongoing (interventions implemented as appropriate)   04/07/19 0145 04/09/19 2005 04/10/19 0445   Coping/Psychosocial   Plan of Care Reviewed With --  patient --    Plan of Care Review   Progress no change --  --    OTHER   Outcome Summary --  --  [facility]. A&O, responses are slowed. Voided spontqaneous in urinal. Incision site, CDI, no drainage. Will conitnue to monitor. No acute changes.        Problem: Fall Risk (Adult)  Goal: Identify Related Risk Factors and Signs and Symptoms  Outcome: Ongoing (interventions implemented as appropriate)      Problem: Confusion, Acute (Adult)  Goal: Cognitive/Functional Impairments Minimized  Outcome: Ongoing (interventions implemented as appropriate)    Goal: Safety  Outcome: Ongoing (interventions implemented as appropriate)      Problem: Skin Injury Risk (Adult)  Goal: Identify Related Risk Factors and Signs and Symptoms  Outcome: Ongoing (interventions implemented as appropriate)

## 2019-04-10 NOTE — PLAN OF CARE
Problem: Patient Care Overview  Goal: Plan of Care Review  Outcome: Ongoing (interventions implemented as appropriate)   04/10/19 1203 04/10/19 1802   Coping/Psychosocial   Plan of Care Reviewed With --  patient;spouse   Plan of Care Review   Progress improving --    OTHER   Outcome Summary --  Pt alert and oriented x4, medicated 1x for pain. Headache behind rt eye. Denies vision changes. Pt states this pain is ongoing. Up with PT and OT today. Appetite well, Up to chair with assist of 1. Possible DC in AM.      Goal: Individualization and Mutuality  Outcome: Ongoing (interventions implemented as appropriate)    Goal: Discharge Needs Assessment  Outcome: Ongoing (interventions implemented as appropriate)    Goal: Interprofessional Rounds/Family Conf  Outcome: Ongoing (interventions implemented as appropriate)      Problem: Skin Injury Risk (Adult)  Goal: Identify Related Risk Factors and Signs and Symptoms  Outcome: Ongoing (interventions implemented as appropriate)

## 2019-04-10 NOTE — PROGRESS NOTES
Name: eHrnandez Waterman ADMIT: 2019   : 1955  PCP: Megha Gant MD    MRN: [id number] LOS: 5 days   AGE/SEX: 63 y.o. male    ROOM: [facility]   Subjective   Status post craniotomy and debulking of glioblastoma  Patient is awake and alert  Moves all extremities   Speech intact  Drain out    Brief hospital course since admission:  Brain metastases  With vasogenic edema, on tapering dose of decadron  S/p craniotomy with debulking of the tumor.    I have reviewed past medical history, family history, social history and allergies.  No changes from admission note.      Review of Systems   Constitutional: Negative for fatigue and fever.   Respiratory: Negative for shortness of breath and wheezing.    Gastrointestinal: Negative for nausea and vomiting.   Neurological: Negative for headaches.          Objective   Vital Signs  Temp:  [97.6 °F (36.4 °C)-98.6 °F (37 °C)] 97.6 °F (36.4 °C)  Heart Rate:  [47-64] 60  Resp:  [15-17] 17  BP: (103-131)/(60-79) 117/71  SpO2:  [95 %-99 %] 98 %  on   ;   Device (Oxygen Therapy): room air  Body mass index is 22.92 kg/m².    Intake/Output Summary (Last 24 hours) at 4/10/2019 1340  Last data filed at 4/10/2019 0620  Gross per 24 hour   Intake 480 ml   Output 1490 ml   Net -1010 ml       Physical Exam   Constitutional: He is oriented to person, place, and time. He appears well-developed and well-nourished. No distress.   HENT:   Head: Normocephalic and atraumatic.   Eyes: Pupils are equal, round, and reactive to light. No scleral icterus.   Cardiovascular: Normal rate and regular rhythm.   Pulmonary/Chest: Effort normal. No respiratory distress. He has no decreased breath sounds. He has no wheezes.   Abdominal: Soft. Bowel sounds are normal. There is no hepatosplenomegaly.   Neurological: He is alert and oriented to person, place, and time.   Left upper extremity weakness     Skin: No cyanosis. Nails show no clubbing.   Psychiatric: He has a normal mood and affect. His  behavior is normal.       Results Review:    Results from last 7 days   Lab Units 04/09/19  0502 04/08/19  0430 04/07/19  1353   WBC 10*3/mm3 15.16* 15.54* 12.26*   HEMOGLOBIN g/dL 12.4* 11.5* 11.2*   HEMATOCRIT % 37.2* 35.2* 34.0*   PLATELETS 10*3/mm3 295 307 292     Results from last 7 days   Lab Units 04/08/19  0430 04/07/19  1353 04/06/19  0638   SODIUM mmol/L 141 141 138   POTASSIUM mmol/L 4.0 3.8 4.3   CHLORIDE mmol/L 103 102 103   CO2 mmol/L 23.9 26.0 25.1   BUN mg/dL 19 20 17   CREATININE mg/dL 0.74* 0.80 0.71*   GLUCOSE mg/dL 110* 135* 163*   CALCIUM mg/dL 8.6 8.4* 8.8     Results from last 7 days   Lab Units 04/06/19  0638   INR  1.16*     Hemoglobin A1C:No results found for: HGBA1C  Glucose Range:  Glucose   Date/Time Value Ref Range Status   04/10/2019 1058 122 70 - 130 mg/dL Final   04/10/2019 0756 100 70 - 130 mg/dL Final   04/09/2019 2039 162 (H) 70 - 130 mg/dL Final   04/09/2019 1540 149 (H) 70 - 130 mg/dL Final   04/09/2019 1102 143 (H) 70 - 130 mg/dL Final   04/09/2019 0722 126 70 - 130 mg/dL Final         dexamethasone 10 mg Intravenous Once   dexamethasone 4 mg Oral Q6H   docusate sodium 100 mg Oral BID   famotidine 20 mg Oral BID   insulin lispro 0-7 Units Subcutaneous 4x Daily With Meals & Nightly   levETIRAcetam 500 mg Oral Q12H   polyethylene glycol 17 g Oral Daily       niCARdipine 5-15 mg/hr   Diet Regular  Assessment/Plan       Assessment/Plan      Active Hospital Problems    Diagnosis  POA   • **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 (CMS/HCC) [D49.6]  Yes   • GBM (glioblastoma multiforme) (CMS/HCC) [C71.9]  Unknown   • Leukemoid reaction [D72.823]  Unknown   • Anemia [D64.9]  Unknown   • Constipation [K59.00]  Unknown   • Acute intractable headache [R51]  Yes   • Cerebral edema (CMS/HCC) [G93.6]  Unknown      Resolved Hospital Problems   No resolved problems to display.     Continue Decadron and Keppra  Glioblastoma status post debulking and craniotomy with  biopsy  Monitor in ICU  Consultants are following namely oncology, radiation oncology and neuro-oncology.  Patient and his wife express that they want to get a second opinion at [facility] and waiting for final pathology report  PT and OT working with him and CCP working on discharge needs    Likely discharge on Friday        Zev Weldon MD  [facility]  04/10/19

## 2019-04-10 NOTE — CONSULTS
Neuro-oncology Service  Consultation was received while I was out of town on vacation.  I discussed with DR Brower at the time: I would like to awaited final path to do complete Neuro-onc consultation.  I just arrived back this evening.  I have reviewed patient's chart: he has had GTR by post-op MRI  Pathology has been sent to [facility]: I will harvey final path but will see sooner if necessary.  Caroline Luther MD  Neuro-oncology Service    No charge    ADDENDUM 04/12/2019   I was called to come and meet with the fmaily which I hve done  I answered their questions  I reviewed with them in details the process of neuro-oncology consultation and the importance of waiting for final path with molecular profile to come back from [place]. WE are not losing time on initiation of treatment.  They have my contact and I will reach out with full detailed consultation as soon as I have needed pathology for best decision making.    No Charge

## 2019-04-10 NOTE — NURSING NOTE
Talked with pt and spouse about rehab. They are both hoping to just go home. Spouse available to assist 24/7. Are agreeable for us to continue to follow and pursue insurance precert if inpatient rehab needed. Thanks, Ajay RE[facility] rehab admission nurse [phone]

## 2019-04-10 NOTE — THERAPY TREATMENT NOTE
Acute Care - Physical Therapy Treatment Note  [facility]     Patient Name: Hernandez Waterman  : 1955  MRN: [id number]  Today's Date: 4/10/2019  Onset of Illness/Injury or Date of Surgery: 19     Referring Physician: German    Admit Date: 2019    Visit Dx:    ICD-10-CM ICD-9-CM   1. Acute intractable headache, unspecified headache type R51 784.0   2. Brain mass G93.9 348.9   3. Right frontal lobe mass G93.9 348.9   4. 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 (CMS/HCC)   • Cerebral edema (CMS/HCC)   • GBM (glioblastoma multiforme) (CMS/HCC)   • Leukemoid reaction   • Anemia   • Constipation       Therapy Treatment    Rehabilitation Treatment Summary     Row Name 04/10/19 1042             Treatment Time/Intention    Discipline  physical therapy assistant  -      Document Type  therapy note (daily note)  -      Subjective Information  complains of;weakness;fatigue;pain  -      Care Plan Review  patient/other agree to care plan  -      Care Plan Review, Other Participant(s)  spouse  -      Existing Precautions/Restrictions  fall  -      Treatment Considerations/Comments  L side wkness  -      Recorded by [] Chelo Sanford PTA 04/10/19 1056      Row Name 04/10/19 1042             Bed Mobility Assessment/Treatment    Scooting/Bridging Arminto (Bed Mobility)  contact guard;supervision;verbal cues  -      Supine-Sit Arminto (Bed Mobility)  contact guard;supervision;verbal cues  -      Assistive Device (Bed Mobility)  bed rails  -      Comment (Bed Mobility)  educ on pushing hips forw w/hands behind him instead of head first w/scooting to EOB   -JM      Recorded by [JM] Chelo Sanford PTA 04/10/19 1056      Row Name 04/10/19 1042             Sit-Stand Transfer    Sit-Stand Ferguson (Transfers)  2 person assist;contact guard  -      Assistive Device (Sit-Stand Transfers)  -- HHA-2; left and posterior lean noted  -JM      Recorded by [JM] Chelo Sanford PTA 04/10/19 1056      Row Name 04/10/19 1042             Stand-Sit Transfer    Stand-Sit Ferguson (Transfers)  contact guard  -      Assistive Device (Stand-Sit Transfers)  walker, front-wheeled  -      Recorded by [] Chelo Sanford PTA 04/10/19 1056      Row Name 04/10/19 1042             Gait/Stairs Assessment/Training    Ferguson Level (Gait)  2 person assist;minimum assist (75% patient effort);contact guard;verbal cues;nonverbal cues (demo/gesture)  -      Assistive Device (Gait)  walker, front-wheeled initially HHA-2, pt wants to be more indep, but not w/HHA-1  -      Distance in Feet (Gait)  20, 80   -JM2      Deviations/Abnormal Patterns (Gait)  base of support, narrow;stride length decreased  -      Bilateral Gait Deviations  leans left;weight shift ability decreased  -      Left Sided Gait Deviations  foot drop/toe drag;knee buckling, left side incr w/fatigue and gt dist  -JM      Comment (Gait/Stairs)  assist to guide rwx and cues for foot placement near end of amb  -JM      Recorded by [] Chelo Sanford PTA 04/10/19 1056  [JM2] Chelo Sanford [facility] 04/10/19 1059      Row Name 04/10/19 1042             Positioning and Restraints    Pre-Treatment Position  in bed  -JM      In Chair  sitting;with family/caregiver;with OT;exit alarm on OT to recline when finished  -JM      Recorded by [] Chelo Sanford PTA 04/10/19 1056      Row Name 04/10/19 1042             Pain Scale: Numbers Pre/Post-Treatment    Pain Location  head  -JM      Recorded by [JM] Chelo Sanford PTA 04/10/19 1056      Row Name 04/10/19 1042             Pain Scale: Word  Pre/Post-Treatment    Pain: Word Scale, Pretreatment  2 - mild pain  -SCAR      Pain: Word Scale, Post-Treatment  2 - mild pain  -SCAR      Recorded by [SCAR] Chelo Sanford PTA 04/10/19 1056      Row Name                Wound 04/07/19 1233 Right head incision    Wound - Properties Group Date first assessed: 04/07/19 [LD] Time first assessed: 1233 [LD] Side: Right [LD] Location: head [LD] Type: incision [LD] Recorded by:  [THUAN] Monet Rivers RN 04/07/19 1233      User Key  (r) = Recorded By, (t) = Taken By, (c) = Cosigned By    Initials Name Effective Dates Discipline     Chelo Sanford PTA 03/07/18 -  PT    Monet Puga RN 06/16/16 -  Nurse          Wound 04/07/19 1233 Right head incision (Active)   Dressing Appearance dry;intact;no drainage 4/9/2019  8:05 PM   Closure RODRIGO 4/9/2019  8:05 PM   Edges rolled/closed 4/9/2019  8:05 PM   Drainage Amount none 4/9/2019  8:05 PM           Physical Therapy Education     Title: PT OT SLP Therapies (In Progress)     Topic: Physical Therapy (Done)     Point: Mobility training (Done)     Learning Progress Summary           Patient Eager, E,TB,D, VU,NR by SCAR at 4/10/2019 10:59 AM    Acceptance, E,TB, VU by RUSSELL at 4/10/2019  4:44 AM    Acceptance, E,TB, VU,NR by JO at 4/9/2019 11:04 AM   Family Eager, E,TB,D, VU,NR by SCAR at 4/10/2019 10:59 AM                   Point: Home exercise program (Done)     Learning Progress Summary           Patient Eager, E,TB,D, VU,NR by SCAR at 4/10/2019 10:59 AM    Acceptance, E,TB, VU by RUSSELL at 4/10/2019  4:44 AM    Acceptance, E,TB, VU,NR by JO at 4/9/2019 11:04 AM   Family Eager, E,TB,D, VU,NR by SCAR at 4/10/2019 10:59 AM                   Point: Body mechanics (Done)     Learning Progress Summary           Patient Eager, E,TB,D, VU,NR by SCAR at 4/10/2019 10:59 AM    AcceptanceMAXINE TB, VU by RUSSELL at 4/10/2019  4:44 AM    AcceptanceMAXINE TB, VU,NR by JO at 4/9/2019 11:04 AM   Family MAXINE Sena TB, D, VU,NR by SCAR at 4/10/2019 10:59 AM                    Point: Precautions (Done)     Learning Progress Summary           Patient Eager, E,TB,D, VU,NR by SCAR at 4/10/2019 10:59 AM    Acceptance, E,TB, VU by RUSSELL at 4/10/2019  4:44 AM    Acceptance, E,TB, VU,NR by CG at 4/9/2019 11:04 AM   Family Eager, E,TB,D, VU,NR by SCAR at 4/10/2019 10:59 AM                               User Key     Initials Effective Dates Name Provider Type Discipline     03/07/18 -  Chelo Sanford, PRESTON Physical Therapy Assistant PT    KT 01/18/18 -  Taniya Taylor, RN Registered Nurse Nurse    CG 02/04/19 -  James Bird PT Student PT Student PT                PT Recommendation and Plan     Plan of Care Reviewed With: patient, spouse  Progress: improving  Outcome Summary: pt very anxious to do well, educ on slowing pace for safety, has high expectations too soon and ed offered to pt/fam  Outcome Measures     Row Name 04/10/19 1100 04/09/19 1500 04/09/19 1100       How much help from another person do you currently need...    Turning from your back to your side while in flat bed without using bedrails?  4  -JM  --  3  -CH (r) CG (t) CH (c)    Moving from lying on back to sitting on the side of a flat bed without bedrails?  4  -JM  --  3  -CH (r) CG (t) CH (c)    Moving to and from a bed to a chair (including a wheelchair)?  3  -JM  --  3  -CH (r) CG (t) CH (c)    Standing up from a chair using your arms (e.g., wheelchair, bedside chair)?  3  -JM  --  3  -CH (r) CG (t) CH (c)    Climbing 3-5 steps with a railing?  2  -JM  --  2  -CH (r) CG (t) CH (c)    To walk in hospital room?  3  -JM  --  3  -CH (r) CG (t) CH (c)    AM-PAC 6 Clicks Score  19  -JM  --  17  -CH (r) CG (t)       How much help from another is currently needed...    Putting on and taking off regular lower body clothing?  --  2  -SG  --    Bathing (including washing, rinsing, and drying)  --  2  -SG  --    Toileting (which includes using toilet bed pan or urinal)  --  2  -SG  --    Putting on and taking off regular upper body  clothing  --  3  -SG  --    Taking care of personal grooming (such as brushing teeth)  --  3  -SG  --    Eating meals  --  3  -SG  --    Score  --  15  -SG  --       Functional Assessment    Outcome Measure Options  --  AM-PAC 6 Clicks Daily Activity (OT)  -SG  AM-PAC 6 Clicks Basic Mobility (PT)  -CH (r) CG (t) CH (c)      User Key  (r) = Recorded By, (t) = Taken By, (c) = Cosigned By    Initials Name Provider Type     Louise Marlow, OTR Occupational Therapist    Peyton Che, PT Physical Therapist    Chelo Andrews, PRE[place] Physical Therapy Assistant    CG James Bird, PT Student PT Student         Time Calculation:   PT Charges     Row Name 04/10/19 1100             Time Calculation    Start Time  1015  -      Stop Time  1038  -      Time Calculation (min)  23 min  -      PT Received On  04/10/19  -      PT - Next Appointment  04/11/19  -         Time Calculation- PT    Total Timed Code Minutes- PT  23 minute(s)  -        User Key  (r) = Recorded By, (t) = Taken By, (c) = Cosigned By    Initials Name Provider Type    Chelo Andrews PTA Physical Therapy Assistant        Therapy Charges for Today     Code Description Service Date Service Provider Modifiers Qty    05107695571 HC PT THER PROC EA 15 MIN 4/10/2019 Chelo Sanford PTA GP 2    69580147653 HC PT THER SUPP EA 15 MIN 4/10/2019 Chelo Sanford PTA GP 1          PT G-Codes  Outcome Measure Options: AM-PAC 6 Clicks Daily Activity (OT)  AM-PAC 6 Clicks Score: 19  Score: 15    Chelo Sanford PTA  4/10/2019

## 2019-04-10 NOTE — PLAN OF CARE
Problem: Patient Care Overview  Goal: Plan of Care Review  Outcome: Ongoing (interventions implemented as appropriate)   04/10/19 5761   Coping/Psychosocial   Plan of Care Reviewed With patient;spouse   Plan of Care Review   Progress improving   OTHER   Outcome Summary pt very anxious to do well, educ on slowing pace for safety, has high expectations too soon and ed offered to pt/fam

## 2019-04-10 NOTE — PROGRESS NOTES
Postoperative visit    Doing okay. No headache. Has talked with medical and radiation oncology. Aware of biopsy results.    .  Vitals:    04/10/19 1303   BP: 117/71   Pulse: 60   Resp: 17   Temp: 97.6 °F (36.4 °C)   SpO2: 98%        4/8/2019 04:30 4/9/2019 05:02   WBC 15.54 (H) 15.16 (H)       AA&O x 3. Appropriate    Pathology consistent with grade 4 glioblastoma; specimen was also sent to [facility] for further testing.        POD 3 right frontal craniotomy with partial right frontal lobectomy for removal and biopsy of tumor  Cerebral edema  Path consistent with GBM  Leukocytosis      Decadron decreased to 4 mg q 6 yesterday  CBC am  Continue to mobilize  Oncologic Neurologist, Dr. Luther has seen   Continue Keppra  Will be available if needed. We will see in office again in 2-3 weeks.

## 2019-04-10 NOTE — THERAPY TREATMENT NOTE
Acute Care - Occupational Therapy Progress Note  [facility]     Patient Name: Hernandez Waterman  : 1955  MRN: [id number]  Today's Date: 4/10/2019  Onset of Illness/Injury or Date of Surgery: 19  Date of Referral to OT: 19  Referring Physician: German    Admit Date: 2019       ICD-10-CM ICD-9-CM   1. Acute intractable headache, unspecified headache type R51 784.0   2. Brain mass G93.9 348.9   3. Right frontal lobe mass G93.9 348.9   4. 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 (CMS/HCC)   • Cerebral edema (CMS/HCC)   • GBM (glioblastoma multiforme) (CMS/HCC)   • Leukemoid reaction   • Anemia   • Constipation     Past Medical History:   Diagnosis Date   • ED (erectile dysfunction)      Past Surgical History:   Procedure Laterality Date   • CRANIOTOMY FOR TUMOR Right 2019    Procedure: RIGHT SIDE CRANIOTOMY FOR TUMOR REMOVAL AND  BIOPSY  WITH SIDNEY NAVIGATTION AND PARTIAL RIGHT FRONTAL LOBE LOBECTOMY;  Surgeon: Keshawn Brower MD;  Location: [facility];  Service: Neurosurgery       Therapy Treatment    Rehabilitation Treatment Summary     Row Name 04/10/19 1149 04/10/19 1042          Treatment Time/Intention    Discipline  occupational therapist  -SG  physical therapy assistant  -     Document Type  therapy note (daily note)  -  therapy note (daily note)  -     Subjective Information  complains of;fatigue  -  complains of;weakness;fatigue;pain  -     Mode of Treatment  individual therapy;occupational therapy  -  --     Care Plan Review  care plan/treatment goals reviewed  -  patient/other agree to care plan  -     Care Plan Review, Other  Participant(s)  spouse  -SG  spouse  -JM     Therapy Frequency (OT Eval)  5 times/wk  -SG  --     Patient Effort  good  -SG  --     Existing Precautions/Restrictions  fall  -SG  fall  -JM     Treatment Considerations/Comments  --  L side wkness  -JM     Recorded by [SG] Ciera Lieberman, OTR 04/10/19 1203 [JM] Chelo Sanford, PTA 04/10/19 1056     Row Name 04/10/19 1149             Cognitive Assessment/Intervention- PT/OT    Orientation Status (Cognition)  oriented x 3  -SG      Follows Commands (Cognition)  follows one step commands;repetition of directions required;increased processing time needed  -SG      Recorded by [SG] Ciera Lieberman, OTR 04/10/19 1203      Row Name 04/10/19 1042             Bed Mobility Assessment/Treatment    Scooting/Bridging Centuria (Bed Mobility)  contact guard;supervision;verbal cues  -      Supine-Sit Cent[place] (Bed Mobility)  contact guard;supervision;verbal cues  -      Assistive Device (Bed Mobility)  bed rails  -      Comment (Bed Mobility)  educ on pushing hips forw w/hands behind him instead of head first w/scooting to EOB  -JM      Recorded by [JM] Chelo Sanford, [facility] 04/10/19 1056      Row Name 04/10/19 1149             Transfer Assessment/Treatment    Transfer Assessment/Treatment  sit-stand transfer  -SG      Recorded by [SG] Ciera Lieberman, OTR 04/10/19 1203      Row Name 04/10/19 1149 04/10/19 1042          Sit-Stand Transfer    Sit-Stand [place] (Transfers)  dependent (less than 25% patient effort);verbal cues;nonverbal cues (demo/gesture) unable to stand from chair with assist of one after walking  -SG  2 person assist;contact guard  -     Assistive Device (Sit-Stand Transfers)  walker, front-wheeled pt c/o fatigue,   -SG  -- HHA-2; left and posterior lean noted  -JM     Recorded by [SG] Ciera Lieberman, OTR 04/10/19 1203 [JM] Chelo Sanford, PTA 04/10/19 1056     Row Name 04/10/19 1042             Stand-Sit Transfer    Stand-Sit  Herrin (Transfers)  contact guard  -      Assistive Device (Stand-Sit Transfers)  walker, front-wheeled  -      Recorded by [JM] Chelo Sanford [facility] 04/10/19 1056      Row Name 04/10/19 1042             Gait/Stairs Assessment/Training    Herrin Level (Gait)  2 person assist;minimum assist (75% patient effort);contact guard;verbal cues;nonverbal cues (demo/gesture)  -      Assistive Device (Gait)  walker, front-wheeled initially HHA-2, pt wants to be more indep, but not w/HHA-1  -      Distance in Feet (Gait)  20, 80   -JM2      Deviations/Abnormal Patterns (Gait)  base of support, narrow;stride length decreased  -      Bilateral Gait Deviations  leans left;weight shift ability decreased  -      Left Sided Gait Deviations  foot drop/toe drag;knee buckling, left side incr w/fatigue and gt dist  -      Comment (Gait/Stairs)  assist to guide rwx and cues for foot placement near end of amb  -JM      Recorded by [JM] Chelo Sanford [facility] 04/10/19 1056  [JM2] Chelo Sanford [facility] 04/10/19 1059      Row Name 04/10/19 1149             ADL Assessment/Intervention    BADL Assessment/Intervention  upper body dressing;lower body dressing  -SG      Recorded by [SG] Ciera Lieberman OTR 04/10/19 1203      Row Name 04/10/19 1149             Upper Body Dressing Assessment/Training    Upper Body Dressing Herrin Level  upper body dressing skills;doff;don;supervision  -SG      Upper Body Dressing Position  supported sitting  -SG      Recorded by [SG] Ciera Lieberman OTR 04/10/19 1203      Row Name 04/10/19 1149             Lower Body Dressing Assessment/Training    Lower Body Dressing Herrin Level  lower body dressing skills;doff;don;supervision;socks;verbal cues;nonverbal cues (demo/gesture)  -      Lower Body Dressing Position  supported sitting  -SG      Comment (Lower Body Dressing)  pt requires cues for safety but able to bring leg up to perform task  -SG      Recorded by [SG]  Ciera Lieberman, OTR 04/10/19 1203      Row Name 04/10/19 1149             BADL Safety/Performance    Impairments, BADL Safety/Performance  endurance/activity tolerance;coordination  -SG      Skilled BADL Treatment/Intervention  BADL process/adaptation training  -SG      Recorded by [SG] Ciera Lieberman, OTR 04/10/19 1203      Row Name 04/10/19 1149             Left Upper Ext    Lt Upper Extremity Comments   decreased coordination and Left attention   -SG      Recorded by [SG] Ciera Lieberman, OTR 04/10/19 1203      Row Name 04/10/19 1149             Upper Extremity Seated Therapeutic Exercise    Performed, Seated Upper Extremity (Therapeutic Exercise)  shoulder flexion/extension;elbow flexion/extension;wrist flexion/extension;digit flexion/extension  -SG      Exercise Type, Seated Upper Extremity (Therapeutic Exercise)  AROM (active range of motion)  -SG      Expected Outcomes, Seated Upper Extremity (Therapeutic Exercise)  improve performance, BADLs;improve functional tolerance, self-care activity  -SG      Recorded by [SG] Ciera Lieberman OTR 04/10/19 1203      Row Name 04/10/19 1149             Fine Motor Testing & Training    Comment, Fine Motor Coordination  instructed pt and spouse with LUE coordination activities  -SG      Recorded by [SG] Ciera Lieberman OTR 04/10/19 1203      Row Name 04/10/19 1149 04/10/19 1042          Positioning and Restraints    Pre-Treatment Position  sitting in chair/recliner  -SG  in bed  -     Post Treatment Position  chair  -SG  --     In Chair  reclined;call light within reach;encouraged to call for assist;exit alarm on;with family/caregiver  -SG  sitting;with family/caregiver;with OT;exit alarm on OT to recline when finished  -JM     Recorded by [SG] Ciera Lieberman, OTR 04/10/19 1203 [JM] Chelo Sanford, [facility] 04/10/19 1056     Row Name 04/10/19 1042             Pain Scale: Numbers Pre/Post-Treatment    Pain Location  head  -      Recorded by [JM] Chelo Sanford,  PTA 04/10/19 1056      Row Name 04/10/19 1149 04/10/19 1042          Pain Scale: Word Pre/Post-Treatment    Pain: Word Scale, Pretreatment  2 - mild pain  -SG  2 - mild pain  -JM     Pain: Word Scale, Post-Treatment  2 - mild pain  -SG  2 - mild pain  -JM     Recorded by [SG] Ciera Lieberman, OTR 04/10/19 1203 [JM] Chelo Sanford, [facility] 04/10/19 1056     Row Name 04/10/19 1149             Sensory Assessment/Intervention    Sensory General Assessment  no sensation deficits identified BUE's  -SG      Recorded by [SG] Ciera Lieberman, OTR 04/10/19 1203      Row Name                Wound 04/07/19 1233 Right head incision    Wound - Properties Group Date first assessed: 04/07/19 [LD] Time first assessed: 1233 [LD] Side: Right [LD] Location: head [LD] Type: incision [LD] Recorded by:  [LD] Monet Rivers RN 04/07/19 1233    Row Name 04/10/19 1149             Plan of Care Review    Plan of Care Reviewed With  patient;spouse  -SG      Recorded by [SG] Ciera Lieberman, OTR 04/10/19 1203        User Key  (r) = Recorded By, (t) = Taken By, (c) = Cosigned By    Initials Name Effective Dates Discipline    SG Ciera Lieberman, OTR 06/08/18 -  OT    Chelo Andrews, PTA 03/07/18 -  PT    LD Monet Rivers RN 06/16/16 -  Nurse        Wound 04/07/19 1233 Right head incision (Active)   Dressing Appearance dry;intact;no drainage 4/9/2019  8:05 PM   Closure RODRIGO 4/9/2019  8:05 PM   Edges rolled/closed 4/9/2019  8:05 PM   Drainage Amount none 4/9/2019  8:05 PM       Occupational Therapy Education     Title: PT OT SLP Therapies (In Progress)     Topic: Occupational Therapy (In Progress)     Point: ADL training (Done)     Description: Instruct learner(s) on proper safety adaptation and remediation techniques during self care or transfers.   Instruct in proper use of assistive devices.    Learning Progress Summary           Patient Acceptance, E,TB, VU by RUSSELL at 4/10/2019  4:44 AM    Acceptance, E,TB, VU by SG at 4/9/2019  3:37 PM     Comment:  LUE SROM HEP                               User Key     Initials Effective Dates Name Provider Type Discipline     12/26/18 -  Louise Marlow OTR Occupational Therapist OT    KT 01/18/18 -  Taniya Taylor, RN Registered Nurse Nurse                OT Recommendation and Plan  Therapy Frequency (OT Eval): 5 times/wk  Plan of Care Review  Plan of Care Reviewed With: patient  Plan of Care Reviewed With: patient  Outcome Summary: Pt presents with decreased coordination and strength LUE and decreased attention to left.  Pt fatigued after walking with PT and unable to stand from chair with assist of one. Pt donned gown and socks from chair with SBA and cues for safety. Will continue to benefit from OT  Outcome Measures     Row Name 04/10/19 1206 04/10/19 1100 04/09/19 1500       How much help from another person do you currently need...    Turning from your back to your side while in flat bed without using bedrails?  --  4  -JM  --    Moving from lying on back to sitting on the side of a flat bed without bedrails?  --  4  -JM  --    Moving to and from a bed to a chair (including a wheelchair)?  --  3  -JM  --    Standing up from a chair using your arms (e.g., wheelchair, bedside chair)?  --  3  -JM  --    Climbing 3-5 steps with a railing?  --  2  -JM  --    To walk in hospital room?  --  3  -JM  --    AM-PAC 6 Clicks Score  --  19  -JM  --       How much help from another is currently needed...    Putting on and taking off regular lower body clothing?  2  -SG  --  2  -SGA    Bathing (including washing, rinsing, and drying)  2  -SG  --  2  -SGA    Toileting (which includes using toilet bed pan or urinal)  2  -SG  --  2  -SGA    Putting on and taking off regular upper body clothing  3  -SG  --  3  -SGA    Taking care of personal grooming (such as brushing teeth)  3  -SG  --  3  -SGA    Eating meals  3  -SG  --  3  -SGA    Score  15  -SG  --  15  -SGA       Functional Assessment    Outcome Measure Options  --   --  AM-PAC 6 Clicks Daily Activity (OT)  -SGA    Row Name 04/09/19 1100             How much help from another person do you currently need...    Turning from your back to your side while in flat bed without using bedrails?  3  -CH (r) CG (t) CH (c)      Moving from lying on back to sitting on the side of a flat bed without bedrails?  3  -CH (r) CG (t) CH (c)      Moving to and from a bed to a chair (including a wheelchair)?  3  -CH (r) CG (t) CH (c)      Standing up from a chair using your arms (e.g., wheelchair, bedside chair)?  3  -CH (r) CG (t) CH (c)      Climbing 3-5 steps with a railing?  2  -CH (r) CG (t) CH (c)      To walk in hospital room?  3  -CH (r) CG (t) CH (c)      AM-PAC 6 Clicks Score  17  -CH (r) CG (t)         Functional Assessment    Outcome Measure Options  AM-PAC 6 Clicks Basic Mobility (PT)  -CH (r) CG (t) CH (c)        User Key  (r) = Recorded By, (t) = Taken By, (c) = Cosigned By    Initials Name Provider Type    Ciera Rubi, BRAYDENR Occupational Therapist    Louise Dumont, OTR Occupational Therapist    CH Peyton Berrios, PT Physical Therapist    Chelo Andrews, PTA Physical Therapy Assistant     James Bird, PT Student PT Student           Time Calculation:   Time Calculation- OT     Row Name 04/10/19 1210             Time Calculation- OT    OT Start Time  1037  -SG      OT Stop Time  1054  -      OT Time Calculation (min)  17 min  -      Total Timed Code Minutes- OT  17 minute(s)  -      OT Received On  04/10/19  -        User Key  (r) = Recorded By, (t) = Taken By, (c) = Cosigned By    Initials Name Provider Type    Ciera Rubi OTR Occupational Therapist        Therapy Charges for Today     Code Description Service Date Service Provider Modifiers Qty    [id number]  OT SELF CARE/MGMT/TRAIN EA 15 MIN 4/10/2019 Ciera Lieberman OTR GO 1               NOLVIA Lopez  4/10/2019

## 2019-04-10 NOTE — PLAN OF CARE
Problem: Patient Care Overview  Goal: Plan of Care Review  Outcome: Ongoing (interventions implemented as appropriate)   04/10/19 1203   Coping/Psychosocial   Plan of Care Reviewed With patient   Plan of Care Review   Progress improving   OTHER   Outcome Summary Pt presents with decreased coordination and strength LUE and decreased attention to left. Pt fatigued after walking with PT and unable to stand from chair with assist of one. Pt donned gown and socks from chair with SBA and cues for safety. Will continue to benefit from OT

## 2019-04-10 NOTE — PROGRESS NOTES
Continued Stay Note  [facility]     Patient Name: Hernandez Waterman  MRN: [id number]  Today's Date: 4/10/2019    Admit Date: 4/5/2019    Discharge Plan     Row Name 04/10/19 0693       Plan    Plan  Home with family    Patient/Family in Agreement with Plan  yes    Plan Comments  Anticiapated DC Friday 4/12. Walker ordered and sent to [facility]. requested to be delivered to room. No other needs identified.          Ciera Metzger RN

## 2019-04-11 NOTE — PROGRESS NOTES
Continued Stay Note  [facility]     Patient Name: Hernandez Waterman  MRN: [id number]  Today's Date: 4/11/2019    Admit Date: 4/5/2019    Discharge Plan     Row Name 04/11/19 1019       Plan    Plan  Home with HH    Patient/Family in Agreement with Plan  yes    Plan Comments  Family requesting HH at discharge. Referral for [facility] made. Flory notified. Awaiting HH orders from Surgery. Walker was delivered to bedside.        Ciera Metzger RN

## 2019-04-11 NOTE — PLAN OF CARE
Problem: Patient Care Overview  Goal: Plan of Care Review  Outcome: Ongoing (interventions implemented as appropriate)   04/10/19 1203 04/10/19 1802 04/11/19 0558   Coping/Psychosocial   Plan of Care Reviewed With --  patient;spouse --    Plan of Care Review   Progress improving --  --    OTHER   Outcome Summary --  --  Pt A&O x4. Medicated for pain originating behind R eye. Pt requested sleeping pill but was asleep when RN attempted to administer., did not wake. Pt is more alert and less flat than previous shift. States he feels a little better. No acute changes overnight and no s/s of distress. Will continue to monitor.       Problem: Fall Risk (Adult)  Goal: Absence of Fall  Outcome: Ongoing (interventions implemented as appropriate)      Problem: Pain, Acute (Adult)  Goal: Acceptable Pain Control/Comfort Level  Outcome: Ongoing (interventions implemented as appropriate)      Problem: Confusion, Acute (Adult)  Goal: Cognitive/Functional Impairments Minimized  Outcome: Ongoing (interventions implemented as appropriate)      Problem: Skin Injury Risk (Adult)  Goal: Skin Health and Integrity  Outcome: Ongoing (interventions implemented as appropriate)

## 2019-04-11 NOTE — DISCHARGE PLACEMENT REQUEST
"Darryl Waterman (63 y.o. Male)     Date of Birth Social Security Number Address Home Phone MRN    1955  [address] [address] [phone] [id number]    Restorationist Marital Status          None        Admission Date Admission Type Admitting Provider Attending Provider Department, Room/Bed    4/5/19 Emergency Zev Weldon MD Hog[place]amp, David Ryan, MD [address], P590/1    Discharge Date Discharge Disposition Discharge Destination                       Attending Provider:  Maximo Mcpherson MD    Allergies:  Sulfa Antibiotics    Isolation:  None   Infection:  None   Code Status:  CPR    Ht:  185.4 cm (72.99\")   Wt:  78.8 kg (173 lb 11.6 oz)    Admission Cmt:  None   Principal Problem:  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 (CMS/HCC) [D49.6]                 Active Insurance as of 4/5/2019     Primary Coverage     Payor Plan Insurance Group Employer/Plan Group    [facility] [id number]     Payor Plan Address Payor Plan Phone Number Payor Plan Fax Number Effective Dates    [address] [phone]  1/1/2018 - None Entered    [facility] [id number]       Subscriber Name Subscriber Birth Date Member ID       DARRYL WATERMAN 1955 [id number]                 Emergency Contacts      (Rel.) Home Phone Work Phone Mobile Phone    ViralsMony L (Spouse) [phone] -- [phone]              "

## 2019-04-11 NOTE — PLAN OF CARE
Problem: Patient Care Overview  Goal: Plan of Care Review   04/11/19 1104   Coping/Psychosocial   Plan of Care Reviewed With patient   Plan of Care Review   Progress improving   OTHER   Outcome Summary Pt demonstrated improvements in ambulation distance this session and in the amount of assistance required. He demonstrated improvements in heel contact this session. He ambulated with and without an assistive device. Without an assistive device he demonstrated an increase in lateral trunk lean to the left and a decrease in weight shfit. He required verbal cues for safety and awareness. Pt will likely benefit from home health PT at discharge. Continue to progress Pt as tolerated.

## 2019-04-11 NOTE — PLAN OF CARE
Problem: Patient Care Overview  Goal: Plan of Care Review  Outcome: Ongoing (interventions implemented as appropriate)   04/11/19 4892   Coping/Psychosocial   Plan of Care Reviewed With patient   Plan of Care Review   Progress improving   OTHER   Outcome Summary Pt AOx4, VSS, pain controlled with PRN meds. Incision to head C/D/I. Plan for pt to go home with HH. Pt received walker in room today. D/C tomorrow. Will continue to monitor.        Problem: Fall Risk (Adult)  Goal: Identify Related Risk Factors and Signs and Symptoms  Outcome: Ongoing (interventions implemented as appropriate)    Goal: Absence of Fall  Outcome: Ongoing (interventions implemented as appropriate)      Problem: Pain, Acute (Adult)  Goal: Identify Related Risk Factors and Signs and Symptoms  Outcome: Ongoing (interventions implemented as appropriate)    Goal: Acceptable Pain Control/Comfort Level  Outcome: Ongoing (interventions implemented as appropriate)      Problem: Confusion, Acute (Adult)  Goal: Cognitive/Functional Impairments Minimized  Outcome: Ongoing (interventions implemented as appropriate)    Goal: Safety  Outcome: Ongoing (interventions implemented as appropriate)

## 2019-04-11 NOTE — PROGRESS NOTES
Name: Hernandez Waterman ADMIT: 2019   : 1955  PCP: Megha Gant MD    MRN: [id number] LOS: 6 days   AGE/SEX: 63 y.o. male    ROOM: [facility]   Subjective   Complains of persistent dull headache present since surgery.  Feels weak and tired today.  Has needed pain medicine.  Decreased appetite.  No bowel movement yet.    Status post craniotomy and debulking of glioblastoma  Patient is awake and alert  Moves all extremities   Speech intact  Out of the intensive care unit    Brief hospital course since admission:  With vasogenic edema, on tapering dose of decadron  S/p craniotomy with debulking of the tumor.    I have reviewed past medical history, family history, social history and allergies.  No changes from admission note.      Review of Systems   Constitutional: Negative for fatigue and fever.   Respiratory: Negative for shortness of breath and wheezing.    Gastrointestinal: Negative for nausea and vomiting.   Neurological: Negative for headaches.          Objective   Vital Signs  Temp:  [97.5 °F (36.4 °C)-98.6 °F (37 °C)] 98.6 °F (37 °C)  Heart Rate:  [40-63] 53  Resp:  [16-18] 18  BP: (129-133)/(82-83) 129/83  SpO2:  [96 %-98 %] 98 %  on   ;   Device (Oxygen Therapy): room air  Body mass index is 22.92 kg/m².    Intake/Output Summary (Last 24 hours) at 2019 1420  Last data filed at 2019 0222  Gross per 24 hour   Intake --   Output 500 ml   Net -500 ml       Physical Exam   Constitutional: He is oriented to person, place, and time. He appears well-developed and well-nourished. No distress.   HENT:   Head: Normocephalic and atraumatic.   Eyes: Pupils are equal, round, and reactive to light. No scleral icterus.   Cardiovascular: Normal rate and regular rhythm.   Pulmonary/Chest: Effort normal. No respiratory distress. He has no decreased breath sounds. He has no wheezes.   Abdominal: Soft. Bowel sounds are normal. There is no hepatosplenomegaly.   Neurological: He is alert and oriented to  person, place, and time.   Left upper extremity weakness     Skin: No cyanosis. Nails show no clubbing.   Psychiatric: He has a normal mood and affect. His behavior is normal.       Results Review:    Results from last 7 days   Lab Units 04/09/19  0502 04/08/19  0430 04/07/19  1353   WBC 10*3/mm3 15.16* 15.54* 12.26*   HEMOGLOBIN g/dL 12.4* 11.5* 11.2*   HEMATOCRIT % 37.2* 35.2* 34.0*   PLATELETS 10*3/mm3 295 307 292     Results from last 7 days   Lab Units 04/08/19  0430 04/07/19  1353 04/06/19  0638   SODIUM mmol/L 141 141 138   POTASSIUM mmol/L 4.0 3.8 4.3   CHLORIDE mmol/L 103 102 103   CO2 mmol/L 23.9 26.0 25.1   BUN mg/dL 19 20 17   CREATININE mg/dL 0.74* 0.80 0.71*   GLUCOSE mg/dL 110* 135* 163*   CALCIUM mg/dL 8.6 8.4* 8.8     Results from last 7 days   Lab Units 04/06/19  0638   INR  1.16*     Hemoglobin A1C:No results found for: HGBA1C  Glucose Range:  Glucose   Date/Time Value Ref Range Status   04/11/2019 1104 99 70 - 130 mg/dL Final   04/11/2019 0719 96 70 - 130 mg/dL Final   04/10/2019 2031 133 (H) 70 - 130 mg/dL Final   04/10/2019 1614 116 70 - 130 mg/dL Final   04/10/2019 1058 122 70 - 130 mg/dL Final   04/10/2019 0756 100 70 - 130 mg/dL Final         dexamethasone 10 mg Intravenous Once   dexamethasone 4 mg Oral Q6H   docusate sodium 100 mg Oral BID   famotidine 20 mg Oral BID   insulin lispro 0-7 Units Subcutaneous 4x Daily With Meals & Nightly   levETIRAcetam 500 mg Oral Q12H   polyethylene glycol 17 g Oral Daily       niCARdipine 5-15 mg/hr   Diet Regular  Assessment/Plan       Assessment/Plan      Active Hospital Problems    Diagnosis  POA   • **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     Continue Decadron and Keppra  Glioblastoma status post debulking and craniotomy with biopsy  Consultants are following namely oncology, radiation oncology and neuro-oncology.  Neurosurgery has signed off  Patient and his wife express that they want to get a second opinion at [facility].  Referral has been sent.  Appreciate oncology assistance  Increased bowel regimen today  PT and OT working with him and CCP working on discharge needs    Likely discharge tomorrow      Maximo Mcpherson MD  [facility]  04/11/19

## 2019-04-11 NOTE — THERAPY TREATMENT NOTE
Acute Care - Occupational Therapy Progress Note  [facility]     Patient Name: Hernandez Waterman  : 1955  MRN: [id number]  Today's Date: 2019  Onset of Illness/Injury or Date of Surgery: 19  Date of Referral to OT: 19  Referring Physician: German    Admit Date: 2019       ICD-10-CM ICD-9-CM   1. Acute intractable headache, unspecified headache type R51 784.0   2. Brain mass G93.9 348.9   3. Right frontal lobe mass G93.9 348.9   4. 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 (CMS/HCC)   • Cerebral edema (CMS/HCC)   • GBM (glioblastoma multiforme) (CMS/HCC)   • Leukemoid reaction   • Anemia   • Constipation     Past Medical History:   Diagnosis Date   • ED (erectile dysfunction)      Past Surgical History:   Procedure Laterality Date   • CRANIOTOMY FOR TUMOR Right 2019    Procedure: RIGHT SIDE CRANIOTOMY FOR TUMOR REMOVAL AND  BIOPSY  WITH SIDNEY NAVIGATTION AND PARTIAL RIGHT FRONTAL LOBE LOBECTOMY;  Surgeon: Keshawn Brower MD;  Location: [facility];  Service: Neurosurgery       Therapy Treatment    Rehabilitation Treatment Summary     Row Name 19 1544 19 1008          Treatment Time/Intention    Discipline  occupational therapist  -SG  physical therapist  -PC,EM,PC2     Document Type  therapy note (daily note)  -SG  therapy note (daily note)  -PC,EM,PC2     Subjective Information  no complaints  -SG  complains of;pain  -PC,EM,PC2     Mode of Treatment  individual therapy;occupational therapy  -SG  physical therapy  -PC,EM,PC2     Patient/Family Observations  --  Pt sitting in bed with no acute distress. Pt's wife at bedside.  -PC,EM,PC2      Care Plan Review  care plan/treatment goals reviewed  -SG  --     Therapy Frequency (PT Clinical Impression)  --  daily  -PC,EM,PC2     Patient Effort  good  -SG  good  -PC,EM,PC2     Recorded by [SG] Ciera Lieberman, OTR 04/11/19 1557 [PC,EM,PC2] Coco Peterson, PT (r) Michelle Alejandro, PT Student (t) Coco Peterson, PT (c) 04/11/19 1140     Row Name 04/11/19 1544 04/11/19 1008          Cognitive Assessment/Intervention- PT/OT    Orientation Status (Cognition)  oriented x 3  -SG  oriented x 3  -PC,EM,PC2     Follows Commands (Cognition)  follows one step commands  -SG  follows one step commands;repetition of directions required;increased processing time needed  -PC,EM,PC2     Safety Deficit (Cognitive)  --  awareness of need for assistance;impulsivity  -PC,EM,PC2     Personal Safety Interventions  --  gait belt;fall prevention program maintained  -PC,EM,PC2     Recorded by [SG] Ciera Lieberman, OTR 04/11/19 1557 [PC,EM,PC2] Coco Peterson, PT (r) Michelle Alejandro, PT Student (t) Coco Peterson, PT (c) 04/11/19 1140     Row Name 04/11/19 1008             Bed Mobility Assessment/Treatment    Bed Mobility Assessment/Treatment  supine-sit  -PC,EM,PC2      Supine-Sit [place] (Bed Mobility)  supervision  -PC,EM,PC2      Assistive Device (Bed Mobility)  bed rails  -PC,EM,PC2      Recorded by [PC,EM,PC2] Coco Peterson, PT (r) Michelle Alejandro, PT Student (t) Coco Peterson, PT (c) 04/11/19 1140      Row Name 04/11/19 1008             Transfer Assessment/Treatment    Transfer Assessment/Treatment  sit-stand transfer;toilet transfer  -PC,EM,PC2      Recorded by [PC,EM,PC2] Coco Peterson, PT (r) Michelle Alejandro, PT Student (t) Coco Peterson, PT (c) 04/11/19 1140      Row Name 04/11/19 1544 04/11/19 1008          Sit-Stand Transfer    Sit-Stand [place] (Transfers)  minimum assist (75% patient effort);verbal cues  -SG  minimum assist (75% patient effort)  -PC,EM,PC2     Assistive Device (Sit-Stand Transfers)   walker, front-wheeled  -SG  walker, front-wheeled  -PC,EM,PC2     Recorded by [SG] Ciera Lieberman, OTR 04/11/19 1557 [PC,EM,PC2] Coco Peterson, PT (r) Michelle Alejandro, PT Student (t) Coco Peterson, PT (c) 04/11/19 1140     Row Name 04/11/19 1544             Stand-Sit Transfer    Stand-Sit [place] (Transfers)  minimum assist (75% patient effort);verbal cues decreased functional standing balance   -SG      Assistive Device (Stand-Sit Transfers)  walker, front-wheeled  -SG      Recorded by [SG] Ciera Lieberman, OTR 04/11/19 1557      Row Name 04/11/19 1008             Toilet Transfer    Type (Toilet Transfer)  stand-sit  -PC,EM,PC2      [place] Level (Toilet Transfer)  minimum assist (75% patient effort);verbal cues  -PC,EM,PC2      Assistive Device (Toilet Transfer)  walker, 4-wheeled  -PC,EM,PC2      Recorded by [PC,EM,PC2] Coco Peterson, PT (r) Michelle Alejandro, PT Student (t) Coco Peterson, PT (c) 04/11/19 1140      Row Name 04/11/19 1008             Gait/Stairs Assessment/Training    Gait/Stairs Assessment/Training  gait/ambulation independence  -PC,EM,PC2      [place] Level (Gait)  contact guard  -PC,EM,PC2      Assistive Device (Gait)  walker, front-wheeled  -PC,EM,PC2      Distance in Feet (Gait)  60ft with walker, 120ft without walker  -PC,EM,PC2      Pattern (Gait)  step-through  -PC,EM,PC2      Deviations/Abnormal Patterns (Gait)  ataxic  -PC,EM,PC2      Bilateral Gait Deviations  leans left;weight shift ability decreased  -PC,EM,PC2      Comment (Gait/Stairs)  Improved heel strike this session and stability. No instances of knee buckeling.   -PC,EM,PC2      Recorded by [PC,EM,PC2] Coco Peterson, PT (r) Michelle Alejandro, PT Student (t) Coco Peterson, PT (c) 04/11/19 1140      Row Name 04/11/19 1544             Lower Body Dressing Assessment/Training    Lower Body Dressing [place] Level  lower body dressing skills;minimum assist (75% patient effort)  -SG      Lower Body Dressing  Position  supported standing  -SG      Comment (Lower Body Dressing)  pt stands to simulate adjusting pants over hip. pt leans to left when performing task. Requires cues to correct  -SG      Recorded by [SG] Ciera Lieberman OTR 04/11/19 1557      Row Name 04/11/19 1544             BADL Safety/Performance    Impairments, BADL Safety/Performance  balance;strength  -SG      Skilled BADL Treatment/Intervention  BADL process/adaptation training  -SG      Recorded by [SG] Ciera Lieberman OTR 04/11/19 1557      Row Name 04/11/19 1544             MMT Left Upper Ext    Lt Upper Extremity Comments   LUE coordination activities. Instructed with isolated finger movements  -SG      Recorded by [SG] Ciera Lieberman OTR 04/11/19 1557      Row Name 04/11/19 1008             Therapeutic Exercise    Additional Documentation  -- Seated EOB: bilateral ankle pumps x10 repetitions, LAQ x 10  -PC,EM,PC2      Recorded by [PC,EM,PC2] Coco Peterson, PT (r) Michelle Alejandro, PT Student (t) Coco Peterson, PT (c) 04/11/19 1140      Row Name 04/11/19 1008             Balance    Balance  static sitting balance  -PC,EM,PC2      Recorded by [PC,EM,PC2] Coco Peterson, PT (r) Michelle Alejandro, PT Student (t) Coco Peterson, PT (c) 04/11/19 1140      Row Name 04/11/19 1008             Static Sitting Balance    Level of [place] (Unsupported Sitting, Static Balance)  supervision  -PC,EM,PC2      Sitting Position (Unsupported Sitting, Static Balance)  sitting on edge of bed  -PC,EM,PC2      Recorded by [PC,EM,PC2] Coco Peterson, PT (r) Michelle Alejandro, PT Student (t) Coco Peterson, PT (c) 04/11/19 1140      Row Name 04/11/19 1544 04/11/19 1008          Positioning and Restraints    Pre-Treatment Position  sitting in chair/recliner  -SG  in bed  -PC,EM,PC2     Post Treatment Position  chair  -SG  bathroom  -PC,EM,PC2     In Chair  sitting;call light within reach;encouraged to call for assist;exit alarm on  -SG  --     Bathroom  --   sitting;with family/caregiver;encouraged to call for assist  -PC,EM,PC2     Recorded by [SG] Ciera Lieberman, OTR 04/11/19 1557 [PC,EM,PC2] Coco Peterson, PT (r) Michelle Alejandro, PT Student (t) Coco Peterson, PT (c) 04/11/19 1140     Row Name 04/11/19 1008             Pain Assessment    Additional Documentation  Pain Scale: Word Pre/Post-Treatment (Group)  -PC,EM,PC2      Recorded by [PC,EM,PC2] Coco Peterson, PT (r) Michelle Alejandro PT Student (t) Coco Peterson, PT (c) 04/11/19 1140      Row Name 04/11/19 1008             Pain Scale: Numbers Pre/Post-Treatment    Pain Location  head  -PC,EM,PC2      Pain Intervention(s)  Repositioned;Ambulation/increased activity  -PC,EM,PC2      Recorded by [PC,EM,PC2] Coco Peterson, PT (r) Michelle Alejandro, PT Student (t) Coco Peterson, PT (c) 04/11/19 1140      Row Name 04/11/19 1544 04/11/19 1008          Pain Scale: Word Pre/Post-Treatment    Pain: Word Scale, Pretreatment  2 - mild pain  -SG  6 - moderate-severe pain  -PC,EM,PC2     Pain: Word Scale, Post-Treatment  2 - mild pain  -SG  6 - moderate-severe pain  -PC,EM,PC2     Recorded by [SG] Ciera Lieberman, OTR 04/11/19 1557 [PC,EM,PC2] Coco Peterson, PT (r) Michelle Alejandro, PT Student (t) Coco Peterson, PT (c) 04/11/19 1140     Row Name                Wound 04/07/19 1233 Right head incision    Wound - Properties Group Date first assessed: 04/07/19 [LD] Time first assessed: 1233 [LD] Side: Right [LD] Location: head [LD] Type: incision [LD] Recorded by:  [LD] Monet Rivers RN 04/07/19 1233    Row Name 04/11/19 1008             Plan of Care Review    Plan of Care Reviewed With  patient;spouse  -PC,EM,PC2      Recorded by [PC,EM,PC2] Coco Peterson, PT (r) Michelle Alejandro, PT Student (t) Coco Peterson, PT (c) 04/11/19 1140      Row Name 04/11/19 1008             Outcome Summary/Treatment Plan (PT)    Anticipated Discharge Disposition (PT)  home with home health  -PC,EM,PC2      Recorded by [PC,EM,PC2] Kristen  Coco YIP, PT (r) Michelle Alejandro, PT Student (t) Coco Peterson, PT (c) 04/11/19 1140        User Key  (r) = Recorded By, (t) = Taken By, (c) = Cosigned By    Initials Name Effective Dates Discipline     Luisana Ciera, OTR 06/08/18 -  OT    PC Coco Peterson, PT 04/03/18 -  PT    Monet Puga RN 06/16/16 -  Nurse    EM Michelle Alejandro, PT Student 03/04/19 -  PT        Wound 04/07/19 1233 Right head incision (Active)   Dressing Appearance dry;intact;open to air 4/11/2019  9:18 AM   Closure Adhesive closure strips 4/11/2019  9:18 AM   Periwound intact 4/10/2019  8:00 PM   Periwound Temperature warm 4/10/2019  8:00 PM   Periwound Skin Turgor soft 4/10/2019  8:00 PM   Edges rolled/closed 4/10/2019  8:00 PM   Drainage Amount none 4/10/2019  8:00 PM       Occupational Therapy Education     Title: PT OT SLP Therapies (In Progress)     Topic: Occupational Therapy (In Progress)     Point: ADL training (Done)     Description: Instruct learner(s) on proper safety adaptation and remediation techniques during self care or transfers.   Instruct in proper use of assistive devices.    Learning Progress Summary           Patient Acceptance, E,TB, VU by  at 4/10/2019  4:44 AM    Acceptance, E,TB, VU by  at 4/9/2019  3:37 PM    Comment:  [facility]                               User Key     Initials Effective Dates Name Provider Type Discipline     12/26/18 -  Louise Marlow OTR Occupational Therapist OT     01/18/18 -  Taniya Taylor, RN Registered Nurse Nurse                OT Recommendation and Plan  Therapy Frequency (OT Eval): 5 times/wk  Plan of Care Review  Plan of Care Reviewed With: patient  Plan of Care Reviewed With: patient  Outcome Summary: Pt progressing with OT. Pt does require min A with functional standing balance task this afternoon leaning to left. Pt requires cues for safety with sit to stand for safety with technique.  Pt may benefit from elevated commode for safety with transfer.  Outcome  Measures     Row Name 04/11/19 1603 04/11/19 1100 04/10/19 1206       How much help from another person do you currently need...    Turning from your back to your side while in flat bed without using bedrails?  --  4  -PC (r) EM (t) PC (c)  --    Moving from lying on back to sitting on the side of a flat bed without bedrails?  --  4  -PC (r) EM (t) PC (c)  --    Moving to and from a bed to a chair (including a wheelchair)?  --  3  -PC (r) EM (t) PC (c)  --    Standing up from a chair using your arms (e.g., wheelchair, bedside chair)?  --  3  -PC (r) EM (t) PC (c)  --    Climbing 3-5 steps with a railing?  --  2  -PC (r) EM (t) PC (c)  --    To walk in hospital room?  --  3  -PC (r) EM (t) PC (c)  --    AM-[facility] 6 Clicks Score  --  19  -PC (r) EM (t)  --       How much help from another is currently needed...    Putting on and taking off regular lower body clothing?  3  -SG  --  2  -SG    Bathing (including washing, rinsing, and drying)  3  -SG  --  2  -SG    Toileting (which includes using toilet bed pan or urinal)  3  -SG  --  2  -SG    Putting on and taking off regular upper body clothing  3  -SG  --  3  -SG    Taking care of personal grooming (such as brushing teeth)  3  -SG  --  3  -SG    Eating meals  3  -SG  --  3  -SG    Score  18  -SG  --  15  -SG       Functional Assessment    Outcome Measure Options  --  AM-PAC 6 Clicks Basic Mobility (PT)  -PC (r) EM (t) PC (c)  --    Row Name 04/10/19 1100 04/09/19 1500 04/09/19 1100       How much help from another person do you currently need...    Turning from your back to your side while in flat bed without using bedrails?  4  -JM  --  3  -CH (r) CG (t) CH (c)    Moving from lying on back to sitting on the side of a flat bed without bedrails?  4  -JM  --  3  -CH (r) CG (t) CH (c)    Moving to and from a bed to a chair (including a wheelchair)?  3  -JM  --  3  -CH (r) CG (t) CH (c)    Standing up from a chair using your arms (e.g., wheelchair, bedside chair)?  3  -JM   --  3  -CH (r) CG (t) CH (c)    Climbing 3-5 steps with a railing?  2  -JM  --  2  -CH (r) CG (t) CH (c)    To walk in hospital room?  3  -JM  --  3  -CH (r) CG (t) CH (c)    AM-PAC 6 Clicks Score  19  -JM  --  17  -CH (r) CG (t)       How much help from another is currently needed...    Putting on and taking off regular lower body clothing?  --  2  -SGA  --    Bathing (including washing, rinsing, and drying)  --  2  -SGA  --    Toileting (which includes using toilet bed pan or urinal)  --  2  -SGA  --    Putting on and taking off regular upper body clothing  --  3  -SGA  --    Taking care of personal grooming (such as brushing teeth)  --  3  -SGA  --    Eating meals  --  3  -SGA  --    Score  --  15  -SGA  --       Functional Assessment    Outcome Measure Options  --  AM-PAC 6 Clicks Daily Activity (OT)  [place] [place]-[facility] 6 Clicks Basic Mobility (PT)  -CH (r) CG (t) CH (c)      User Key  (r) = Recorded By, (t) = Taken By, (c) = Cosigned By    Initials Name Provider Type     Ciera Lieberman, OTR Occupational Therapist    Louise Dumont, OTR Occupational Therapist    CH Peyton Berrios, PT Physical Therapist    Coco Metz, PT Physical Therapist    Chelo Andrews, PTA Physical Therapy Assistant    CG James Bird, PT Student PT Student    Michelle Balderrama, PT Student PT Student           Time Calculation:   Time Calculation- OT     Row Name 04/11/19 1604             Time Calculation- OT    OT Start Time  1507  -SG      OT Stop Time  1522  -      OT Time Calculation (min)  15 min  -      Total Timed Code Minutes- OT  15 minute(s)  -      OT Received On  04/11/19  -        User Key  (r) = Recorded By, (t) = Taken By, (c) = Cosigned By    Initials Name Provider Type     Ciera Lieberman, OTR Occupational Therapist        Therapy Charges for Today     Code Description Service Date Service Provider Modifiers Qty    [id number]  OT SELF CARE/MGMT/TRAIN EA 15 MIN 4/10/2019 Luisana  Ciera OTJASSON GO 1    [id number]  OT SELF CARE/MGMT/TRAIN EA 15 MIN 4/11/2019 Ciera Lieberman OTR GO 1               NOLVIA Lopez  4/11/2019

## 2019-04-11 NOTE — PLAN OF CARE
Problem: Patient Care Overview  Goal: Plan of Care Review  Outcome: Ongoing (interventions implemented as appropriate)   04/11/19 4344   Coping/Psychosocial   Plan of Care Reviewed With patient   OTHER   Outcome Summary Pt progressing with OT. Pt does require min A with functional standing balance task this afternoon leaning to left. Pt requires cues for safety with sit to stand for safety with technique. Pt may benefit from elevated commode for safety with transfer.

## 2019-04-11 NOTE — THERAPY TREATMENT NOTE
Acute Care - Physical Therapy Treatment Note  [facility]     Patient Name: Hernandez Waterman  : 1955  MRN: [id number]  Today's Date: 2019  Onset of Illness/Injury or Date of Surgery: 19     Referring Physician: German    Admit Date: 2019    Visit Dx:    ICD-10-CM ICD-9-CM   1. Acute intractable headache, unspecified headache type R51 784.0   2. Brain mass G93.9 348.9   3. Right frontal lobe mass G93.9 348.9   4. 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 (CMS/HCC)   • Cerebral edema (CMS/HCC)   • GBM (glioblastoma multiforme) (CMS/HCC)   • Leukemoid reaction   • Anemia   • Constipation       Therapy Treatment    Rehabilitation Treatment Summary     Row Name 19 1008             Treatment Time/Intention    Discipline  physical therapist  (Pended)   -EM      Document Type  therapy note (daily note)  (Pended)   -EM      Subjective Information  complains of;pain  (Pended)   -EM      Mode of Treatment  physical therapy  (Pended)   -EM      Patient/Family Observations  Pt sitting in bed with no acute distress. Pt's wife at bedside.  (Pended)   -EM      Therapy Frequency (PT Clinical Impression)  daily  (Pended)   -EM      Patient Effort  good  (Pended)   -EM      Recorded by [EM] Michelle Alejandro, PT Student 19 1010      Row Name 19 1008             Cognitive Assessment/Intervention- PT/OT    Orientation Status (Cognition)  oriented x 3  (Pended)   -EM      Follows Commands (Cognition)  follows one step commands;repetition of directions required;increased processing time needed  (Pended)   -EM      Safety Deficit (Cognitive)  awareness of need for  assistance;impulsivity  (Pended)   -EM      Personal Safety Interventions  gait belt;fall prevention program maintained  (Pended)   -EM      Recorded by [EM] Michelle Alejandro, PT Student 04/11/19 1010      Row Name 04/11/19 1008             Bed Mobility Assessment/Treatment    Bed Mobility Assessment/Treatment  supine-sit  (Pended)   -EM      Supine-Sit [place] (Bed Mobility)  supervision  (Pended)   -EM      Assistive Device (Bed Mobility)  bed rails  (Pended)   -EM      Recorded by [EM] Michelle Alejandro, PT Student 04/11/19 1010      Row Name 04/11/19 1008             Transfer Assessment/Treatment    Transfer Assessment/Treatment  sit-stand transfer;toilet transfer  (Pended)   -EM      Recorded by [EM] Michelle Alejandro, PT Student 04/11/19 1010      Row Name 04/11/19 1008             Sit-Stand Transfer    Sit-Stand [place] (Transfers)  minimum assist (75% patient effort)  (Pended)   -EM      Assistive Device (Sit-Stand Transfers)  walker, front-wheeled  (Pended)   -EM      Recorded by [EM] Michelle Alejandro, PT Student 04/11/19 1013      Row Name 04/11/19 1008             Toilet Transfer    Type (Toilet Transfer)  stand-sit  (Pended)   -EM      Hanson Level (Toilet Transfer)  minimum assist (75% patient effort);verbal cues  (Pended)   -EM      Assistive Device (Toilet Transfer)  walker, 4-wheeled  (Pended)   -EM      Recorded by [EM] Michelle Alejandro, PT Student 04/11/19 1013      Row Name 04/11/19 1008             Gait/Stairs Assessment/Training    Gait/Stairs Assessment/Training  gait/ambulation independence  (Pended)   -EM      Hanson Level (Gait)  contact guard  (Pended)   -EM      Assistive Device (Gait)  walker, front-wheeled  (Pended)   -EM      Distance in Feet (Gait)  60ft with walker, 120ft without walker  (Pended)   -EM      Pattern (Gait)  step-through  (Pended)   -EM      Deviations/Abnormal Patterns (Gait)  ataxic  (Pended)   -EM      Bilateral Gait Deviations  leans left;weight shift  ability decreased  (Pended)   -EM      Comment (Gait/Stairs)  Improved heel strike this session and stability. No instances of knee buckeling.   (Pended)   -EM      Recorded by [EM] Michelle Alejandro, PT Student 04/11/19 1013      Row Name 04/11/19 1008             Therapeutic Exercise    Additional Documentation  --  (Pended)  Seated EOB: bilateral ankle pumps x10 repetitions, LAQ x 10  -EM      Recorded by [EM] Michelle Alejandro, PT Student 04/11/19 1013      Row Name 04/11/19 1008             Balance    Balance  static sitting balance  (Pended)   -EM      Recorded by [EM] Michelle Alejandro, PT Student 04/11/19 1013      Row Name 04/11/19 1008             Static Sitting Balance    Level of [place] (Unsupported Sitting, Static Balance)  supervision  (Pended)   -EM      Sitting Position (Unsupported Sitting, Static Balance)  sitting on edge of bed  (Pended)   -EM      Recorded by [EM] Michelle Alejandro, PT Student 04/11/19 1013      Row Name 04/11/19 1008             Positioning and Restraints    Pre-Treatment Position  in bed  (Pended)   -EM      Post Treatment Position  bathroom  (Pended)   -EM      Bathroom  sitting;with family/caregiver;encouraged to call for assist  (Pended)   -EM2      Recorded by [EM] Michelle Alejandro, PT Student 04/11/19 1013  [EM2] Michelle Alejandro, PT Student 04/11/19 1014      Row Name 04/11/19 1008             Pain Assessment    Additional Documentation  Pain Scale: Word Pre/Post-Treatment (Group)  (Pended)   -EM      Recorded by [EM] Michelle Alejandro, PT Student 04/11/19 1014      Row Name 04/11/19 1008             Pain Scale: Numbers Pre/Post-Treatment    Pain Location  head  (Pended)   -EM      Pain Intervention(s)  Repositioned;Ambulation/increased activity  (Pended)   -EM      Recorded by [EM] Michelle Alejandro, PT Student 04/11/19 1014      Row Name 04/11/19 1008             Pain Scale: Word Pre/Post-Treatment    Pain: Word Scale, Pretreatment  6 - moderate-severe pain  (Pended)   -EM      Pain:  Word Scale, Post-Treatment  6 - moderate-severe pain  (Pended)   -EM      Recorded by [EM] Michelle Alejandro, PT Student 04/11/19 1014      Row Name                Wound 04/07/19 1233 Right head incision    Wound - Properties Group Date first assessed: 04/07/19 [LD] Time first assessed: 1233 [LD] Side: Right [LD] Location: head [LD] Type: incision [LD] Recorded by:  [LD] Monet Rivers RN 04/07/19 1233    Row Name 04/11/19 1008             Plan of Care Review    Plan of Care Reviewed With  patient;spouse  (Pended)   -EM      Recorded by [EM] Michelle Alejandro, PT Student 04/11/19 1014      Row Name 04/11/19 1008             Outcome Summary/Treatment Plan (PT)    Anticipated Discharge Disposition (PT)  home with home health  (Pended)   -EM      Recorded by [EM] Michelle Alejandro, PT Student 04/11/19 1014        User Key  (r) = Recorded By, (t) = Taken By, (c) = Cosigned By    Initials Name Effective Dates Discipline    LD Monet Rivers RN 06/16/16 -  Nurse    EM Michelle Alejandro, PT Student 03/04/19 -  PT          Wound 04/07/19 1233 Right head incision (Active)   Dressing Appearance dry;intact;open to air 4/11/2019  9:18 AM   Closure Adhesive closure strips 4/11/2019  9:18 AM   Periwound intact 4/10/2019  8:00 PM   Periwound Temperature warm 4/10/2019  8:00 PM   Periwound Skin Turgor soft 4/10/2019  8:00 PM   Edges rolled/closed 4/10/2019  8:00 PM   Drainage Amount none 4/10/2019  8:00 PM           Physical Therapy Education     Title: PT OT SLP Therapies (In Progress)     Topic: Physical Therapy (In Progress)     Point: Mobility training (In Progress)     Learning Progress Summary           Patient Acceptance, E, NR by EM at 4/11/2019 11:04 AM    Eager, E,TB,D, VU,NR by SCAR at 4/10/2019 10:59 AM    Acceptance, E,TB, VU by KT at 4/10/2019  4:44 AM    Acceptance, E,TB, VU,NR by CG at 4/9/2019 11:04 AM   Family MAXINE Sena,POLINA LOCKETT, VU,NR by SCAR at 4/10/2019 10:59 AM                   Point: Home exercise program (In Progress)      Learning Progress Summary           Patient Acceptance, E, NR by EM at 4/11/2019 11:04 AM    Eager, E,TB,D, VU,NR by SCAR at 4/10/2019 10:59 AM    Acceptance, E,TB, VU by RUSSELL at 4/10/2019  4:44 AM    Acceptance, E,TB, VU,NR by CG at 4/9/2019 11:04 AM   Family Eager, E,TB,D, VU,NR by SCAR at 4/10/2019 10:59 AM                   Point: Body mechanics (In Progress)     Learning Progress Summary           Patient Acceptance, E, NR by EM at 4/11/2019 11:04 AM    Eager, E,TB,D, VU,NR by SCAR at 4/10/2019 10:59 AM    Acceptance, E,TB, VU by RUSSELL at 4/10/2019  4:44 AM    Acceptance, E,TB, VU,NR by CG at 4/9/2019 11:04 AM   Family Eager, E,TB,D, VU,NR by SCAR at 4/10/2019 10:59 AM                   Point: Precautions (In Progress)     Learning Progress Summary           Patient Acceptance, E, NR by EM at 4/11/2019 11:04 AM    Eager, E,TB,D, VU,NR by SCAR at 4/10/2019 10:59 AM    Acceptance, E,TB, VU by RUSSELL at 4/10/2019  4:44 AM    Acceptance, E,TB, VU,NR by JO at 4/9/2019 11:04 AM   Family Eager, E,TB,D, VU,NR by SCAR at 4/10/2019 10:59 AM                               User Key     Initials Effective Dates Name Provider Type Discipline    SCAR 03/07/18 -  Chelo Sanford, PTA Physical Therapy Assistant PT    KT 01/18/18 -  Taniya Taylor, RN Registered Nurse Nurse     02/04/19 -  James Bird, PT Student PT Student PT    EM 03/04/19 -  Michelle Alejandro, PT Student PT Student PT                PT Recommendation and Plan  Anticipated Discharge Disposition (PT): (P) home with home health  Therapy Frequency (PT Clinical Impression): (P) daily  Outcome Summary/Treatment Plan (PT)  Anticipated Discharge Disposition (PT): (P) home with home health  Plan of Care Reviewed With: (P) patient  Progress: (P) improving  Outcome Summary: (P) pt demonstrated improvements in ambulation distance this session and in the amount of assistance required. He demonstrated improvements in heel contact this session. He ambulated with and without an assistive  device. Without an assistive device he demonstrated an increase in lateral trunk lean to the left and a decrease in weight shfit. He required verbal cues for safety and awareness. Pt will likely benefit from home health PT at discharge. Continue to progress Pt as tolerated.   Outcome Measures     Row Name 04/11/19 1100 04/10/19 1206 04/10/19 1100       How much help from another person do you currently need...    Turning from your back to your side while in flat bed without using bedrails?  4  (Pended)   -EM  --  4  -JM    Moving from lying on back to sitting on the side of a flat bed without bedrails?  4  (Pended)   -EM  --  4  -JM    Moving to and from a bed to a chair (including a wheelchair)?  3  (Pended)   -EM  --  3  -JM    Standing up from a chair using your arms (e.g., wheelchair, bedside chair)?  3  (Pended)   -EM  --  3  -JM    Climbing 3-5 steps with a railing?  2  (Pended)   -EM  --  2  -JM    To walk in hospital room?  3  (Pended)   -EM  --  3  -JM    -[facility] 6 Clicks Score  19  (Pended)   -EM  --  19  -JM       How much help from another is currently needed...    Putting on and taking off regular lower body clothing?  --  2  -SG  --    Bathing (including washing, rinsing, and drying)  --  2  -SG  --    Toileting (which includes using toilet bed pan or urinal)  --  2  -SG  --    Putting on and taking off regular upper body clothing  --  3  -SG  --    Taking care of personal grooming (such as brushing teeth)  --  3  -SG  --    Eating meals  --  3  -SG  --    Score  --  15  -SG  --       Functional Assessment    Outcome Measure Options  AM-PAC 6 Clicks Basic Mobility (PT)  (Pended)   -EM  --  --    Row Name 04/09/19 1500 04/09/19 1100          How much help from another person do you currently need...    Turning from your back to your side while in flat bed without using bedrails?  --  3  -CH (r) CG (t) CH (c)     Moving from lying on back to sitting on the side of a flat bed without bedrails?  --  3  -CH  (r) CG (t) CH (c)     Moving to and from a bed to a chair (including a wheelchair)?  --  3  -CH (r) CG (t) CH (c)     Standing up from a chair using your arms (e.g., wheelchair, bedside chair)?  --  3  -CH (r) CG (t) CH (c)     Climbing 3-5 steps with a railing?  --  2  -CH (r) CG (t) CH (c)     To walk in hospital room?  --  3  -CH (r) CG (t) CH (c)     AM-PAC 6 Clicks Score  --  17  -CH (r) CG (t)        How much help from another is currently needed...    Putting on and taking off regular lower body clothing?  2  -SGA  --     Bathing (including washing, rinsing, and drying)  2  -SGA  --     Toileting (which includes using toilet bed pan or urinal)  2  -SGA  --     Putting on and taking off regular upper body clothing  3  -SGA  --     Taking care of personal grooming (such as brushing teeth)  3  -SGA  --     Eating meals  3  -SGA  --     Score  15  -SGA  --        Functional Assessment    Outcome Measure Options  AM-PAC 6 Clicks Daily Activity (OT)  -SGA  AM-[facility] 6 Clicks Basic Mobility (PT)  -CH (r) CG (t) CH (c)       User Key  (r) = Recorded By, (t) = Taken By, (c) = Cosigned By    Initials Name Provider Type    Ciera Rubi, OTR Occupational Therapist    SGA Louise Marlow, OTR Occupational Therapist    CH Peyton Berrios, PT Physical Therapist    Chelo Andrews, PTA Physical Therapy Assistant    CG James Bird, PT Student PT Student    EM Michelle Alejandro, PT Student PT Student         Time Calculation:   PT Charges     Row Name 04/11/19 1107             Time Calculation    Start Time  0950  (Pended)   -EM      Stop Time  1003  (Pended)   -EM      Time Calculation (min)  13 min  (Pended)   -EM      PT Received On  04/11/19  (Pended)   -EM      PT - Next Appointment  04/12/19  (Pended)   -EM        User Key  (r) = Recorded By, (t) = Taken By, (c) = Cosigned By    Initials Name Provider Type    Michelle Balderrama, PT Student PT Student        Therapy Charges for Today     Code Description  Service Date Service Provider Modifiers Qty    [id number]  PT THER PROC EA 15 MIN 4/11/2019 Michelle Alejandro, PT Student GP 1    [id number] HC PT THER SUPP EA 15 MIN 4/11/2019 Michelle Alejandro, PT Student GP 1          PT G-Codes  Outcome Measure Options: (P) AM-PAC 6 Clicks Basic Mobility (PT)  AM-PAC 6 Clicks Score: (P) 19  Score: 15    Michelle Alejandro PT Student  4/11/2019

## 2019-04-11 NOTE — PROGRESS NOTES
REASON FOR CONSULTATION:     Provide an opinion on any further workup or treatment of GBM status post a right craniotomy on 4/7/2019.                              INTERVAL HISTORY:   Patient is recovering from surgery.  On 4/11/2019, he was able to ambulate in the hallway.       HISTORY OF PRESENT ILLNESS:  The patient is a 63 y.o. 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 This was progressively getting worse. The patient originally thought this was due to sinusitis which he had previously. He denies vision changes however, he does have significant nausea and with a few episodes of vomiting with clear gastric content. He denies fever, sweating or chills. Nevertheless this patient had imaging studies obtained in the early morning on 04/05/2019 initially with CT of head without contrast and this described a large right frontal lobe mass measuring 6.1 x 3.9 cm and midline shift 1.3 cm. He had brain MRI examination with and without contrast a few hours later, which showed necrotic enhancing right frontal lobe mass measuring 6.8 x 4.3 x 4.6 cm, with vasogenic edema, extending into the genu of the corpus collosum. There is a marked mass effect in addition to midline shift by 8 mm and entrapment of the left lateral ventricle, subfalcine herniation of the anterior and medial portion of the right frontal lobe. This is highly suspicious for glioblastoma multiforme. The patient subsequently had a CT of the abdomen and pelvis with IV contrast and there was no definite evidence of primary or metastatic disease.      The patient was seen by Neurosurgeon, Dr. Brower and started on dexamethasone and Keppra for anti-seizure purpose. This patient went on to have right craniotomy on 04/07/2019.      Preliminary pathology evaluation reported high grade glioma, fits with GBM. According  to pathologist Dr. Macias, final pathology evaluation is still pending and likely the sample will be sent to [facility] for further molecular study.      Prior to this admission, patient was physically active, has regular exercise on a daily basis prior to this hospitalization.  Does not take prescribed medication.     No family history of malignancy.           Medical History        Past Medical History:   Diagnosis Date   • ED (erectile dysfunction)           Surgical History         Past Surgical History:   Procedure Laterality Date   • CRANIOTOMY FOR TUMOR Right 4/7/2019     Procedure: RIGHT SIDE CRANIOTOMY FOR TUMOR REMOVAL AND  BIOPSY  WITH SIDNEY NAVIGATTION AND PARTIAL RIGHT FRONTAL LOBE LOBECTOMY;  Surgeon: Keshawn Brower MD;  Location: [facility];  Service: Neurosurgery            HEMATOLOGIC/ONCOLOGIC HISTORY:  (History from previous dates can be found in the separate document.)  See HPI     MEDICATIONS: see EMR      ALLERGIES:           Allergies   Allergen Reactions   • Sulfa Antibiotics Unknown (See Comments)       Unknown         SOCIAL HISTORY:       Patient is , [employer] .  Never smoked cigarettes.  Very rare social drinker.         FAMILY HISTORY:  Father is alive in his middle 90s, has hypertension and BPH, with him during Lobo catheter, and episodes of urinary tract infection.  Mother in her mid 90s with arthritis and osteoporosis and hypertension.  No family history of malignancy.        REVIEW OF SYSTEMS:  Review of Systems   Constitutional: Negative for appetite change, diaphoresis, fatigue, fever and unexpected weight change.   HENT: Positive for sinus pressure. Negative for facial swelling.    Eyes: Negative for photophobia and visual disturbance.   Respiratory: Negative for cough and shortness of breath.    Cardiovascular: Negative for chest pain and palpitations.   Gastrointestinal: Positive for constipation (No bowel movement in the past several days.) and nausea (This has  much improved). Negative for abdominal pain and blood in stool.   Endocrine: Negative for cold intolerance.   Genitourinary: Negative for hematuria.   Musculoskeletal: Negative for arthralgias and joint swelling.   Allergic/Immunologic: Negative for food allergies.   Neurological: Positive for headaches. Negative for seizures, syncope and weakness.   Hematological: Negative for adenopathy. Does not bruise/bleed easily.   Psychiatric/Behavioral: Negative for agitation and hallucinations.                 Objective        Vitals:    04/10/19 1700 04/10/19 2151 04/11/19 0500 04/11/19 0932   BP:  130/83 131/83 133/82   BP Location:  Left arm Left arm Left arm   Patient Position:  Sitting Lying Lying   Pulse: 50 63 61 (!) 40   Resp: 18 16 17 17   Temp: 97.5 °F (36.4 °C) 98.6 °F (37 °C) 98.2 °F (36.8 °C) 98.3 °F (36.8 °C)   TempSrc: Oral Oral Oral Oral   SpO2:  96% 97% 97%   Weight:       Height:           PHYSICAL EXAM:       CONSTITUTIONAL: Well-developed well-nourished male.  Vital signs reviewed.  No distress, status post craniectomy with dressing in place.  EYES:  Conjunctiva and lids unremarkable.   EARS,NOSE,MOUTH,THROAT:  Nose appear unremarkable.  Lips appear unremarkable.  RESPIRATORY:  Normal respiratory effort.  Lungs clear to auscultation bilaterally.  CARDIOVASCULAR: Regular rhythm and rate.  Normal S1, S2.  No significant lower extremity edema.  GASTROINTESTINAL: Abdomen appears unremarkable.  Nontender.  No hepatomegaly.  No splenomegaly.  LYMPHATIC:  No cervical, supraclavicular, axillary lymphadenopathy.  MUSCULOSKELETAL:   No cyanosis or clubbing.  SKIN:  Warm.  No rashes.  PSYCHIATRIC:  Normal judgment and insight.  Normal mood and affect.        RECENT LABS:     Results from last 7 days   Lab Units 04/09/19  0502 04/08/19  0430 04/07/19  1353   WBC 10*3/mm3 15.16* 15.54* 12.26*   HEMOGLOBIN g/dL 12.4* 11.5* 11.2*   HEMATOCRIT % 37.2* 35.2* 34.0*   PLATELETS 10*3/mm3 295 307 292     Results from last 7  days   Lab Units 04/08/19  0430 04/07/19  1353 04/06/19  0638 04/05/19  0544   SODIUM mmol/L 141 141 138 139   POTASSIUM mmol/L 4.0 3.8 4.3 4.4   CHLORIDE mmol/L 103 102 103 100   CO2 mmol/L 23.9 26.0 25.1 27.8   BUN mg/dL 19 20 17 19   CREATININE mg/dL 0.74* 0.80 0.71* 0.80   CALCIUM mg/dL 8.6 8.4* 8.8 9.2   BILIRUBIN mg/dL  --   --   --  0.5   ALK PHOS U/L  --   --   --  71   ALT (SGPT) U/L  --   --   --  111*   AST (SGOT) U/L  --   --   --  69*   GLUCOSE mg/dL 110* 135* 163* 119*     Results from last 7 days   Lab Units 04/06/19  0638   INR  1.16*        Assessment/Plan         1.  Patient is a 63 years old male was in good health condition before this admission, he looks younger than his stated age.  He has new onset headache and now newly diagnosed GBM, with a large right frontal lobe mass with surrounding edema, status post right craniotomy on 4/7/2019.  Final pathology results is pending.  His sample likely will be sent to [facility] for molecular study.      Patient was already seen by radiation oncologist Dr. Philippe on 4/8/2019.      On 4/8/2019, I discussed with the patient and his wife today, that pending further molecular genetic study, we will decide whether this patient will be a good candidate for clinical trial, or going straight with standard concurrent chemoradiation therapy. The patient was already seen by radiation oncologist Shanti Philippe MD earlier this afternoon. I briefly discussed with them for concurrent chemotherapy using temozolomide. He will have subsequent maintenance temozolomide treatment for 5 days every 4 weeks.      We will follow along with him for further pathology results, to finalize treatment plan.  I also discussed with them that if they prefer to have a second opinion from other Center, will be happy to make appointment.     Pathology evaluation reported GBM grade 4.  Sent for further center to [facility] for evaluation.    I discussed with the patient again today on  4/9/2019 about plan for future treatment.  Patient voiced he does want to have a second opinion evaluation at [facility].  I think that is reasonable.  I will ask my office to arrange appointment with [facility] towards the end of next week, expecting patient will be discharged later this week.  And I will arrange patient come back to see me in 2 weeks, after he has been seen at [facility].  Patient is agreeable with this arrangement.    We contacted [facility] on 4/9/2019, and that they took information of this patient, however they would not arrange patient for appointment yet until he will be discharged from hospital.  They also prefer patient has repeated MRI of the brain before he goes down to [facility].     So I discussed that with the patient and his wife today on 4/11/2019, we will keep eye on his status, and make referral to [facility].  I will also arrange patient come back to see me in 2 weeks, expecting that he has been evaluated by physicians at the [facility].      2.  Leukocytosis, this is secondary to steroids use.      3.  Mild anemia, this is post surgery.  This patient previously has borderline normal hemoglobin fluctuating around 14.0 g in the past few years.     Discussed with the patient and his wife today.      I discussed with his nurse today for referral to [facility].     We will sign off now.      JUSTIN FROST M.D., Ph.D.     4/11/2019        Dictated using Dragon Dictation.

## 2019-04-12 NOTE — PLAN OF CARE
Problem: Patient Care Overview  Goal: Plan of Care Review  Outcome: Ongoing (interventions implemented as appropriate)   04/12/19 1204   Coping/Psychosocial   Plan of Care Reviewed With patient   OTHER   Outcome Summary pt ajit amb dist well, but cognitive and balance issues limiting safety ; fam not able to physically assist pt, but very supportive; feel pt would benefit from all services available in acute [facility]

## 2019-04-12 NOTE — PROGRESS NOTES
Name: Hernandez Waterman ADMIT: 2019   : 1955  PCP: Megha Gant MD    MRN: [id number] LOS: 7 days   AGE/SEX: 63 y.o. male    ROOM: [facility]   Subjective   Headache is persistent, rated 5 out of 10.  Still with decreased appetite and weakness.  Worked with physical therapy today and is tired at the moment status post craniotomy and debulking of glioblastoma  Patient is awake and alert  Moves all extremities   Speech intact    Brief hospital course since admission:  With vasogenic edema, on tapering dose of decadron  S/p craniotomy with debulking of the tumor.    I have reviewed past medical history, family history, social history and allergies.  No changes from admission note.      Review of Systems   Constitutional: Negative for fatigue and fever.   Respiratory: Negative for shortness of breath and wheezing.    Gastrointestinal: Negative for nausea and vomiting.   Neurological: Negative for headaches.          Objective   Vital Signs  Temp:  [97.7 °F (36.5 °C)-98.5 °F (36.9 °C)] 97.8 °F (36.6 °C)  Heart Rate:  [48-79] 62  Resp:  [18] 18  BP: (131-147)/(78-81) 134/78  SpO2:  [98 %] 98 %  on   ;   Device (Oxygen Therapy): room air  Body mass index is 22.92 kg/m².  No intake or output data in the 24 hours ending 19 1421    Physical Exam   Constitutional: He is oriented to person, place, and time. He appears well-developed and well-nourished. No distress.   Appears weak and tired   HENT:   Head: Normocephalic and atraumatic.   Eyes: Pupils are equal, round, and reactive to light. No scleral icterus.   Cardiovascular: Normal rate and regular rhythm.   Pulmonary/Chest: Effort normal. No respiratory distress. He has no decreased breath sounds. He has no wheezes.   Abdominal: Soft. Bowel sounds are normal. There is no hepatosplenomegaly.   Musculoskeletal: He exhibits no edema.   Neurological: He is alert and oriented to person, place, and time.   Left upper extremity weakness     Skin: No cyanosis.  Nails show no clubbing.   Psychiatric: He has a normal mood and affect. His behavior is normal.   Vitals reviewed.      Results Review:    Results from last 7 days   Lab Units 04/09/19  0502 04/08/19  0430 04/07/19  1353   WBC 10*3/mm3 15.16* 15.54* 12.26*   HEMOGLOBIN g/dL 12.4* 11.5* 11.2*   HEMATOCRIT % 37.2* 35.2* 34.0*   PLATELETS 10*3/mm3 295 307 292     Results from last 7 days   Lab Units 04/08/19  0430 04/07/19  1353 04/06/19  0638   SODIUM mmol/L 141 141 138   POTASSIUM mmol/L 4.0 3.8 4.3   CHLORIDE mmol/L 103 102 103   CO2 mmol/L 23.9 26.0 25.1   BUN mg/dL 19 20 17   CREATININE mg/dL 0.74* 0.80 0.71*   GLUCOSE mg/dL 110* 135* 163*   CALCIUM mg/dL 8.6 8.4* 8.8     Results from last 7 days   Lab Units 04/06/19  0638   INR  1.16*     Hemoglobin A1C:No results found for: HGBA1C  Glucose Range:  Glucose   Date/Time Value Ref Range Status   04/11/2019 1104 99 70 - 130 mg/dL Final   04/11/2019 0719 96 70 - 130 mg/dL Final   04/10/2019 2031 133 (H) 70 - 130 mg/dL Final   04/10/2019 1614 116 70 - 130 mg/dL Final   04/10/2019 1058 122 70 - 130 mg/dL Final   04/10/2019 0756 100 70 - 130 mg/dL Final         dexamethasone 10 mg Intravenous Once   dexamethasone 4 mg Oral Q6H   famotidine 20 mg Oral BID   insulin lispro 0-7 Units Subcutaneous 4x Daily With Meals & Nightly   levETIRAcetam 500 mg Oral Q12H   polyethylene glycol 17 g Oral BID   sennosides-docusate sodium 2 tablet Oral Nightly      Diet Regular  Assessment/Plan       Assessment/Plan      Active Hospital Problems    Diagnosis  POA   • **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 (CMS/HCC) [D49.6]  Yes   • GBM (glioblastoma multiforme) (CMS/HCC) [C71.9]  Unknown   • Leukemoid reaction [D72.823]  Unknown   • Anemia [D64.9]  Unknown   • Constipation [K59.00]  Unknown   • Acute intractable headache [R51]  Yes   • Cerebral edema (CMS/HCC) [G93.6]  Unknown      Resolved Hospital Problems   No resolved problems to display.     Continue Decadron  and Keppra  Glioblastoma status post debulking and craniotomy with biopsy  Consultants are following namely oncology, radiation oncology and neuro-oncology.  Neurosurgery has signed off  He will need radiation and has appointment with radiation oncology.  He will get concurrent Temodar final pathology is pending  Patient and his wife express that they want to get a second opinion at [facility].  Referral has been sent.  Appreciate oncology assistance  Increased bowel regimen yesterday, he wants prune juice for the morning  PT and OT working with him and CCP working on discharge needs    DISPO: his wife wants acute rehab, they are reevaluating him now      Maximo Mcpherson MD  [facility]  04/12/19

## 2019-04-12 NOTE — PROGRESS NOTES
Continued Stay Note  [facility]     Patient Name: Hernandez Waterman  MRN: [id number]  Today's Date: 4/12/2019    Admit Date: 4/5/2019    Discharge Plan     Row Name 04/12/19 1042       Plan    Plan Comments  Notified through RN that wife was wanting patient to go to Rehab instead of home with HH. I called Lalita with  Rehab who stated they had declined services for 2 days. She was unsure if patient would now be guarenteed a bed. Stated she would re-evaluate. Talked with wife to explain rehab situation. Wife upset stating she never refused Rehab. Discussed how patient is walking >100ft. Discussed with wife/patient that we do have [facility] set up if not qualified for rehab. Gave wife Transition Book and list of SNF for alternative locations for Rehab. WIll F/U after hear from Lalita.        Ciera Metzger RN

## 2019-04-12 NOTE — PLAN OF CARE
Problem: Patient Care Overview  Goal: Plan of Care Review  Outcome: Ongoing (interventions implemented as appropriate)   04/12/19 8662   Coping/Psychosocial   Plan of Care Reviewed With patient   Plan of Care Review   Progress improving   OTHER   Outcome Summary Pt AOx4, VSS, pain controlled with PRN meds. Pt up x1 assist with walker. Incision to head C/D/I. Family wants rehab for pt; precert is pending. Will continue to monitor.        Problem: Fall Risk (Adult)  Goal: Identify Related Risk Factors and Signs and Symptoms  Outcome: Ongoing (interventions implemented as appropriate)    Goal: Absence of Fall  Outcome: Ongoing (interventions implemented as appropriate)      Problem: Pain, Acute (Adult)  Goal: Identify Related Risk Factors and Signs and Symptoms  Outcome: Ongoing (interventions implemented as appropriate)      Problem: Skin Injury Risk (Adult)  Goal: Identify Related Risk Factors and Signs and Symptoms  Outcome: Ongoing (interventions implemented as appropriate)    Goal: Skin Health and Integrity  Outcome: Ongoing (interventions implemented as appropriate)

## 2019-04-12 NOTE — DISCHARGE INSTR - APPOINTMENTS
Apr 23, 2019 2:45 PM EDT  FOLLOW UP with Keshawn Brower  [facility]  [address]  [place] [id number] [phone]

## 2019-04-12 NOTE — NURSING NOTE
No call back from insurance. Will update if hear over week end for precert. Otherwise will follow up on Mon. Ajay Andrews RN rehab admission nurse [phone]

## 2019-04-12 NOTE — THERAPY TREATMENT NOTE
Acute Care - Physical Therapy Treatment Note  [facility]     Patient Name: Hernandez Waterman  : 1955  MRN: [id number]  Today's Date: 2019  Onset of Illness/Injury or Date of Surgery: 19     Referring Physician: German    Admit Date: 2019    Visit Dx:    ICD-10-CM ICD-9-CM   1. Acute intractable headache, unspecified headache type R51 784.0   2. Brain mass G93.9 348.9   3. Right frontal lobe mass G93.9 348.9   4. 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 (CMS/HCC)   • Cerebral edema (CMS/HCC)   • GBM (glioblastoma multiforme) (CMS/HCC)   • Leukemoid reaction   • Anemia   • Constipation       Therapy Treatment    Rehabilitation Treatment Summary     Row Name 19 1100             Treatment Time/Intention    Discipline  physical therapy assistant  -      Document Type  therapy note (daily note)  -      Subjective Information  complains of;fatigue;pain  -      Care Plan Review  patient/other agree to care plan  -      Care Plan Review, Other Participant(s)  spouse  -      Comment  pt presents w/decr safety awareness, decr knowledge of deficits  -      Existing Precautions/Restrictions  fall  -      Treatment Considerations/Comments  multiple cues for safety during amb, balance issues as well as safety due to cognitive deficits  -      Recorded by [SCAR] Chelo Sanford PTA 19 1204      Row Name 19 1100             Bed Mobility Assessment/Treatment    Comment (Bed Mobility)  in chair upon entry  -      Recorded by [SCAR] Chelo Sanford PTA 19 1204      Row Name 19 1100             Sit-Stand Transfer    Sit-Stand  [place] (Transfers)  2 person assist;contact guard;verbal cues  -      Assistive Device (Sit-Stand Transfers)  walker, front-wheeled  -JM      Recorded by [JM] Chelo Sanford, PRESTON 04/12/19 1204      Row Name 04/12/19 1100             Stand-Sit Transfer    Stand-Sit [place] (Transfers)  contact guard;verbal cues cues to make sure pt at chair before sitting  -JM      Recorded by [JM] Chelo Sanford PTA 04/12/19 1204      Row Name 04/12/19 1100             Toilet Transfer    Type (Toilet Transfer)  stand-sit;sit-stand  -      [place] Level (Toilet Transfer)  contact guard  -      Assistive Device (Toilet Transfer)  grab bars/safety frame;commode cues to back up before sitting on commode  -JM      Recorded by [JM] Chelo Sanford PTA 04/12/19 1204      Row Name 04/12/19 1100             Gait/Stairs Assessment/Training    [place] Level (Gait)  2 person assist;contact guard;verbal cues;nonverbal cues (demo/gesture)  -      Assistive Device (Gait)  walker, front-wheeled and HHA  -JM      Distance in Feet (Gait)  120 x2; with and w/o AD  -JM      Left Sided Gait Deviations  weight shift ability decreased  -      [place] Level (Stairs)  2 person assist;contact guard  -      Handrail Location (Stairs)  left side (ascending)  -      Number of Steps (Stairs)  10 also perf 3 w/o HR  as he has no rail to enter home  -      Ascending Technique (Stairs)  step-to-step req cues to perf proper sequencing  more than once  -      Descending Technique (Stairs)  step-to-step  -JM      Comment (Gait/Stairs)  veers L using rwx, cues for footplacement , some steppage pattern noted L foot  -JM      Recorded by [JM] Chelo Sanford, PRESTON 04/12/19 1204      Row Name 04/12/19 1100             Therapeutic Exercise    Lower Extremity (Therapeutic Exercise)  LAQ (long arc quad), bilateral;marching while seated  -JM      Sets/Reps (Therapeutic Exercise)  10 reps, had pt count but easily distracted   -JM      Recorded by [] Chelo Sanford PTA 04/12/19 1204      Row Name 04/12/19 1100             Positioning and Restraints    Pre-Treatment Position  sitting in chair/recliner  -JM      In Chair  reclined;call light within reach;encouraged to call for assist;exit alarm on;with OT;with nsg  -      Recorded by [] Chelo Sanford PTA 04/12/19 1204      Row Name 04/12/19 1100             Pain Scale: Numbers Pre/Post-Treatment    Pain Location  head  -JM      Pain Intervention(s)  Medication (See MAR)  -      Recorded by [SCAR] Chelo Sanford PTA 04/12/19 1204      Row Name 04/12/19 1100             Pain Scale: Word Pre/Post-Treatment    Pain: Word Scale, Pretreatment  2 - mild pain  -      Pain: Word Scale, Post-Treatment  2 - mild pain  -      Recorded by [SCAR] Chelo Sanford PTA 04/12/19 1204      Row Name                Wound 04/07/19 1233 Right head incision    Wound - Properties Group Date first assessed: 04/07/19 [LD] Time first assessed: 1233 [LD] Side: Right [LD] Location: head [LD] Type: incision [LD] Recorded by:  [THUAN] Monet Rivers, RN 04/07/19 1233      User Key  (r) = Recorded By, (t) = Taken By, (c) = Cosigned By    Initials Name Effective Dates Discipline     Chelo Sanford PTA 03/07/18 -  PT    Monet Puga, RN 06/16/16 -  Nurse          Wound 04/07/19 1233 Right head incision (Active)   Dressing Appearance dry;intact 4/12/2019  8:55 AM   Closure Adhesive closure strips 4/12/2019  4:20 AM   Periwound intact;dry 4/12/2019  8:55 AM   Periwound Temperature warm 4/12/2019  8:55 AM   Periwound Skin Turgor soft 4/12/2019  8:55 AM   Edges rolled/closed 4/12/2019  8:55 AM   Drainage Amount none 4/12/2019  8:55 AM           Physical Therapy Education     Title: PT OT SLP Therapies (In Progress)     Topic: Physical Therapy (Done)     Point: Mobility training (Done)     Learning Progress Summary           Patient Acceptance, E,TB,D, VU,NR by SCAR at 4/12/2019 12:06 PM    Comment:  pt  very motivated but req educ for safety    Acceptance, E, NR by EM at 4/11/2019 11:04 AM    Eager, E,TB,D, VU,NR by SCAR at 4/10/2019 10:59 AM    Acceptance, E,TB, VU by KT at 4/10/2019  4:44 AM    Acceptance, E,TB, VU,NR by CG at 4/9/2019 11:04 AM   Family Eager, E,TB,D, VU,NR by SCAR at 4/10/2019 10:59 AM                   Point: Home exercise program (Done)     Learning Progress Summary           Patient Acceptance, E,TB,D, VU,NR by SCAR at 4/12/2019 12:06 PM    Comment:  pt very motivated but req educ for safety    Acceptance, E, NR by EM at 4/11/2019 11:04 AM    Eager, E,TB,D, VU,NR by SCAR at 4/10/2019 10:59 AM    Acceptance, E,TB, VU by KT at 4/10/2019  4:44 AM    Acceptance, E,TB, VU,NR by CG at 4/9/2019 11:04 AM   Family Eager, E,TB,D, VU,NR by SCAR at 4/10/2019 10:59 AM                   Point: Body mechanics (Done)     Learning Progress Summary           Patient Acceptance, E,TB,D, VU,NR by SCAR at 4/12/2019 12:06 PM    Comment:  pt very motivated but req educ for safety    Acceptance, E, NR by EM at 4/11/2019 11:04 AM    Eager, E,TB,D, VU,NR by SCAR at 4/10/2019 10:59 AM    Acceptance, E,TB, VU by KT at 4/10/2019  4:44 AM    Acceptance, E,TB, VU,NR by CG at 4/9/2019 11:04 AM   Family Eager, E,TB,D, VU,NR by SCAR at 4/10/2019 10:59 AM                   Point: Precautions (Done)     Learning Progress Summary           Patient Acceptance, E,TB,D, VU,NR by SCAR at 4/12/2019 12:06 PM    Comment:  pt very motivated but req educ for safety    Acceptance, E, NR by EM at 4/11/2019 11:04 AM    Eager, E,TB,D, VU,NR by SCAR at 4/10/2019 10:59 AM    Acceptance, E,TB, VU by KT at 4/10/2019  4:44 AM    Acceptance, E,LEVY, LORIE,NR by CG at 4/9/2019 11:04 AM   Family Eager, LEVY GOODMAN,POLINA, LORIE,NR by SCAR at 4/10/2019 10:59 AM                               User Key     Initials Effective Dates Name Provider Type Discipline    SCAR 03/07/18 -  Chelo Sanford PTA Physical Therapy Assistant PT    RUSSELL 01/18/18 -  Taniya Taylor, RN Registered Nurse Nurse    CG  02/04/19 -  James Bird, PT Student PT Student PT    EM 03/04/19 -  Michelle Alejandro, PT Student PT Student PT                PT Recommendation and Plan     Plan of Care Reviewed With: patient  Progress: improving  Outcome Summary: pt ajit amb dist well, but cognitive and balance issues limiting safety ; fam not able to physically assist pt, but very supportive; feel pt would benefit from all services available in acute [facility]  Outcome Measures     Row Name 04/11/19 1603 04/11/19 1100 04/10/19 1206       How much help from another person do you currently need...    Turning from your back to your side while in flat bed without using bedrails?  --  4  -PC (r) EM (t) PC (c)  --    Moving from lying on back to sitting on the side of a flat bed without bedrails?  --  4  -PC (r) EM (t) PC (c)  --    Moving to and from a bed to a chair (including a wheelchair)?  --  3  -PC (r) EM (t) PC (c)  --    Standing up from a chair using your arms (e.g., wheelchair, bedside chair)?  --  3  -PC (r) EM (t) PC (c)  --    Climbing 3-5 steps with a railing?  --  2  -PC (r) EM (t) PC (c)  --    To walk in hospital room?  --  3  -PC (r) EM (t) PC (c)  --    AM-PAC 6 Clicks Score  --  19  -PC (r) EM (t)  --       How much help from another is currently needed...    Putting on and taking off regular lower body clothing?  3  -SG  --  2  -SG    Bathing (including washing, rinsing, and drying)  3  -SG  --  2  -SG    Toileting (which includes using toilet bed pan or urinal)  3  -SG  --  2  -SG    Putting on and taking off regular upper body clothing  3  -SG  --  3  -SG    Taking care of personal grooming (such as brushing teeth)  3  -SG  --  3  -SG    Eating meals  3  -SG  --  3  -SG    Score  18  -SG  --  15  -SG       Functional Assessment    Outcome Measure Options  --  AM-PAC 6 Clicks Basic Mobility (PT)  -PC (r) EM (t) PC (c)  --    Row Name 04/10/19 1100 04/09/19 1500          How much help from another person do you currently need...     Turning from your back to your side while in flat bed without using bedrails?  4  -JM  --     Moving from lying on back to sitting on the side of a flat bed without bedrails?  4  -JM  --     Moving to and from a bed to a chair (including a wheelchair)?  3  -JM  --     Standing up from a chair using your arms (e.g., wheelchair, bedside chair)?  3  -JM  --     Climbing 3-5 steps with a railing?  2  -JM  --     To walk in hospital room?  3  -JM  --     AM-PAC 6 Clicks Score  19  -JM  --        How much help from another is currently needed...    Putting on and taking off regular lower body clothing?  --  2  -SGA     Bathing (including washing, rinsing, and drying)  --  2  -SGA     Toileting (which includes using toilet bed pan or urinal)  --  2  -SGA     Putting on and taking off regular upper body clothing  --  3  -SGA     Taking care of personal grooming (such as brushing teeth)  --  3  -SGA     Eating meals  --  3  -SGA     Score  --  15  -SGA        Functional Assessment    Outcome Measure Options  --  AM-PAC 6 Clicks Daily Activity (OT)  -[facility]       User Key  (r) = Recorded By, (t) = Taken By, (c) = Cosigned By    Initials Name Provider Type    Ciera Rubi, OTR Occupational Therapist    Louise Dumont, OTR Occupational Therapist    Coco Metz, PT Physical Therapist    Chelo Andrews, PRESTON Physical Therapy Assistant    Michelle Balderrama, PT Student PT Student         Time Calculation:   PT Charges     Row Name 04/12/19 1207             Time Calculation    Start Time  0940  -      Stop Time  1010  -      Time Calculation (min)  30 min  -      PT Received On  04/12/19  -      PT - Next Appointment  04/13/19  -         Time Calculation- PT    Total Timed Code Minutes- PT  30 minute(s)  -        User Key  (r) = Recorded By, (t) = Taken By, (c) = Cosigned By    Initials Name Provider Type    Chelo Andrews PTA Physical Therapy Assistant        Therapy Charges for Today     Code  Description Service Date Service Provider Modifiers Qty    83492035560 HC PT THER PROC EA 15 MIN 4/12/2019 Chelo Sanford, PRESTON GP 2    55607646244 HC PT THER SUPP EA 15 MIN 4/12/2019 Chelo Sanford PTA GP 2          PT G-Codes  Outcome Measure Options: AM-PAC 6 Clicks Basic Mobility (PT)  AM-PAC 6 Clicks Score: 19  Score: 18    Chelo Sanford PTA  4/12/2019

## 2019-04-12 NOTE — PLAN OF CARE
Problem: Patient Care Overview  Goal: Plan of Care Review  Outcome: Ongoing (interventions implemented as appropriate)   04/12/19 0332   Coping/Psychosocial   Plan of Care Reviewed With patient   Plan of Care Review   Progress improving   OTHER   Outcome Summary VSS. A&O x4. Refused bed alarm. Crani incision CDI and benign. Using walker with standby assist. Anticipate DC today.

## 2019-04-12 NOTE — NURSING NOTE
Received call that spouse now wanting acute rehab for pt. Left her VM to discuss. Based on current therapy notes, rec cont therapies. Clinicals sent to insurance CM and will update once have a determination. Thanks, CHANNING Blanco RN rehab admission nurse [phone]

## 2019-04-13 NOTE — PLAN OF CARE
Problem: Patient Care Overview  Goal: Plan of Care Review  Outcome: Ongoing (interventions implemented as appropriate)   04/13/19 0619   Coping/Psychosocial   Plan of Care Reviewed With patient   Plan of Care Review   Progress improving   OTHER   Outcome Summary A&O x4. VSS. Crani incision benign. Medicated x1 for surgical site pain. Awaiting precert for rehab.

## 2019-04-13 NOTE — NURSING NOTE
Patient reporting to this Nurse that after the suppository, the patient is feeling some relief and has been able to have a small bowel movement. This nurse suggested that a fleets enema would probably provide additional relief; and educated the patient and his wife regarding the necessity of ambulation, as well as the risk of the patient straining to have a bowel movement. Patient and wife voiced understanding. Dr. Mcpherson paged for a fleets enema; order received for a fleet enema once daily PRN. VSS, will continue to monitor.

## 2019-04-13 NOTE — PROGRESS NOTES
Name: Hernandez Waterman ADMIT: 2019   : 1955  PCP: Megha Gant MD    MRN: [id number] LOS: 8 days   AGE/SEX: 63 y.o. male    ROOM: [facility]   Subjective   Still has not had bowel movement.  Has been passing flatus.  Now has suprapubic tenderness and has difficulty urinating.  Bladder scan chest now shows 800 cc in the bladder.  Does have history of BPH  Headache is persistent, rated 5 out of 10.  Still with decreased appetite and weakness.     status post craniotomy and debulking of glioblastoma  Patient is awake and alert  Moves all extremities   Speech intact    Brief hospital course since admission:  With vasogenic edema, on tapering dose of decadron  S/p craniotomy with debulking of the tumor.    I have reviewed past medical history, family history, social history and allergies.  No changes from admission note.      Review of Systems   Constitutional: Negative for fatigue and fever.   Respiratory: Negative for shortness of breath and wheezing.    Gastrointestinal: Negative for nausea and vomiting.   Neurological: Negative for headaches.          Objective   Vital Signs  Temp:  [97.5 °F (36.4 °C)-98.5 °F (36.9 °C)] 97.5 °F (36.4 °C)  Heart Rate:  [50-72] 50  Resp:  [16-18] 16  BP: (116-134)/(75-87) 131/87  SpO2:  [95 %-98 %] 97 %  on   ;   Device (Oxygen Therapy): room air  Body mass index is 22.92 kg/m².    Intake/Output Summary (Last 24 hours) at 2019 0838  Last data filed at 2019 0300  Gross per 24 hour   Intake --   Output 500 ml   Net -500 ml       Physical Exam   Constitutional: He is oriented to person, place, and time. He appears well-developed and well-nourished. No distress.   Appears weak and tired   HENT:   Head: Normocephalic and atraumatic.   Eyes: Pupils are equal, round, and reactive to light. No scleral icterus.   Cardiovascular: Normal rate and regular rhythm.   Pulmonary/Chest: Effort normal. No respiratory distress. He has no decreased breath sounds. He has no  wheezes.   Abdominal: Soft. Bowel sounds are normal. There is no hepatosplenomegaly. There is tenderness in the suprapubic area. There is no rigidity and no guarding.   Musculoskeletal: He exhibits no edema.   Neurological: He is alert and oriented to person, place, and time.   Left upper extremity weakness     Skin: No cyanosis. Nails show no clubbing.   Psychiatric: He has a normal mood and affect. His behavior is normal.   Vitals reviewed.      Results Review:    Results from last 7 days   Lab Units 04/09/19  0502 04/08/19  0430 04/07/19  1353   WBC 10*3/mm3 15.16* 15.54* 12.26*   HEMOGLOBIN g/dL 12.4* 11.5* 11.2*   HEMATOCRIT % 37.2* 35.2* 34.0*   PLATELETS 10*3/mm3 295 307 292     Results from last 7 days   Lab Units 04/08/19  0430 04/07/19  1353   SODIUM mmol/L 141 141   POTASSIUM mmol/L 4.0 3.8   CHLORIDE mmol/L 103 102   CO2 mmol/L 23.9 26.0   BUN mg/dL 19 20   CREATININE mg/dL 0.74* 0.80   GLUCOSE mg/dL 110* 135*   CALCIUM mg/dL 8.6 8.4*         Hemoglobin A1C:No results found for: HGBA1C  Glucose Range:  Glucose   Date/Time Value Ref Range Status   04/11/2019 1104 99 70 - 130 mg/dL Final   04/11/2019 0719 96 70 - 130 mg/dL Final   04/10/2019 2031 133 (H) 70 - 130 mg/dL Final   04/10/2019 1614 116 70 - 130 mg/dL Final   04/10/2019 1058 122 70 - 130 mg/dL Final         dexamethasone 10 mg Intravenous Once   dexamethasone 4 mg Oral Q6H   famotidine 20 mg Oral BID   insulin lispro 0-7 Units Subcutaneous 4x Daily With Meals & Nightly   levETIRAcetam 500 mg Oral Q12H   polyethylene glycol 17 g Oral BID   sennosides-docusate sodium 2 tablet Oral BID   tamsulosin 0.4 mg Oral Daily      Diet Regular  Assessment/Plan       Assessment/Plan      Active Hospital Problems    Diagnosis  POA   • **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 (CMS/HCC) [D49.6]  Yes   • GBM (glioblastoma multiforme) (CMS/HCC) [C71.9]  Unknown   • Leukemoid reaction [D72.823]  Unknown   • Anemia [D64.9]  Unknown   •  Constipation [K59.00]  Unknown   • Acute intractable headache [R51]  Yes   • Cerebral edema (CMS/HCC) [G93.6]  Unknown      Resolved Hospital Problems   No resolved problems to display.     -Continue Decadron and Keppra  -Glioblastoma status post debulking and craniotomy with biopsy  -Consultants are following namely oncology, radiation oncology and neuro-oncology.  Neurosurgery has signed off  -He will need radiation and has appointment with radiation oncology in a couple weeks.  He will get concurrent Temodar final pathology is pending  -Patient and his wife express that they want to get a second opinion at [facility].  Referral has been sent.  Appreciate oncology assistance  -Start Flomax, straight cath x1 now  -Increased bowel regimen again today, he is now agreeable to suppository.  If this is not successful we will give an enema  -PT and OT working with him and CCP working on discharge needs    DISPO: his wife wants acute rehab, they are reevaluating him now      Maximo Mcpherson MD  [facility]  04/13/19

## 2019-04-13 NOTE — PLAN OF CARE
Problem: Patient Care Overview  Goal: Plan of Care Review  Outcome: Ongoing (interventions implemented as appropriate)   04/13/19 0619 04/13/19 0810 04/13/19 4929   Coping/Psychosocial   Plan of Care Reviewed With --  patient;spouse --    Plan of Care Review   Progress improving --  --    OTHER   Outcome Summary --  --  Pt. remains A&Ox4 throughout shift, Craniotomy incision remains C/D/I. Multiple small bowel movements noted after enema, suppository, sennakot, and miralax. Plan for possible rehab at d/c?

## 2019-04-13 NOTE — SIGNIFICANT NOTE
04/13/19 1507   Rehab Treatment   Discipline physical therapist   Reason Treatment Not Performed patient/family declined treatment, not feeling well  (reports feeling sick to stomach and very fatigued)   Recommendation   PT - Next Appointment 04/14/19

## 2019-04-14 NOTE — PROGRESS NOTES
Name: Hernandez Waterman ADMIT: 2019   : 1955  PCP: Megha Gant MD    MRN: [id number] LOS: 9 days   AGE/SEX: 63 y.o. male    ROOM: [facility]   Subjective   Appetite returning  BMs with bowel regimen yesterday  Urinating without trouble today  Headache is persistent, rated 5 out of 10.  Still with decreased appetite and weakness.     status post craniotomy and debulking of glioblastoma  Patient is awake and alert  Moves all extremities   Speech intact    Brief hospital course since admission:  With vasogenic edema, on tapering dose of decadron  S/p craniotomy with debulking of the tumor.    I have reviewed past medical history, family history, social history and allergies.  No changes from admission note.      Review of Systems   Constitutional: Negative for fatigue and fever.   Respiratory: Negative for shortness of breath and wheezing.    Gastrointestinal: Negative for nausea and vomiting.   Neurological: Negative for headaches.          Objective   Vital Signs  Temp:  [97.8 °F (36.6 °C)-98.4 °F (36.9 °C)] 98.4 °F (36.9 °C)  Heart Rate:  [55-72] 55  Resp:  [16-18] 18  BP: (112-127)/(74-92) 121/78  SpO2:  [95 %-98 %] 96 %  on   ;   Device (Oxygen Therapy): room air  Body mass index is 22.92 kg/m².  No intake or output data in the 24 hours ending 19 0825    Physical Exam   Constitutional: He is oriented to person, place, and time. He appears well-developed and well-nourished. No distress.   Appears weak and tired   HENT:   Head: Normocephalic and atraumatic.   Eyes: Pupils are equal, round, and reactive to light. No scleral icterus.   Cardiovascular: Normal rate and regular rhythm.   Pulmonary/Chest: Effort normal. No respiratory distress. He has no decreased breath sounds. He has no wheezes.   Abdominal: Soft. Bowel sounds are normal. There is no hepatosplenomegaly. There is no tenderness. There is no rigidity and no guarding.   Musculoskeletal: He exhibits no edema.   Neurological: He is alert  and oriented to person, place, and time.   Left upper extremity weakness     Skin: No cyanosis. Nails show no clubbing.   Psychiatric: He has a normal mood and affect. His behavior is normal.   Vitals reviewed.      Results Review:    Results from last 7 days   Lab Units 04/09/19  0502 04/08/19  0430 04/07/19  1353   WBC 10*3/mm3 15.16* 15.54* 12.26*   HEMOGLOBIN g/dL 12.4* 11.5* 11.2*   HEMATOCRIT % 37.2* 35.2* 34.0*   PLATELETS 10*3/mm3 295 307 292     Results from last 7 days   Lab Units 04/08/19  0430 04/07/19  1353   SODIUM mmol/L 141 141   POTASSIUM mmol/L 4.0 3.8   CHLORIDE mmol/L 103 102   CO2 mmol/L 23.9 26.0   BUN mg/dL 19 20   CREATININE mg/dL 0.74* 0.80   GLUCOSE mg/dL 110* 135*   CALCIUM mg/dL 8.6 8.4*         Hemoglobin A1C:No results found for: HGBA1C  Glucose Range:  Glucose   Date/Time Value Ref Range Status   04/11/2019 1104 99 70 - 130 mg/dL Final         dexamethasone 10 mg Intravenous Once   dexamethasone 4 mg Oral Q6H   famotidine 20 mg Oral BID   levETIRAcetam 500 mg Oral Q12H   polyethylene glycol 17 g Oral BID   sennosides-docusate sodium 2 tablet Oral BID   tamsulosin 0.4 mg Oral Daily      Diet Regular  Assessment/Plan       Assessment/Plan      Active Hospital Problems    Diagnosis  POA   • **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 (CMS/HCC) [D49.6]  Yes   • GBM (glioblastoma multiforme) (CMS/HCC) [C71.9]  Unknown   • Leukemoid reaction [D72.823]  Unknown   • Anemia [D64.9]  Unknown   • Constipation [K59.00]  Unknown   • Acute intractable headache [R51]  Yes   • Cerebral edema (CMS/HCC) [G93.6]  Unknown      Resolved Hospital Problems   No resolved problems to display.     -Continue Decadron and Keppra  -Glioblastoma status post debulking and craniotomy with biopsy  -Consultants are following namely oncology, radiation oncology and neuro-oncology.  Neurosurgery has signed off  -He will need radiation and has appointment with radiation oncology in a couple weeks.  He  will get concurrent Temodar final pathology is pending  -Patient and his wife express that they want to get a second opinion at [facility].  Referral has been sent.  Appreciate oncology assistance  -Started Flomax, straight cath x1 4/13 but now urinating on his own  -Increased bowel regimen--has had BMs with suppository and enema yesterday  -PT and OT working with him and CCP working on discharge needs    DISPO: he will need acute rehab, they are reevaluating him now      Maximo Mcpherson MD  [facility]  04/14/19

## 2019-04-14 NOTE — PLAN OF CARE
Problem: Patient Care Overview  Goal: Plan of Care Review  Outcome: Ongoing (interventions implemented as appropriate)   04/14/19 0668   Coping/Psychosocial   Plan of Care Reviewed With patient;family   Plan of Care Review   Progress improving   OTHER   Outcome Summary Pt transferred sit to stand with CGA x 1 and RW. Pt ambulated 200 ft with RW and CGA x 1. Pt ascended/descended 12 steps with 1 handrail and CGA x 1` with step to pattern. Pt returned to sit in chair.

## 2019-04-14 NOTE — PLAN OF CARE
Problem: Patient Care Overview  Goal: Plan of Care Review  Outcome: Ongoing (interventions implemented as appropriate)   04/14/19 1345 04/14/19 2203   Coping/Psychosocial   Plan of Care Reviewed With patient;family --    Plan of Care Review   Progress improving --    OTHER   Outcome Summary --  Pt. remains A&Ox4 throughout shift with flat affect. Patient able to have multiple bowel movements. Pt worked with PT and tolerated well. Plan for possible rehab at d/c (to be evaluated in am).

## 2019-04-14 NOTE — THERAPY TREATMENT NOTE
Acute Care - Physical Therapy Treatment Note  [facility]     Patient Name: Hernandez Waterman  : 1955  MRN: [id number]  Today's Date: 2019  Onset of Illness/Injury or Date of Surgery: 19     Referring Physician: German    Admit Date: 2019    Visit Dx:    ICD-10-CM ICD-9-CM   1. Acute intractable headache, unspecified headache type R51 784.0   2. Brain mass G93.9 348.9   3. Right frontal lobe mass G93.9 348.9   4. 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 (CMS/HCC)   • Cerebral edema (CMS/HCC)   • GBM (glioblastoma multiforme) (CMS/HCC)   • Leukemoid reaction   • Anemia   • Constipation       Therapy Treatment    Rehabilitation Treatment Summary     Row Name 19 1300             Treatment Time/Intention    Discipline  physical therapist  -      Document Type  therapy note (daily note)  -      Subjective Information  complains of;weakness;fatigue  -LC      Mode of Treatment  individual therapy;physical therapy  -LC      Patient/Family Observations  up in chair, family present  -      Care Plan Review  evaluation/treatment results reviewed;care plan/treatment goals reviewed;risks/benefits reviewed;patient/other agree to care plan;current/potential barriers reviewed  -LC      Patient Effort  good  -LC      Existing Precautions/Restrictions  fall  -LC      Recorded by [LC] Osvaldo Dubon, PT DPT 19 1344      Row Name 19 1300             Cognitive Assessment/Intervention- PT/OT    Orientation Status (Cognition)  oriented x 3  -LC      Follows Commands (Cognition)  WFL  -LC      Personal Safety Interventions  fall prevention program maintained;gait  belt;muscle strengthening facilitated;nonskid shoes/slippers when out of bed  -LC      Recorded by [LC] Osvaldo Dubon, PT DPT 04/14/19 1344      Row Name 04/14/19 1300             Bed Mobility Assessment/Treatment    Comment (Bed Mobility)  up in chair  -LC      Recorded by [LC] Osvaldo Dubon, PT DPT 04/14/19 1344      Row Name 04/14/19 1300             Sit-Stand Transfer    Sit-Stand Coal (Transfers)  contact guard  -LC      Assistive Device (Sit-Stand Transfers)  walker, front-wheeled  -LC      Recorded by [LC] Osvaldo Dubon, PT DPT 04/14/19 1344      Row Name 04/14/19 1300             Stand-Sit Transfer    Stand-Sit Coal (Transfers)  contact guard  -LC      Assistive Device (Stand-Sit Transfers)  walker, front-wheeled  -LC      Recorded by [LC] Osvaldo Dubon, PT DPT 04/14/19 1344      Row Name 04/14/19 1300             Gait/Stairs Assessment/Training    75417 - Gait Training Minutes   15  -LC      Coal Level (Gait)  contact guard  -LC      Assistive Device (Gait)  walker, front-wheeled  -LC      Distance in Feet (Gait)  200  -LC      Pattern (Gait)  step-through  -LC      Deviations/Abnormal Patterns (Gait)  ataxic  -LC      Bilateral Gait Deviations  leans left;weight shift ability decreased  -LC      Coal Level (Stairs)  contact guard  -LC      Handrail Location (Stairs)  left side (ascending)  -LC      Number of Steps (Stairs)  12  -LC      Ascending Technique (Stairs)  step-to-step  -LC      Descending Technique (Stairs)  step-to-step  -LC      Recorded by [LC] Osvaldo Dubon, PT DPT 04/14/19 1344      Row Name 04/14/19 1300             Positioning and Restraints    Pre-Treatment Position  sitting in chair/recliner  -LC      Post Treatment Position  chair  -LC      In Chair  sitting;call light within reach;encouraged to call for assist;with family/caregiver  -LC      Recorded by [LC] Osvaldo Dubon, PT DPT 04/14/19 1344      Row Name                Wound 04/07/19 1233  Right head incision    Wound - Properties Group Date first assessed: 04/07/19 [LD] Time first assessed: 1233 [LD] Side: Right [LD] Location: head [LD] Type: incision [LD] Recorded by:  [THUAN] Monet Rivers RN 04/07/19 1233      User Key  (r) = Recorded By, (t) = Taken By, (c) = Cosigned By    Initials Name Effective Dates Discipline    LD Monet Rivers RN 06/16/16 -  Nurse    Osvaldo Blunt, PT DPT 08/02/16 -  PT          Wound 04/07/19 1233 Right head incision (Active)   Dressing Appearance dry;intact 4/14/2019  7:35 AM   Closure Adhesive closure strips 4/14/2019  7:35 AM   Periwound intact;dry 4/14/2019  7:35 AM   Periwound Temperature warm 4/14/2019  7:35 AM   Periwound Skin Turgor soft 4/14/2019  7:35 AM   Edges rolled/closed 4/14/2019  7:35 AM   Drainage Amount none 4/14/2019  7:35 AM   Dressing Care, Wound open to air 4/14/2019  7:35 AM           Physical Therapy Education     Title: PT OT SLP Therapies (In Progress)     Topic: Physical Therapy (Done)     Point: Mobility training (Done)     Learning Progress Summary           Patient Acceptance, E,D, VU,DU by CARRILLO at 4/14/2019  1:44 PM    Comment:  safety during stairs    Acceptance, E,TB,D, VU,NR by SCAR at 4/12/2019 12:06 PM    Comment:  pt very motivated but req educ for safety    Acceptance, E, NR by EM at 4/11/2019 11:04 AM    Eager, E,TB,D, VU,NR by SCAR at 4/10/2019 10:59 AM    Acceptance, E,TB, VU by KT at 4/10/2019  4:44 AM    Acceptance, E,TB, VU,NR by CG at 4/9/2019 11:04 AM   Family Acceptance, E,D, VU,DU by CARRILLO at 4/14/2019  1:44 PM    Comment:  safety during stairs    Eager, E,TB,D, VU,NR by SCAR at 4/10/2019 10:59 AM                   Point: Home exercise program (Done)     Learning Progress Summary           Patient Acceptance, E,D, VU,DU by CARRILLO at 4/14/2019  1:44 PM    Comment:  safety during stairs    Acceptance, E,TB,D, LORIE,NR by SCAR at 4/12/2019 12:06 PM    Comment:  pt very motivated but req educ for safety    Acceptance, E, NR by EM at  4/11/2019 11:04 AM    Eager, E,TB,D, VU,NR by SCAR at 4/10/2019 10:59 AM    Acceptance, E,TB, VU by RUSSELL at 4/10/2019  4:44 AM    Acceptance, E,TB, VU,NR by CG at 4/9/2019 11:04 AM   Family Acceptance, E,D, VU,DU by CARRILLO at 4/14/2019  1:44 PM    Comment:  safety during stairs    Eager, E,TB,D, VU,NR by SCAR at 4/10/2019 10:59 AM                   Point: Body mechanics (Done)     Learning Progress Summary           Patient Acceptance, E,D, VU,DU by CARRILLO at 4/14/2019  1:44 PM    Comment:  safety during stairs    Acceptance, E,TB,D, VU,NR by SCAR at 4/12/2019 12:06 PM    Comment:  pt very motivated but req educ for safety    Acceptance, E, NR by EM at 4/11/2019 11:04 AM    Eager, E,TB,D, VU,NR by SCAR at 4/10/2019 10:59 AM    Acceptance, E,TB, VU by RUSSELL at 4/10/2019  4:44 AM    Acceptance, E,TB, VU,NR by JO at 4/9/2019 11:04 AM   Family Acceptance, E,D, VU,DU by CARRILLO at 4/14/2019  1:44 PM    Comment:  safety during stairs    Eager, E,TB,D, VU,NR by SCAR at 4/10/2019 10:59 AM                   Point: Precautions (Done)     Learning Progress Summary           Patient Acceptance, E,D, VU,DU by CARRILLO at 4/14/2019  1:44 PM    Comment:  safety during stairs    Acceptance, E,TB,D, VU,NR by SCAR at 4/12/2019 12:06 PM    Comment:  pt very motivated but req educ for safety    Acceptance, E, NR by EM at 4/11/2019 11:04 AM    Eager, E,TB,D, VU,NR by SCAR at 4/10/2019 10:59 AM    Acceptance, E,TB, VU by RUSSELL at 4/10/2019  4:44 AM    Acceptance, E,TB, VU,NR by JO at 4/9/2019 11:04 AM   Family Acceptance, E,D, VU,DU by CARRILLO at 4/14/2019  1:44 PM    Comment:  safety during stairs    MAXINE Sena,LEVY,D, VU,NR by  at 4/10/2019 10:59 AM                               User Key     Initials Effective Dates Name Provider Type Discipline     03/07/18 -  Chelo Sanford, PRESTON Physical Therapy Assistant PT    LC 08/02/16 -  Osvaldo Dubon, PT DPT Physical Therapist PT    KT 01/18/18 -  Taniay Taylor, RN Registered Nurse Nurse    CG 02/04/19 -  James Bird, PT Student  PT Student PT    EM 03/04/19 -  Michelle Alejandro, PT Student PT Student PT                PT Recommendation and Plan     Plan of Care Reviewed With: patient, family  Progress: improving  Outcome Summary: Pt transferred sit to stand with CGA x 1 and RW. Pt ambulated 200 ft with RW and CGA x 1. Pt ascended/descended 12 steps with 1 handrail and CGA x 1` with step to pattern. Pt returned to sit in chair.  Outcome Measures     Row Name 04/14/19 1300 04/11/19 1603          How much help from another person do you currently need...    Turning from your back to your side while in flat bed without using bedrails?  4  -LC  --     Moving from lying on back to sitting on the side of a flat bed without bedrails?  4  -LC  --     Moving to and from a bed to a chair (including a wheelchair)?  3  -LC  --     Standing up from a chair using your arms (e.g., wheelchair, bedside chair)?  3  -LC  --     Climbing 3-5 steps with a railing?  3  -LC  --     To walk in hospital room?  3  -LC  --     AM-PAC 6 Clicks Score  20  -LC  --        How much help from another is currently needed...    Putting on and taking off regular lower body clothing?  --  3  -SG     Bathing (including washing, rinsing, and drying)  --  3  -SG     Toileting (which includes using toilet bed pan or urinal)  --  3  -SG     Putting on and taking off regular upper body clothing  --  3  -SG     Taking care of personal grooming (such as brushing teeth)  --  3  -SG     Eating meals  --  3  -SG     Score  --  18  -SG        Functional Assessment    Outcome Measure Options  AM-PAC 6 Clicks Basic Mobility (PT)  -  --       User Key  (r) = Recorded By, (t) = Taken By, (c) = Cosigned By    Initials Name Provider Type    Ciera Rubi, OTR Occupational Therapist    Osvaldo Blunt, PT DPT Physical Therapist         Time Calculation:   PT Charges     Row Name 04/14/19 1300             Time Calculation    Start Time  1320  -      Stop Time  1335  -      Time  Calculation (min)  15 min  -      PT Received On  04/14/19  -      PT - Next Appointment  04/15/19  -         Time Calculation- PT    Total Timed Code Minutes- PT  15 minute(s)  -         Timed Charges    05663 - Gait Training Minutes   15  -        User Key  (r) = Recorded By, (t) = Taken By, (c) = Cosigned By    Initials Name Provider Type    Osvaldo Blunt, PT DPT Physical Therapist        Therapy Charges for Today     Code Description Service Date Service Provider Modifiers Qty    [id number] HC GAIT TRAINING EA 15 MIN 4/14/2019 Osvaldo Dubon, PT DPT GP 1          PT G-Codes  Outcome Measure Options: AM-PAC 6 Clicks Basic Mobility (PT)  AM-PAC 6 Clicks Score: 20  Score: 18    Osvaldo Dubon PT DPT  4/14/2019

## 2019-04-14 NOTE — PLAN OF CARE
Problem: Patient Care Overview  Goal: Plan of Care Review  Outcome: Ongoing (interventions implemented as appropriate)   04/14/19 4989   Coping/Psychosocial   Plan of Care Reviewed With patient   Plan of Care Review   Progress improving   OTHER   Outcome Summary A&O x4. Neuros intact.  Incision benign. Continues to have multiple small bowel movements. Awaiting determination if pt qualifies for rehab stay.

## 2019-04-15 NOTE — PROGRESS NOTES
LOS: 10 days       Chief Complaint: Glioblastoma multiforme       Interval History: The patient reports intermittent minor headaches relieved with hydrocodone.  He has had constipation from narcotics which actually was relieved earlier today.  He is doing fairly well with ambulation and with therapy.  Awaiting approval for acute inpatient rehab.        Review of Systems:    Review of systems was obtained with pertinent positive findings as noted in the interval history.  All other systems negative.    Objective     Vital Signs  Temp:  [97.7 °F (36.5 °C)-98 °F (36.7 °C)] 97.9 °F (36.6 °C)  Heart Rate:  [49-62] 62  Resp:  [18] 18  BP: ()/(66-82) 95/66        Physical Exam:     GENERAL:  Well-developed, well-nourished in no acute distress.   HEAD: Right frontal scalp lesion healing with Steri-Strips in place  MOUTH:  Tongue is well-papillated; no stomatitis or ulcers.  Lips normal.  CHEST:  Lungs clear to percussion and auscultation. Good airflow.  CARDIAC:  Regular rate and rhythm without murmurs, rubs or gallops. Normal S1,S2.  ABDOMEN:  Soft, nontender with no organomegaly or masses.  EXTREMITIES:  No clubbing, cyanosis or edema.  NEUROLOGICAL:  Cranial Nerves II-XII grossly intact.  No focal neurological deficits.  PSYCHIATRIC:  Normal affect and mood.           Results Review:     I reviewed the patient's new clinical results.    Results from last 7 days   Lab Units 04/09/19  0502   WBC 10*3/mm3 15.16*   HEMOGLOBIN g/dL 12.4*   HEMATOCRIT % 37.2*   PLATELETS 10*3/mm3 295     Lab Results   Component Value Date    NEUTROABS 13.46 (H) 04/08/2019                     Medication Review: Yes     Assessment/Plan     1. Glioblastoma multiforme:  · Vision presented with headaches, ataxia and falls  · MRI brain 4/5/19 with 6.8 x 4.3 x 4.6 cm right frontal lobe mass lesion extending into the genuine of the corpus callosum, mass-effect with midline shift (8 mm), entrapment of left lateral ventricle, subfalcine  herniation of anterior and medial portion right frontal lobe.  · Surgical debulking performed 4/7/19 with pathology revealing glioblastoma, grade 4.  Specimen sent to [facility] for molecular testing, results pending.  · Patient has been seen by radiation oncology.  Dr. Luther in neuro oncology has also seen the patient, awaiting additional pathologic results prior to treatment recommendation.  · Patient has requested a second opinion visit at [facility].  Cannot schedule appointment until patient is discharged from hospital.  · I did see the patient for the first time today.  He appears to be recovering well from his surgery.  He is doing reasonably well with ambulation and therapy.  Headaches are minimal.  Awaiting approval for transfer to acute rehab.  No additional results available from molecular studies at this time.  We will plan to see the patient again once additional pathologic results are available and await recommendations from Dr. Luther.  Patient does continue on Decadron 4 mg every 6 hours.  Steroid dosing/taper per neurosurgery.  2. Headaches:  · Minimal and tolerable, continue hydrocodone 5/325 as needed  3. Narcotic induced constipation:  · Relieved today, continuing on Senokot 2 twice daily and MiraLAX twice daily.  4. Seizure prophylaxis:  · Continuing on Keppra 500 mg every 12 hours    Plan:  1. Await molecular studies pending from [facility] on surgical pathology in regards to treatment recommendations  2. Await approval/transfer to acute rehab  3. We will follow peripherally, plan to see again once additional pathologic results available.    Discussed with patient and wife at bedside.  They had multiple questions which I answered to their satisfaction.  The patient and the above issues were new to me today.      Jeffrey Adair MD  04/15/19  5:23 PM

## 2019-04-15 NOTE — PLAN OF CARE
Problem: Patient Care Overview  Goal: Plan of Care Review  Outcome: Ongoing (interventions implemented as appropriate)   04/15/19 9455   Coping/Psychosocial   Plan of Care Reviewed With patient;spouse   Plan of Care Review   Progress improving   OTHER   Outcome Summary Pt ambulated to bathroom with RWX with CGA and vc's for safety. Pt performs commode transfer with CGA and stands at sink with CGA. Pt impulsive with movements and requires cues throughout mobility for safety. will continue to follow for OT

## 2019-04-15 NOTE — NURSING NOTE
Insurance CM requested updated info. Will send. Pt has made improvement over week end. Will review with rehab team. Thanks, Ajay REICH rehab admission nurse [phone]

## 2019-04-15 NOTE — PLAN OF CARE
Problem: Patient Care Overview  Goal: Plan of Care Review  Outcome: Ongoing (interventions implemented as appropriate)   04/15/19 7574   Coping/Psychosocial   Plan of Care Reviewed With patient;spouse   OTHER   Outcome Summary pt amb well w/rwx; had LOB x2 w/o AD ; feel pt would benefit from additional rehabilitation to progress to full potential; fam still concerned about his balance issues and judgement on safety

## 2019-04-15 NOTE — PLAN OF CARE
Problem: Patient Care Overview  Goal: Plan of Care Review  Outcome: Ongoing (interventions implemented as appropriate)   04/15/19 0623   Coping/Psychosocial   Plan of Care Reviewed With patient   Plan of Care Review   Progress improving   OTHER   Outcome Summary Vss and on RA. C/o right lateral H/A. Controlled with PO pain medication. A&OX4. No neuro changes noted. Will CTM.

## 2019-04-15 NOTE — PROGRESS NOTES
Continued Stay Note  [facility]     Patient Name: Hernandez Waterman  MRN: [id number]  Today's Date: 4/15/2019    Admit Date: 4/5/2019    Discharge Plan     Row Name 04/15/19 1635       Plan    Plan Comments  Per Lalita with acute rehab, information has been submitted to insurance.  Poss too high level.  CCP to follow & assist.                  Janette Collado RN

## 2019-04-15 NOTE — THERAPY TREATMENT NOTE
Acute Care - Physical Therapy Treatment Note  [facility]     Patient Name: Hernandez Waterman  : 1955  MRN: [id number]  Today's Date: 4/15/2019  Onset of Illness/Injury or Date of Surgery: 19     Referring Physician: German    Admit Date: 2019    Visit Dx:    ICD-10-CM ICD-9-CM   1. Acute intractable headache, unspecified headache type R51 784.0   2. Brain mass G93.9 348.9   3. Right frontal lobe mass G93.9 348.9   4. 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 (CMS/HCC)   • Cerebral edema (CMS/HCC)   • GBM (glioblastoma multiforme) (CMS/HCC)   • Leukemoid reaction   • Anemia   • Constipation       Therapy Treatment    Rehabilitation Treatment Summary     Row Name 04/15/19 1416 04/15/19 1329          Treatment Time/Intention    Discipline  physical therapy assistant  -  occupational therapist  -SG     Document Type  therapy note (daily note)  -  therapy note (daily note)  -     Subjective Information  no complaints  -[facility]  no complaints  -     Mode of Treatment  --  individual therapy;occupational therapy  -SG     Patient/Family Observations  --  pt reclined in chair  -SG     Care Plan Review  patient/other agree to care plan  -[facility]  care plan/treatment goals reviewed  -SG     Care Plan Review, Other Participant(s)  spouse  -[facility]  --     Therapy Frequency (OT Eval)  --  5 times/wk  -SG     Patient Effort  --  good  -SG     Comment  still presents w/balance issues w/o AD used  -[facility]  --     Existing Precautions/Restrictions  fall  -[facility]  fall  -SG     Recorded by [JM] Chelo Sanford, PRESTON 04/15/19 1417  [2] Chelo Sanford PTA 04/15/19 1428 [SG] Ciera Lieberman,  OTR 04/15/19 1333     Row Name 04/15/19 1329             Cognitive Assessment/Intervention- PT/OT    Orientation Status (Cognition)  oriented x 3  -SG      Follows Commands (Cognition)  WFL  -SG      Recorded by [SG] Ciera Lieberman OTR 04/15/19 1333      Row Name 04/15/19 1416             Bed Mobility Assessment/Treatment    Comment (Bed Mobility)  in chair  -      Recorded by [JM] Chelo Sanford [facility] 04/15/19 1428      Row Name 04/15/19 1329             Functional Mobility    Functional Mobility- Ind. Level  contact guard assist  -SG      Functional Mobility- Device  rolling walker  -SG      Functional Mobility- Comment  ambulates to bathroom for commode transfer to sink in bathroom and back to bed and then to chair  -      Recorded by [SG] Ciera Lieberman OTR 04/15/19 1333      Row Name 04/15/19 1416 04/15/19 1329          Sit-Stand Transfer    Sit-Stand [place] (Transfers)  contact guard;verbal cues  -  contact guard;verbal cues  -     Assistive Device (Sit-Stand Transfers)  walker, front-wheeled  -  walker, front-wheeled  -SG     Recorded by [JM] Chelo Sanford [facility] 04/15/19 1428 [SG] Ciera Lieberman OTR 04/15/19 1333     Row Name 04/15/19 1416 04/15/19 1329          Stand-Sit Transfer    Stand-Sit [place] (Transfers)  stand by assist;verbal cues  -  contact guard  -     Assistive Device (Stand-Sit Transfers)  walker, front-wheeled  -  walker, front-wheeled  -SG     Recorded by [JM] Chelo Sanford [facility] 04/15/19 1428 [SG] Ciera Lieberman OTR 04/15/19 1333     Row Name 04/15/19 1329             Toilet Transfer    Type (Toilet Transfer)  stand-sit;sit-stand  -      [place] Level (Toilet Transfer)  contact guard;verbal cues  -      Assistive Device (Toilet Transfer)  grab bars/safety frame  -      Recorded by [SG] Ciera Lieberman OTR 04/15/19 1333      Row Name 04/15/19 1416             Gait/Stairs Assessment/Training    [place] Level (Gait)  contact  guard;stand by assist  -      Assistive Device (Gait)  walker, front-wheeled MIN assist w/o AD, LOB x2  -JM      Distance in Feet (Gait)  100 x1, w/and w/o AD, had LOB w/HHA  -JM2      Deviations/Abnormal Patterns (Gait)  base of support, narrow  -JM2      Bilateral Gait Deviations  -- scissoring w/turns w/o using rwx  -JM2      Recorded by [JM] Chelo Sanford, [facility] 04/15/19 1643  [JM2] Chelo Sanford [facility] 04/15/19 1647      Row Name 04/15/19 1329             BADL Safety/Performance    Impairments, BADL Safety/Performance  balance requires vc's for safety and technique with all transfers  -SG      Skilled BADL Treatment/Intervention  BADL process/adaptation training  -SG      Recorded by [SG] Ciera Lieberman, OTR 04/15/19 1333      Row Name 04/15/19 1416             Therapeutic Exercise    Sets/Reps (Therapeutic Exercise)  10 reps slight cues to keep on task  -      Comment (Therapeutic Exercise)  standing bal act using rail for toe-ups, HIP abd/add, mini-squats  -      Recorded by [JM] Chelo Sanford, [facility] 04/15/19 1647      Row Name 04/15/19 1416 04/15/19 1329          Positioning and Restraints    Pre-Treatment Position  sitting in chair/recliner  -  sitting in chair/recliner  -     Post Treatment Position  --  chair  -SG     In Chair  reclined;call light within reach;encouraged to call for assist;with family/caregiver no alarm upon entry-[facility] will notify nsg if leaving  -  reclined;call light within reach;encouraged to call for assist;exit alarm on;with family/caregiver  -SG     Recorded by [JM] Chelo Sanford, [facility] 04/15/19 1647 [SG] Ciera Lieberman, OTR 04/15/19 1333     Row Name 04/15/19 1416 04/15/19 1329          Pain Scale: Numbers Pre/Post-Treatment    Pain Scale: Numbers, Pretreatment  0/10 - no pain  -JM  0/10 - no pain  -SG     Pain Scale: Numbers, Post-Treatment  3/10  -JM  0/10 - no pain  -SG     Pain Location  head  -JM  --     Pre/Post Treatment Pain Comment  slight HA  -JM  --      Recorded by [JM] Chelo Sanford PTA 04/15/19 1647 [SG] Luisana Ciera, OTR 04/15/19 1333     Row Name                Wound 04/07/19 1233 Right head incision    Wound - Properties Group Date first assessed: 04/07/19 [LD] Time first assessed: 1233 [LD] Side: Right [LD] Location: head [LD] Type: incision [LD] Recorded by:  [LD] Monet Rivers RN 04/07/19 1233      User Key  (r) = Recorded By, (t) = Taken By, (c) = Cosigned By    Initials Name Effective Dates Discipline    SG Luisana Ciera, OTR 06/08/18 -  OT    Chelo Andrews PTA 03/07/18 -  PT    Monet Puga RN 06/16/16 -  Nurse          Wound 04/07/19 1233 Right head incision (Active)   Dressing Appearance dry;intact 4/15/2019  8:30 AM   Closure Adhesive closure strips 4/15/2019  8:30 AM   Periwound intact;dry 4/15/2019  8:30 AM   Periwound Temperature warm 4/15/2019  8:30 AM   Periwound Skin Turgor soft 4/15/2019  8:30 AM   Edges rolled/closed 4/15/2019  8:30 AM   Drainage Amount none 4/15/2019  8:30 AM   Dressing Care, Wound open to air 4/15/2019  8:30 AM           Physical Therapy Education     Title: PT OT SLP Therapies (In Progress)     Topic: Physical Therapy (Done)     Point: Mobility training (Done)     Learning Progress Summary           Patient Eager, E,TB,D, VU,NR by SCAR at 4/15/2019  4:49 PM    Acceptance, E,D, VU,DU by CARRILLO at 4/14/2019  1:44 PM    Comment:  safety during stairs    Acceptance, E,TB,D, VU,NR by SCAR at 4/12/2019 12:06 PM    Comment:  pt very motivated but req educ for safety    Acceptance, E, NR by EM at 4/11/2019 11:04 AM    Eager, E,TB,D, VU,NR by SCAR at 4/10/2019 10:59 AM    Acceptance, E,TB, VU by KT at 4/10/2019  4:44 AM    Acceptance, E,TB, VU,NR by JO at 4/9/2019 11:04 AM   Family MAXINE Sena TB, D, VU, NR by SCAR at 4/15/2019  4:49 PM    Adore, POLINA GOODMAN VU, DU by CARRILLO at 4/14/2019  1:44 PM    Comment:  safety during stairs    MAXINE Sena TB, D, VU, NR by SCAR at 4/10/2019 10:59 AM                   Point: Home exercise program  (Done)     Learning Progress Summary           Patient Eager, E,TB,D, VU,NR by SCAR at 4/15/2019  4:49 PM    Acceptance, E,D, VU,DU by CARRILLO at 4/14/2019  1:44 PM    Comment:  safety during stairs    Acceptance, E,TB,D, VU,NR by SCAR at 4/12/2019 12:06 PM    Comment:  pt very motivated but req educ for safety    Acceptance, E, NR by EM at 4/11/2019 11:04 AM    Eager, E,TB,D, VU,NR by SCAR at 4/10/2019 10:59 AM    Acceptance, E,TB, VU by KT at 4/10/2019  4:44 AM    Acceptance, E,TB, VU,NR by JO at 4/9/2019 11:04 AM   Family Eager, E,TB,D, VU,NR by SCAR at 4/15/2019  4:49 PM    Acceptance, E,D, VU,DU by CARRILLO at 4/14/2019  1:44 PM    Comment:  safety during stairs    Eager, E,TB,D, VU,NR by SCAR at 4/10/2019 10:59 AM                   Point: Body mechanics (Done)     Learning Progress Summary           Patient Eager, E,TB,D, VU,NR by SCAR at 4/15/2019  4:49 PM    Acceptance, E,D, VU,DU by CARRILLO at 4/14/2019  1:44 PM    Comment:  safety during stairs    Acceptance, E,TB,D, VU,NR by SCAR at 4/12/2019 12:06 PM    Comment:  pt very motivated but req educ for safety    Acceptance, E, NR by EM at 4/11/2019 11:04 AM    Eager, E,TB,D, VU,NR by SCAR at 4/10/2019 10:59 AM    Acceptance, E,TB, VU by RUSSELL at 4/10/2019  4:44 AM    Acceptance, E,TB, VU,NR by JO at 4/9/2019 11:04 AM   Family Eager, E,TB,D, VU,NR by SCAR at 4/15/2019  4:49 PM    Acceptance, E,D, VU,DU by CARRILLO at 4/14/2019  1:44 PM    Comment:  safety during stairs    Eager, E,TB,D, VU,NR by SCAR at 4/10/2019 10:59 AM                   Point: Precautions (Done)     Learning Progress Summary           Patient EagMAXINE prieto TB, D, VU,NR by SCAR at 4/15/2019  4:49 PM    Acceptance, POLINA GOODMAN VU,OLESYA by CARRILLO at 4/14/2019  1:44 PM    Comment:  safety during stairs    AcceptanceMAXINE TB, D, VU,NR by SCAR at 4/12/2019 12:06 PM    Comment:  pt very motivated but req educ for safety    AcceptanceMAXINE NR by EM at 4/11/2019 11:04 AM    EagMAXINE prieto TB, D, LORIE,NR by SCAR at 4/10/2019 10:59 AM    Acceptance, LEVY GOODMAN VU by RUSSELL at 4/10/2019  4:44  AM    Acceptance, E,TB, VU,NR by JO at 4/9/2019 11:04 AM   Family [place], MAXINE,TB,D, VU,NR by SCAR at 4/15/2019  4:49 PM    Acceptance, E,D, VU,DU by  at 4/14/2019  1:44 PM    Comment:  safety during stairs    Eager, E,TB,D, VU,NR by SCAR at 4/10/2019 10:59 AM                               User Key     Initials Effective Dates Name Provider Type Discipline     03/07/18 -  Chelo Sanford, PTA Physical Therapy Assistant PT    LC 08/02/16 -  Osvaldo Dubon, PT DPT Physical Therapist PT    KT 01/18/18 -  Taniya Taylor, RN Registered Nurse Nurse    CG 02/04/19 -  James Bird, PT Student PT Student PT    EM 03/04/19 -  Michelle Alejandro, PT Student PT Student PT                PT Recommendation and Plan     Plan of Care Reviewed With: patient, spouse  Progress: improving  Outcome Summary: pt amb well w/rwx; had LOB x2 w/o AD ; feel pt would benefit from additional rehabilitation to progress to full potential; fam still concerned about his balance issues and judgement on safety  Outcome Measures     Row Name 04/15/19 1600 04/15/19 1335 04/14/19 1300       How much help from another person do you currently need...    Turning from your back to your side while in flat bed without using bedrails?  4  -JM  --  4  -LC    Moving from lying on back to sitting on the side of a flat bed without bedrails?  4  -JM  --  4  -LC    Moving to and from a bed to a chair (including a wheelchair)?  3  -JM  --  3  -LC    Standing up from a chair using your arms (e.g., wheelchair, bedside chair)?  3  -JM  --  3  -LC    Climbing 3-5 steps with a railing?  3  -JM  --  3  -LC    To walk in hospital room?  3  -JM  --  3  -LC    AM-PAC 6 Clicks Score  20  -JM  --  20  -LC       How much help from another is currently needed...    Putting on and taking off regular lower body clothing?  --  3  -SG  --    Bathing (including washing, rinsing, and drying)  --  3  -SG  --    Toileting (which includes using toilet bed pan or urinal)  --  3  -SG  --     Putting on and taking off regular upper body clothing  --  3  -SG  --    Taking care of personal grooming (such as brushing teeth)  --  3  -SG  --    Eating meals  --  3  -SG  --    Score  --  18  -SG  --       Functional Assessment    Outcome Measure Options  --  --  AM-PAC 6 Clicks Basic Mobility (PT)  -CARRILLO      User Key  (r) = Recorded By, (t) = Taken By, (c) = Cosigned By    Initials Name Provider Type    Ciera Rubi, OTR Occupational Therapist    Chelo Andrews PTA Physical Therapy Assistant    Osvaldo Blunt, PT DPT Physical Therapist         Time Calculation:   PT Charges     Row Name 04/15/19 1429             Time Calculation    Start Time  1330  -      Stop Time  1401  -SCAR      Time Calculation (min)  31 min  -      PT Received On  04/15/19  -SCAR      PT - Next Appointment  04/16/19  -SCAR         Time Calculation- PT    Total Timed Code Minutes- PT  31 minute(s)  -        User Key  (r) = Recorded By, (t) = Taken By, (c) = Cosigned By    Initials Name Provider Type    Chelo Andrews PTA Physical Therapy Assistant        Therapy Charges for Today     Code Description Service Date Service Provider Modifiers Qty    [id number] HC PT THER PROC EA 15 MIN 4/15/2019 Chelo Sanford PTA GP 2          PT G-Codes  Outcome Measure Options: AM-PAC 6 Clicks Basic Mobility (PT)  AM-PAC 6 Clicks Score: 20  Score: 18    Chelo Sanford PTA  4/15/2019

## 2019-04-15 NOTE — PROGRESS NOTES
Name: Hernandez Waterman ADMIT: 2019   : 1955  PCP: Megha Gant MD    MRN: [id number] LOS: 10 days   AGE/SEX: 63 y.o. male    ROOM: [facility]   Subjective   Appetite is better  Having BMs  HA is the same   status post craniotomy and debulking of glioblastoma  Patient is awake and alert  Moves all extremities   Speech intact    Brief hospital course since admission:  With vasogenic edema, on tapering dose of decadron  S/p craniotomy with debulking of the tumor.    I have reviewed past medical history, family history, social history and allergies.  No changes from admission note.      Review of Systems   Constitutional: Negative for fatigue and fever.   Respiratory: Negative for shortness of breath and wheezing.    Gastrointestinal: Negative for nausea and vomiting.   Neurological: Negative for headaches.          Objective   Vital Signs  Temp:  [97.7 °F (36.5 °C)-98.3 °F (36.8 °C)] 97.9 °F (36.6 °C)  Heart Rate:  [49-79] 62  Resp:  [18] 18  BP: ()/(66-82) 95/66  SpO2:  [95 %-98 %] 96 %  on   ;   Device (Oxygen Therapy): room air  Body mass index is 22.92 kg/m².  No intake or output data in the 24 hours ending 04/15/19 1401    Physical Exam   Constitutional: He is oriented to person, place, and time. He appears well-developed and well-nourished. No distress.   Appears weak and tired   HENT:   Head: Normocephalic and atraumatic.   Eyes: Pupils are equal, round, and reactive to light. No scleral icterus.   Cardiovascular: Normal rate and regular rhythm.   Pulmonary/Chest: Effort normal. No respiratory distress. He has no decreased breath sounds. He has no wheezes.   Abdominal: Soft. Bowel sounds are normal. There is no hepatosplenomegaly. There is no tenderness. There is no rigidity and no guarding.   Musculoskeletal: He exhibits no edema.   Neurological: He is alert and oriented to person, place, and time.   Left upper extremity weakness     Skin: No cyanosis. Nails show no clubbing.   Psychiatric:  He has a normal mood and affect. His behavior is normal.   Flat affect     Vitals reviewed.      Results Review:    Results from last 7 days   Lab Units 04/09/19  0502   WBC 10*3/mm3 15.16*   HEMOGLOBIN g/dL 12.4*   HEMATOCRIT % 37.2*   PLATELETS 10*3/mm3 295             Hemoglobin A1C:No results found for: HGBA1C  Glucose Range:  No results found for: POCGLU      dexamethasone 10 mg Intravenous Once   dexamethasone 4 mg Oral Q6H   famotidine 20 mg Oral BID   levETIRAcetam 500 mg Oral Q12H   polyethylene glycol 17 g Oral BID   sennosides-docusate sodium 2 tablet Oral BID   tamsulosin 0.4 mg Oral Daily      Diet Regular  Assessment/Plan       Assessment/Plan      Active Hospital Problems    Diagnosis  POA   • **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 (CMS/HCC) [D49.6]  Yes   • GBM (glioblastoma multiforme) (CMS/HCC) [C71.9]  Unknown   • Leukemoid reaction [D72.823]  Unknown   • Anemia [D64.9]  Unknown   • Constipation [K59.00]  Unknown   • Acute intractable headache [R51]  Yes   • Cerebral edema (CMS/HCC) [G93.6]  Unknown      Resolved Hospital Problems   No resolved problems to display.     -Continue Decadron and Keppra  -Glioblastoma status post debulking and craniotomy with biopsy  -Consultants are following namely oncology, radiation oncology and neuro-oncology.  Neurosurgery has signed off  -He will need radiation and has appointment with radiation oncology in a couple weeks.  He will get concurrent Temodar final pathology is pending  -Patient and his wife express that they want to get a second opinion at [facility].  Referral has been sent.  Appreciate oncology assistance  -Started Flomax, straight cath x1 4/13 but now urinating on his own  -Increased bowel regimen--has had BMs  -PT and OT working with him and CCP working on discharge needs    DISPO: Discharge whenever he gets precert. he will need acute rehab, they are reevaluating him now      Maximo Mcpherson MD  [place]  [facility]  04/15/19

## 2019-04-15 NOTE — PAYOR COMM NOTE
"UR CONTACT:  ANALY  P: [phone]        F: [phone]        Darryl Waterman (63 y.o. Male)     Date of Birth Social Security Number Address Home Phone MRN    1955  [id number] [address] [phone] [id number]    Sabianism Marital Status          None        Admission Date Admission Type Admitting Provider Attending Provider Department, Room/Bed    4/5/19 Emergency Zev Weldon MD Hogancamp, David Ryan, MD [address], P590/1    Discharge Date Discharge Disposition Discharge Destination                       Attending Provider:  Maximo Mcpherson MD    Allergies:  Sulfa Antibiotics    Isolation:  None   Infection:  None   Code Status:  CPR    Ht:  185.4 cm (72.99\")   Wt:  78.8 kg (173 lb 11.6 oz)    Admission Cmt:  None   Principal Problem:  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 (CMS/HCC) [D49.6]                 Active Insurance as of 4/5/2019     Primary Coverage     Payor Plan Insurance Group Employer/Plan Group    [employer] PPO [id number]     Payor Plan Address Payor Plan Phone Number Payor Plan Fax Number Effective Dates    [address] [phone]  1/1/2018 - None Entered    [place] [address]       Subscriber Name Subscriber Birth Date Member ID       DARRYL WATERMAN 1955 [id number]                 Emergency Contacts      (Rel.) Home Phone Work Phone Mobile Phone    Mony Waterman (Spouse) [phone] -- [phone]            ICU Vital Signs     Row Name 04/15/19 1310 04/15/19 0926 04/15/19 0830 04/15/19 0634 04/15/19 0600       Vitals    Temp  97.9 °F (36.6 °C)  98 °F (36.7 °C)  --  97.7 °F (36.5 °C)  --    Temp src  Oral  Oral  --  Oral  --    Pulse  62  55  --  --  49  (Abnormal)     Heart Rate Source  Monitor  Monitor  --  --  Monitor    Resp  18  18  --  --  18    Resp Rate Source  Visual  Visual  --  --  Visual    BP  95/66  118/76  --  --  " 126/82    Noninvasive MAP (mmHg)  --  --  --  --  95    BP Location  Left arm  Left arm  --  --  Left arm    BP Method  Automatic  Automatic  --  --  Automatic    Patient Position  Lying  Lying  --  --  Lying       Oxygen Therapy    SpO2  96 %  98 %  --  --  95 %    Pulse Oximetry Type  --  --  --  --  Continuous    Device (Oxygen Therapy)  --  --  room air  --  room air        Lines, Drains & Airways    Active LDAs     Name:   Placement date:   Placement time:   Site:   Days:    Peripheral IV 19 Left Antecubital   19    Antecubital   10                     Physician Progress Notes       Maximo Mcpherson MD at 4/15/2019  2:01 PM              Name: Hernandez Waterman ADMIT: 2019   : 1955  PCP: Megha Gant MD    MRN: [id number] LOS: 10 days   AGE/SEX: 63 y.o. male    ROOM: [facility]   Subjective   Appetite is better  Having BMs  HA is the same   status post craniotomy and debulking of glioblastoma  Patient is awake and alert  Moves all extremities   Speech intact    Brief hospital course since admission:  With vasogenic edema, on tapering dose of decadron  S/p craniotomy with debulking of the tumor.    I have reviewed past medical history, family history, social history and allergies.  No changes from admission note.      Review of Systems   Constitutional: Negative for fatigue and fever.   Respiratory: Negative for shortness of breath and wheezing.    Gastrointestinal: Negative for nausea and vomiting.   Neurological: Negative for headaches.          Objective   Vital Signs  Temp:  [97.7 °F (36.5 °C)-98.3 °F (36.8 °C)] 97.9 °F (36.6 °C)  Heart Rate:  [49-79] 62  Resp:  [18] 18  BP: ()/(66-82) 95/66  SpO2:  [95 %-98 %] 96 %  on   ;   Device (Oxygen Therapy): room air  Body mass index is 22.92 kg/m².  No intake or output data in the 24 hours ending 04/15/19 1401    Physical Exam   Constitutional: He is oriented to person, place, and time. He appears well-developed and  well-nourished. No distress.   Appears weak and tired   HENT:   Head: Normocephalic and atraumatic.   Eyes: Pupils are equal, round, and reactive to light. No scleral icterus.   Cardiovascular: Normal rate and regular rhythm.   Pulmonary/Chest: Effort normal. No respiratory distress. He has no decreased breath sounds. He has no wheezes.   Abdominal: Soft. Bowel sounds are normal. There is no hepatosplenomegaly. There is no tenderness. There is no rigidity and no guarding.   Musculoskeletal: He exhibits no edema.   Neurological: He is alert and oriented to person, place, and time.   Left upper extremity weakness     Skin: No cyanosis. Nails show no clubbing.   Psychiatric: He has a normal mood and affect. His behavior is normal.   Flat affect     Vitals reviewed.      Results Review:    Results from last 7 days   Lab Units 04/09/19  0502   WBC 10*3/mm3 15.16*   HEMOGLOBIN g/dL 12.4*   HEMATOCRIT % 37.2*   PLATELETS 10*3/mm3 295             Hemoglobin A1C:No results found for: HGBA1C  Glucose Range:  No results found for: POCGLU      dexamethasone 10 mg Intravenous Once   dexamethasone 4 mg Oral Q6H   famotidine 20 mg Oral BID   levETIRAcetam 500 mg Oral Q12H   polyethylene glycol 17 g Oral BID   sennosides-docusate sodium 2 tablet Oral BID   tamsulosin 0.4 mg Oral Daily      Diet Regular  Assessment/Plan       Assessment/Plan     Active Hospital Problems    Diagnosis  POA   • **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 (CMS/HCC) [D49.6]  Yes   • GBM (glioblastoma multiforme) (CMS/HCC) [C71.9]  Unknown   • Leukemoid reaction [D72.823]  Unknown   • Anemia [D64.9]  Unknown   • Constipation [K59.00]  Unknown   • Acute intractable headache [R51]  Yes   • Cerebral edema (CMS/HCC) [G93.6]  Unknown      Resolved Hospital Problems   No resolved problems to display.     -Continue Decadron and Keppra  -Glioblastoma status post debulking and craniotomy with biopsy  -Consultants are following namely oncology,  radiation oncology and neuro-oncology.  Neurosurgery has signed off  -He will need radiation and has appointment with radiation oncology in a couple weeks.  He will get concurrent Temodar final pathology is pending  -Patient and his wife express that they want to get a second opinion at [facility].  Referral has been sent.  Appreciate oncology assistance  -Started Flomax, straight cath x1 4/13 but now urinating on his own  -Increased bowel regimen--has had BMs  -PT and OT working with him and CCP working on discharge needs    DISPO: Discharge whenever he gets precert. he will need acute rehab, they are reevaluating him now      Maximo Mcpherson MD  [facility]  04/15/19    Electronically signed by Maximo Mcpherson MD at 4/15/2019  3:27 PM           Ciera Lieberman OTR   Occupational Therapist   Occupational Therapy   Plan of Care   Signed   Date of Service:  4/15/2019  1:35 PM   Creation Time:  4/15/2019  1:35 PM            Signed           Problem: Patient Care Overview  Goal: Plan of Care Review  Outcome: Ongoing (interventions implemented as appropriate)    04/15/19 1333   Coping/Psychosocial   Plan of Care Reviewed With patient;spouse   Plan of Care Review   Progress improving   OTHER   Outcome Summary Pt ambulated to bathroom with RWX with CGA and vc's for safety. Pt performs commode transfer with CGA and stands at sink with CGA. Pt impulsive with movements and requires cues throughout mobility for safety. will continue to follow for OT                    Adrianna Blanco RN   Registered Nurse   Physical Medicine and Rehabilitation   Nursing Note   Signed   Date of Service:  4/15/2019 10:58 AM   Creation Time:  4/15/2019 10:58 AM            Signed                 Insurance CM requested updated info. Will send. Pt has made improvement over week end. Will review with rehab team. Thanks, Ajay REICH rehab admission nurse [phone]                      Chelo Ortiz, RACHANA    Registered Nurse      Plan of Care   Signed   Date of Service:  4/15/2019  6:51 AM   Creation Time:  4/15/2019  6:51 AM            Signed           Problem: Patient Care Overview  Goal: Plan of Care Review  Outcome: Ongoing (interventions implemented as appropriate)    04/15/19 0649   Coping/Psychosocial   Plan of Care Reviewed With patient   Plan of Care Review   Progress improving   OTHER   Outcome Summary Vss and on RA. C/o right lateral H/A. Controlled with PO pain medication. A&OX4. No neuro changes noted. Will CTM.

## 2019-04-15 NOTE — THERAPY TREATMENT NOTE
Acute Care - Occupational Therapy Progress Note  [facility]     Patient Name: Hernandez Waterman  : 1955  MRN: [id number]  Today's Date: 4/15/2019  Onset of Illness/Injury or Date of Surgery: 19  Date of Referral to OT: 19  Referring Physician: German    Admit Date: 2019       ICD-10-CM ICD-9-CM   1. Acute intractable headache, unspecified headache type R51 784.0   2. Brain mass G93.9 348.9   3. Right frontal lobe mass G93.9 348.9   4. 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 (CMS/HCC)   • Cerebral edema (CMS/HCC)   • GBM (glioblastoma multiforme) (CMS/HCC)   • Leukemoid reaction   • Anemia   • Constipation     Past Medical History:   Diagnosis Date   • ED (erectile dysfunction)      Past Surgical History:   Procedure Laterality Date   • CRANIOTOMY FOR TUMOR Right 2019    Procedure: RIGHT SIDE CRANIOTOMY FOR TUMOR REMOVAL AND  BIOPSY  WITH SIDNEY NAVIGATTION AND PARTIAL RIGHT FRONTAL LOBE LOBECTOMY;  Surgeon: Keshawn Brower MD;  [facility];  Service: Neurosurgery       Therapy Treatment    Rehabilitation Treatment Summary     Row Name 04/15/19 1329             Treatment Time/Intention    Discipline  occupational therapist  -SG      Document Type  therapy note (daily note)  -SG      Subjective Information  no complaints  -SG      Mode of Treatment  individual therapy;occupational therapy  -SG      Patient/Family Observations  pt reclined in chair  -SG      Care Plan Review  care plan/treatment goals reviewed  -SG      Therapy Frequency (OT Eval)  5 times/wk  -SG      Patient Effort  good  -SG      Existing Precautions/Restrictions  fall  -SG       Recorded by [SG] Ciera Lieberman OTR 04/15/19 1333      Row Name 04/15/19 1329             Cognitive Assessment/Intervention- PT/OT    Orientation Status (Cognition)  oriented x 3  -SG      Follows Commands (Cognition)  WFL  -SG      Recorded by [SG] Ciera Lieberman OTR 04/15/19 1333      Row Name 04/15/19 1329             Functional Mobility    Functional Mobility- Ind. Level  contact guard assist  -SG      Functional Mobility- Device  rolling walker  -SG      Functional Mobility- Comment  ambulates to bathroom for commode transfer to sink in bathroom and back to bed and then to chair  -SG      Recorded by [SG] Ciera Lieberman OTR 04/15/19 1333      Row Name 04/15/19 1329             Sit-Stand Transfer    Sit-Stand Bell (Transfers)  contact guard;verbal cues  -SG      Assistive Device (Sit-Stand Transfers)  walker, front-wheeled  -SG      Recorded by [SG] Ciera Lieberman OTR 04/15/19 1333      Row Name 04/15/19 1329             Stand-Sit Transfer    Stand-Sit Bell (Transfers)  contact guard  -SG      Assistive Device (Stand-Sit Transfers)  walker, front-wheeled  -SG      Recorded by [SG] Ciera Lieberman OTR 04/15/19 1333      Row Name 04/15/19 1329             Toilet Transfer    Type (Toilet Transfer)  stand-sit;sit-stand  -SG      Bell Level (Toilet Transfer)  contact guard;verbal cues  -SG      Assistive Device (Toilet Transfer)  grab bars/safety frame  -SG      Recorded by [SG] Ciera Lieberman OTR 04/15/19 1333      Row Name 04/15/19 1329             BADL Safety/Performance    Impairments, BADL Safety/Performance  balance requires vc's for safety and technique with all transfers  -SG      Skilled BADL Treatment/Intervention  BADL process/adaptation training  -SG      Recorded by [SG] Ciera Lieberman OTR 04/15/19 1333      Row Name 04/15/19 1329             Positioning and Restraints    Pre-Treatment Position  sitting in chair/recliner  -SG      Post Treatment Position   chair  -SG      In Chair  reclined;call light within reach;encouraged to call for assist;exit alarm on;with family/caregiver  -SG      Recorded by [SG] Ciera Lieberman OTR 04/15/19 1333      Row Name 04/15/19 1329             Pain Scale: Numbers Pre/Post-Treatment    Pain Scale: Numbers, Pretreatment  0/10 - no pain  -SG      Pain Scale: Numbers, Post-Treatment  0/10 - no pain  -SG      Recorded by [SG] Ciera Lieberman OTR 04/15/19 1333      Row Name                Wound 04/07/19 1233 Right head incision    Wound - Properties Group Date first assessed: 04/07/19 [LD] Time first assessed: 1233 [LD] Side: Right [LD] Location: head [LD] Type: incision [LD] Recorded by:  [LD] Monet Rivers, RN 04/07/19 1233      User Key  (r) = Recorded By, (t) = Taken By, (c) = Cosigned By    Initials Name Effective Dates Discipline     Ciera Lieberman OTJASSON 06/08/18 -  OT    Monet Puga, RN 06/16/16 -  Nurse        Wound 04/07/19 1233 Right head incision (Active)   Dressing Appearance dry;intact 4/15/2019  8:30 AM   Closure Adhesive closure strips 4/15/2019  8:30 AM   Periwound intact;dry 4/15/2019  8:30 AM   Periwound Temperature warm 4/15/2019  8:30 AM   Periwound Skin Turgor soft 4/15/2019  8:30 AM   Edges rolled/closed 4/15/2019  8:30 AM   Drainage Amount none 4/15/2019  8:30 AM   Dressing Care, Wound open to air 4/15/2019  8:30 AM       Occupational Therapy Education     Title: PT OT SLP Therapies (In Progress)     Topic: Occupational Therapy (In Progress)     Point: ADL training (Done)     Description: Instruct learner(s) on proper safety adaptation and remediation techniques during self care or transfers.   Instruct in proper use of assistive devices.    Learning Progress Summary           Patient Acceptance, E,TB, VU by KT at 4/10/2019  4:44 AM    Acceptance, E,TB, VU by AVIVA at 4/9/2019  3:37 PM    Comment:  SIERRA KAY HEP                               User Key     Initials Effective Dates Name Provider Type  Discipline    SG 12/26/18 -  Louise Marlow, OTR Occupational Therapist OT    KT 01/18/18 -  Taniya Taylor, RN Registered Nurse Nurse                OT Recommendation and Plan  Therapy Frequency (OT Eval): 5 times/wk  Plan of Care Review  Plan of Care Reviewed With: patient, spouse  Plan of Care Reviewed With: patient, spouse  Outcome Summary: Pt ambulated to bathroom with RWX with CGA and vc's for safety. Pt performs commode transfer with CGA and stands at sink with CGA. Pt impulsive with movements and requires cues throughout mobility for safety. will continue to follow for OT  Outcome Measures     Row Name 04/15/19 1335 04/14/19 1300          How much help from another person do you currently need...    Turning from your back to your side while in flat bed without using bedrails?  --  4  -LC     Moving from lying on back to sitting on the side of a flat bed without bedrails?  --  4  -LC     Moving to and from a bed to a chair (including a wheelchair)?  --  3  -LC     Standing up from a chair using your arms (e.g., wheelchair, bedside chair)?  --  3  -LC     Climbing 3-5 steps with a railing?  --  3  -LC     To walk in hospital room?  --  3  -LC     AM-PAC 6 Clicks Score  --  20  -LC        How much help from another is currently needed...    Putting on and taking off regular lower body clothing?  3  -SG  --     Bathing (including washing, rinsing, and drying)  3  -SG  --     Toileting (which includes using toilet bed pan or urinal)  3  -SG  --     Putting on and taking off regular upper body clothing  3  -SG  --     Taking care of personal grooming (such as brushing teeth)  3  -SG  --     Eating meals  3  -SG  --     Score  18  -SG  --        Functional Assessment    Outcome Measure Options  --  AM-PAC 6 Clicks Basic Mobility (PT)  -LC       User Key  (r) = Recorded By, (t) = Taken By, (c) = Cosigned By    Initials Name Provider Type    Ciera Rubi, OTR Occupational Therapist    Osvaldo Blunt, PT  DPT Physical Therapist           Time Calculation:   Time Calculation- OT     Row Name 04/15/19 1335             Time Calculation- OT    OT Start Time  1308  -SG      OT Stop Time  1326  -SG      OT Time Calculation (min)  18 min  -      Total Timed Code Minutes- OT  18 minute(s)  -      OT Received On  04/15/19  -        User Key  (r) = Recorded By, (t) = Taken By, (c) = Cosigned By    Initials Name Provider Type     Ciera Lieberman OTR Occupational Therapist        Therapy Charges for Today     Code Description Service Date Service Provider Modifiers Qty    [id number]  OT SELF CARE/MGMT/TRAIN EA 15 MIN 4/15/2019 Ciera Lieberman OTR GO 1               NOLVIA Lopez  4/15/2019

## 2019-04-16 NOTE — DISCHARGE SUMMARY
Patient Name: Hernandez Waterman  : 1955  MRN: [id number]    Date of Admission: 2019  Date of Discharge:  2019  Primary Care Physician: Megha Gant MD      Hospital Course     Chief Complaint:   Weakness - Generalized and Headache      Active Hospital Problems    Diagnosis  POA   • **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 (CMS/HCC) [D49.6]  Yes   • GBM (glioblastoma multiforme) (CMS/HCC) [C71.9]  Yes   • Leukemoid reaction [D72.823]  Yes   • Anemia [D64.9]  Yes   • Constipation [K59.00]  No   • Acute intractable headache [R51]  Yes   • Cerebral edema (CMS/HCC) [G93.6]  Yes      Resolved Hospital Problems   No resolved problems to display.        Hospital Course:  Mr. Waterman is a 63 y.o. male  non-smoker who was previously healthy came in with 3 weeks of headache and found to have a large right frontal lobe mass with vasogenic edema and midline shift.  He was started on steroids, Keppra and ulcer prophylaxis with Pepcid.  Neurosurgery was consulted.  He underwent craniotomy and debulking and pathology showed glioblastoma.  He was watched for a couple days in the intensive care unit.  He was transferred out and has done well.  He did not have any complications.  He was much weaker than baseline and his wife cannot take care of him at home.  He worked with physical therapy and was recommended for acute rehab.  He was also seen by radiation oncology, hematology/oncology and neuro-oncology.    He will have appointment with radiation oncology for CT simulation and treatment planning on  Dr. Philippe.  He was also seen by Dr. Luther and he is waiting on final pathology/molecular profile to come back from the [facility] prior to initiation of treatment.  He has been approved for acute rehab here at [facility] and is medically stable to be transferred there today.  I will defer tapering of the steroids to neurosurgery or neuro oncology.  He does continue on Keppra for  seizure prophylaxis.  He was see neurosurgery on April 23, and oncology on April 25.      Day of Discharge     Physical Exam:  Temp:  [97.9 °F (36.6 °C)-98.4 °F (36.9 °C)] 98.3 °F (36.8 °C)  Heart Rate:  [51-78] 64  Resp:  [15-18] 18  BP: ()/(59-86) 114/71  Body mass index is 22.92 kg/m².  Physical Exam  Constitutional: He is oriented to person, place, and time. He appears well-developed and well-nourished. No distress.   Appears weak and tired   HENT:   Head: Normocephalic and atraumatic.   Eyes: Pupils are equal, round, and reactive to light. No scleral icterus.   Cardiovascular: Normal rate and regular rhythm.   Pulmonary/Chest: Effort normal. No respiratory distress. He has no decreased breath sounds. He has no wheezes.   Abdominal: Soft. Bowel sounds are normal. There is no hepatosplenomegaly. There is no tenderness. There is no rigidity and no guarding.   Musculoskeletal: He exhibits no edema.   Neurological: He is alert and oriented to person, place, and time.   Left upper extremity weakness     Skin: No cyanosis. Nails show no clubbing.   Psychiatric: He has a normal mood and affect. His behavior is normal.   Flat affect        Consultants     Consult Orders (all) (From admission, onward)    Start     Ordered    04/12/19 0854  Inpatient Neurology Consult Other (see comments)  Once     Specialty:  Neurology  Provider:  Caroline Luther MD    04/12/19 0854    04/08/19 1457  Hematology & Oncology Inpatient Consult  Once     Specialty:  Hematology and Oncology  Provider:  Mariposa Flood MD    04/08/19 1459    04/08/19 0000  Inpatient Medical Oncology Consult  Once     Specialty:  Medical Oncology  Provider:  Shanti Philippe MD    04/07/19 1715    04/07/19 2033  Inpatient Pulmonology Consult  Once     Specialty:  Pulmonary Disease  Provider:  Tonny Haley MD    04/07/19 2033 04/07/19 1716  Inpatient Radiation Oncology Consult  Once     Specialty:  Radiation Oncology  Provider:     04/07/19 1715    04/05/19 0552  Neurosurgery (on-call MD unless specified)  Once     Specialty:  Neurosurgery  Provider:  (Not yet assigned)    04/05/19 0551        Pertinent Labs and Procedures         Estimated Creatinine Clearance: 112.4 mL/min (A) (by C-G formula based on SCr of 0.74 mg/dL (L)).                Invalid input(s): LDLCALC        RIGHT SIDE CRANIOTOMY FOR TUMOR REMOVAL AND  BIOPSY  WITH SIDNEY NAVIGATTION AND PARTIAL RIGHT FRONTAL LOBE LOBECTOMY    Ct Head Without Contrast    Result Date: 4/8/2019  Postoperative changes of the right frontal region from craniotomy with midline shift, pneumocephaly small amount of hemorrhage and vasogenic edema. Please see full discussion above.  Radiation dose reduction techniques were utilized, including automated exposure control and exposure modulation based on body size.  This report was finalized on 4/8/2019 7:26 AM by Dr. Jono Rose M.D.      Ct Head Without Contrast    Result Date: 4/8/2019  Changes of right frontal craniotomy, as noted above. The patient has subdural hemorrhage noted overlying the operative bed. On the axial series, it appears slightly larger near the vertex. However, interval enlargement is not as convincingly demonstrated on the coronal and sagittal images, and the appearance may be artifactual. Short-term follow-up is recommended. There also appears to have been an increase in low-attenuation subdural fluid overlying the right cerebral convexity. No new areas of hemorrhage are seen. Degree of midline shift appears stable.  Radiation dose reduction techniques were utilized, including automated exposure control and exposure modulation based on body size.  This report was finalized on 4/8/2019 5:42 AM by Dr. Niki Beatty M.D.      Ct Head Without Contrast    Result Date: 4/5/2019  Large area of vasogenic edema identified within the right frontal lobe. Underlying mass lesion is suspected. Both primary brain neoplasm and metastatic  disease would be in the differential. Further evaluation with MRI of the brain without and with contrast is recommended. Midline shift measures up to 1.3 cm, and there is significant mass effect upon the right lateral ventricle. Neurosurgical consultation is recommended. Findings were relayed to Herve Jane, the physician assistant at 5:45 AM.  Radiation dose reduction techniques were utilized, including automated exposure control and exposure modulation based on body size.  This report was finalized on 4/5/2019 5:52 AM by Dr. Niki Beatty M.D.      Ct Chest With Contrast    Result Date: 4/5/2019  1.  No findings of definite metastatic disease within the chest, abdomen or pelvis. 2.  Sub-6 mm noncalcified nodule within the right upper lobe of indeterminate etiology given patient history. Continued attention on followup is recommended to ensure stability. 3.  Ill-defined mesenteric stranding and a small amount of free intraperitoneal fluid layering in the pelvis in the setting of body wall edema and periportal edema is most suggestive of diffuse anasarca, possibly related to the patient's brain mass. A diffuse enterocolitis could appear similar within the abdomen although this is considered less likely. Correlation with patient history is recommended. 4.  Pericholecystic edema versus gallbladder wall thickening. The gallbladder is otherwise nondistended. While findings are also favored to be related to third spacing of fluid, chronic cholecystitis could appear similar although this is considered less likely. This can be further evaluated with right upper quadrant sonogram if clinically indicated.  The above findings were discussed with Lidia stewart    by telephone by Paulino Gunn at approximately 2:00 PM on  4/5/2019  .  This report was finalized on 4/5/2019 5:27 PM by Dr. Paulino Gunn M.D.      Mri Brain With & Without Contrast    Result Date: 4/8/2019   Postsurgical changes, as described, with restricted  diffusion anteriorly in the right frontal lobe adjacent to the resection cavity. No residual enhancing tumor is identified. Continued follow-up recommended.  This report was finalized on 4/8/2019 6:25 PM by Dr. Sean Ridley M.D.      Mri Brain With & Without Contrast    Result Date: 4/5/2019   There is a 6.8 x 4.3 x 4.6 cm heterogeneous necrotic contrast enhancing mass centered within the right frontal lobe with circumscribing signal abnormality noted predominantly within the right frontal lobe, but also extending into the genu of the corpus callosum. Note is made of marked mass effect with sulcal and ventricular effacement in addition to midline shift to the left by approximately 8 mm, entrapment of the left lateral ventricle, and subfalcine herniation of the anterior and medial portion of the right frontal lobe. This lesion is most likely representative of a high-grade glioma such as a glioblastoma multiforme.  This report was finalized on 4/5/2019 10:14 AM by Dr. Michael Harris M.D.      Ct Abdomen Pelvis With Contrast    Result Date: 4/5/2019  1.  No findings of definite metastatic disease within the chest, abdomen or pelvis. 2.  Sub-6 mm noncalcified nodule within the right upper lobe of indeterminate etiology given patient history. Continued attention on followup is recommended to ensure stability. 3.  Ill-defined mesenteric stranding and a small amount of free intraperitoneal fluid layering in the pelvis in the setting of body wall edema and periportal edema is most suggestive of diffuse anasarca, possibly related to the patient's brain mass. A diffuse enterocolitis could appear similar within the abdomen although this is considered less likely. Correlation with patient history is recommended. 4.  Pericholecystic edema versus gallbladder wall thickening. The gallbladder is otherwise nondistended. While findings are also favored to be related to third spacing of fluid, chronic cholecystitis could appear  similar although this is considered less likely. This can be further evaluated with right upper quadrant sonogram if clinically indicated.  The above findings were discussed with Lidia stewart    by telephone by Paulino Gunn at approximately 2:00 PM on  4/5/2019  .  This report was finalized on 4/5/2019 5:27 PM by Dr. Paulino Gunn M.D.             Results for orders placed during the hospital encounter of 05/27/16   Adult transthoracic echo complete    Narrative · All left ventricular wall segments contract normally.  · Left ventricular function is normal.  · Calculated EF = 62.7%.  · Mild mitral valve regurgitation is present          Test Results Pending at Discharge   None    Discharge Details     He is being discharged on Decadron 4 mg every 6 hours.  Keppra 500 mg every 12 hours.  Flomax 0.4 mg daily.  Pepcid 20 mg twice daily, MiraLAX 17 g twice daily, senna-S2 tablets twice daily.  The medications below are not accurate.     Discharge Medications      ASK your doctor about these medications      Instructions Start Date   amoxicillin 875 MG tablet  Commonly known as:  AMOXIL  Ask about: Should I take this medication?   875 mg, Oral, 2 Times Daily      Loratadine 10 MG capsule  Commonly known as:  CLARITIN  Ask about: Should I take this medication?   1 tablet, Oral, Daily, Over the counter             Allergies   Allergen Reactions   • Sulfa Antibiotics Unknown (See Comments)     Unknown         Discharge Disposition:  Rehab Facility or Unit ([facility])    Discharge Diet:  Diet Order   Procedures   • Diet Regular       Discharge Activity:       CODE STATUS:    Code Status and Medical Interventions:   Ordered at: 04/05/19 1111     Level Of Support Discussed With:    Patient     Code Status:    CPR     Medical Interventions (Level of Support Prior to Arrest):    Full       Future Appointments   Date Time Provider Department Center   4/22/2019 10:30 AM Shanti Philippe MD [facility] None    4/23/2019  2:45 PM Keshawn Brower MD [facility] None   4/25/2019  1:20 PM LAB CHAIR [facility] LAB [facility] None   4/25/2019  2:00 PM Antolin Medley MD PhD [facility]     Follow-up Information     Megha Gant MD .    Specialty:  Internal Medicine  Contact information:  [facility] [place] [place] [id number] [phone]                       Time Spent on Discharge:  Greater than 30 minutes      Electronically signed by Maximo Mcpherson MD, 4/16/2019, 12:55 PM

## 2019-04-16 NOTE — NURSING NOTE
Have needed precert for acute rehab. Can admit today. Please complete discharge/readmit at time of transfer. Thanks, Ajay REICH rehab admission nurse [phone]

## 2019-04-16 NOTE — THERAPY TREATMENT NOTE
Acute Care - Physical Therapy Treatment Note  [facility]     Patient Name: Hernandez Waterman  : 1955  MRN: [id number]  Today's Date: 2019  Onset of Illness/Injury or Date of Surgery: 19     Referring Physician: German    Admit Date: 2019    Visit Dx:    ICD-10-CM ICD-9-CM   1. Acute intractable headache, unspecified headache type R51 784.0   2. Brain mass G93.9 348.9   3. Right frontal lobe mass G93.9 348.9   4. 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 (CMS/HCC)   • Cerebral edema (CMS/HCC)   • GBM (glioblastoma multiforme) (CMS/HCC)   • Leukemoid reaction   • Anemia   • Constipation       Therapy Treatment    Rehabilitation Treatment Summary     Row Name 19 1354             Treatment Time/Intention    Discipline  physical therapy assistant  -      Document Type  therapy note (daily note);discharge treatment  -      Subjective Information  complains of;fatigue;pain  -      Care Plan Review  patient/other agree to care plan  -      Care Plan Review, Other Participant(s)  spouse  -SCAR      Existing Precautions/Restrictions  fall  -      Recorded by [SCAR] Chelo Sanford PTA 19 135      Row Name 19 1351             Sit-Stand Transfer    Sit-Stand [place] (Transfers)  contact guard;verbal cues  -SCAR      Assistive Device (Sit-Stand Transfers)  walker, front-wheeled  -SCAR      Recorded by [SCAR] Chelo Sanford PTA 19 1355      Row Name 19 4017             Stand-Sit Transfer    Stand-Sit [place] (Transfers)  stand by assist;verbal cues  -SCAR      Assistive Device (Stand-Sit Transfers)  walker, front-wheeled  -SCAR       Recorded by [] Chelo Sanford PTA 04/16/19 1358      Row Name 04/16/19 1354             Gait/Stairs Assessment/Training    [place] Level (Gait)  contact guard;stand by assist  -      Assistive Device (Gait)  walker, front-wheeled MIN assist w/o AD, LOB x1  -JM      Deviations/Abnormal Patterns (Gait)  base of support, narrow  -JM      Bilateral Gait Deviations  -- slight wt shift difficulty w/turns, but no scissoring today  -      Comment (Gait/Stairs)  fast paced, cues for safety and balance  -      Recorded by [] Chelo Sanford PTA 04/16/19 1358      Row Name 04/16/19 1354             Therapeutic Exercise    Sets/Reps (Therapeutic Exercise)  10 reps  -      Comment (Therapeutic Exercise)  standing toe-ups, mini-squats, hip abd/add, heel to toe amb forw and back w/CGA 2  -JM      Recorded by [] Chelo Sanford PTA 04/16/19 1358      Row Name 04/16/19 1351             Positioning and Restraints    Pre-Treatment Position  sitting in chair/recliner  -JM      In Chair  reclined;call light within reach;encouraged to call for assist;exit alarm on;with family/caregiver  -      Recorded by [] Chelo Sanford PTA 04/16/19 1358      Row Name 04/16/19 1357             Pain Scale: Numbers Pre/Post-Treatment    Pain Scale: Numbers, Pretreatment  0/10 - no pain  -      Pain Scale: Numbers, Post-Treatment  3/10  -      Pain Location  head  -      Recorded by [] Chelo Sanford PTA 04/16/19 1358      Row Name                Wound 04/07/19 1233 Right head incision    Wound - Properties Group Date first assessed: 04/07/19 [LD] Time first assessed: 1233 [LD] Side: Right [LD] Location: head [LD] Type: incision [LD] Recorded by:  [LD] Monet Rivers RN 04/07/19 1233      User Key  (r) = Recorded By, (t) = Taken By, (c) = Cosigned By    Initials Name Effective Dates Discipline     Chelo Sanford PTA 03/07/18 -  PT    LD Monet Rivers RN 06/16/16 -  Nurse          Wound 04/07/19 1236  Right head incision (Active)   Dressing Appearance dry;intact 4/16/2019  8:08 AM   Closure Adhesive closure strips 4/16/2019  8:08 AM   Periwound intact;dry 4/16/2019  8:08 AM   Periwound Temperature warm 4/16/2019  8:08 AM   Periwound Skin Turgor soft 4/16/2019  8:08 AM   Edges rolled/closed 4/16/2019  8:08 AM   Drainage Amount none 4/16/2019  8:08 AM   Dressing Care, Wound open to air 4/15/2019  7:59 PM           Physical Therapy Education     Title: PT OT SLP Therapies (In Progress)     Topic: Physical Therapy (Done)     Point: Mobility training (Done)     Learning Progress Summary           Patient Eager, E,TB,D, VU,DU,NR by SCAR at 4/16/2019  2:01 PM    Eager, E,TB,D, VU,NR by SCAR at 4/15/2019  4:49 PM    Acceptance, E,D, VU,DU by CARRILLO at 4/14/2019  1:44 PM    Comment:  safety during stairs    Acceptance, E,TB,D, VU,NR by SCAR at 4/12/2019 12:06 PM    Comment:  pt very motivated but req educ for safety    Acceptance, E, NR by EM at 4/11/2019 11:04 AM    Eager, E,TB,D, VU,NR by SCAR at 4/10/2019 10:59 AM    Acceptance, E,TB, VU by KT at 4/10/2019  4:44 AM    Acceptance, E,TB, VU,NR by CG at 4/9/2019 11:04 AM   Family Eager, E,TB,D, VU,DU,NR by SCAR at 4/16/2019  2:01 PM    Eager, E,TB,D, VU,NR by SCAR at 4/15/2019  4:49 PM    Acceptance, E,D, VU,DU by CARRILLO at 4/14/2019  1:44 PM    Comment:  safety during stairs    Eager, E,TB,D, VU,NR by SCAR at 4/10/2019 10:59 AM                   Point: Home exercise program (Done)     Learning Progress Summary           Patient Eager, E,TB,D, VU,DU,NR by SCAR at 4/16/2019  2:01 PM    Eager, E,TB,D, VU,NR by SCAR at 4/15/2019  4:49 PM    Acceptance, POLINA GOODMAN VU,DU by CARRILLO at 4/14/2019  1:44 PM    Comment:  safety during stairs    Acceptance, LEVY GOODMAN D, VU,NR by SCAR at 4/12/2019 12:06 PM    Comment:  pt very motivated but req educ for safety    Acceptance, MAXINE NR by EM at 4/11/2019 11:04 AM    Eager, LEVY GOODMAN D, LORIE,NR by SCAR at 4/10/2019 10:59 AM    Acceptance, LEVY GOODMAN VU by RUSSELL at 4/10/2019  4:44 AM    Acceptance,  E,TB, VU,NR by CG at 4/9/2019 11:04 AM   Family Eager, E,TB,D, VU,DU,NR by SCAR at 4/16/2019  2:01 PM    Eager, E,TB,D, VU,NR by SCAR at 4/15/2019  4:49 PM    Acceptance, E,D, VU,DU by CARRILLO at 4/14/2019  1:44 PM    Comment:  safety during stairs    Eager, E,TB,D, VU,NR by SCAR at 4/10/2019 10:59 AM                   Point: Body mechanics (Done)     Learning Progress Summary           Patient Eager, E,TB,D, VU,DU,NR by SCAR at 4/16/2019  2:01 PM    Eager, E,TB,D, VU,NR by SCAR at 4/15/2019  4:49 PM    Acceptance, E,D, VU,DU by CARRILLO at 4/14/2019  1:44 PM    Comment:  safety during stairs    Acceptance, E,TB,D, VU,NR by SCAR at 4/12/2019 12:06 PM    Comment:  pt very motivated but req educ for safety    Acceptance, E, NR by EM at 4/11/2019 11:04 AM    Eager, E,TB,D, VU,NR by SCAR at 4/10/2019 10:59 AM    Acceptance, E,TB, VU by KT at 4/10/2019  4:44 AM    Acceptance, E,TB, VU,NR by JO at 4/9/2019 11:04 AM   Family Eager, E,TB,D, VU,DU,NR by SCAR at 4/16/2019  2:01 PM    Eager, E,TB,D, VU,NR by SCAR at 4/15/2019  4:49 PM    Acceptance, E,D, VU,DU by CARRILLO at 4/14/2019  1:44 PM    Comment:  safety during stairs    Eager, E,TB,D, VU,NR by SCAR at 4/10/2019 10:59 AM                   Point: Precautions (Done)     Learning Progress Summary           Patient Eager, E,TB,D, VU,DU,NR by SCAR at 4/16/2019  2:01 PM    Eager, E,TB,D, VU,NR by SCAR at 4/15/2019  4:49 PM    Acceptance, E,D, VU,DU by CARRILLO at 4/14/2019  1:44 PM    Comment:  safety during stairs    Acceptance, E,TB,D, VU,NR by SCAR at 4/12/2019 12:06 PM    Comment:  pt very motivated but req educ for safety    Acceptance, E, NR by EM at 4/11/2019 11:04 AM    Eager, E,TB,D, VU,NR by SCAR at 4/10/2019 10:59 AM    Acceptance, E,TB, VU by KT at 4/10/2019  4:44 AM    Acceptance, E,TB, VU,NR by CG at 4/9/2019 11:04 AM   Family Eager, E,TB,D, VU,DU,NR by SCAR at 4/16/2019  2:01 PM    Eager, E,TB,D, VU,NR by SCAR at 4/15/2019  4:49 PM    Acceptance, E,D, VU,DU by CARRILLO at 4/14/2019  1:44 PM    Comment:  safety during  MAXINE Bates,LEVY,D, VU,NR by  at 4/10/2019 10:59 AM                               User Key     Initials Effective Dates Name Provider Type Discipline     03/07/18 -  Chelo Sanford, PRESTON Physical Therapy Assistant PT    LC 08/02/16 -  Osvaldo Dubon, PT DPT Physical Therapist PT    KT 01/18/18 -  Taniya Taylor, RN Registered Nurse Nurse     02/04/19 -  James Bird, PT Student PT Student PT    EM 03/04/19 -  Michelle Alejandro, PT Student PT Student PT                PT Recommendation and Plan     Plan of Care Reviewed With: patient, spouse  Progress: improving  Outcome Summary: incr standing bal act exer, but req assist during perf and rail; no scissoring noted but still needs cues to slow pace for safety , bal deficits; fatigue plays a role in balance; plans RHB tfer today  Outcome Measures     Row Name 04/16/19 1400 04/15/19 1600 04/15/19 1335       How much help from another person do you currently need...    Turning from your back to your side while in flat bed without using bedrails?  4  -  4  -JM  --    Moving from lying on back to sitting on the side of a flat bed without bedrails?  4  -  4  -JM  --    Moving to and from a bed to a chair (including a wheelchair)?  3  -  3  -JM  --    Standing up from a chair using your arms (e.g., wheelchair, bedside chair)?  3  -  3  -JM  --    Climbing 3-5 steps with a railing?  3  -  3  -JM  --    To walk in hospital room?  3  -  3  -JM  --    AM-PAC 6 Clicks Score  20  -  20  -JM  --       How much help from another is currently needed...    Putting on and taking off regular lower body clothing?  --  --  3  -SG    Bathing (including washing, rinsing, and drying)  --  --  3  -SG    Toileting (which includes using toilet bed pan or urinal)  --  --  3  -SG    Putting on and taking off regular upper body clothing  --  --  3  -SG    Taking care of personal grooming (such as brushing teeth)  --  --  3  -SG    Eating meals  --  --  3  -SG    Score   --  --  18  -SG    Row Name 04/14/19 1300             How much help from another person do you currently need...    Turning from your back to your side while in flat bed without using bedrails?  4  -LC      Moving from lying on back to sitting on the side of a flat bed without bedrails?  4  -LC      Moving to and from a bed to a chair (including a wheelchair)?  3  -LC      Standing up from a chair using your arms (e.g., wheelchair, bedside chair)?  3  -LC      Climbing 3-5 steps with a railing?  3  -LC      To walk in hospital room?  3  -LC      AM-PAC 6 Clicks Score  20  -LC         Functional Assessment    Outcome Measure Options  AM-PAC 6 Clicks Basic Mobility (PT)  -        User Key  (r) = Recorded By, (t) = Taken By, (c) = Cosigned By    Initials Name Provider Type     Ciera Lieberman OTR Occupational Therapist    Chelo Andrews PTA Physical Therapy Assistant    Osvaldo Blunt, PT DPT Physical Therapist         Time Calculation:   PT Charges     Row Name 04/16/19 1401             Time Calculation    Start Time  1330  -      Stop Time  1353  -      Time Calculation (min)  23 min  -      PT Received On  04/16/19  -         Time Calculation- PT    Total Timed Code Minutes- PT  23 minute(s)  -        User Key  (r) = Recorded By, (t) = Taken By, (c) = Cosigned By    Initials Name Provider Type    Chelo Andrews PTA Physical Therapy Assistant        Therapy Charges for Today     Code Description Service Date Service Provider Modifiers Qty    21747753887 HC PT THER PROC EA 15 MIN 4/15/2019 Chelo Sanford PTA GP 2    19026681187 HC PT THER PROC EA 15 MIN 4/16/2019 Chelo Sanford PTA GP 2    93532255397 HC PT THER SUPP EA 15 MIN 4/16/2019 Chelo Sanford PTA GP 1          PT G-Codes  Outcome Measure Options: AM-PAC 6 Clicks Basic Mobility (PT)  AM-PAC 6 Clicks Score: 20  Score: 18    Chelo Sanford PTA  4/16/2019

## 2019-04-16 NOTE — PROGRESS NOTES
Inpatient Rehabilitation Plan of Care Note    Plan of Care  Care Plan Reviewed - No updates at this time.    Body Systems    [RN] Integumentary(Active)  Current Status(01/01/1753):  Weekly Goal(01/01/1753):  Discharge Goal:        Pain    [RN] Pain Management(Active)  Current Status(04/16/2019): Patient is experiencing headache due to brain tumor  and craniotomy.  Weekly Goal(04/23/2019): Patients pain will be well controlled.  Discharge Goal: Patients pain will be well controlled.        Psychosocial    [RN] Coping/Adjustment(Active)  Current Status(04/16/2019): Patient is at risk for pyschosocial issues related  to new diagnosis of Glioblastoma.  Weekly Goal(04/23/2019): Patient will demonstrate appropriate coping and be free  from pyschosocial issues.  Discharge Goal: Same as weekly.        Safety    [RN] Potential for Injury(Active)  Current Status(04/16/2019): Patient is at risk for falls related to decrease in  mobility due to recent surgery and new diagnosis.  Weekly Goal(04/23/2019): Patient will be free from falls and demonstrate  apporpriate safety techniques.  Discharge Goal: Same as weekly.        Sphincter Control    [RN] Bowel and Bladder Management(Active)  Current Status(04/16/2019): Patient is continent of bowel and bladder 100% of  the time.  Weekly Goal(04/23/2019): Patient will be continue to be continent of bowel and  bladder and be free from any elimination issues.  Discharge Goal: Same as weekly.    Signed by: Nava Sandhu RN

## 2019-04-16 NOTE — PAYOR COMM NOTE
"DISCHARGED        Darryl Fisher (63 y.o. Male)     Date of Birth Social Security Number Address Home Phone MRN    1955  [id number] [address] [phone] [id number]    Mandaeism Marital Status          None        Admission Date Admission Type Admitting Provider Attending Provider Department, Room/Bed    19 Emergency Zev Weldon MD  [facility] [address] [place], P590/1    Discharge Date Discharge Disposition Discharge Destination        2019 Rehab Facility or Unit ([facility])              Attending Provider:  (none)   Allergies:  Sulfa Antibiotics    Isolation:  None   Infection:  None   Code Status:  Prior    Ht:  185.4 cm (72.99\")   Wt:  78.8 kg (173 lb 11.6 oz)    Admission Cmt:  None   Principal Problem:  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 (CMS/HCC) [D49.6]                 Active Insurance as of 2019     Primary Coverage     Payor Plan Insurance Group Employer/Plan Group    [employer] O [id number]     Payor Plan Address Payor Plan Phone Number Payor Plan Fax Number Effective Dates    [address] [phone]  2018 - None Entered    [place] [address]       Subscriber Name Subscriber Birth Date Member ID       DARRYL FISHER 1955 [id number]                 Emergency Contacts      (Rel.) Home Phone Work Phone Mobile Phone    Mony Fisher (Spouse) [phone] -- [phone]               Discharge Summary      Maximo Mcpherson MD at 2019 12:47 PM              Patient Name: Darryl Fisher  : 1955  MRN: [id number]    Date of Admission: 2019  Date of Discharge:  2019  Primary Care Physician: Megha Gant MD      Hospital Course     Chief Complaint:   Weakness - Generalized and Headache      Active Hospital Problems    Diagnosis  POA   • **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 (CMS/HCC) " [D49.6]  Yes   • GBM (glioblastoma multiforme) (CMS/HCC) [C71.9]  Yes   • Leukemoid reaction [D72.823]  Yes   • Anemia [D64.9]  Yes   • Constipation [K59.00]  No   • Acute intractable headache [R51]  Yes   • Cerebral edema (CMS/HCC) [G93.6]  Yes      Resolved Hospital Problems   No resolved problems to display.        Hospital Course:  Mr. Waterman is a 63 y.o. male  non-smoker who was previously healthy came in with 3 weeks of headache and found to have a large right frontal lobe mass with vasogenic edema and midline shift.  He was started on steroids, Keppra and ulcer prophylaxis with Pepcid.  Neurosurgery was consulted.  He underwent craniotomy and debulking and pathology showed glioblastoma.  He was watched for a couple days in the intensive care unit.  He was transferred out and has done well.  He did not have any complications.  He was much weaker than baseline and his wife cannot take care of him at home.  He worked with physical therapy and was recommended for acute rehab.  He was also seen by radiation oncology, hematology/oncology and neuro-oncology.    He will have appointment with radiation oncology for CT simulation and treatment planning on April 22 Dr. Philippe.  He was also seen by Dr. Luther and he is waiting on final pathology/molecular profile to come back from the [facility] prior to initiation of treatment.  He has been approved for acute rehab here at [facility] and is medically stable to be transferred there today.  I will defer tapering of the steroids to neurosurgery or neuro oncology.  He does continue on Keppra for seizure prophylaxis.  He was see neurosurgery on April 23, and oncology on April 25.      Day of Discharge     Physical Exam:  Temp:  [97.9 °F (36.6 °C)-98.4 °F (36.9 °C)] 98.3 °F (36.8 °C)  Heart Rate:  [51-78] 64  Resp:  [15-18] 18  BP: ()/(59-86) 114/71  Body mass index is 22.92 kg/m².  Physical Exam  Constitutional: He is oriented to person, place, and time. He appears  well-developed and well-nourished. No distress.   Appears weak and tired   HENT:   Head: Normocephalic and atraumatic.   Eyes: Pupils are equal, round, and reactive to light. No scleral icterus.   Cardiovascular: Normal rate and regular rhythm.   Pulmonary/Chest: Effort normal. No respiratory distress. He has no decreased breath sounds. He has no wheezes.   Abdominal: Soft. Bowel sounds are normal. There is no hepatosplenomegaly. There is no tenderness. There is no rigidity and no guarding.   Musculoskeletal: He exhibits no edema.   Neurological: He is alert and oriented to person, place, and time.   Left upper extremity weakness     Skin: No cyanosis. Nails show no clubbing.   Psychiatric: He has a normal mood and affect. His behavior is normal.   Flat affect        Consultants     Consult Orders (all) (From admission, onward)    Start     Ordered    04/12/19 0854  Inpatient Neurology Consult Other (see comments)  Once     Specialty:  Neurology  Provider:  Caroline Luther MD    04/12/19 0854    04/08/19 1457  Hematology & Oncology Inpatient Consult  Once     Specialty:  Hematology and Oncology  Provider:  Mariposa Flood MD    04/08/19 1459    04/08/19 0000  Inpatient Medical Oncology Consult  Once     Specialty:  Medical Oncology  Provider:  Shanti Philippe MD    04/07/19 1715    04/07/19 2033  Inpatient Pulmonology Consult  Once     Specialty:  Pulmonary Disease  Provider:  Tonny Haley MD    04/07/19 2033 04/07/19 1716  Inpatient Radiation Oncology Consult  Once     Specialty:  Radiation Oncology  Provider:    04/07/19 1715    04/05/19 0552  Neurosurgery (on-call MD unless specified)  Once     Specialty:  Neurosurgery  Provider:  (Not yet assigned)    04/05/19 0551        Pertinent Labs and Procedures         Estimated Creatinine Clearance: 112.4 mL/min (A) (by C-G formula based on SCr of 0.74 mg/dL (L)).                Invalid input(s): LDLCALC        RIGHT SIDE CRANIOTOMY FOR TUMOR REMOVAL  AND  BIOPSY  WITH SIDNEY NAVIGATTION AND PARTIAL RIGHT FRONTAL LOBE LOBECTOMY    Ct Head Without Contrast    Result Date: 4/8/2019  Postoperative changes of the right frontal region from craniotomy with midline shift, pneumocephaly small amount of hemorrhage and vasogenic edema. Please see full discussion above.  Radiation dose reduction techniques were utilized, including automated exposure control and exposure modulation based on body size.  This report was finalized on 4/8/2019 7:26 AM by Dr. Jono Rose M.D.      Ct Head Without Contrast    Result Date: 4/8/2019  Changes of right frontal craniotomy, as noted above. The patient has subdural hemorrhage noted overlying the operative bed. On the axial series, it appears slightly larger near the vertex. However, interval enlargement is not as convincingly demonstrated on the coronal and sagittal images, and the appearance may be artifactual. Short-term follow-up is recommended. There also appears to have been an increase in low-attenuation subdural fluid overlying the right cerebral convexity. No new areas of hemorrhage are seen. Degree of midline shift appears stable.  Radiation dose reduction techniques were utilized, including automated exposure control and exposure modulation based on body size.  This report was finalized on 4/8/2019 5:42 AM by Dr. Niki Beatty M.D.      Ct Head Without Contrast    Result Date: 4/5/2019  Large area of vasogenic edema identified within the right frontal lobe. Underlying mass lesion is suspected. Both primary brain neoplasm and metastatic disease would be in the differential. Further evaluation with MRI of the brain without and with contrast is recommended. Midline shift measures up to 1.3 cm, and there is significant mass effect upon the right lateral ventricle. Neurosurgical consultation is recommended. Findings were relayed to Herve Jane, the physician assistant at 5:45 AM.  Radiation dose reduction techniques were  utilized, including automated exposure control and exposure modulation based on body size.  This report was finalized on 4/5/2019 5:52 AM by Dr. Niki Beatty M.D.      Ct Chest With Contrast    Result Date: 4/5/2019  1.  No findings of definite metastatic disease within the chest, abdomen or pelvis. 2.  Sub-6 mm noncalcified nodule within the right upper lobe of indeterminate etiology given patient history. Continued attention on followup is recommended to ensure stability. 3.  Ill-defined mesenteric stranding and a small amount of free intraperitoneal fluid layering in the pelvis in the setting of body wall edema and periportal edema is most suggestive of diffuse anasarca, possibly related to the patient's brain mass. A diffuse enterocolitis could appear similar within the abdomen although this is considered less likely. Correlation with patient history is recommended. 4.  Pericholecystic edema versus gallbladder wall thickening. The gallbladder is otherwise nondistended. While findings are also favored to be related to third spacing of fluid, chronic cholecystitis could appear similar although this is considered less likely. This can be further evaluated with right upper quadrant sonogram if clinically indicated.  The above findings were discussed with Lidia stewart    by telephone by Paulino Gunn at approximately 2:00 PM on  4/5/2019  .  This report was finalized on 4/5/2019 5:27 PM by Dr. Paulino Gunn M.D.      Mri Brain With & Without Contrast    Result Date: 4/8/2019   Postsurgical changes, as described, with restricted diffusion anteriorly in the right frontal lobe adjacent to the resection cavity. No residual enhancing tumor is identified. Continued follow-up recommended.  This report was finalized on 4/8/2019 6:25 PM by Dr. Sean Ridley M.D.      Mri Brain With & Without Contrast    Result Date: 4/5/2019   There is a 6.8 x 4.3 x 4.6 cm heterogeneous necrotic contrast enhancing mass centered  within the right frontal lobe with circumscribing signal abnormality noted predominantly within the right frontal lobe, but also extending into the genu of the corpus callosum. Note is made of marked mass effect with sulcal and ventricular effacement in addition to midline shift to the left by approximately 8 mm, entrapment of the left lateral ventricle, and subfalcine herniation of the anterior and medial portion of the right frontal lobe. This lesion is most likely representative of a high-grade glioma such as a glioblastoma multiforme.  This report was finalized on 4/5/2019 10:14 AM by Dr. Michael Harris M.D.      Ct Abdomen Pelvis With Contrast    Result Date: 4/5/2019  1.  No findings of definite metastatic disease within the chest, abdomen or pelvis. 2.  Sub-6 mm noncalcified nodule within the right upper lobe of indeterminate etiology given patient history. Continued attention on followup is recommended to ensure stability. 3.  Ill-defined mesenteric stranding and a small amount of free intraperitoneal fluid layering in the pelvis in the setting of body wall edema and periportal edema is most suggestive of diffuse anasarca, possibly related to the patient's brain mass. A diffuse enterocolitis could appear similar within the abdomen although this is considered less likely. Correlation with patient history is recommended. 4.  Pericholecystic edema versus gallbladder wall thickening. The gallbladder is otherwise nondistended. While findings are also favored to be related to third spacing of fluid, chronic cholecystitis could appear similar although this is considered less likely. This can be further evaluated with right upper quadrant sonogram if clinically indicated.  The above findings were discussed with Lidia stewart    by telephone by Paulino Gunn at approximately 2:00 PM on  4/5/2019  .  This report was finalized on 4/5/2019 5:27 PM by Dr. Paulino Gunn M.D.             Results for orders placed during the  hospital encounter of 05/27/16   Adult transthoracic echo complete    Narrative · All left ventricular wall segments contract normally.  · Left ventricular function is normal.  · Calculated EF = 62.7%.  · Mild mitral valve regurgitation is present          Test Results Pending at Discharge   None    Discharge Details     He is being discharged on Decadron 4 mg every 6 hours.  Keppra 500 mg every 12 hours.  Flomax 0.4 mg daily.  Pepcid 20 mg twice daily, MiraLAX 17 g twice daily, senna-S2 tablets twice daily.  The medications below are not accurate.     Discharge Medications      ASK your doctor about these medications      Instructions Start Date   amoxicillin 875 MG tablet  Commonly known as:  AMOXIL  Ask about: Should I take this medication?   875 mg, Oral, 2 Times Daily      Loratadine 10 MG capsule  Commonly known as:  CLARITIN  Ask about: Should I take this medication?   1 tablet, Oral, Daily, Over the counter             Allergies   Allergen Reactions   • Sulfa Antibiotics Unknown (See Comments)     Unknown         Discharge Disposition:  Rehab Facility or Unit ([facility])    Discharge Diet:  Diet Order   Procedures   • Diet Regular       Discharge Activity:       CODE STATUS:    Code Status and Medical Interventions:   Ordered at: 04/05/19 1111     Level Of Support Discussed With:    Patient     Code Status:    CPR     Medical Interventions (Level of Support Prior to Arrest):    Full       Future Appointments   Date Time Provider Department Center   4/22/2019 10:30 AM Shanti Philippe MD [facility] None   4/23/2019  2:45 PM Keshawn Brower MD [facility] None   4/25/2019  1:20 PM LAB CHAIR [facility] None   4/25/2019  2:00 PM Antolin Medley MD PhD [facility]     Follow-up Information     Megha Gant MD .    Specialty:  Internal Medicine  Contact information:  [address] [facility]  [address] [id number] [phone]                       Time  Spent on Discharge:  Greater than 30 minutes      Electronically signed by Maximo Mcpherson MD, 4/16/2019, 12:55 PM            Electronically signed by Maximo Mcpherson MD at 4/16/2019 12:56 PM

## 2019-04-16 NOTE — PROGRESS NOTES
Case Management Discharge Note    Final Note: [facility] acute rehab    Destination - Selection Complete      Service Provider Request Status Selected Services Address Phone Number Fax Number    [facility] Selected Inpatient [facility] [address] [facility] [address] [phone] --      Durable Medical Equipment      No service has been selected for the patient.      Dialysis/Infusion      No service has been selected for the patient.      Home Medical Care      No service has been selected for the patient.      Therapy      No service has been selected for the patient.      Community Resources      No service has been selected for the patient.             Final Discharge Disposition Code: 62 - inpatient rehab facility

## 2019-04-16 NOTE — PROGRESS NOTES
SECTION GG    Eating Performance: Patient completed the activities by him/herself with no  assistance from a helper.    Eating Discharge Goals: [place]    Signed by: Nava Sandhu RN

## 2019-04-16 NOTE — PROGRESS NOTES
"Section B. Hearing, Speech, Vision: Expression of Ideas and Wants: Expresses  complex messages without difficulty and with speech that is clear and easy to  understand.  Understanding Verbal and Non-Verbal Content: Understands: Clear comprehension  without cues or repetitions.    Section C. Cognitive Patterns: Brief Interview for Mental Status (BIMS) was  conducted.  Repetition of Three Words: Three words  Able to report correct year: Correct  Able to report correct month: Accurate within 5 days  Able to report correct day of the week: Correct  Able to recall \"sock\": Yes, no cue required  Able to recall \"blue\": Yes, no cue required  Able to recall \"bed\": Yes, no cue required    BIMS SUMMARY SCORE: 15 Cognitively intact Patient was able to complete the Brief  Interview for Mental Status    Signed by: Nava Sandhu RN    "

## 2019-04-16 NOTE — PLAN OF CARE
Problem: Patient Care Overview  Goal: Plan of Care Review  Outcome: Ongoing (interventions implemented as appropriate)   04/16/19 8079   Coping/Psychosocial   Plan of Care Reviewed With patient;spouse   Plan of Care Review   Progress improving   OTHER   Outcome Summary incr standing bal act exer, but req assist during perf and rail; no scissoring noted but still needs cues to slow pace for safety , bal deficits; fatigue plays a role in balance; plans RHB tfer today

## 2019-04-16 NOTE — PLAN OF CARE
"Problem: Fall Risk (Adult)  Goal: Identify Related Risk Factors and Signs and Symptoms  Outcome: Ongoing (interventions implemented as appropriate)  Patient requires tylenol twice through the night for \"soreness\" on the right lateral head; patient continues to be impulsive. Wife present at bedside this am. Rn to continue to monitor.  Goal: Absence of Fall  Outcome: Ongoing (interventions implemented as appropriate)      Problem: Pain, Acute (Adult)  Goal: Identify Related Risk Factors and Signs and Symptoms  Outcome: Ongoing (interventions implemented as appropriate)    Goal: Acceptable Pain Control/Comfort Level  Outcome: Ongoing (interventions implemented as appropriate)      Problem: Confusion, Acute (Adult)  Goal: Cognitive/Functional Impairments Minimized  Outcome: Ongoing (interventions implemented as appropriate)    Goal: Safety  Outcome: Ongoing (interventions implemented as appropriate)      Problem: Skin Injury Risk (Adult)  Goal: Identify Related Risk Factors and Signs and Symptoms  Outcome: Ongoing (interventions implemented as appropriate)    Goal: Skin Health and Integrity  Outcome: Ongoing (interventions implemented as appropriate)        "

## 2019-04-17 PROBLEM — C71.9 GLIOBLASTOMA MULTIFORME (HCC): Status: ACTIVE | Noted: 2019-01-01

## 2019-04-17 NOTE — PLAN OF CARE
Problem: Nutrition, Imbalanced: Inadequate Oral Intake (Adult)  Goal: Identify Related Risk Factors and Signs and Symptoms  Outcome: Outcome(s) achieved Date Met: 04/17/19 04/17/19 1121   Nutrition, Imbalanced: Inadequate Oral Intake (Adult)   Related Risk Factors (Nutrition Imbalance, Inadequate Oral Intake) chronic illness/infection;hypercatabolism   Signs and Symptoms (Nutrition Imbalance, Inadequate Oral Intake: Signs and Symptoms) weight decreased (percent weight loss, percent usual body weight, body mass index less than 18.5) (Adults)     Goal: Prevent Further Weight Loss  Outcome: Ongoing (interventions implemented as appropriate)   04/17/19 1121   Nutrition, Imbalanced: Inadequate Oral Intake (Adult)   Prevent Further Weight Loss making progress toward outcome

## 2019-04-17 NOTE — PROGRESS NOTES
Inpatient Rehabilitation Plan of Care Note    Plan of Care  Branch    Field    Signed by: Nina Roberts RN

## 2019-04-17 NOTE — PROGRESS NOTES
SECTION GG    Self Care Performance:   Oral Hygiene: [place] provides verbal cues and/or touching/steadying and/or  contact guard assistance as patient completes activity.   Toileting Hygiene: [place] provides verbal cues and/or touching/steadying and/or  contact guard assistance as patient completes activity.   Shower/Bathe Self: [place] provides verbal cues and/or touching/steadying and/or  contact guard assistance as patient completes activity.   Upper Body Dressing: [place] provides verbal cues and/or touching/steadying  and/or contact guard assistance as patient completes activity.   Lower Body Dressing: [place] provides verbal cues and/or touching/steadying  and/or contact guard assistance as patient completes activity.   Putting On/Taking Off Footwear: [place] provides verbal cues and/or  touching/steadying and/or contact guard assistance as patient completes  activity.    Self Care Discharge Goals:   Oral Hygiene: [place] provides verbal cues or touching/steadying assistance as  patient completes activity.   Toileting Hygiene: [place] provides verbal cues or touching/steadying assistance  as patient completes activity.   Shower/Bathe Self: [place] provides verbal cues or touching/steadying assistance  as patient completes activity.   Upper Body Dressing: [place] provides verbal cues or touching/steadying  assistance as patient completes activity.   Lower Body Dressing: [place] provides verbal cues or touching/steadying  assistance as patient completes activity.   Putting On/Taking Off Footwear: [place] provides verbal cues or  touching/steadying assistance as patient completes activity.    Mobility Toilet Transfer Performance: [place] provides verbal cues or  touching/steadying assistance as patient completes activity.    Mobility Toilet Transfer Discharge Goal: [place] provides verbal cues or  touching/steadying assistance as patient completes activity.    Signed by: Maximo Oscar OTR/ROSA

## 2019-04-17 NOTE — PROGRESS NOTES
"Case Management  Inpatient Rehabilitation Plan of Care and Discharge Plan Note    Rehabilitation Diagnosis:  Crani for tumor resection  Date of Onset:  4/7/19    Medical Summary:  \"Sinus HA\" for around a week - new onset confusion, 3 falls  day PTA, N/V - neoplasm of brain  Past Medical History: None    Plan of Care  Updated Problems/Interventions      Expected Intensity:  Average of 3 hours of therapy 5 days/week.  Interdisciplinary Team:  Interdisciplinary Team: Medical Supervision and 24 Hour Rehabilitation Nursing.,  Physical Therapy:, Occupational Therapy:, Speech and Language Therapy:, Social  Work, Therapeutic Recreation., Psychology.  Physical Therapy Intensity/Duration: 60 minutes/day, 5 days/week  Occupational Therapy Intensity/Duration: 60 minutes/day, 5 days/week  Speech Language Pathology  Intensity/Duration: 60 minutes/day, 5 days/week  Estimated Length of Stay/Anticipated Discharge Date: ELOS: 2 weeks  Anticipated Discharge Destination:  Anticipated discharge destination from inpatient rehabilitation is community  discharge with assistance. Home with spouse      Based on the patient's medical and functional status, their prognosis and  expected level of functional improvement is:  MOD I    Signed by: Kei Ballard RN    "

## 2019-04-17 NOTE — PROGRESS NOTES
Inpatient Rehabilitation Functional Measures Assessment    Functional Measures  LISA Eating:  [facility] Grooming: [facility] Bathing:  [facility] Upper Body Dressing:  [facility] Lower Body Dressing:  [facility] Toileting:  [facility] Bladder Management  Level of Assistance:  [place]  Frequency/Number of Accidents this Shift:  [facility] Bowel Management  Level of Assistance: [place]  Frequency/Number of Accidents this Shift: [facility] Bed/Chair/Wheelchair Transfer:  [facility] Toilet Transfer:  [facility] Tub/Shower Transfer:  [place]    Previously Documented Mode of Locomotion at Discharge: Field  LISA Expected Mode of Locomotion at Discharge: [facility] Walk/Wheelchair:  [facility] Stairs:  [facility] Comprehension:  Auditory comprehension is the usual mode. Comprehension  Score = 7, Independent.  Patient comprehends complex/abstract information in  their primary language.  Patient is completely independent for auditory  comprehension.  There are no activity limitations.  LISA Expression:  Vocal expression is the usual mode. Expression Score = 7,  Independent.  Patient expresses complex/abstract information in their primary  language.  Patient is completely independent for vocal expression.  There are no  activity limitations.  LISA Social Interaction:  Social Interaction Score = 6, Modified Independent.  Patient is modified independent for social interaction, requiring:  LISA Problem Solving:  Problem Solving Score = 6, Modified [place].  Patient  makes appropriate decisions in order to solve complex problems, but requires  extra time.  LISA Memory:  Memory Score = 6, Modified [place].  Patient is modified  independent for memory, requiring:    Therapy Mode Minutes  Occupational Therapy: [place]  Physical Therapy: [place]  Speech Language Pathology:  Individual: 60 minutes.    Signed by: AUREA Richey

## 2019-04-17 NOTE — PROGRESS NOTES
Inpatient Rehabilitation Functional Measures Assessment    Functional Measures  LISA Eating:  [facility] Grooming: [facility] Bathing:  [facility] Upper Body Dressing:  [facility] Lower Body Dressing:  [facility] Toileting:  [facility] Bladder Management  Level of Assistance:  [place]  Frequency/Number of Accidents this Shift:  [facility] Bowel Management  Level of Assistance: [place]  Frequency/Number of Accidents this Shift: [facility] Bed/Chair/Wheelchair Transfer:  [facility] Toilet Transfer:  [facility] Tub/Shower Transfer:  [place]    Previously Documented Mode of Locomotion at Discharge: Field  LSIA Expected Mode of Locomotion at Discharge: [facility] Walk/Wheelchair:  [facility] Stairs:  [facility] Comprehension:  Auditory comprehension is the usual mode. Comprehension  Score = 6, Modified [place].  Patient comprehends complex/abstract  information in their primary language, requiring:  LISA Expression:  Vocal expression is the usual mode. Expression Score = 7,  Independent.  Patient expresses complex/abstract information in their primary  language.  Patient is completely independent for vocal expression.  There are no  activity limitations.  LISA Social Interaction:  Social Interaction Score = 7, Independent. Patient is  completely independent for social interaction.  There are no activity  limitations.  LISA Problem Solving:  Problem Solving Score = 7, Independent.  Patient makes  appropriate decisions in order to solve complex problems.  Patient is completely  independent for problem solving.  There are no activity limitations.  LISA Memory:  Memory Score = 7, Independent.  Patient is completely independent  for memory.  There are no activity limitations.    Therapy Mode Minutes  Occupational Therapy: [facility]  Physical Therapy: [place]  Speech Language Pathology:  [place]    Signed by: Nina Roberts RN

## 2019-04-17 NOTE — PROGRESS NOTES
Discharge Planning Assessment  [facility]     Patient Name: Hernandez Waterman  MRN: [id number]  Today's Date: 4/17/2019    Admit Date: 4/16/2019    Discharge Needs Assessment     Row Name 04/17/19 1521       Living Environment    Lives With  spouse    Name(s) of Who Lives With Patient  Mony Waterman  [phone]    Unique Family Situation  Adult son, Barry and 3 year old grandson live in [place].  Elderly parents live in an assisted living facility in [place]    Current Living Arrangements  home/apartment/condo    Primary Care Provided by  self    Provides Primary Care For  no one    Family Caregiver if Needed  spouse    Family Caregiver Names  Mony Waterman    Quality of Family Relationships  supportive;involved    Able to Return to Prior Arrangements  yes       Resource/Environmental Concerns    Resource/Environmental Concerns  home accessibility    Home Accessibility Concerns  stairs to enter home;stairs to access bedroom or bathroom    Transportation Concerns  car, none       Transition Planning    Patient/Family Anticipates Transition to  home with family    Patient/Family Anticipated Services at Transition  durable medical equipment;medical specialist;rehabilitation services    Transportation Anticipated  family or friend will provide       Discharge Needs Assessment    Readmission Within the Last 30 Days  no previous admission in last 30 days    Concerns to be Addressed  adjustment to diagnosis/illness;care coordination/care conferences;discharge planning;coping/stress    Equipment Currently Used at Home  none    Anticipated Changes Related to Illness  inability to work    Equipment Needed After Discharge  -- To be determined    Outpatient/Agency/Support Group Needs  -- To be determined    Discharge Facility/Level of Care Needs  other (see comments) Pt to receive radiation therapy        Discharge Plan     Row Name 04/17/19 3414       Plan    Plan  Home with wife    Patient/Family in Agreement with Plan   yes    Plan Comments  Assessment completed with pt and pt's wife. Explained rehab program and SW role. Pt lives with his wife in a quad level home 3-4 stpe to enter and 8 steps to bedrooms and full bath.   Pt has one son who lives in [place]  Pt works as a  at [employer]. and had expected to retire in about one year.    Prior to admission, pt functioned independently at home and in the community.  He denies depression, though his affect is flat.  Pt's wife is naturally upset and anxious about this situation, but feels she has good support from family and friends.  Pt's wife will be his primary caregiver at discharge.        Destination      No service coordination in this encounter.      Durable Medical Equipment      No service coordination in this encounter.      Dialysis/Infusion      No service coordination in this encounter.      Home Medical Care      No service coordination in this encounter.      Therapy      No service coordination in this encounter.      Community Resources      No service coordination in this encounter.          Demographic Summary     Row Name 04/17/19 1513       General Information    Admission Type  inpatient    Arrived From  hospital    Referral Source  hospital clinician/department    Reason for Consult  care coordination/care conference;discharge planning    Preferred Language  English     Used During This Interaction  no       Contact Information    Permission Granted to Share Info With  family/designee;, insurance        Functional Status     Row Name 04/17/19 1514       Functional Status    Usual Activity Tolerance  excellent    Current Activity Tolerance  good       Functional Status, IADL    Medications  independent    Meal Preparation  independent    Housekeeping  independent    Laundry  independent    Shopping  independent       Mental Status    General Appearance WDL  WDL       Mental Status Summary    Recent Changes in Mental  Status/Cognitive Functioning  no changes       Employment/    Employment Status  employed full time    Shift Worked  first shift    Employment/ Comments  [employer]         Psychosocial     Row Name 04/17/19 8070       Values/Beliefs    Spiritual, Cultural Beliefs, Confucianist Practices, Values that Affect Care  no    Values/Beliefs Comment  Pt attends [facility] [employer] [facility] [employer] and is in a Bible study group at [facility] [employer]       Behavior WDL    Behavior WDL  WDL       Emotion Mood WDL    Emotion/Mood/Affect WDL  affect    Affect  aloof;flat       Speech WDL    Speech WDL  WDL       Perceptual State WDL    Perceptual State WDL  WDL       Intellectual Performance WDL    Intellectual Performance WDL  WDL    Level of Consciousness  Alert       Coping/Stress    Major Change/Loss/Stressor  hospitalization;medical condition/diagnosis;significant life changes    Patient Personal Strengths  yamilka/spirituality;motivated;strong support system    Sources of Support  Jainism/[employer] organization;adult child(bailey);sibling(s);spouse    Techniques to [place] with Loss/Stress/Change  exercise;diversional activities;other (see comments) 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 Expects to meet with the oncologist on Thursday to discuss the molecular path results from [place] and discuss treatment options       Developmental Stage (Brandoniktyon's)    Developmental Stage  Stage 7 (35-65 years/Middle Adulthood) Generativity vs. Stagnation       C-SSRS (Recent)    Wish to be Dead  no    Suicidal Thoughts  no    Suicide Behavior  no       Violence Risk    Feels Like Hurting Others  no    Previous Attempt to Harm Others  no        Abuse/Neglect    No documentation.       Legal     No documentation.       Substance Abuse     Row Name 04/17/19 1521       Substance Use    Substance Use Comment  occassional alcohol use, no tobacco        Patient Forms    No documentation.           Lyly Rollins

## 2019-04-17 NOTE — PROGRESS NOTES
Inpatient Rehabilitation Functional Measures Assessment and Plan of Care    Plan of Care  Updated Problems/Interventions  Mobility    [PT] Bed/Chair/Wheelchair(Active)  Current Status(04/17/2019): CGA  Weekly Goal(04/19/2019): SBA  Discharge Goal: Supervision    [PT] Stairs(Active)  Current Status(04/17/2019): 12 steps, 2 HR, CGA  Weekly Goal(04/19/2019): PT only  Discharge Goal: 12 steps, 1 HR, Supervision    [PT] Walk(Active)  Current Status(04/17/2019): 160` CGA  Weekly Goal(04/19/2019): to and from BR, SBA  Discharge Goal: 200`Supervision    Functional Measures  LISA Eating:  [facility] Grooming: [facility] Bathing:  [facility] Upper Body Dressing:  [facility] Lower Body Dressing:  [facility] Toileting:  [facility] Bladder Management  Level of Assistance:  [place]  Frequency/Number of Accidents this Shift:  [facility] Bowel Management  Level of Assistance: [place]  Frequency/Number of Accidents this Shift: [facility] Bed/Chair/Wheelchair Transfer:  Bed/chair/wheelchair Transfer Score = 4.  Patient performs 75% or more of effort and minimal assistance (little/incidental  help/lifting of one limb/steadying) for transferring to and from the  bed/chair/wheelchair, requiring: Contact guard. No assistive devices were  required.  [facility] Toilet Transfer:  [facility] Tub/Shower Transfer:  [place]    Previously Documented Mode of Locomotion at Discharge: Field  LISA Expected Mode of Locomotion at Discharge: [facility] Walk/Wheelchair:  WHEELCHAIR OBSERVATION   Activity was not observed.    WALK OBSERVATION   Walk Distance Scale = 3.  Distance walked is greater than 150 feet. Walk Score  = 4.  Patient performs 75% or more of effort and requires minimal assistance.  Incidental help/contact guard/steadying was provided. Patient walked a distance  of  160 feet. No assistive devices were required.  LISA Stairs:  Stairs Score = 4.  Incidental help/contact guard/steadying was  provided. Patient performs 75% or more of  effort and requires minimal  assistance. Patient negotiated 12 stairs. Patient requires the following  assistive device(s): Handrail(s).    LISA Comprehension:  [place]  LISA Expression:  [place]  LISA Social Interaction:  [facility] Problem Solving:  [facility] Memory:  [place]    Therapy Mode Minutes  Occupational Therapy: [place]  Physical Therapy: Individual: 60 minutes.  Speech Language Pathology:  [place]    Signed by: Jemma Llanos PT

## 2019-04-17 NOTE — PROGRESS NOTES
LOS: 1 day   Patient Care Team:  Megha Gant MD as PCP - General (Internal Medicine)  Keshawn Brower MD as Surgeon (Neurosurgery)  Beata Porter APRN as Referring Physician (Neurosurgery)    Chief Complaint: same    Subjective     History of Present Illness    Subjective Pt is awake and alert. No new issues. Pt is stble and ready to begin an acute rehab program.     History taken from: patient    Objective     Vital Signs  Temp:  [97 °F (36.1 °C)-97.7 °F (36.5 °C)] 97.3 °F (36.3 °C)  Heart Rate:  [49-66] 66  Resp:  [16-18] 16  BP: (103-113)/(63-69) 103/65    Objective exam as in H&P dictated today    Results Review:     I reviewed the patient's new clinical results.    Medication Review:     Assessment/Plan       * No active hospital problems. *      Assessment & PlanPt is medically stable and ready to begin rehab program.     Felipe Demarco MD  04/17/19  2:26 PM    Time:

## 2019-04-17 NOTE — H&P
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     HISTORY OF PRESENT ILLNESS: Patient presented with new headaches and mental status changes and a history of recent falls. Patient did participate in therapy on the acute care setting. He did have some loss of balance and poor safety judgment. He was contact guard with ADLs with verbal cues.     PAST MEDICAL/SURGICAL HISTORY: Otherwise unremarkable.     ALLERGIES: SULFA MEDICATION.     FAMILY HISTORY: No diseases or conditions.     REVIEW OF SYSTEMS: Patient denies headaches. He had had a recent dull headache but appears to be abating since surgery. He denies any vision changes. His swallow is intact. His hearing is intact. He is continent of bowel and bladder. His appetite is good. Sleep quality is fair. He denies any GERD symptoms. He has no open sores or rashes. His incision is well approximated and healing well.     SOCIAL HISTORY: Patient lives locally with his spouse who is in good health and available to assist. 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 He denies alcohol and tobacco use.     PHYSICAL EXAMINATION:   GENERAL: Patient is an awake, alert, pleasant, well-oriented, normally-developed white male who is in no acute distress.   HEENT: Unremarkable except for the right parietal incision which is well approximated without drainage.   NECK: Benign.  LUNGS: Clear to auscultation.   HEART: Regular rate and rhythm without murmur or gallop.   ABDOMEN: Nontender, nondistended. Bowel sounds apparent.   NEUROMUSCULOSKELETAL: Patient is able to move all 4 extremities. He has functional strength and range of motion in all planes, both arms and legs. No peripheral edema is appreciated. Light touch sensation is grossly intact bilaterally.     ASSESSMENT/PLAN: Patient is medically stable and ready to begin what appears to be a brief acute level rehab  program where he will receive at least 3 hours of therapy 5 days a week initially consisting of traditional PT/OT services, psychology for supportive services and cognitive evaluation, speech therapy as well. Patient will be assessed for and provided with necessary adaptive equipment. His wife will be invited to participate in caregiver training. He will be discussed in a team-type setting once weekly where obstacles are identified and plans made for discharge home. Estimated length of stay 2-4 days with plan being he will return home with his wife providing care as needed.

## 2019-04-17 NOTE — PLAN OF CARE
Problem: Patient Care Overview  Goal: Plan of Care Review  Outcome: Ongoing (interventions implemented as appropriate)   04/17/19 0113   Patient Care Overview   IRF Plan of Care Review progress ongoing, continue   Coping/Psychosocial   Plan of Care Reviewed With patient   OTHER   Outcome Summary Cognitive-lingusitic eval completed. Pt scored WNL on all domains (attention, memory, executive function, language, and visuospatial skills) of CLQT. ST to complete diagnostic therapy for high level reasoning/problem solving and finance management.

## 2019-04-17 NOTE — THERAPY EVALUATION
Inpatient Rehabilitation - Speech Language Pathology Initial Evaluation  [facility]     Patient Name: Hernandez Waterman  : 1955  MRN: [id number]  Today's Date: 2019               Admit Date: 2019     Visit Dx:  No diagnosis found.  Patient Active Problem List   Diagnosis   • Pure hypercholesterolemia   • Testicular hypofunction   • ED (erectile dysfunction)   • Frequency of urination   • Other hyperlipidemia   • Nocturia   • Primary insomnia   • Chronic fatigue   • Night sweat   • Acute intractable headache   • 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 (CMS/HCC)   • Cerebral edema (CMS/HCC)   • GBM (glioblastoma multiforme) (CMS/HCC)   • Leukemoid reaction   • Anemia   • Constipation   • Glioblastoma multiforme (CMS/HCC)     Past Medical History:   Diagnosis Date   • ED (erectile dysfunction)    • Glioblastoma (CMS/HCC)      Past Surgical History:   Procedure Laterality Date   • CRANIOTOMY FOR TUMOR Right 2019    Procedure: RIGHT SIDE CRANIOTOMY FOR TUMOR REMOVAL AND  BIOPSY  WITH SIDNEY NAVIGATTION AND PARTIAL RIGHT FRONTAL LOBE LOBECTOMY;  Surgeon: Keshawn Brower MD;  Location: [facility];  Service: Neurosurgery   • CRANIOTOMY FOR TUMOR  2019        SLP EVALUATION (last 72 hours)      SLP SLC Evaluation     Row Name 19 1130          Document Type  evaluation  -OC    Subjective Information  no complaints  -OC    Patient Observations  alert;cooperative;agree to therapy  -OC    Patient Effort  good  -OC    Symptoms Noted During/After Treatment  none  -OC          Patient Profile Reviewed  yes  -OC    Pertinent History Of Current Problem  craniotomy for glioblastoma multiforme resection   -OC    Precautions/Limitations, Vision  WFL with corrective lenses  -OC    Precautions/Limitations, Hearing  WFL  -OC    Prior Level of Function-Communication  WFL  -OC    Plans/Goals Discussed with  patient;spouse/S.O.;agreed upon  -OC    Barriers to Rehab  medically  complex  -OC    Patient's Goals for Discharge  patient did not state  -OC    Family Goals for Discharge  family did not state  -OC    Standardized Assessment Used  CLQT  -OC          Pain Scale: Numbers, Pretreatment  0/10 - no pain  -OC          Pain: Word Scale, Pretreatment  0 - no pain  -OC    Pain: Word Scale, Post-Treatment  0 - no pain  -OC          Orientation Status (Cognition)  WFL  -OC    Memory (Cognitive)  WFL  -OC    Attention (Cognitive)  WFL  -OC    Thought Organization (Cognitive)  mild impairment  -OC    Reasoning (Cognitive)  WFL;other (see comments) further assessment higher level fxn  -OC    Problem Solving (Cognitive)  WFL;other (see comments) diagnostic tx for higher level function  -OC    Functional Math (Cognitive)  WFL  -OC    Executive Function (Cognition)  other (see comments) recommend diagnotic therapy for higher level function  -OC          Cognitive/Memory Tests  CLQT: Cognitive Linguistic Quick Test  -OC          Attention Domain Score  203  -OC    Attention Severity Rating  4: WNL  -OC    Memory Domain Score  162  -OC    Memory Severity Rating  4: WNL  -OC    Executive Function Domain Score  29  -OC    Executive Function Severity Rating  4: WNL  -OC    Language Domain Score  29  -OC    Language Severity Rating  4: WNL  -OC    Visuospatial Domain Score  99  -OC    Visuospatial Severity Rating  4: WNL  -OC    Clock Drawing Total Score  12  -OC    Clock Drawing Severity Rating  WNL  -OC    Composite Severity Rating  4  -OC    Composite Severity Rating Range  4.0 - 3.5: WNL  -OC    CLQT Comments  Pt aware of errors and able to self correct throughout eval.   -OC          SLP Diagnosis  Pt scored WFL on CLQT and informal measures. ST administered informal cognitive-linguistic assessment.  Results were as follows:  orientation 100% acc, immediate verbal [id number]% acc,  item exclusion 90%, sequencing 100%, auditory processing 90%, problem solving 100%, word problems 100%, reasoning of  similarities and differences 100%.  Recommend further diagnostic therapy for higher level function.  -OC    Rehab Potential/Prognosis  good  -OC    SLC Criteria for Skilled Therapy Interventions Met  yes  -OC    Functional Impact  difficulty completing vocational tasks;difficulty completing home management task  -OC          Therapy Frequency (SLP SLC)  5 days per week  -OC    Predicted Duration Therapy Intervention (Days)  until discharge  -OC    Anticipated Dischage Disposition  home with assist  -OC          Reasoning Selection  reasoning, SLP goal 1  -OC    Problem Solving Selection  problem solving, SLP goal 1  -OC    Functional Math Skills Selection  functional math skills, SLP goal 1  -OC    Executive Function Skills Selection  executive function skills, SLP goal 1;executive function skills, SLP goal 2  -OC          Improve Reasoning Through Goal 1 (SLP)  complete high level reasoning task;complete deductive reasoning task;complete mental flexibility task;complete logic/creative thinking task;90%;independently (over 90% accuracy)  -OC    Time Frame (Reasoning Goal 1, SLP)  by discharge  -OC          Improve Problem Solving Through Goal 1 (SLP)  determine solutions to complex problems;determine solutions to multifactorial problems;determine multiple solutions to problems;complete organization/home management task;90%;independently (over 90% accuracy)  -OC    Time Frame (Problem Solving Goal 1, SLP)  by discharge  -OC          Improve Problem Solving Through Goal 2 (SLP)  determine solutions to complex problems;determine solutions to multifactorial problems;determine multiple solutions to problems;complete organization/home management task;90%;independently (over 90% accuracy)  -OC    Time Frame (Problem Solving Goal 2, SLP)  by discharge  -OC          Improve Functional Math Skills Through Goal 1 (SLP)  complete word problems involving money;complete word problems involving time;complete functional math  task;90%;independently (over 90% accuracy)  -OC    Time Frame (Functional Math Skills Goal 1, SLP)  by discharge  -OC          Improve Executive Function Skills Goal 1 (SLP)  demonstrate awareness of deficit;organization/planning activity;time management activity;complex organization/planning activity;realistic goal setting;90%;independently (over 90% accuracy)  -OC          Improve Executive Function Skills Goal 2 (SLP)  home management activity;exhibit cognitive flexibility;perform self-correction;perform self-evaluation;90%;independently (over 90% accuracy)  -OC      User Key  (r) = Recorded By, (t) = Taken By, (c) = Cosigned By    Initials Name Effective Dates    OC Angelic, LUMA Kruse,CCC-SLP 06/08/18 -              EDUCATION  The patient has been educated in the following areas:     Cognitive Impairment Dysphagia (Swallowing Impairment).    SLP Recommendation and Plan  SLP Diagnosis: Pt scored WFL on CLQT and informal measures. ST administered informal cognitive-linguistic assessment.  Results were as follows:  orientation 100% acc, immediate verbal [id number]% acc,  item exclusion 90%, sequencing 100%, auditory processing 90%, problem solving 100%, word problems 100%, reasoning of similarities and differences 100%.  Recommend further diagnostic therapy for higher level function.     SLC Criteria for Skilled Therapy Interventions Met: yes  SLP Diagnosis: Pt scored WFL on CLQT and informal measures. ST administered informal cognitive-linguistic assessment.  Results were as follows:  orientation 100% acc, immediate verbal [id number]% acc,  item exclusion 90%, sequencing 100%, auditory processing 90%, problem solving 100%, word problems 100%, reasoning of similarities and differences 100%.  Recommend further diagnostic therapy for higher level function.  Anticipated Dischage Disposition: home with assist     Predicted Duration Therapy Intervention (Days): until discharge    Plan of Care Reviewed With: patient  Plan of  Care Review  Plan of Care Reviewed With: patient      SLP GOALS     Row Name 04/17/19 1130             Reasoning Goal 1 (SLP)    Improve Reasoning Through Goal 1 (SLP)  complete high level reasoning task;complete deductive reasoning task;complete mental flexibility task;complete logic/creative thinking task;90%;independently (over 90% accuracy)  -OC      Time Frame (Reasoning Goal 1, SLP)  by discharge  -OC         Functional Problem Solving Skills Goal 1 (SLP)    Improve Problem Solving Through Goal 1 (SLP)  determine solutions to complex problems;determine solutions to multifactorial problems;determine multiple solutions to problems;complete organization/home management task;90%;independently (over 90% accuracy)  -OC      Time Frame (Problem Solving Goal 1, SLP)  by discharge  -OC         Functional Problem Solving Skills Goal 2 (SLP)    Improve Problem Solving Through Goal 2 (SLP)  determine solutions to complex problems;determine solutions to multifactorial problems;determine multiple solutions to problems;complete organization/home management task;90%;independently (over 90% accuracy)  -OC      Time Frame (Problem Solving Goal 2, SLP)  by discharge  -OC         Functional Math Skills Goal 1 (SLP)    Improve Functional Math Skills Through Goal 1 (SLP)  complete word problems involving money;complete word problems involving time;complete functional math task;90%;independently (over 90% accuracy)  -OC      Time Frame (Functional Math Skills Goal 1, SLP)  by discharge  -OC         Executive Functional Skills Goal 1 (SLP)    Improve Executive Function Skills Goal 1 (SLP)  demonstrate awareness of deficit;organization/planning activity;time management activity;complex organization/planning activity;realistic goal setting;90%;independently (over 90% accuracy)  -OC         Executive Functional Skills Goal 2 (SLP)    Improve Executive Function Skills Goal 2 (SLP)  home management activity;exhibit cognitive  flexibility;perform self-correction;perform self-evaluation;90%;independently (over 90% accuracy)  -OC        User Key  (r) = Recorded By, (t) = Taken By, (c) = Cosigned By    Initials Name Provider Type    Aixa Sands MA,[facility]-SLP Speech and Language Pathologist             SLP Outcome Measures (last 72 hours)      SLP Outcome Measures     Row Name 04/17/19 1130             SLP Outcome Measures    Outcome Measure Used?  Adult NOMS  -OC         Adult FCM Scores    FCM Chosen  Problem Solving  -OC      Problem Solving Score FCM  6  -OC        User Key  (r) = Recorded By, (t) = Taken By, (c) = Cosigned By    Initials Name Effective Dates    OC Aixa Atwood MA, CCC-SLP 06/08/18 -               Time Calculation:     Time Calculation- SLP     Row Name 04/17/19 1624 04/17/19 1623 04/17/19 1000       Time Calculation- SLP    SLP Start Time  1500  -OC  1100  -OC  0930  -OC    SLP Stop Time  1600  -OC  1130  -OC  1000  -OC    SLP Time Calculation (min)  60 min  -OC  30 min  -OC  30 min  -OC      User Key  (r) = Recorded By, (t) = Taken By, (c) = Cosigned By    Initials Name Provider Type    Aixa Sands MA,[facility]-SLP Speech and Language Pathologist          Therapy Charges for Today     Code Description Service Date Service Provider Modifiers Qty    [id number]  ST STD COG PERF TEST PER HOUR 4/17/2019 Aixa Atwood MA, CCC-SLP GN 2             ADULT NOMS (last 72 hours)      Adult NOMS     Row Name 04/17/19 1130                   Adult FCM Scores    FCM Chosen  Problem Solving  -OC        Problem Solving Score FCM  6  -OC          User Key  (r) = Recorded By, (t) = Taken By, (c) = Cosigned By    Initials Name Effective Dates    Aixa Sands MA, CCC-SLP 06/08/18 -                  Aixa Atwood MA, CCC-SLP  4/17/2019

## 2019-04-17 NOTE — PLAN OF CARE
Problem: Fall Risk (Adult)  Goal: Absence of Fall  Outcome: Ongoing (interventions implemented as appropriate)  Safety measures enforced

## 2019-04-17 NOTE — PROGRESS NOTES
SECTION GG    Mobility Performance:     Roll Left and Right: [place] provides verbal cues and/or touching/steadying  and/or contact guard assistance as patient completes activity. Assistance may be  provided throughout the activity or intermittently.   Sit to Lying: [place] provides verbal cues and/or touching/steadying and/or  contact guard assistance as patient completes activity. Assistance may be  provided throughout the activity or intermittently.   Lying to Sitting on Side of Bed: [place] provides verbal cues and/or  touching/steadying and/or contact guard assistance as patient completes  activity. Assistance may be provided throughout the activity or intermittently.   Sit to Stand: [place] provides verbal cues and/or touching/steadying and/or  contact guard assistance as patient completes activity. Assistance may be  provided throughout the activity or intermittently.   Chair/Bed to Chair Transfer: [place] provides verbal cues and/or  touching/steadying and/or contact guard assistance as patient completes  activity. Assistance may be provided throughout the activity or intermittently.   Car Transfer: [place] provides verbal cues and/or touching/steadying and/or  contact guard assistance as patient completes activity. Assistance may be  provided throughout the activity or intermittently.   Walk 10 Feet:   [place] provides verbal cues and/or touching/steadying and/or  contact guard assistance as patient completes activity. Assistance may be  provided throughout the activity or intermittently.  Walk 50 Feet with 2 Turns:   [place] provides verbal cues and/or  touching/steadying and/or contact guard assistance as patient completes  activity. Assistance may be provided throughout the activity or intermittently.  Walk 150 Feet:   [place] provides verbal cues and/or touching/steadying and/or  contact guard assistance as patient completes activity. Assistance may be  provided throughout the activity or  intermittently.  Walking 10 Feet on Uneven Surfaces:   [place] does less than half the effort.  [place] lifts, holds or supports trunk or limbs but provides less than half the  effort.  1 Step Over Curb or Up/Down Stair:   [place] provides verbal cues and/or  touching/steadying and/or contact guard assistance as patient completes  activity. Assistance may be provided throughout the activity or intermittently.  4 Steps Up and Down, With/Without Rail:   [place] provides verbal cues and/or  touching/steadying and/or contact guard assistance as patient completes  activity. Assistance may be provided throughout the activity or intermittently.  12 Steps Up and Down, With/Without Rail:   [place] provides verbal cues and/or  touching/steadying and/or contact guard assistance as patient completes  activity. Assistance may be provided throughout the activity or intermittently.  Picking up an Object:   Not attempted due to medical or safety concerns.  Uses Wheelchair/Scooter: No    Mobility Discharge Goals:   Chair/Bed to Chair Transfer: [place] provides verbal cues or touching/steadying  assistance as patient completes activity.    Signed by: Jemma Llanos, PT

## 2019-04-17 NOTE — THERAPY EVALUATION
Inpatient Rehabilitation - Physical Therapy Initial Evaluation       [facility]     Patient Name: Hernandez Waterman  : 1955  MRN: [id number]    Today's Date: 2019                    Admit Date: 2019      Visit Dx: No diagnosis found.    Patient Active Problem List   Diagnosis   • Pure hypercholesterolemia   • Testicular hypofunction   • ED (erectile dysfunction)   • Frequency of urination   • Other hyperlipidemia   • Nocturia   • Primary insomnia   • Chronic fatigue   • Night sweat   • Acute intractable headache   • 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 (CMS/HCC)   • Cerebral edema (CMS/HCC)   • GBM (glioblastoma multiforme) (CMS/HCC)   • Leukemoid reaction   • Anemia   • Constipation   • Glioblastoma multiforme (CMS/HCC)       Past Medical History:   Diagnosis Date   • ED (erectile dysfunction)    • Glioblastoma (CMS/HCC)        Past Surgical History:   Procedure Laterality Date   • CRANIOTOMY FOR TUMOR Right 2019    Procedure: RIGHT SIDE CRANIOTOMY FOR TUMOR REMOVAL AND  BIOPSY  WITH SIDNEY NAVIGATTION AND PARTIAL RIGHT FRONTAL LOBE LOBECTOMY;  Surgeon: Keshawn Brower MD;  Location: [facility];  Service: Neurosurgery   • CRANIOTOMY FOR TUMOR  2019          PT ASSESSMENT (last 72 hours)      IRF Physical Therapy Evaluation     Row Name 19 0806          Evaluation/Treatment Time and Intent    Subjective Information  no complaints  -MD     Existing Precautions/Restrictions  fall  -MD     Document Type  evaluation  -MD     Mode of Treatment  physical therapy  -MD     Patient/Family Observations  Pt supine in bed showing no signs of acute distress.  -MD     Row Name 19 0806          Living Environment    Home Accessibility  stairs to enter home;stairs within home  -MD     Row Name 19 0806          Home Main Entrance    Number of Stairs, Main Entrance  three  -MD     Stair Railings, Main Entrance  none  -MD     Row Name 19 0806           Stairs Within Home, Primary    Stairs, Within Home, Primary  tri leel home, BR on 2nd floor  -MD     Number of Stairs, Within Home, Primary  six  -MD     Stair Railings, Within Home, Primary  railings safe and in good condition  -MD     Row Name 04/17/19 0806          Cognition/Psychosocial- PT/OT    Orientation Status (Cognition)  oriented x 3  -MD     Follows Commands (Cognition)  WFL  -MD     Personal Safety Interventions  fall prevention program maintained  -MD     Row Name 04/17/19 0806          Mobility    Advanced Gait Activity  curb negotiation;rough/uneven surfaces  -MD     Additional Documentation  Advanced Gait Activity (Row)  -MD     Row Name 04/17/19 0806          Bed Mobility Assessment/Treatment    Bed Mobility Assessment/Treatment  rolling left;rolling right;supine-sit;sit-supine  -MD     Rolling Left [place] (Bed Mobility)  supervision  -MD     Rolling Right [place] (Bed Mobility)  supervision  -MD     Supine-Sit [place] (Bed Mobility)  supervision  -MD     Row Name 04/17/19 0806          Transfer Assessment/Treatment    Transfer Assessment/Treatment  car transfer  -MD     Row Name 04/17/19 0806          Bed-Chair Transfer    Bed-Chair [place] (Transfers)  verbal cues;minimum assist (75% patient effort)  -MD     Row Name 04/17/19 0806          Sit-Stand Transfer    Sit-Stand [place] (Transfers)  verbal cues;minimum assist (75% patient effort)  -MD     Row Name 04/17/19 0806          Stand-Sit Transfer    Stand-Sit [place] (Transfers)  verbal cues;minimum assist (75% patient effort)  -MD     Row Name 04/17/19 0806          Car Transfer    [place] Level (Car Transfer)  verbal cues;contact guard  -MD     Row Name 04/17/19 0806          Gait/Stairs Assessment/Training    [place] Level (Gait)  verbal cues;contact guard  -MD     Distance in Feet (Gait)  160  -MD     Pattern (Gait)  step-through  -MD     Deviations/Abnormal Patterns (Gait)  esdras decreased;gait  speed decreased;stride length decreased  -MD     [place] Level (Stairs)  contact guard  -MD     Handrail Location (Stairs)  both sides  -MD     Number of Steps (Stairs)  4  -MD     Ascending Technique (Stairs)  step-over-step  -MD     Descending Technique (Stairs)  step-over-step  -MD     Comment (Gait/Stairs)  12 steps in pm w 2 HR and CGA  -MD     Row Name 04/17/19 0806          Curb Negotiation (Mobility)    [place], Curb Negotiation  contact guard  -MD     Row Name 04/17/19 0806          Rough/Uneven Surface Gait Skills (Mobility)    [place], Gait on Rough/Uneven Surface (Mobility)  minimal assist, 75% or more patient effort  -MD     Comment, Gait Rough/Uneven Surface (Mobility)  20`   -MD     Row Name 04/17/19 0806          General ROM    GENERAL ROM COMMENTS  B LE AROM WFL  -MD     Row Name 04/17/19 0806          MMT (Manual Muscle Testing)    Rt Lower Ext  Rt Hip Flexion;Rt Knee Extension;Rt Knee Flexion;Rt Ankle Dorsiflexion  -MD     Lt Lower Ext  Lt Hip Flexion;Lt Knee Extension;Lt Knee Flexion;Lt Ankle Dorsiflexion  -MD     Row Name 04/17/19 0806          MMT Right Lower Ext    Rt Hip Flexion MMT, Gross Movement  (4/5) good  -MD     Rt Knee Extension MMT, Gross Movement  (5/5) normal  -MD     Rt Knee Flexion MMT, Gross Movement  (5/5) normal  -MD     Rt Ankle Dorsiflexion MMT, Gross Movement  (3/5) fair  -MD     Row Name 04/17/19 0806          MMT Left Lower Ext    Lt Hip Flexion MMT, Gross Movement  (4-/5) good minus  -MD     Lt Knee Extension MMT, Gross Movement  (5/5) normal  -MD     Lt Knee Flexion MMT, Gross Movement  (5/5) normal  -MD     Lt Ankle Dorsiflexion MMT, Gross Movement  (3/5) fair  -MD     Row Name 04/17/19 0806          Pain Scale: Numbers Pre/Post-Treatment    Pain Scale: Numbers, Pretreatment  0/10 - no pain  -MD     Row Name 04/17/19 0806          Balance    Balance  dynamic balance activity  -MD     Additional Documentation  Balance (Row)  -MD     Row Name 04/17/19 0806           Dynamic Balance Activity    Therapeutic Training Performed (Dynamic Balance)  backward walking;side stepping  -MD     Support Needed for Balance (Dynamic Balance Training)  CGA  -MD     Row Name             Wound 04/07/19 1233 Right head incision    Wound - Properties Group Date first assessed: 04/07/19  -LD Time first assessed: 1233  -LD Side: Right  -LD Location: head  -LD Type: incision  -LD    Row Name 04/17/19 0806          PT Clinical Impression    General Observations of Patient  Pt presents s/p craniotomy with tumor resection.  Upon PT evaluation pt presents w impaired dynamic standing balance and L LE weakness specifically hip weakness.  Pt is currently requiring assist x1 for safety w mobility.  Prior to admission pt was independent w mobility and working full time as a  for [employer].  Due to the above pt will benifit from skilled PT to address mobility issues and increase pt safety.  -MD     Rehab Potential/Prognosis (PT Eval)  good, to achieve stated therapy goals  -MD     Frequency of Treatment (PT Eval)  5 times per week;60 minutes per session  -MD     Estimated Length of Stay, Weeks (PT Eval)  1 week  -MD     Expected Discharge Disposition (PT Eval)  home or self care outpt PT  -MD     Row Name 04/17/19 0806          IRF PT Goals    Bed Mobility Goal Selection (PT-IRF)  bed mobility, PT goal 1  -MD     Transfer Goal Selection (PT-IRF)  transfers, PT goal 1;transfers, PT goal 2  -MD     Gait (Walking Locomotion) Goal Selection (PT-IRF)  gait, PT goal 1  -MD     Stairs Goal Selection (PT-IRF)  stairs, PT goal 1  -MD     Row Name 04/17/19 0806          Bed Mobility Goal 1 (PT-IRF)    Activity/Assistive Device (Bed Mobility Goal 1, PT-IRF)  bed mobility activities, all  -MD     [place] Level (Bed Mobility Goal 1, PT-IRF)  independent  -MD     Time Frame (Bed Mobility Goal 1, PT-IRF)  1 week  -MD     Row Name 04/17/19 0806          Transfer Goal 1 (PT-IRF)    Activity/Assistive Device  (Transfer Goal 1, PT-IRF)  sit-to-stand/stand-to-sit  -MD     [place] Level (Transfer Goal 1, PT-IRF)  supervision required  -MD     Time Frame (Transfer Goal 1, PT-IRF)  1 week  -MD     Row Name 04/17/19 0806          Transfer Goal 2 (PT-IRF)    Activity/Assistive Device (Transfer Goal 2, PT-IRF)  car transfer  -MD     [place] Level (Transfer Goal 2, PT-IRF)  supervision required  -MD     Time Frame (Transfer Goal 2, PT-IRF)  1 week  -MD     Row Name 04/17/19 0806          Gait/Walking Locomotion Goal 1 (PT-IRF)    Activity/Assistive Device (Gait/Walking Locomotion Goal 1, PT-IRF)  gait (walking locomotion)  -MD     Gait/Walking Locomotion Distance Goal 1 (PT-IRF)  200  -MD     [place] Level (Gait/Walking Locomotion Goal 1, PT-IRF)  supervision required  -MD     Time Frame (Gait/Walking Locomotion Goal 1, PT-IRF)  1 week  -MD     Row Name 04/17/19 0806          Stairs Goal 1 (PT-IRF)    Activity/Assistive Device (Stairs Goal 1, PT-IRF)  stairs, all skills  -MD     Number of Stairs (Stairs Goal 1, PT-IRF)  12  -MD     [place] Level (Stairs Goal 1, PT-IRF)  supervision required  -MD     Time Frame (Stairs Goal 1, PT-IRF)  1 week  -MD     Row Name 04/17/19 0806          Positioning and Restraints    Pre-Treatment Position  in bed  -MD     Post Treatment Position  bed  -MD     In Bed  sitting EOB;call light within reach;with family/caregiver  -MD       User Key  (r) = Recorded By, (t) = Taken By, (c) = Cosigned By    Initials Name Provider Type    Jemma Vasquez, PT Physical Therapist    Monet Puag, RN Registered Nurse          Physical Therapy Education     Title: PT OT SLP Therapies (In Progress)     Topic: Physical Therapy (In Progress)     Point: Precautions (Done)     Learning Progress Summary           Patient Acceptance, E, VU by MD at 4/17/2019  8:18 AM                               User Key     Initials Effective Dates Name Provider Type Wilbert MEDLEY 04/03/18 -  Jemma Llanos, PT  Physical Therapist PT                PT Recommendation and Plan    Frequency of Treatment (PT Eval): 5 times per week, 60 minutes per session            Outcome Measures     Row Name 04/16/19 1400 04/15/19 1600 04/15/19 1335       How much difficulty does the patient currently have...    Turning from your back to your side while in flat bed without using bedrails?  4  -JM  4  -JM  --    Standing up from a chair using your arms (e.g., wheelchair, bedside chair)?  3  -JM  3  -JM  --    Moving from lying on back to sitting on the side of a flat bed without bedrails?  4  -JM  4  -JM  --       How much help from another person do you currently need...    Moving to and from a bed to a chair (including a wheelchair)?  3  -JM  3  -JM  --    Climbing 3-5 steps with a railing?  3  -JM  3  -JM  --    To walk in hospital room?  3  -JM  3  -JM  --    AM-PAC 6 Clicks Score  20  -JM  20  -JM  --       How much help from another is currently needed...    Putting on and taking off regular lower body clothing?  --  --  3  -SG    Bathing (including washing, rinsing, and drying)  --  --  3  -SG    Toileting (which includes using toilet bed pan or urinal)  --  --  3  -SG    Putting on and taking off regular upper body clothing  --  --  3  -SG    Taking care of personal grooming (such as brushing teeth)  --  --  3  -SG    Eating meals  --  --  3  -SG    Score  --  --  18  -SG      User Key  (r) = Recorded By, (t) = Taken By, (c) = Cosigned By    Initials Name Provider Type    Ciera Rubi OTR Occupational Therapist    Chelo Andrews, PRE[place] Physical Therapy Assistant               Time Calculation:     PT Charges     Row Name 04/17/19 1444 04/17/19 1137          Time Calculation    Start Time  1430  -MD  0800  -MD     Stop Time  1500  -MD  0830  -MD     Time Calculation (min)  30 min  -MD  30 min  -MD     PT Received On  --  04/17/19  -MD     PT - Next Appointment  --  04/18/19  -MD     PT Goal Re-Cert Due Date  --  04/24/19   -MD       User Key  (r) = Recorded By, (t) = Taken By, (c) = Cosigned By    Initials Name Provider Type    Jemma Vasquez, PT Physical Therapist          Therapy Charges for Today     Code Description Service Date Service Provider Modifiers Qty    40225185088  PT EVAL LOW COMPLEXITY 2 4/17/2019 Jemma Llanos, PT GP 1    [id number]  PT THER PROC EA 15 MIN 4/17/2019 Jemma Llanos, PT GP 3                   Jemma Llanos, PT  4/17/2019

## 2019-04-17 NOTE — CONSULTS
"Adult Nutrition  Assessment/PES    Patient Name:  Hernandez Waterman  YOB: 1955  MRN: [id number]  Admit Date:  4/16/2019    Assessment Date:  4/17/2019    Comments:  Rehab assessment complete. Met with pt & wife. He reports good appetite at present, steroids have ^ hunger. Weight down about 7 lb since his surgery. We discussed menu write-ins, food prefs; wife with questions about diet/cancer. Going to see if oncology RD has anything specific to his cancer dx, but we discussed importance of maintaining weight, continuing to eat healthy (^fruits, veg, lean proteins), well-cooked foods & avoiding raw/undercooked items to avoid food-borne illness. She was asking about high calorie shakes with eggs - advised her not to use raw eggs, but gave alternative options for high heydi/pro smoothies. Will continue to follow.     Reason for Assessment     Row Name 04/17/19 1056          Reason for Assessment    Reason For Assessment  nurse/nurse practitioner consult     Diagnosis  neurologic conditions;cardiac disease;hematological/related complications GBM (glioblastoma multiforme), s/p craniotomy     Identified At Risk by Screening Criteria  no indicators present         Nutrition/Diet History     Row Name 04/17/19 1057          Nutrition/Diet History    Typical Food/Fluid Intake  Steroids have made him hungry; appetite fairly good. We discussed possible SE of chemo/XRT. Provided them with Berenice Leiva's name (onc RD) as resource once OP.      Food Preferences  Wife/pt report they eat very healthy (^ fruits, vegetables, lean proteins, organic). She asked about need to avoid \"sugars\" since he has ca.     Factors Affecting Nutritional Intake  --         Anthropometrics     Row Name 04/17/19 1113          Admit Weight    Admit Weight  66.2 kg (146 lb)        Usual Body Weight (UBW)    Usual Body Weight  69.4 kg (153 lb) per pt     Weight Loss  unintentional     Weight Loss Time Frame  7 lb since surgery        Body Mass " Index (BMI)    BMI Assessment  BMI 18.5-24.9: normal         Labs/Tests/Procedures/Meds     Row Name 04/17/19 1058          Labs/Procedures/Meds    Lab Results Reviewed  reviewed     Lab Results Comments  Glu         Diagnostic Tests/Procedures    Diagnostic Test/Procedure Reviewed  reviewed, pertinent     Diagnostic Test/Procedures Comments  large right frontal lobe mass with vasogenic edema and midline shift; s/p crani; appointment with radiation oncology for CT simulation and treatment planning on April 22 Dr. Philippe        Medications    Pertinent Medications Reviewed  reviewed     Pertinent Medications Comments  steroid, ppi, keppra, miralax         Physical Findings     Row Name 04/17/19 1059          Physical Findings    Overall Physical Appearance  other (see comments) thin     Skin  other (see comments) R head incision         Estimated/Assessed Needs     Row Name 04/17/19 1114          Calculation Measurements    Weight Used For Calculations  66.5 kg (146 lb 9.7 oz)        Estimated/Assessed Needs    Additional Documentation  Protein Requirements (Group);KCAL/KG (Group)        KCAL/KG    14 Kcal/Kg (kcal)  931     15 Kcal/Kg (kcal)  997.5     18 Kcal/Kg (kcal)  1197     20 Kcal/Kg (kcal)  1330     25 Kcal/Kg (kcal)  1662.5     30 Kcal/Kg (kcal)  1995     35 Kcal/Kg (kcal)  2327.5     40 Kcal/Kg (kcal)  2660     45 Kcal/Kg (kcal)  2992.5     50 Kcal/Kg (kcal)  3325     kcal/kg (Specify)  30-35        Gregg-St. Jeor Equation    RMR (Gregg-St. Jeor Equation)  1508.88        Protein Requirements    Est Protein Requirement Amount (gms/kg)  1.2 gm protein     Estimated Protein Requirements (gms/day)  79.8        Fluid Requirements    Rusty-Xiomara Method (over 20 kg) 2300 cc     Nutrition Prescription Ordered     Row Name 04/17/19 1115          Nutrition Prescription PO    Current PO Diet  Regular     Fluid Consistency  Thin         Evaluation of Received Nutrient/Fluid Intake     Row Name 04/17/19  1115 04/17/19 1114       Calculation Measurements    Weight Used For Calculations  --  66.5 kg (146 lb 9.7 oz)       PO Evaluation    Number of Days PO Intake Evaluated  1 day  --    Number of Meals  2  --    % PO Intake  100%  --        Problem/Interventions:  Problem 1     Row Name 04/17/19 1115          Nutrition Diagnoses Problem 1    Problem 1  Increased Nutrient Needs     Macronutrient  Kcal;Protein     Etiology (related to)  Medical Diagnosis     Oncology  Other (comment);Head/neck cancer GBM                 Intervention Goal     Row Name 04/17/19 1115          Intervention Goal    General  Maintain nutrition;Meet nutritional needs for age/condition;Disease management/therapy;Provide information regarding MNT for treatment/condition     PO  Maintain intake;PO intake (%)     PO Intake %  100 %     Weight  Appropriate weight gain         Nutrition Intervention     Row Name 04/17/19 1116          Nutrition Intervention    RD/Tech Action  Advise alternate selection;Advise available snack;Interview for preference;Menu provided;Encourage intake;Follow Tx progress;Care plan reviewd;Other (comment) Will ask onc RD if any info specific to diet/brain tumors per wife's request           Education/Evaluation     Row Name 04/17/19 1116          Education    Education  Education topics     Education Topics  Basic nutrition;Neoplastic diagnosis        Monitor/Evaluation    Monitor  Per protocol           Electronically signed by:  Binta Knapp RD  04/17/19 11:16 AM

## 2019-04-17 NOTE — PROGRESS NOTES
Occupational Therapy: Individual: 60 minutes.    Physical Therapy: Branch    Speech Language Pathology:  Branch    Signed by: NOLVIA Hatfield/ROSA

## 2019-04-17 NOTE — PROGRESS NOTES
Inpatient Rehabilitation Functional Measures Assessment and Plan of Care    Plan of Care  Updated Problems/Interventions  Mobility    [OT] Toilet Transfers(Active)  Current Status(04/17/2019): CGA  Weekly Goal(04/19/2019): SBA  Discharge Goal: SBA    [OT] Tub/Shower Transfers(Active)  Current Status(04/17/2019): CGA  Weekly Goal(04/19/2019): SBA  Discharge Goal: SBA        Self Care    [OT] Bathing(Active)  Current Status(04/17/2019): CGA  Weekly Goal(04/19/2019): SBA  Discharge Goal: SBA    [OT] Dressing (Lower)(Active)  Current Status(04/17/2019): CGA  Weekly Goal(04/19/2019): SBA  Discharge Goal: SBA    [OT] Dressing (Upper)(Active)  Current Status(04/17/2019): SBA  Weekly Goal(04/19/2019): SBA  Discharge Goal: SBA    [OT] Grooming(Active)  Current Status(04/17/2019): sBA  Weekly Goal(04/19/2019): SBA  Discharge Goal: SBA    [OT] Toileting(Active)  Current Status(04/17/2019): SBA  Weekly Goal(04/19/2019): SBA  Discharge Goal: SBA    Functional Measures  LISA Eating:  Eating Score = 7. Patient is completely independent for eating.  There are no activity limitations.  LISA Grooming: Grooming Score = 5. Patient is supervision/set-up for grooming,  requiring: Stand by assistance. No assistive devices were required.  LISA Bathing:  Patient bathed in shower. Bathing Score = 4.  Patient requires  minimal assistance for bathing, requiring steadying for balance only. Patient  requires the following assistive device(s): Grab bar/arm rest to maintain  balance. Hand held shower. vc for sequencing  LISA Upper Body Dressing:  Upper Body Dressing Score = 5. Patient is supervision  for upper body dressing, requiring: Gathering/setting out clothes. Verbal cuing,  prompting, or instructing. No assistive devices were required.  LISA Lower Body Dressing:  Lower Body Dressing Score = 4. Patient requires  minimal assistance for lower body dressing, requiring incidental help only (such  as task initiation or assist with buttons/zips/snaps).  No assistive devices were  required. CGA for standing balance  LISA Toileting:  Toileting Score = 5.  Patient is supervision/set-up for  toileting, requiring: Stand by assistance. No assistive devices were required.    [facility] Bladder Management  Level of Assistance:  [place]  Frequency/Number of Accidents this Shift:  [facility] Bowel Management  Level of Assistance: [place]  Frequency/Number of Accidents this Shift: [facility] Bed/Chair/Wheelchair Transfer:  [facility] Toilet Transfer:  Toilet Transfer Score = 4.  Patient performs 75% or more  of effort and minimal assistance (little/incidental help/steadying) for  transferring to and from the toilet/commode, requiring: Contact guard. No  assistive devices were required.  LISA Tub/Shower Transfer:  Shower Transfer Score = 4. Patient performs 75% or  more of effort and minimal assistance (little/incidental help/lifting of one  limb/steadying) for transferring to and from the shower, requiring: Contact  guard. Patient requires the following assistive device(s): Grab bars. Shower  chair.    Previously Documented Mode of Locomotion at Discharge: Field  LISA Expected Mode of Locomotion at Discharge: [facility] Walk/Wheelchair:  [facility] Stairs:  Branch    [facility] Comprehension:  Branch  LISA Expression:  Branch  [facility] Social Interaction:  [facility] Problem Solving:  [facility] Memory:  [place]    Therapy Mode Minutes  Occupational Therapy: Individual: 60 minutes.  Physical Therapy: Branch  Speech Language Pathology:  [place]    Signed by: Maximo Oscar OTR/L

## 2019-04-17 NOTE — CONSULTS
Neuro-oncology consultation  I have seen the patient twice in reply to the initial consultation as I was awaiting the final path to have a comprehensive consultation visit with them  I now have the pathology from the [facility].  Done for this consultation:  1. Meeting with the patient and wife face to face today 60 minutes  2. Reviewing his chart and the MRI images pre- and post-operatively  3. Correspondence with the [facility] and our pathologist on the final ,pathology    Summary: A 63 YOM, right-handed, previously in good health with 5-6 week history of gradual confusion, mild headaches and left sided weakness. Brain MRI on April 5/2019 showed a 6.8 x 4.3 x 4.6 cms enhancing mas right frontal region involving genu of corpus callosum with vasogenic edema and 8 mm right-to-left shift. Has GTR by DR Brower on 04/07/2019 with postop charbel MRI showing total resection of enhancing mass with new diffusion restricted ischemic change anterior tot he resection cavity. Pathology from [facility] [id number] is that of GBM with mutated P53 and intact ATRX and negative IDH1 by IHC. MGMT methylation is pending.  Current KPS 90. On decadron 16 mg daily with Pepcid 20/20 mg daily,and Keppra 500/500 mg daily apophylactically    HPI: as summarized above  Neurological review else non-remarkable to a comprehensive review    PMH: Gout, hypercholesterolemia on treatment  FHx: pertinent negative no Hx of charbel tumors  ROS: No SOB, no chest pain, no DVT, no cough, no GI symptoms, normal bladder function,  Medications: as noted above for decadron, Pepcid and Keppra    Examination: alert, oriented, cooperative  Normal mental status for concentration, orientation, math and comprehension  Speech and language are normal  Cranial nerves: normal EOM w/o diplopia or nystagmus. PERRL, Visual fields are full. No facial asymmetry. Tongue midline. Cranial V, VII, VIII, IX, X and XI are normal (taste not tested)  No pronator drift  Normal  Brigida and coordination   Gait normal but walking with eyes closed he is clearly unsteady  Excellent strength w/o fasciculations or atrophy  Cortical sensory function is normal   DTRs are symmetrical w/o pathological reflexes  No lymphadenopathy Oral cavity clear  No abdominal tenderness and no organomegaly.  Imaging: summarized above  Labs: reactive leukocytosis 2/2/ decadron    Discussion and Plan:  I have reviewed the following swiht them amd left with extensive written notes to take home to review in case of questions:  1. Grading of glial tumors  2. Difference between Primary and secondary GBM  3. Molecular markers with comprehensive review and implications for treatment. In his case, even if MGMT comes back un-methylated considering his age and his good KPS he should get standard RT/TMZ per [facility] protocol: literature review cited the reasons for that recommendation  4. He has been seen by Radiation Oncology and s follow up with Dr Shanti Philippe for simulation on 04/22  5. He has also been seen by Medical Oncology: I reviewed with Dr Adair. I am happy to give the Temodar. I reviewed the standard plan of treatment in details except for the side effects which will be done when I consent him for the chemo    RECOMMENDATIONS:  I think he should start slow callum of Decadron from 4 mg Q6H to 6 mg bid for one week then down to 4 mg bid until Radiation Oncology starts his next taper  They have my number to call with any questions

## 2019-04-17 NOTE — THERAPY EVALUATION
Inpatient Rehabilitation - Occupational Therapy Initial Evaluation    [facility]     Patient Name: Hernandez Waterman  : 1955  MRN: [id number]    Today's Date: 2019                 Admit Date: 2019       No diagnosis found.    Patient Active Problem List   Diagnosis   • Pure hypercholesterolemia   • Testicular hypofunction   • ED (erectile dysfunction)   • Frequency of urination   • Other hyperlipidemia   • Nocturia   • Primary insomnia   • Chronic fatigue   • Night sweat   • Acute intractable headache   • 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 (CMS/HCC)   • Cerebral edema (CMS/HCC)   • GBM (glioblastoma multiforme) (CMS/HCC)   • Leukemoid reaction   • Anemia   • Constipation   • Glioblastoma multiforme (CMS/HCC)       Past Medical History:   Diagnosis Date   • ED (erectile dysfunction)    • Glioblastoma (CMS/HCC)        Past Surgical History:   Procedure Laterality Date   • CRANIOTOMY FOR TUMOR Right 2019    Procedure: RIGHT SIDE CRANIOTOMY FOR TUMOR REMOVAL AND  BIOPSY  WITH SIDNEY NAVIGATTION AND PARTIAL RIGHT FRONTAL LOBE LOBECTOMY;  Surgeon: Keshawn Brower MD;  Location: [facility];  Service: Neurosurgery   • CRANIOTOMY FOR TUMOR  2019            IRF OT ASSESSMENT FLOWSHEET (last 72 hours)      IRF Occupational Therapy Evaluation     Row Name 19 1640                   Evaluation/Treatment Time and Intent    Subjective Information  no complaints  -DN        Existing Precautions/Restrictions  fall  -DN        Document Type  evaluation  -DN        Mode of Treatment  occupational therapy  -DN        Patient/Family Observations  pt sitting in w/c wife present  -DN        Row Name 19 1640                   Relationship/Environment    Lives With  spouse  -DN        Row Name 19 1640                   Resource/Environmental Concerns    Current Living Arrangements  home/apartment/condo  -DN        Resource/Environmental Concerns  home accessibility   -DN        Home Accessibility Concerns  stairs to enter home  -DN        Row Name 04/17/19 1640                   Home Use of Assistive/Adaptive Equipment    Equipment Currently Used at Home  none pt states he uses tub shower at home  -DN        Row Name 04/17/19 1640                   Cognition/Psychosocial- PT/OT    Orientation Status (Cognition)  oriented x 3  -DN        Follows Commands (Cognition)  increased processing time needed;initiation impaired  -DN        Personal Safety Interventions  fall prevention program maintained;gait belt  -DN        Cognitive Function (Cognitive)  attention deficit  -DN        Attention Deficit (Cognitive)  mild deficit;focused/sustained attention  -DN        Safety Deficit (Cognitive)  mild deficit;insight into deficits/self awareness  -DN        Row Name 04/17/19 1640                   Toilet Transfer    Type (Toilet Transfer)  stand pivot/stand step  -DN        [place] Level (Toilet Transfer)  contact guard;verbal cues  -DN        Assistive Device (Toilet Transfer)  grab bars/safety frame no device  -DN        Row Name 04/17/19 1640                   Shower Transfer    Type (Shower Transfer)  stand pivot/stand step  -DN        [place] Level (Shower Transfer)  contact guard  -DN        Assistive Device (Shower Transfer)  shower chair;grab bars/tub rail no device  -DN        Row Name 04/17/19 1640                   Safety Issues, Functional Mobility    Safety Issues Affecting Function (Mobility)  insight into deficits/self awareness;sequencing abilities  -DN        Impairments Affecting Function (Mobility)  balance;endurance/activity tolerance;cognition  -DN        Row Name 04/17/19 1640                   Bathing Assessment/Treatment    Bathing [place] Level  bathing skills;lower body;upper body;contact guard assist;verbal cues;set up  -DN        Assistive Device (Bathing)  grab bar/tub rail;hand held shower spray hose;shower chair  -DN        Bathing Position   supported sitting;supported standing  -DN        Comment (Bathing)  vc for sequecing and balance when standing  -DN        Row Name 04/17/19 1640                   Upper Body Dressing Assessment/Treatment    Upper Body Dressing Task  upper body dressing skills;doff;don;supervision  -DN        Upper Body Dressing Position  supported sitting  -DN        Row Name 04/17/19 1640                   Lower Body Dressing Assessment/Treatment    Lower Body Dressing [place] Level  doff;don;pants/bottoms;shoes/slippers;socks;underwear;contact guard assist;verbal cues  -DN        Lower Body Dressing Position  supported sitting;supported standing  -DN        Lower Body Dressing Setup Assistance  obtain clothing  -DN        Row Name 04/17/19 1640                   Grooming Assessment/Treatment    Grooming [place] Level  grooming skills;deodorant application;hair care, combing/brushing;make-up application;verbal cues;set up  -DN        Row Name 04/17/19 1640                   Toileting Assessment/Treatment    Toileting [place] Level  toileting skills;adjust/manage clothing;perform perineal hygiene;supervision;verbal cues  -DN        Assistive Device Use (Toileting)  grab bar/safety frame;raised toilet seat  -DN        Toileting Position  supported sitting;supported standing  -DN        Row Name 04/17/19 1640                   General ROM    GENERAL ROM COMMENTS  BUEs WNL  -DN        Row Name 04/17/19 1640                   MMT (Manual Muscle Testing)    General MMT Comments  BUE 4+/5  -DN        Row Name 04/17/19 1640                   Hand  Strength Testing    Right Hand, Setting 2 (Dynamometer Testing)  94  -DN        Left Hand, Setting 2 (Dynamometer Testing)  94  -DN        Right Hand: Lateral (Key) Pinch Strength (Pinch Dynamometer Testing)  18  -DN        Left Hand: Lateral (Key) Pinch Strength (Pinch Dynamometer Testing)  21  -DN        Row Name 04/17/19 1640                   Fine Motor Testing & Training     Results, 9 Hole Peg Test of Fine Motor Coordination-Right  22  -DN        Results, 9 Hole Peg Test of Fine Motor Coordination-Left  22  -DN        Results, Box N Block Test-Right  61  -DN        Results, Box N Block Test-Left  54  -DN        Row Name 04/17/19 1640                   Dynamic Standing Balance    Level of [place], Reaches Outside Midline (Standing, Dynamic Balance)  contact guard assist during transfers with list to the left at times  -DN        Row Name 04/17/19 1640                   Sensory    Sensory General Assessment  no sensation deficits identified  -DN        Row Name 04/17/19 1640                   Vision Assessment/Intervention    Vision Assessment Comment  no visual issues noted on initial day of tx  -DN        Row Name 04/17/19 1640                   Pain Scale: Numbers Pre/Post-Treatment    Pain Scale: Numbers, Pretreatment  0/10 - no pain  -DN        Pain Scale: Numbers, Post-Treatment  0/10 - no pain  -DN        Row Name 04/17/19 1640                   OT Clinical Impression    General Observations of Patient  pt s/p craniotomy with tumor resection. Pt presents with inpaired balance during transfers to commode/shower and walking down [place], slow processing information during adls and needed cues for sequencing and time management during adls.  Pt would benifit from short Rehab stay for OT tx to increased [place] with adls, transfer, safety, balance.  -DN        Patient's Goals For Discharge  return home  -DN        Family Goals For Discharge  ability to leave patient alone for short periods in the home  -DN        Rehab Potential/Prognosis: Occupational Therapy  good, to achieve stated therapy goals  -DN        Frequency of Treatment (OT Eval)  5 times per week  -DN        Estimated Length of Stay (OT Eval)  other (see comments) 3 to 5 days  -DN        Problem List: Occupational Therapy  impaired balance;impaired cognition  -DN        Limitations/Impairments  safety/cognitive   -DN        Activity Limitations Related to Problem List (OT Eval)  ambulation not performed safely;BADL activities not performed adequately or safely  -DN        Anticipated Equipment Needs At Discharge (OT Eval)  shower chair  -DN        Row Name 04/17/19 1640                   IRF OT Goals    Bathing Goal Selection (OT-IRF)  bathing, OT goal 1;bathing, OT goal 2  -DN        UB Dressing Goal Selection (OT-IRF)  UB dressing, OT goal 1;UB dressing, OT goal 2  -DN        LB Dressing Goal Selection (OT-IRF)  LB dressing, OT goal 1;LB dressing, OT goal 2  -DN        Grooming Goal Selection (OT-IRF)  grooming, OT goal 1;grooming, OT goal 2  -DN        Toileting Goal Selection (OT-IRF)  toileting, OT goal 1;toileting, OT goal 2  -DN        Caregiver Training Goal Selection (OT-IRF)  caregiver training, OT goal 2  -DN        Safety Awareness Goal Selection (OT-IRF)  safety awareness, OT goal 2  -DN        Row Name 04/17/19 1640                   Bathing Goal 1 (OT-IRF)    Activity/Device (Bathing Goal 1, OT-IRF)  bathing skills, all;hand-held shower spray hose;shower chair  -DN        [place] Level (Bathing Goal 1, OT-IRF)  supervision required  -DN        Time Frame (Bathing Goal 1, OT-IRF)  short term goal (STG)  -DN        Progress/Outcomes (Bathing Goal 1, OT-IRF)  continuing progress toward goal  -DN        Row Name 04/17/19 1640                   Bathing Goal 2 (OT-IRF)    Activity/Device (Bathing Goal 2, OT-IRF)  bathing skills, all;hand-held shower spray hose;shower chair  -DN        [place] Level (Bathing Goal 2, OT-IRF)  supervision required  -DN        Time Frame (Bathing Goal 2, OT-IRF)  long term goal (LTG)  -DN        Progress/Outcomes (Bathing Goal 2, OT-IRF)  continuing progress toward goal  -DN        Row Name 04/17/19 1640                   UB Dressing Goal 1 (OT-IRF)    Activity/Device (UB Dressing Goal 1, OT-IRF)  upper body dressing  -DN        [place] (UB Dress Goal 1, OT-IRF)   supervision required  -DN        Time Frame (UB Dressing Goal 1, OT-IRF)  short term goal (STG)  -DN        Progress/Outcomes (UB Dressing Goal 1, OT-IRF)  continuing progress toward goal  -DN        Row Name 04/17/19 1640                   UB Dressing Goal 2 (OT-IRF)    Activity/Device (UB Dressing Goal 2, OT-IRF)  upper body dressing  -DN        [place] (UB Dress Goal 2, OT-IRF)  supervision required  -DN        Time Frame (UB Dressing Goal 2, OT-IRF)  long term goal (LTG)  -DN        Progress/Outcomes (UB Dressing Goal 2, OT-IRF)  continuing progress toward goal  -DN        Row Name 04/17/19 1640                   LB Dressing Goal 1 (OT-IRF)    Activity/Device (LB Dressing Goal 1, OT-IRF)  lower body dressing  -DN        [place] (LB Dressing Goal 1, OT-IRF)  supervision required  -DN        Time Frame (LB Dressing Goal 1, OT-IRF)  short term goal (STG)  -DN        Progress/Outcomes (LB Dressing Goal 1, OT-IRF)  continuing progress toward goal  -DN        Row Name 04/17/19 1640                   LB Dressing Goal 2 (OT-IRF)    Activity/Device (LB Dressing Goal 2, OT-IRF)  lower body dressing  -DN        [place] (LB Dressing Goal 2, OT-IRF)  supervision required  -DN        Time Frame (LB Dressing Goal 2, OT-IRF)  long term goal (LTG)  -DN        Progress/Outcomes (LB Dressing Goal 2, OT-IRF)  continuing progress toward goal  -DN        Row Name 04/17/19 1640                   Grooming Goal 1 (OT-IRF)    Activity/Device (Grooming Goal 1, OT-IRF)  grooming skills, all  -DN        [place] (Grooming Goal 1, OT-IRF)  supervision required  -DN        Time Frame (Grooming Goal 1, OT-IRF)  short term goal (STG)  -DN        Progress/Outcomes (Grooming Goal 1, OT-IRF)  continuing progress toward goal  -DN        Row Name 04/17/19 1640                   Grooming Goal 2 (OT-IRF)    Activity/Device (Grooming Goal 2, OT-IRF)  grooming skills, all  -DN        [place] (Grooming Goal 2, OT-IRF)  supervision  required  -DN        Time Frame (Grooming Goal 2, OT-IRF)  long term goal (LTG)  -DN        Progress/Outcomes (Grooming Goal 2, OT-IRF)  continuing progress toward goal  -DN        Row Name 04/17/19 1640                   Toileting Goal 1 (OT-IRF)    Activity/Device (Toileting Goal 1, OT-IRF)  toileting skills, all  -DN        [place] Level (Toileting Goal 1, OT-IRF)  supervision required  -DN        Time Frame (Toileting Goal 1, OT-IRF)  short term goal (STG)  -DN        Progress/Outcomes (Toileting Goal 1, OT-IRF)  continuing progress toward goal  -DN        Row Name 04/17/19 1640                   Toileting Goal 2 (OT-IRF)    Activity/Device (Toileting Goal 2, OT-IRF)  toileting skills, all  -DN        [place] Level (Toileting Goal 2, OT-IRF)  supervision required  -DN        Time Frame (Toileting Goal 2, OT-IRF)  long term goal (LTG)  -DN        Progress/Outcomes (Toileting Goal 2, OT-IRF)  continuing progress toward goal  -DN        Row Name 04/17/19 1640                   Coordination Goal 1 (OT)    Progress/Outcomes (Coordination Goal 1, OT)  goal no longer appropriate  -DN        Row Name 04/17/19 1640                   Caregiver Training Goal 2 (OT-IRF)    Caregiver Training Goal 2 (OT-IRF)  pt wife to be independent with vc needed for pt supervision in d/c environment for adls and functional transfers  -DN        Time Frame (Caregiver Training Goal 2, OT-IRF)  long term goal (LTG)  -DN        Progress/Outcomes (Caregiver Training Goal 2, OT-IRF)  continuing progress toward goal  -DN          User Key  (r) = Recorded By, (t) = Taken By, (c) = Cosigned By    Initials Name Effective Dates    Maximo Matute OT 06/08/18 -            Occupational Therapy Education     Title: PT OT SLP Therapies (In Progress)     Topic: Occupational Therapy (Done)     Point: ADL training (Done)     Description: Instruct learner(s) on proper safety adaptation and remediation techniques during self care or transfers.    Instruct in proper use of assistive devices.    Learning Progress Summary           Patient Acceptance, E, VU,NR by DN at 4/17/2019  5:03 PM    Comment:  pt wife educated on OT Role and supervision needed during adls and transfers due to balance and sequencing issues noted during eval   Family Acceptance, E, VU,NR by DN at 4/17/2019  5:03 PM    Comment:  pt wife educated on OT Role and supervision needed during adls and transfers due to balance and sequencing issues noted during eval                   Point: Precautions (Done)     Description: Instruct learner(s) on prescribed precautions during self-care and functional transfers.    Learning Progress Summary           Patient Acceptance, E, VU,NR by DN at 4/17/2019  5:03 PM    Comment:  pt wife educated on OT Role and supervision needed during adls and transfers due to balance and sequencing issues noted during eval   Family Acceptance, E, VU,NR by DN at 4/17/2019  5:03 PM    Comment:  pt wife educated on OT Role and supervision needed during adls and transfers due to balance and sequencing issues noted during eval                   Point: Body mechanics (Done)     Description: Instruct learner(s) on proper positioning and spine alignment during self-care, functional mobility activities and/or exercises.    Learning Progress Summary           Patient Acceptance, E, VU,NR by DN at 4/17/2019  5:03 PM    Comment:  pt wife educated on OT Role and supervision needed during adls and transfers due to balance and sequencing issues noted during eval   Family Acceptance, E, VU,NR by DN at 4/17/2019  5:03 PM    Comment:  pt wife educated on OT Role and supervision needed during adls and transfers due to balance and sequencing issues noted during eval                               User Key     Initials Effective Dates Name Provider Type Discipline    CHANNING 06/08/18 -  Maximo Oscar OT Occupational Therapist OT                    OT Recommendation and Plan                    Outcome Measures     Row Name 04/16/19 1400 04/15/19 1600 04/15/19 1335       How much difficulty does the patient currently have...    Turning from your back to your side while in flat bed without using bedrails?  4  -JM  4  -JM  --    Standing up from a chair using your arms (e.g., wheelchair, bedside chair)?  3  -JM  3  -JM  --    Moving from lying on back to sitting on the side of a flat bed without bedrails?  4  -JM  4  -JM  --       How much help from another person do you currently need...    Moving to and from a bed to a chair (including a wheelchair)?  3  -JM  3  -JM  --    Climbing 3-5 steps with a railing?  3  -JM  3  -JM  --    To walk in hospital room?  3  -JM  3  -JM  --    AM-PAC 6 Clicks Score  20  -JM  20  -JM  --       How much help from another is currently needed...    Putting on and taking off regular lower body clothing?  --  --  3  -SG    Bathing (including washing, rinsing, and drying)  --  --  3  -SG    Toileting (which includes using toilet bed pan or urinal)  --  --  3  -SG    Putting on and taking off regular upper body clothing  --  --  3  -SG    Taking care of personal grooming (such as brushing teeth)  --  --  3  -SG    Eating meals  --  --  3  -SG    Score  --  --  18  -SG      User Key  (r) = Recorded By, (t) = Taken By, (c) = Cosigned By    Initials Name Provider Type    Ciera Rubi OTR Occupational Therapist    Chelo Andrews PTA Physical Therapy Assistant            Time Calculation:     OT Start Time: 1300  OT Stop Time: 1330  OT Time Calculation (min): 30 min  Therapy Charges for Today     Code Description Service Date Service Provider Modifiers Qty    [id number]  OT EVAL LOW COMPLEXITY 2 4/17/2019 Maximo Oscar OT GO 1    [id number]  OT SELF CARE/MGMT/TRAIN EA 15 MIN 4/17/2019 Maximo Oscar OT GO 2                   Maximo Oscar OT  4/17/2019

## 2019-04-18 NOTE — NURSING NOTE
Spoke with Dr. Morris on call for Dr. Moses regarding order of Beata Porter. She stated Dr. moses could address tomorrow.

## 2019-04-18 NOTE — PROGRESS NOTES
Inpatient Rehabilitation Functional Measures Assessment    Functional Measures  LISA Eating:  [facility] Grooming: [facility] Bathing:  [facility] Upper Body Dressing:  [facility] Lower Body Dressing:  [facility] Toileting:  [facility] Bladder Management  Level of Assistance:  [place]  Frequency/Number of Accidents this Shift:  [facility] Bowel Management  Level of Assistance: [place]  Frequency/Number of Accidents this Shift: [facility] Bed/Chair/Wheelchair Transfer:  [facility] Toilet Transfer:  [facility] Tub/Shower Transfer:  [place]    Previously Documented Mode of Locomotion at Discharge: Field  LISA Expected Mode of Locomotion at Discharge: [facility] Walk/Wheelchair:  [facility] Stairs:  [facility] Comprehension:  Auditory comprehension is the usual mode. Comprehension  Score = 7, Independent.  Patient comprehends complex/abstract information in  their primary language.  Patient is completely independent for auditory  comprehension.  There are no activity limitations.  LISA Expression:  Vocal expression is the usual mode. Expression Score = 7,  Independent.  Patient expresses complex/abstract information in their primary  language.  Patient is completely independent for vocal expression.  There are no  activity limitations.  LISA Social Interaction:  Social Interaction Score = 7, Independent. Patient is  completely independent for social interaction.  There are no activity  limitations.  LISA Problem Solving:  Problem Solving Score = 7, Independent.  Patient makes  appropriate decisions in order to solve complex problems.  Patient is completely  independent for problem solving.  There are no activity limitations.  LISA Memory:  Memory Score = 7, Independent.  Patient is completely independent  for memory.  There are no activity limitations.    Therapy Mode Minutes  Occupational Therapy: Branch  Physical Therapy: Branch  Speech Language Pathology:  Branch    Signed by: Lonny Trevizo RN

## 2019-04-18 NOTE — PROGRESS NOTES
Inpatient Rehabilitation Functional Measures Assessment    Functional Measures  LISA Eating:  [facility] Grooming: [facility] Bathing:  [facility] Upper Body Dressing:  [facility] Lower Body Dressing:  [facility] Toileting:  [facility] Bladder Management  Level of Assistance:  [place]  Frequency/Number of Accidents this Shift:  [facility] Bowel Management  Level of Assistance: [place]  Frequency/Number of Accidents this Shift: [facility] Bed/Chair/Wheelchair Transfer:  [facility] Toilet Transfer:  [facility] Tub/Shower Transfer:  [place]    Previously Documented Mode of Locomotion at Discharge: Field  LISA Expected Mode of Locomotion at Discharge: [facility] Walk/Wheelchair:  [facility] Stairs:  [facility] Comprehension:  Auditory comprehension is the usual mode. Comprehension  Score = 6, Modified [place].  Patient comprehends complex/abstract  information in their primary language with only mild difficulty.  LISA Expression:  Vocal expression is the usual mode. Expression Score = 6,  Modified Independent.  Patient expresses complex/abstract information in their  primary language with only mild difficulty with tasks.  LISA Social Interaction:  Social Interaction Score = 7, Independent. Patient is  completely independent for social interaction.  There are no activity  limitations.  LISA Problem Solving:  Activity was not observed.  LISA Memory:  Memory Score = 6, Modified [place].  Patient is modified  independent for memory, having only mild difficulty and using self-initiated or  environmental cues to remember.    Therapy Mode Minutes  Occupational Therapy: [place]  Physical Therapy: [place]  Speech Language Pathology:  [place]    Signed by: Eunice Batres RN

## 2019-04-18 NOTE — PROGRESS NOTES
Inpatient Rehabilitation Plan of Care Note    Plan of Care  Care Plan Reviewed - No updates at this time.    Psychosocial    Performed Intervention(s)  encourage expression of feelings and concerns      Safety    Performed Intervention(s)  hourly rounding  items within reach      Pain    Performed Intervention(s)  meds PRN  Give medication as needed for pain  Assess pain level, notify with changes      Sphincter Control    Performed Intervention(s)  monitor intake and ouput      Body Systems    Performed Intervention(s)  monitor incision  monitor temperature  Inspect th incision every shift for drainage and s/s of infection    Signed by: Lonny Trevizo RN

## 2019-04-18 NOTE — PROGRESS NOTES
Inpatient Rehabilitation Plan of Care Note    Plan of Care  Updated Problems/Interventions  Medical Problem(s)    BNE (Active)  Att'n. - Min Imp., two moments of distractibility  Exec. Fx. - WNL  Rsng/Jgmnt - WNL  Arith - WNL  Visuospatial Skills - WNL  Visual Mem. - WNL  Verbal Mem. - Mildly Imp.  Emot - Pt described mild frustration related to physical symptoms    Signed by: Taj Wasserman Psy.d

## 2019-04-18 NOTE — PROGRESS NOTES
SECTION GG      Mobility Performance Discharge:     Roll Left and Right: [place] provides verbal cues and/or touching/steadying  and/or contact guard assistance as patient completes activity. Assistance may be  provided throughout the activity or intermittently.   Sit to Lying: [place] provides verbal cues and/or touching/steadying and/or  contact guard assistance as patient completes activity. Assistance may be  provided throughout the activity or intermittently.   Lying to Sitting on Side of Bed: [place] provides verbal cues and/or  touching/steadying and/or contact guard assistance as patient completes  activity. Assistance may be provided throughout the activity or intermittently.   Sit to Stand: [place] provides verbal cues and/or touching/steadying and/or  contact guard assistance as patient completes activity. Assistance may be  provided throughout the activity or intermittently.   Chair/Bed to Chair Transfer: [place] provides verbal cues and/or  touching/steadying and/or contact guard assistance as patient completes  activity. Assistance may be provided throughout the activity or intermittently.   Car Transfer: [place] provides verbal cues and/or touching/steadying and/or  contact guard assistance as patient completes activity. Assistance may be  provided throughout the activity or intermittently.   Walk 10 Feet:   [place] provides verbal cues and/or touching/steadying and/or  contact guard assistance as patient completes activity. Assistance may be  provided throughout the activity or intermittently.  Walk 50 Feet with 2 Turns:   [place] provides verbal cues and/or  touching/steadying and/or contact guard assistance as patient completes  activity. Assistance may be provided throughout the activity or intermittently.  Walk 150 Feet:   [place] provides verbal cues and/or touching/steadying and/or  contact guard assistance as patient completes activity. Assistance may be  provided throughout the activity or  intermittently.  Walking 10 Feet on Uneven Surfaces:   [place] provides verbal cues and/or  touching/steadying and/or contact guard assistance as patient completes  activity. Assistance may be provided throughout the activity or intermittently.  1 Step Over Curb or Up/Down Stair:   [place] provides verbal cues and/or  touching/steadying and/or contact guard assistance as patient completes  activity. Assistance may be provided throughout the activity or intermittently.  4 Steps Up and Down, With/Without Rail:   [place] provides verbal cues and/or  touching/steadying and/or contact guard assistance as patient completes  activity. Assistance may be provided throughout the activity or intermittently.  12 Steps Up and Down, With/Without Rail:   [place] provides verbal cues and/or  touching/steadying and/or contact guard assistance as patient completes  activity. Assistance may be provided throughout the activity or intermittently.  Picking up an Object:   [place] provides verbal cues and/or touching/steadying  and/or contact guard assistance as patient completes activity. Assistance may be  provided throughout the activity or intermittently. Uses Wheelchair and/or  Scooter: No    Signed by: Jemma Llanos, PT

## 2019-04-18 NOTE — THERAPY DISCHARGE NOTE
Inpatient Rehabilitation - Physical Therapy Treatment Note/Discharge  [facility]     Patient Name: Hernandez Waterman  : 1955  MRN: [id number]  Today's Date: 2019             Admit Date: 2019    Visit Dx:  No diagnosis found.  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    Acceptance, E, VU by MD at 2019 11:22 AM    Acceptance, E, VU by MD at 2019  8:18 AM                               User Key     Initials Effective Dates Name Provider Type Discipline    MD 18 -  Jemma Llanos PT Physical Therapist PT              PT IRF GOALS     Row Name 19 1500             Bed Mobility Goal 1 (PT-IRF)    Progress/Outcomes (Bed Mobility Goal 1, PT-IRF)  goal not met  -MD         Transfer Goal 1 (PT-IRF)    Progress/Outcomes (Transfer Goal 1, PT-IRF)  goal met  -MD         Transfer Goal 2 (PT-IRF)    Progress/Outcomes (Transfer Goal 2, PT-IRF)  goal not met  -MD         Gait/Walking Locomotion Goal 1 (PT-IRF)    Progress/Outcomes (Gait/Walking Locomotion Goal 1, PT-IRF)  goal met  -MD         Stairs Goal 1 (PT-IRF)    Progress/Outcomes (Stairs Goal 1, PT-IRF)  goal met  -MD        User  Key  (r) = Recorded By, (t) = Taken By, (c) = Cosigned By    Initials Name Provider Type    Jemma Vasquez, PT Physical Therapist          Therapy Treatment    IRF Treatment Summary     Row Name 04/18/19 1538 04/18/19 1030 04/18/19 0955       Evaluation/Treatment Time and Intent    Subjective Information  no complaints  -DN  no complaints  -OC  no complaints  -MD    Existing Precautions/Restrictions  fall  -DN  fall  -OC  fall  -MD    Document Type  therapy note (daily note)  -DN  therapy note (daily note)  -OC  therapy note (daily note)  -MD    Mode of Treatment  occupational therapy  -DN  speech-language pathology  -OC  physical therapy  -MD    Patient/Family Observations  pt SBA/Mod independent today with OT txs today, will make independent in room today to test independence level if ok, willl d/c tomorrow  -DN  --  Pt sitting in WC showing no signs of acute distress.  -MD    Recorded by [DN] Maximo Oscar OT [OC] Aixa Atwood MA,CCC-SLP [MD] Jemma Llanos, PT    Row Name 04/18/19 1538             Functional Mobility    Functional Mobility- Ind. Level  supervision required  -DN      Functional Mobility- Device  -- no device  -DN      Functional Mobility- Comment  walk from room to gym x2  -DN      Recorded by [DN] Maximo Oscar OT      Row Name 04/18/19 0955             Sit-Stand Transfer    Sit-Stand [place] (Transfers)  supervision  -MD      Recorded by [MD] Jemma Llanos PT      Row Name 04/18/19 0955             Stand-Sit Transfer    Stand-Sit [place] (Transfers)  supervision  -MD      Recorded by [MD] Jemma Llanos PT      Row Name 04/18/19 1538             Toilet Transfer    Type (Toilet Transfer)  stand pivot/stand step  -DN      [place] Level (Toilet Transfer)  supervision  -DN      Assistive Device (Toilet Transfer)  raised toilet seat  -DN      Recorded by [CHANNING] Maximo Oscar OT      Row Name 04/18/19 1538             Bathtub Transfer    Type (Bathtub Transfer)  stand pivot/stand step  -DN       [place] Level (Bathtub Transfer)  supervision  -DN      Assistive Device (Bathtub Transfer)  shower chair pt declined shower but completed dry run   -DN      Recorded by [CHANNING] Maximo Oscar OT      Row Name 04/18/19 0955             Gait/Stairs Assessment/Training    [place] Level (Gait)  supervision  -MD      Distance in Feet (Gait)  300  -MD      Deviations/Abnormal Patterns (Gait)  gait speed decreased;esdras decreased;stride length decreased  -MD      [place] Level (Stairs)  supervision  -MD      Handrail Location (Stairs)  both sides  -MD      Number of Steps (Stairs)  12  -MD      Ascending Technique (Stairs)  step-over-step  -MD      Descending Technique (Stairs)  step-over-step  -MD      Comment (Gait/Stairs)  this pm pt ambulated outside on side walk and grass w supervision approx 300`.  pt also ambulated up and down 7 steps no HR w supervision  -MD      Recorded by [MD] Jemma Llanos, PT      Row Name 04/18/19 1538             Safety Issues, Functional Mobility    Safety Issues Affecting Function (Mobility)  sequencing abilities  -DN      Recorded by [CHANNING] Maximo Oscar OT      Row Name 04/18/19 1538             Grooming Assessment/Treatment    Grooming [place] Level  conditional independence  -DN      Recorded by [CHANNING] Maximo Oscar OT      Row Name 04/18/19 1538             Toileting Assessment/Treatment    Toileting [place] Level  toileting skills;supervision  -DN      Assistive Device Use (Toileting)  raised toilet seat  -DN      Recorded by [CHANNING] Maximo Oscar OT      Row Name 04/18/19 1538 04/18/19 0955          Pain Scale: Numbers Pre/Post-Treatment    Pain Scale: Numbers, Pretreatment  0/10 - no pain  -DN  0/10 - no pain  -MD     Pain Scale: Numbers, Post-Treatment  0/10 - no pain  -DN  --     Recorded by [CHANNING] Maximo Oscar, OT [MD] Jemma Llanos, PT     Row Name 04/18/19 1538             Upper Extremity Seated Therapeutic Exercise    Performed, Seated Upper Extremity  (Therapeutic Exercise)  shoulder flexion/extension;shoulder abduction/adduction;shoulder external/internal rotation;shoulder horizontal abduction/adduction;scapular protraction/retraction;elbow flexion/extension;forearm supination/pronation;wrist flexion/extension;digit flexion/extension  -DN      Device, Seated Upper Extremity (Therapeutic Exercise)  elastic bands/tubing;free weights, barbell  -DN      Exercise Type, Seated Upper Extremity (Therapeutic Exercise)  AROM (active range of motion);resistive exercise  -DN      Expected Outcomes, Seated Upper Extremity (Therapeutic Exercise)  improve functional tolerance, self-care activity  -DN      Sets/Reps Detail, Seated Upper Extremity (Therapeutic Exercise)  3/10 with 2# dumbell, handgripper, and theraband  -DN      Comment, Seated Upper Extremity (Therapeutic Exercise)  pt completed anticipated HEP with receive print out tomorrow am  -DN      Recorded by [DN] Maximo Oscar OT      Row Name 04/18/19 1538 04/18/19 0955          Positioning and Restraints    Pre-Treatment Position  sitting in chair/recliner  -DN  sitting in chair/recliner  -MD     Post Treatment Position  wheelchair  -DN  bed  -MD     In Bed  --  sitting EOB;with family/caregiver  -MD     Recorded by [CHANNING] Maximo Oscar OT [MD] Jemma Llanos, PT       User Key  (r) = Recorded By, (t) = Taken By, (c) = Cosigned By    Initials Name Effective Dates    Aixa Sands MA,CCC-SLP 06/08/18 -     Maximo Matute OT 06/08/18 -     Jemma Vasquez, PT 04/03/18 -           PT Recommendation and Plan          Outcome Measures     Row Name 04/18/19 1400 04/18/19 0900 04/16/19 1400       How much difficulty does the patient currently have...    Turning from your back to your side while in flat bed without using bedrails?  --  --  4  -JM    Standing up from a chair using your arms (e.g., wheelchair, bedside chair)?  --  --  3  -JM    Moving from lying on back to sitting on the side of a flat bed without bedrails?   --  --  4  -JM       How much help from another person do you currently need...    Moving to and from a bed to a chair (including a wheelchair)?  --  --  3  -JM    Climbing 3-5 steps with a railing?  --  --  3  -JM    To walk in hospital room?  --  --  3  -JM    AM-[facility] 6 Clicks Score  --  --  20  -JM       Ariza Balance Scale    Sitting to Standing  --  4  -MD  --    Standing Unsupported  --  4  -MD  --    Sitting with Back Unsupported but Feet Supported on Floor or on Stool  --  4  -MD  --    Standing to Sitting  --  4  -MD  --    Transfers  --  4  -MD  --    Standing Unsupported with Eyes Closed  --  4  -MD  --    Standing Unsupported with Feet Together  --  4  -MD  --    Reaching Forward with Outstretched Arm While Standing  --  3  -MD  --     Object From the Floor From a Standing Position  --  4  -MD  --    Turning to Look Behind Over Left and Right Shoulders While Standing  --  4  -MD  --    Turn 360 Degrees  --  4  -MD  --    Place Alternate Foot on Step or Stool While Standing Unsupported  --  4  -MD  --    Standing Unsupported with One Foot in Front  --  4  -MD  --    Standing on One Leg  --  4  -MD  --    Ariza Total Score  --  55  -MD  --       Dynamic Gait Index (DGI)    Gait Level Surface  3  -MD  --  --    Change in Gait Speed  3  -MD  --  --    Gait with Horizontal Head Turns  3  -MD  --  --    Gait with Vertical Head Turns  2  -MD  --  --    Gait and Pivot Turn  3  -MD  --  --    Step Over Obstacle  3  -MD  --  --    Step Around Obstacles  3  -MD  --  --    Steps  3  -MD  --  --    Dynamic Gait Index Score  23  -MD  --  --       Functional Assessment    Outcome Measure Options  Dynamic Gait Index  -MD  Ariza Balance  -MD  --    Row Name 04/15/19 1600             How much difficulty does the patient currently have...    Turning from your back to your side while in flat bed without using bedrails?  4  -JM      Standing up from a chair using your arms (e.g., wheelchair, bedside chair)?  3  -JM       Moving from lying on back to sitting on the side of a flat bed without bedrails?  4  -JM         How much help from another person do you currently need...    Moving to and from a bed to a chair (including a wheelchair)?  3  -JM      Climbing 3-5 steps with a railing?  3  -JM      To walk in hospital room?  3  -JM      AM-[facility] 6 Clicks Score  20  -JM        User Key  (r) = Recorded By, (t) = Taken By, (c) = Cosigned By    Initials Name Provider Type    Jemma Vasquez, PT Physical Therapist    Chelo Andrews, [place] Physical Therapy Assistant           Time Calculation:   PT Charges     Row Name 04/18/19 1447 04/18/19 0955          Time Calculation    Start Time  1430  -MD  0930  -MD     Stop Time  1500  -MD  1000  -MD     Time Calculation (min)  30 min  -MD  30 min  -MD     PT Received On  --  04/18/19  -MD     PT - Next Appointment  --  04/19/19  -MD       User Key  (r) = Recorded By, (t) = Taken By, (c) = Cosigned By    Initials Name Provider Type    Jemma Vasquez, PT Physical Therapist          Therapy Charges for Today     Code Description Service Date Service Provider Modifiers Qty    55233236081 HC PT EVAL LOW COMPLEXITY 2 4/17/2019 Jemma Llanos, PT GP 1    26999785601 HC PT THER PROC EA 15 MIN 4/17/2019 Jemma Llanos, PT GP 3    20785735686 HC PT THER PROC EA 15 MIN 4/18/2019 Jemma Llanos, PT GP 4          PT G-Codes  Outcome Measure Options: Dynamic Gait Index  Ariza Total Score: 55    PT Discharge Summary  Reason for Discharge: Independent  Outcomes Achieved: Patient able to partially acheive established goals  Discharge Destination: Home    Jemma Llanos PT  4/18/2019

## 2019-04-18 NOTE — PROGRESS NOTES
Inpatient Rehabilitation Functional Measures Assessment and Plan of Care    Plan of Care  Updated Problems/Interventions  Field    Functional Measures  LISA Eating:  Eating Score = 7. Patient is completely independent for eating.  There are no activity limitations.  LISA Grooming: [place]  [facility] Bathing:  [place]  [facility] Upper Body Dressing:  [place]  [facility] Lower Body Dressing:  [place]  [facility] Toileting:  Toileting Score = 5.  Patient is supervision/set-up for  toileting, requiring: No assistive devices were required.    LISA Bladder Management  Level of Assistance:  Bladder Score = 5.  Patient is supervision/set-up for  bladder management, requiring: Stand by assistance. No assistive devices were  required.  Frequency/Number of Accidents this Shift:  Bladder accidents this shift:  1 .    LISA Bowel Management  Level of Assistance: Bowel Score = 7.  Patient is completely independent for  bowel management. There are no activity limitations.  Frequency/Number of Accidents this Shift: Bowel accidents this shift: 1 .    LISA Bed/Chair/Wheelchair Transfer:  Bed/chair/wheelchair Transfer Score = 5.  Patient is supervision/set-up for transferring to and from the  bed/chair/wheelchair, requiring: Patient requires the following assistive  device(s): Walker.  LISA Toilet Transfer:  Toilet Transfer Score = 5.  Patient is supervision/set-up  for transferring to and from the toilet/commode, requiring: Patient requires the  following assistive device(s): Walker.  [facility] Tub/Shower Transfer:  [place]    Previously Documented Mode of Locomotion at Discharge: Field  [facility] Expected Mode of Locomotion at Discharge: [facility] Walk/Wheelchair:  [place]  [facility] Stairs:  [place]    [facility] Comprehension:  [place]  [facility] Expression:  [facility] Social Interaction:  [facility] Problem Solving:  [facility] Memory:  [place]    Therapy Mode Minutes  Occupational Therapy: [place]  Physical Therapy: [place]  Speech Language Pathology:  [place]    Signed by: Becky Go

## 2019-04-18 NOTE — PROGRESS NOTES
Inpatient Rehabilitation Plan of Care Note    Plan of Care  Care Plan Reviewed - No updates at this time.    Body Systems    [RN] Integumentary(Active)  Current Status(04/15/2019): incision  healing with no infection  Weekly Goal(04/22/2019): Continues to heal with no infection  Discharge Goal: Incision will be healed 100%    Performed Intervention(s)  monitor incision  monitor temperature  Inspect th incision every shift for drainage and s/s of infection      Psychosocial    Performed Intervention(s)  encourage expression of feelings and concerns      Safety    Performed Intervention(s)  hourly rounding  items within reach  bed,chair alarm      Pain    Performed Intervention(s)  meds PRN  Give medication as needed for pain  Assess pain level, notify with changes      Sphincter Control    Performed Intervention(s)  monitor intake and ouput    Signed by: Eunice Batres RN

## 2019-04-18 NOTE — PROGRESS NOTES
"Section B. Hearing, Speech, Vision: Expression of Ideas and Wants: Expresses  complex messages without difficulty and with speech that is clear and easy to  understand.  Understanding Verbal and Non-Verbal Content: Understands: Clear comprehension  without cues or repetitions.    Section C. Cognitive Patterns: Brief Interview for Mental Status (BIMS) was  conducted.  Repetition of Three Words: Three words  Able to report correct year: Correct  Able to report correct month: Accurate within 5 days  Able to report correct day of the week: Correct  Able to recall \"sock\": Yes, no cue required  Able to recall \"blue\": Yes, no cue required  Able to recall \"bed\": Yes, no cue required    BIMS SUMMARY SCORE: 15 Cognitively intact Patient was able to complete the Brief  Interview for Mental Status    Signed by: Aixa Atwood SLP    "

## 2019-04-18 NOTE — PROGRESS NOTES
Inpatient Rehabilitation Functional Measures Assessment and Plan of Care    Plan of Care  Updated Problems/Interventions  Mobility    [OT] Toilet Transfers(Active)  Current Status(04/18/2019): SBA/Mod Indep  Weekly Goal(04/19/2019): Mod Indep  Discharge Goal: Mod Indep    [OT] Tub/Shower Transfers(Active)  Current Status(04/18/2019): SBA  Weekly Goal(04/19/2019): SBA  Discharge Goal: SBA        Self Care    [OT] Bathing(Active)  Current Status(04/18/2019): CGA refused 4/18  Weekly Goal(04/19/2019): Mod Indep  Discharge Goal: Mod Indep    [OT] Dressing (Lower)(Active)  Current Status(04/18/2019): SBA  Weekly Goal(04/19/2019): Mod Indep  Discharge Goal: Mod Indep    [OT] Dressing (Upper)(Active)  Current Status(04/18/2019): SBA  Weekly Goal(04/19/2019): SBA  Discharge Goal: SBA    [OT] Grooming(Active)  Current Status(04/18/2019): Mod  Indep  Weekly Goal(04/19/2019): Mod Indep  Discharge Goal: Mod Indep    [OT] Toileting(Active)  Current Status(04/18/2019): SBA  Weekly Goal(04/19/2019): Mod Indep  Discharge Goal: Mod Indep    Functional Measures  LISA Eating:  [place]  LISA Grooming: Grooming Score = 6.  Patient is modified independent for grooming,  requiring: Safety considerations.  LISA Bathing:  Branch  LISA Upper Body Dressing:  Branch  LISA Lower Body Dressing:  Branch  LISA Toileting:  Branch    LISA Bladder Management  Level of Assistance:  Branch  Frequency/Number of Accidents this Shift:  Branch    LISA Bowel Management  Level of Assistance: [place]  Frequency/Number of Accidents this Shift: Branch    LISA Bed/Chair/Wheelchair Transfer:  Branch  LISA Toilet Transfer:  Branch  LISA Tub/Shower Transfer:  Tub Transfer Score = 5.  Patient is supervision/set-up  for transferring to and from the tub, requiring: Stand by assistance. Patient  requires the following assistive device(s): Shower chair.    Previously Documented Mode of Locomotion at Discharge: [place]  LISA Expected Mode of Locomotion at Discharge: Branch  [facility]  Walk/Wheelchair:  Branch  LISA Stairs:  Branch    LISA Comprehension:  Branch  LISA Expression:  Branch  LISA Social Interaction:  Branch  LISA Problem Solving:  Branch  LISA Memory:  Branch    Therapy Mode Minutes  Occupational Therapy: Individual: 60 minutes.  Physical Therapy: Branch  Speech Language Pathology:  Branch    Signed by: Maximo Oscar OTR/L

## 2019-04-18 NOTE — THERAPY TREATMENT NOTE
Inpatient Rehabilitation - Occupational Therapy Treatment Note    [facility]     Patient Name: Hernandez Waterman  : 1955  MRN: [id number]    Today's Date: 2019                 Admit Date: 2019      Visit Dx:  No diagnosis found.    Patient Active Problem List   Diagnosis   • Pure hypercholesterolemia   • Testicular hypofunction   • ED (erectile dysfunction)   • Frequency of urination   • Other hyperlipidemia   • Nocturia   • Primary insomnia   • Chronic fatigue   • Night sweat   • Acute intractable headache   • 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 (CMS/HCC)   • Cerebral edema (CMS/HCC)   • GBM (glioblastoma multiforme) (CMS/HCC)   • Leukemoid reaction   • Anemia   • Constipation   • Glioblastoma multiforme (CMS/HCC)         Therapy Treatment    IRF Treatment Summary     Row Name 19 1538 19 1030 19 0965       Evaluation/Treatment Time and Intent    Subjective Information  no complaints  -DN  no complaints  -OC  no complaints  -MD    Existing Precautions/Restrictions  fall  -DN  fall  -OC  fall  -MD    Document Type  therapy note (daily note)  -DN  therapy note (daily note)  -OC  therapy note (daily note)  -MD    Mode of Treatment  occupational therapy  -DN  speech-language pathology  -OC  physical therapy  -MD    Patient/Family Observations  pt SBA/Mod independent today with OT txs today, will make independent in room today to test independence level if ok, willl d/c tomorrow  -DN  --  Pt sitting in WC showing no signs of acute distress.  -MD    Recorded by [DN] Maximo Oscar OT [OC] Aixa Atwood MA,CCC-SLP [MD] Jemma Llanos, PT    Row Name 19 153             Functional Mobility    Functional Mobility- Ind. Level  supervision required  -DN      Functional Mobility- Device  -- no device  -DN      Functional Mobility- Comment  walk from room to gym x2  -DN      Recorded by [DN] Maximo Oscar OT      Row Name 19 0988             Sit-Stand Transfer     Sit-Stand [place] (Transfers)  supervision  -MD      Recorded by [MD] Jemma Llanos, PT      Row Name 04/18/19 0955             Stand-Sit Transfer    Stand-Sit [place] (Transfers)  supervision  -MD      Recorded by [MD] Jemma Llanos, PT      Row Name 04/18/19 1538             Toilet Transfer    Type (Toilet Transfer)  stand pivot/stand step  -DN      [place] Level (Toilet Transfer)  supervision  -DN      Assistive Device (Toilet Transfer)  raised toilet seat  -DN      Recorded by [CHANNING] Maximo Oscar, OT      Row Name 04/18/19 1538             Bathtub Transfer    Type (Bathtub Transfer)  stand pivot/stand step  -DN      [place] Level (Bathtub Transfer)  supervision  -DN      Assistive Device (Bathtub Transfer)  shower chair pt declined shower but completed dry run   -DN      Recorded by [CHANNING] Maximo Oscar, OT      Row Name 04/18/19 0955             Gait/Stairs Assessment/Training    [place] Level (Gait)  supervision  -MD      Distance in Feet (Gait)  300  -MD      Deviations/Abnormal Patterns (Gait)  gait speed decreased;esdras decreased;stride length decreased  -MD      Recorded by [MD] Jemma Llanos, PT      Row Name 04/18/19 1538             Safety Issues, Functional Mobility    Safety Issues Affecting Function (Mobility)  sequencing abilities  -DN      Recorded by [CHANNING] Maximo Oscar, OT      Row Name 04/18/19 1538             Grooming Assessment/Treatment    Grooming [place] Level  conditional independence  -DN      Recorded by [CHANNING] Maximo Oscar, OT      Row Name 04/18/19 1538             Toileting Assessment/Treatment    Toileting [place] Level  toileting skills;supervision  -DN      Assistive Device Use (Toileting)  raised toilet seat  -DN      Recorded by [CHANNING] Maximo Oscar, OT      Row Name 04/18/19 1538 04/18/19 0955          Pain Scale: Numbers Pre/Post-Treatment    Pain Scale: Numbers, Pretreatment  0/10 - no pain  -DN  0/10 - no pain  -MD     Pain Scale: Numbers,  Post-Treatment  0/10 - no pain  -DN  --     Recorded by [CHANNING] Maximo Oscar OT [MD] Jemma Llanos, PT     Row Name 04/18/19 1538             Upper Extremity Seated Therapeutic Exercise    Performed, Seated Upper Extremity (Therapeutic Exercise)  shoulder flexion/extension;shoulder abduction/adduction;shoulder external/internal rotation;shoulder horizontal abduction/adduction;scapular protraction/retraction;elbow flexion/extension;forearm supination/pronation;wrist flexion/extension;digit flexion/extension  -DN      Device, Seated Upper Extremity (Therapeutic Exercise)  elastic bands/tubing;free weights, barbell  -DN      Exercise Type, Seated Upper Extremity (Therapeutic Exercise)  AROM (active range of motion);resistive exercise  -DN      Expected Outcomes, Seated Upper Extremity (Therapeutic Exercise)  improve functional tolerance, self-care activity  -DN      Sets/Reps Detail, Seated Upper Extremity (Therapeutic Exercise)  3/10 with 2# dumbell, handgripper, and theraband  -DN      Comment, Seated Upper Extremity (Therapeutic Exercise)  pt completed anticipated HEP with receive print out tomorrow am  -DN      Recorded by [CHANNING] Maximo Oscar OT      Row Name 04/18/19 1538 04/18/19 0955          Positioning and Restraints    Pre-Treatment Position  sitting in chair/recliner  -DN  sitting in chair/recliner  -MD     Post Treatment Position  wheelchair  -DN  --     Recorded by [CHANNING] Maximo Oscar OT [MD] Jemma Llanos, PT       User Key  (r) = Recorded By, (t) = Taken By, (c) = Cosigned By    Initials Name Effective Dates    OC Aixa Atwood MA,CCC-SLP 06/08/18 -     Maximo Matute OT 06/08/18 -     Jemma Vasquez, PT 04/03/18 -           Wound 04/07/19 1233 Right head incision (Active)   Dressing Appearance dry;intact 4/18/2019  8:20 AM   Closure Approximated;Adhesive closure strips;Sutures 4/18/2019  8:20 AM   Base dry;clean 4/18/2019  8:20 AM   Periwound intact;dry 4/18/2019  8:20 AM   Periwound Temperature warm  4/17/2019  8:43 PM   Periwound Skin Turgor soft 4/17/2019  8:43 PM   Drainage Amount none 4/17/2019  8:43 PM   Dressing Care, Wound open to air 4/17/2019  8:43 PM         OT Recommendation and Plan    Anticipated Equipment Needs At Discharge (OT Eval): shower chair            OT IRF GOALS     Row Name 04/17/19 1640             Bathing Goal 1 (OT-IRF)    Activity/Device (Bathing Goal 1, OT-IRF)  bathing skills, all;hand-held shower spray hose;shower chair  -DN      S[place]ter Level (Bathing Goal 1, OT-IRF)  supervision required  -DN      Time Frame (Bathing Goal 1, OT-IRF)  short term goal (STG)  -DN      Progress/Outcomes (Bathing Goal 1, OT-IRF)  continuing progress toward goal  -DN         Bathing Goal 2 (OT-IRF)    Activity/Device (Bathing Goal 2, OT-IRF)  bathing skills, all;hand-held shower spray hose;shower chair  -DN      [place] Level (Bathing Goal 2, OT-IRF)  supervision required  -DN      Time Frame (Bathing Goal 2, OT-IRF)  long term goal (LTG)  -DN      Progress/Outcomes (Bathing Goal 2, OT-IRF)  continuing progress toward goal  -DN         UB Dressing Goal 1 (OT-IRF)    Activity/Device (UB Dressing Goal 1, OT-IRF)  upper body dressing  -DN      Shorter (UB Dress Goal 1, OT-IRF)  supervision required  -DN      Time Frame (UB Dressing Goal 1, OT-IRF)  short term goal (STG)  -DN      Progress/Outcomes (UB Dressing Goal 1, OT-IRF)  continuing progress toward goal  -DN         UB Dressing Goal 2 (OT-IRF)    Activity/Device (UB Dressing Goal 2, OT-IRF)  upper body dressing  -DN      Shorter (UB Dress Goal 2, OT-IRF)  supervision required  -DN      Time Frame (UB Dressing Goal 2, OT-IRF)  long term goal (LTG)  -DN      Progress/Outcomes (UB Dressing Goal 2, OT-IRF)  continuing progress toward goal  -DN         LB Dressing Goal 1 (OT-IRF)    Activity/Device (LB Dressing Goal 1, OT-IRF)  lower body dressing  -DN      Shorter (LB Dressing Goal 1, OT-IRF)  supervision required  -DN      Time  Frame (LB Dressing Goal 1, OT-IRF)  short term goal (STG)  -DN      Progress/Outcomes (LB Dressing Goal 1, OT-IRF)  continuing progress toward goal  -DN         LB Dressing Goal 2 (OT-IRF)    Activity/Device (LB Dressing Goal 2, OT-IRF)  lower body dressing  -DN      [place] (LB Dressing Goal 2, OT-IRF)  supervision required  -DN      Time Frame (LB Dressing Goal 2, OT-IRF)  long term goal (LTG)  -DN      Progress/Outcomes (LB Dressing Goal 2, OT-IRF)  continuing progress toward goal  -DN         Grooming Goal 1 (OT-IRF)    Activity/Device (Grooming Goal 1, OT-IRF)  grooming skills, all  -DN      [place] (Grooming Goal 1, OT-IRF)  supervision required  -DN      Time Frame (Grooming Goal 1, OT-IRF)  short term goal (STG)  -DN      Progress/Outcomes (Grooming Goal 1, OT-IRF)  continuing progress toward goal  -DN         Grooming Goal 2 (OT-IRF)    Activity/Device (Grooming Goal 2, OT-IRF)  grooming skills, all  -DN      [place] (Grooming Goal 2, OT-IRF)  supervision required  -DN      Time Frame (Grooming Goal 2, OT-IRF)  long term goal (LTG)  -DN      Progress/Outcomes (Grooming Goal 2, OT-IRF)  continuing progress toward goal  -DN         Toileting Goal 1 (OT-IRF)    Activity/Device (Toileting Goal 1, OT-IRF)  toileting skills, all  -DN      [place] Level (Toileting Goal 1, OT-IRF)  supervision required  -DN      Time Frame (Toileting Goal 1, OT-IRF)  short term goal (STG)  -DN      Progress/Outcomes (Toileting Goal 1, OT-IRF)  continuing progress toward goal  -DN         Toileting Goal 2 (OT-IRF)    Activity/Device (Toileting Goal 2, OT-IRF)  toileting skills, all  -DN      [place] Level (Toileting Goal 2, OT-IRF)  supervision required  -DN      Time Frame (Toileting Goal 2, OT-IRF)  long term goal (LTG)  -DN      Progress/Outcomes (Toileting Goal 2, OT-IRF)  continuing progress toward goal  -DN         Caregiver Training Goal 2 (OT-IRF)    Caregiver Training Goal 2 (OT-IRF)  pt wife to be  independent with vc needed for pt supervision in d/c environment for adls and functional transfers  -DN      Time Frame (Caregiver Training Goal 2, OT-IRF)  long term goal (LTG)  -DN      Progress/Outcomes (Caregiver Training Goal 2, OT-IRF)  continuing progress toward goal  -DN        User Key  (r) = Recorded By, (t) = Taken By, (c) = Cosigned By    Initials Name Provider Type    Maximo Matute OT Occupational Therapist          Occupational Therapy Education     Title: PT OT SLP Therapies (In Progress)     Topic: Occupational Therapy (Done)     Point: ADL training (Done)     Description: Instruct learner(s) on proper safety adaptation and remediation techniques during self care or transfers.   Instruct in proper use of assistive devices.    Learning Progress Summary           Patient Acceptance, E, VU,DU by DN at 4/18/2019  3:48 PM    Comment:  tub transfer to shower chair with back, BUE HEP    Acceptance, E, VU,NR by DN at 4/17/2019  5:03 PM    Comment:  pt wife educated on OT Role and supervision needed during adls and transfers due to balance and sequencing issues noted during eval   Family Acceptance, E, VU,NR by DN at 4/17/2019  5:03 PM    Comment:  pt wife educated on OT Role and supervision needed during adls and transfers due to balance and sequencing issues noted during eval                   Point: Precautions (Done)     Description: Instruct learner(s) on prescribed precautions during self-care and functional transfers.    Learning Progress Summary           Patient Acceptance, E, VU,DU by DN at 4/18/2019  3:48 PM    Comment:  tub transfer to shower chair with back, BUE HEP    Acceptance, E, VU,NR by DN at 4/17/2019  5:03 PM    Comment:  pt wife educated on OT Role and supervision needed during adls and transfers due to balance and sequencing issues noted during eval   Family Acceptance, E, VU,NR by DN at 4/17/2019  5:03 PM    Comment:  pt wife educated on OT Role and supervision needed during adls  and transfers due to balance and sequencing issues noted during eval                   Point: Body mechanics (Done)     Description: Instruct learner(s) on proper positioning and spine alignment during self-care, functional mobility activities and/or exercises.    Learning Progress Summary           Patient Acceptance, E, LORIE,DU by DN at 4/18/2019  3:48 PM    Comment:  tub transfer to shower chair with back, BUE HEP    Acceptance, E, LORIE,NR by DN at 4/17/2019  5:03 PM    Comment:  pt wife educated on OT Role and supervision needed during adls and transfers due to balance and sequencing issues noted during eval   Family Acceptance, E, VU,NR by CHANNING at 4/17/2019  5:03 PM    Comment:  pt wife educated on OT Role and supervision needed during adls and transfers due to balance and sequencing issues noted during eval                               User Key     Initials Effective Dates Name Provider Type Discipline    CHANNING 06/08/18 -  Maximo Oscar, BRAYDEN Occupational Therapist OT                Outcome Measures     Row Name 04/18/19 1400 04/18/19 0900 04/16/19 1400       How much difficulty does the patient currently have...    Turning from your back to your side while in flat bed without using bedrails?  --  --  4  -JM    Standing up from a chair using your arms (e.g., wheelchair, bedside chair)?  --  --  3  -JM    Moving from lying on back to sitting on the side of a flat bed without bedrails?  --  --  4  -JM       How much help from another person do you currently need...    Moving to and from a bed to a chair (including a wheelchair)?  --  --  3  -JM    Climbing 3-5 steps with a railing?  --  --  3  -JM    To walk in hospital room?  --  --  3  -JM    AM-[facility] 6 Clicks Score  --  --  20  -JM       Ariza Balance Scale    Sitting to Standing  --  4  -MD  --    Standing Unsupported  --  4  -MD  --    Sitting with Back Unsupported but Feet Supported on Floor or on Stool  --  4  -MD  --    Standing to Sitting  --  4  -MD  --     Transfers  --  4  -MD  --    Standing Unsupported with Eyes Closed  --  4  -MD  --    Standing Unsupported with Feet Together  --  4  -MD  --    Reaching Forward with Outstretched Arm While Standing  --  3  -MD  --     Object From the Floor From a Standing Position  --  4  -MD  --    Turning to Look Behind Over Left and Right Shoulders While Standing  --  4  -MD  --    Turn 360 Degrees  --  4  -MD  --    Place Alternate Foot on Step or Stool While Standing Unsupported  --  4  -MD  --    Standing Unsupported with One Foot in Front  --  4  -MD  --    Standing on One Leg  --  4  -MD  --    Ariza Total Score  --  55  -MD  --       Dynamic Gait Index (DGI)    Gait Level Surface  3  -MD  --  --    Change in Gait Speed  3  -MD  --  --    Gait with Horizontal Head Turns  3  -MD  --  --    Gait with Vertical Head Turns  2  -MD  --  --    Gait and Pivot Turn  3  -MD  --  --    Step Over Obstacle  3  -MD  --  --    Step Around Obstacles  3  -MD  --  --    Steps  3  -MD  --  --    Dynamic Gait Index Score  23  -MD  --  --       Functional Assessment    Outcome Measure Options  Dynamic Gait Index  -MD  Ariza Balance  -MD  --    Row Name 04/15/19 1600             How much difficulty does the patient currently have...    Turning from your back to your side while in flat bed without using bedrails?  4  -JM      Standing up from a chair using your arms (e.g., wheelchair, bedside chair)?  3  -JM      Moving from lying on back to sitting on the side of a flat bed without bedrails?  4  -JM         How much help from another person do you currently need...    Moving to and from a bed to a chair (including a wheelchair)?  3  -JM      Climbing 3-5 steps with a railing?  3  -JM      To walk in hospital room?  3  -JM      AM-[facility] 6 Clicks Score  20  -JM        User Key  (r) = Recorded By, (t) = Taken By, (c) = Cosigned By    Initials Name Provider Type    Jemma Vasquez, PT Physical Therapist    Chelo Andrews PTA Physical Therapy  Assistant             Time Calculation:     Time Calculation- OT     Row Name 04/18/19 1549 04/18/19 0830          Time Calculation- OT    OT Start Time  1230  -DN  0830  -DN     OT Stop Time  1300  -DN  0900  -DN     OT Time Calculation (min)  30 min  -DN  30 min  -DN     OT Received On  04/18/19  -DN  04/18/19  -DN       User Key  (r) = Recorded By, (t) = Taken By, (c) = Cosigned By    Initials Name Provider Type    Maximo Matute OT Occupational Therapist          Therapy Charges for Today     Code Description Service Date Service Provider Modifiers Qty    97500336437 HC OT EVAL LOW COMPLEXITY 2 4/17/2019 Maximo Oscar OT GO 1    48891148135 HC OT SELF CARE/MGMT/TRAIN EA 15 MIN 4/17/2019 Maximo Oscar OT GO 2    22948370946 HC OT SELF CARE/MGMT/TRAIN EA 15 MIN 4/18/2019 Maximo Oscar OT GO 1    74135429621 HC OT THER PROC EA 15 MIN 4/18/2019 Maximo Oscar OT GO 3                   Maximo Oscar OT  4/18/2019

## 2019-04-18 NOTE — PROGRESS NOTES
Inpatient Rehabilitation Functional Measures Assessment and Plan of Care    Plan of Care  Updated Problems/Interventions  Cognition    [ST] Executive Functions(Active)  Current Status(04/18/2019): Mild Impairment  Weekly Goal(04/25/2019): Follow therapy schedule I'ly  Discharge Goal: Improve executive function for increased independence at home.    Functional Measures  [facility] Eating:  [facility] Grooming: [facility] Bathing:  [facility] Upper Body Dressing:  [facility] Lower Body Dressing:  [facility] Toileting:  [facility] Bladder Management  Level of Assistance:  [place]  Frequency/Number of Accidents this Shift:  [facility] Bowel Management  Level of Assistance: [place]  Frequency/Number of Accidents this Shift: [facility] Bed/Chair/Wheelchair Transfer:  [facility] Toilet Transfer:  [facility] Tub/Shower Transfer:  [place]    Previously Documented Mode of Locomotion at Discharge: Field  LISA Expected Mode of Locomotion at Discharge: [facility] Walk/Wheelchair:  [facility] Stairs:  [facility] Comprehension:  Auditory comprehension is the usual mode. Comprehension  Score = 7, Independent.  Patient comprehends complex/abstract information in  their primary language.  Patient is completely independent for auditory  comprehension.  There are no activity limitations.  LISA Expression:  Vocal expression is the usual mode. Expression Score = 7,  Independent.  Patient expresses complex/abstract information in their primary  language.  Patient is completely independent for vocal expression.  There are no  activity limitations.  LISA Social Interaction:  Social Interaction Score = 7, Independent. Patient is  completely independent for social interaction.  There are no activity  limitations.  LISA Problem Solving:  Patient does not make appropriate decisions in order to  solve complex problems without assistance from a helper. Problem Solving Score =  5, Supervision.  Patient makes appropriate decisions in order  to solve routine  problems with directing only under stressful or unfamiliar conditions, but no  more than 10% of the time, for the following behavior(s):  LISA Memory:  Memory Score = 5, Supervision.  Patient recognizes and remembers  with prompting only under stressful or unfamiliar conditions, but no more than  10% of the time, for the following behavior(s):    Therapy Mode Minutes  Occupational Therapy: Branch  Physical Therapy: Branch  Speech Language Pathology:  Individual: 60 minutes.    Signed by: Aixa Atwood SLP

## 2019-04-18 NOTE — PLAN OF CARE
Problem: Patient Care Overview  Goal: Plan of Care Review  Outcome: Ongoing (interventions implemented as appropriate)   04/18/19 0316   Patient Care Overview   IRF Plan of Care Review progress ongoing, continue   Progress, Functional Goals demonstrating adequate progress   Coping/Psychosocial   Plan of Care Reviewed With patient   OTHER   Outcome Summary Pt. is calm and cooperative. Denied any pain. A&O X 4. Forgetful sometimes. Takes Meds whole with water. Continent of B&B. No use call light sometimes at night. RN talked with Pt. about safety issues. Refused to use SCDs after MN. No further issues to note at this time. Will continue to monitor.     Goal: Coping Plan  Outcome: Ongoing (interventions implemented as appropriate)   04/18/19 0316   Coping Plan   Demonstration of Effective Coping Strategies progressing to functional independence       Problem: Fall Risk (Adult)  Goal: Absence of Fall  Outcome: Ongoing (interventions implemented as appropriate)   04/18/19 0316   Fall Risk (Adult)   Absence of Fall making progress toward outcome

## 2019-04-18 NOTE — PROGRESS NOTES
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  Treatment plan - Temodar/XRT. Radiation therapy simulation April 22.   Steroids - per Neuro-oncology consult April 17 - he should start slow callum of Decadron from 4 mg Q6H to 6 mg bid for one week then down to 4 mg bid until Radiation Oncology starts his next taper  DVT prophylaxis - SCDs  Seizure prophylaxis - Keppra  GI prophylaxis - Pepcid  - Flomax              Subjective     History of Present Illness    Subjective  He denies any significant headache.  Takes Tylenol.  Tolerates therapies.  Feels making progress.  Still in coordination on the left side.  History taken from: patient    Objective     Vital Signs  Temp:  [97.3 °F (36.3 °C)-98.1 °F (36.7 °C)] 98.1 °F (36.7 °C)  Heart Rate:  [55-66] 55  Resp:  [16-18] 18  BP: (101-119)/(59-67) 101/59    Objective  MENTAL STATUS -  AWAKE / ALERT  HEENT-    LUNGS - CTA, NO WHEEZES, RALES OR RHONCHI  HEART- RRR, NO RUB, MURMUR, OR GALLOP  ABD - NORMOACTIVE BOWEL SOUNDS, SOFT, NT.    EXT - NO EDEMA OR CYANOSIS  NEURO -   MOTOR EXAM -    takes resistance in bilateral upper extremities and lower extremity's.  Incoordination of the left upper extremity.  Fairly good standing balance    Results Review:     I reviewed the patient's new clinical results.    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    Treatment plan - Temodar/XRT. Radiation therapy simulation April 22.     Steroids - per Neuro-oncology consult April 17 - recommend should start slow callum of Decadron from 4 mg Q6H to 6 mg bid for one week then down to 4 mg bid until Radiation Oncology starts his next taper  April 18 - discussed with Neurosurgery, who will outline taper schedule    DVT prophylaxis - SCDs    Seizure prophylaxis - Keppra    GI prophylaxis - Pepcid    - Flomax    April 18-  BNE (Active)  Att'n. - Min Imp., two moments of distractibility  Exec. Fx. - WNL  Rsng/Jgmnt - WNL  Arith - WNL  Visuospatial Skills - WNL  Visual Mem. - WNL  Verbal Mem. - Mildly Imp.  Emot - Pt described mild frustration related to physical symptoms  Mobility/activity daily living at a modified independent-standby assist level.  Gait 300 feet supervision.  Anticipate discharge home tomorrow.  Family conference tomorrow at 11 AM.    Jeffrey Moses MD  04/18/19  11:29 AM    Time:

## 2019-04-18 NOTE — PROGRESS NOTES
Case Management  Inpatient Rehabilitation Plan of Care and Discharge Plan Note    Rehabilitation Diagnosis:  Branch  Date of Onset:  Branch    Medical Summary:  Branch  Past Medical History: Branch    Plan of Care  Updated Problems/Interventions  Field    Expected Intensity:  Branch  Interdisciplinary Team:  Daysi  Estimated Length of Stay/Anticipated Discharge Date: Branch  Anticipated Discharge Destination:  Anticipated discharge destination from inpatient rehabilitation is community  discharge with assistance. Pt lives with his wife in a quad level home 3-4 steps  to enter and 8 steps to full bath. Pt will have 24 hour assistance at d/c.  Family conference 4/19 @ 11:00.      Based on the patient's medical and functional status, their prognosis and  expected level of functional improvement is:  Daysi    Signed by: CHANTELL Holloway

## 2019-04-18 NOTE — THERAPY TREATMENT NOTE
Inpatient Rehabilitation - Speech Language Pathology Treatment Note    [facility]       Patient Name: Hernandez Waterman  : 1955  MRN: [id number]    Today's Date: 2019           Admit Date: 2019      Visit Dx:      No diagnosis found.    Patient Active Problem List   Diagnosis   • Pure hypercholesterolemia   • Testicular hypofunction   • ED (erectile dysfunction)   • Frequency of urination   • Other hyperlipidemia   • Nocturia   • Primary insomnia   • Chronic fatigue   • Night sweat   • Acute intractable headache   • 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 (CMS/HCC)   • Cerebral edema (CMS/HCC)   • GBM (glioblastoma multiforme) (CMS/HCC)   • Leukemoid reaction   • Anemia   • Constipation   • Glioblastoma multiforme (CMS/HCC)          Therapy Treatment    Evaluation/Coping    Evaluation/Treatment Time and Intent  Subjective Information: no complaints (19 1030 : Aixa Atwood MA,CCC-SLP)  Existing Precautions/Restrictions: fall (19 1030 : Aixa Atwood MA,CCC-SLP)  Document Type: therapy note (daily note) (19 1030 : Aixa Atwood MA,CCC-SLP)  Mode of Treatment: speech-language pathology (19 1030 : Aixa Atwood MA,CCC-SLP)    Vitals/Pain/Safety         Cognition/Communication         Oral Motor/Eating         Mobility/Basic Activities/Instrumental Activities/Motor/Modality                   ROM/MMT                   Sensory/Myotome/Dermatome/Edema               Posture/Balance/Special Tests/Exercise/Transportation/Sexual Function                   Orthotics/Residual Limb/Prosthetic Management              Outcome Summary         EDUCATION    The patient has been educated in the following areas:     Cognitive Impairment.    SLP Recommendation and Plan    SLP Diagnosis: Pt scored WFL on CLQT and informal measures. ST administered informal cognitive-linguistic assessment.  Results were as follows:  orientation 100% acc, immediate verbal [id number]% acc,  item  exclusion 90%, sequencing 100%, auditory processing 90%, problem solving 100%, word problems 100%, reasoning of similarities and differences 100%.  Recommend further diagnostic therapy for higher level function.    SLP Diagnosis: Pt scored WFL on CLQT and informal measures. ST administered informal cognitive-linguistic assessment.  Results were as follows:  orientation 100% acc, immediate verbal [id number]% acc,  item exclusion 90%, sequencing 100%, auditory processing 90%, problem solving 100%, word problems 100%, reasoning of similarities and differences 100%.  Recommend further diagnostic therapy for higher level function.    Rehab Potential/Prognosis: good         Anticipated Dischage Disposition: home with assist              Predicted Duration Therapy Intervention (Days): until discharge           Plan of Care Reviewed With: patient      SLP GOALS     Row Name 04/18/19 1100 04/17/19 1130          Reasoning Goal 1 (SLP)    Improve Reasoning Through Goal 1 (SLP)  --  complete high level reasoning task;complete deductive reasoning task;complete mental flexibility task;complete logic/creative thinking task;90%;independently (over 90% accuracy)  -OC     Time Frame (Reasoning Goal 1, SLP)  --  by discharge  -OC        Functional Problem Solving Skills Goal 1 (SLP)    Improve Problem Solving Through Goal 1 (SLP)  --  determine solutions to complex problems;determine solutions to multifactorial problems;determine multiple solutions to problems;complete organization/home management task;90%;independently (over 90% accuracy)  -OC     Time Frame (Problem Solving Goal 1, SLP)  --  by discharge  -OC     Progress (Problem Solving Goal 1, SLP)  40%;other (comment) deductive scheduling  -OC  --     Progress/Outcomes (Problem Solving Goal 1, SLP)  continuing progress toward goal  -OC  --     Comment (Problem Solving Goal 1, SLP)  following written directions 100% I'ly  -OC  --        Functional Problem Solving Skills Goal 2  (SLP)    Improve Problem Solving Through Goal 2 (SLP)  --  determine solutions to complex problems;determine solutions to multifactorial problems;determine multiple solutions to problems;complete organization/home management task;90%;independently (over 90% accuracy)  -OC     Time Frame (Problem Solving Goal 2, SLP)  --  by discharge  -OC     Progress (Problem Solving Goal 2, SLP)  100%;independently (over 90% accuracy);other (comment)  -OC  --     Progress/Outcomes (Problem Solving Goal 2, SLP)  good progress toward goal  -OC  --     Comment (Problem Solving Goal 2, SLP)  making change with money  -OC  --        Functional Math Skills Goal 1 (SLP)    Improve Functional Math Skills Through Goal 1 (SLP)  --  complete word problems involving money;complete word problems involving time;complete functional math task;90%;independently (over 90% accuracy)  -OC     Time Frame (Functional Math Skills Goal 1, SLP)  --  by discharge  -OC        Executive Functional Skills Goal 1 (SLP)    Improve Executive Function Skills Goal 1 (SLP)  --  demonstrate awareness of deficit;organization/planning activity;time management activity;complex organization/planning activity;realistic goal setting;90%;independently (over 90% accuracy)  -OC     Time Frame (Executive Function Skills Goal 1, SLP)  by discharge  -OC  --     Progress (Executive Function Skills Goal 1, SLP)  60%;with minimal cues (75-90%);80%;with moderate cues (50-74%) deductive reasoning task  -OC  --     Progress/Outcomes (Executive Function Skills Goal 1, SLP)  good progress toward goal  -OC  --     Comment (Executive Function Skills Goal 1, SLP)  sequential thought, filling in letters 70% I'ly  -OC  --        Executive Functional Skills Goal 2 (SLP)    Improve Executive Function Skills Goal 2 (SLP)  --  home management activity;exhibit cognitive flexibility;perform self-correction;perform self-evaluation;90%;independently (over 90% accuracy)  -OC     Time Frame  (Executive Function Skills Goal 2, SLP)  by discharge  -OC  --       User Key  (r) = Recorded By, (t) = Taken By, (c) = Cosigned By    Initials Name Provider Type    [facility] Speech and Language Pathologist             SLP Outcome Measures (last 72 hours)      SLP Outcome Measures     Row Name 04/17/19 1130             SLP Outcome Measures    Outcome Measure Used?  Adult NOMS  -OC         Adult FCM Scores    FCM Chosen  Problem Solving  -OC      Problem Solving Score FCM  6  -OC        User Key  (r) = Recorded By, (t) = Taken By, (c) = Cosigned By    Initials Name Effective Dates    Aixa Sands MA, CCC-SLP 06/08/18 -               Time Calculation:       Time Calculation- SLP     Row Name 04/18/19 1132 04/18/19 1000          Time Calculation- SLP    SLP Start Time  1030  -OC  0930  -OC     SLP Stop Time  1100  -OC  1000  -OC     SLP Time Calculation (min)  30 min  -OC  30 min  -OC       User Key  (r) = Recorded By, (t) = Taken By, (c) = Cosigned By    Initials Name Provider Type    [facility] Speech and Language Pathologist            Therapy Charges for Today     Code Description Service Date Service Provider Modifiers Qty    96951893425 HC ST STD COG PERF TEST PER HOUR 4/17/2019 Aixa Atwood [facility]-SLP GN 2    85514956518 HC ST DEV OF COGN SKILLS EACH 15 MIN 4/18/2019 Aixa Atwood [facility]-SLP  4               ADULT NOMS (last 72 hours)      Adult NOMS     Row Name 04/17/19 1130                   Adult FCM Scores    FCM Chosen  Problem Solving  -OC        Problem Solving Score FCM  6  -OC          User Key  (r) = Recorded By, (t) = Taken By, (c) = Cosigned By    Initials Name Effective Dates    Aixa Sands MA, CCC-SLP 06/08/18 -                      Aixa Atwood MA, CCC-SLP  4/18/2019

## 2019-04-18 NOTE — PLAN OF CARE
Problem: Patient Care Overview  Goal: Plan of Care Review  Outcome: Ongoing (interventions implemented as appropriate)   04/18/19 [facility]   Patient Care Overview   IRF Plan of Care Review progress ongoing, continue   Progress, Functional Goals demonstrating adequate progress   Coping/Psychosocial   Plan of Care Reviewed With patient   OTHER   Outcome Summary Ambulatory without device. Occasional headache relieved with tylenol. Intake is okay. meds whole with water. tolerated therapies. possible DC tomorrow.       Problem: Fall Risk (Adult)  Goal: Absence of Fall  Outcome: Ongoing (interventions implemented as appropriate)      Problem: Pain, Acute (Adult)  Goal: Acceptable Pain Control/Comfort Level  Outcome: Ongoing (interventions implemented as appropriate)      Problem: Nutrition, Imbalanced: Inadequate Oral Intake (Adult)  Goal: Prevent Further Weight Loss  Outcome: Ongoing (interventions implemented as appropriate)

## 2019-04-18 NOTE — PROGRESS NOTES
Inpatient Rehabilitation Functional Measures Assessment and Plan of Care    Plan of Care  Updated Problems/Interventions  Mobility    [PT] Bed/Chair/Wheelchair(Active)  Current Status(04/18/2019): supervision  Weekly Goal(04/19/2019): supervision  Discharge Goal: Supervision    [PT] Stairs(Active)  Current Status(04/18/2019): 12 steps, 2 HR, supervision  Weekly Goal(04/19/2019): PT only  Discharge Goal: 12 steps, 1 HR, Supervision    [PT] Walk(Active)  Current Status(04/18/2019): 300` supervision  Weekly Goal(04/19/2019): to and from BR supervision  Discharge Goal: 200`Supervision    Functional Measures  LISA Eating:  Branch  LISA Grooming: Branch  LISA Bathing:  Branch  LISA Upper Body Dressing:  Branch  LISA Lower Body Dressing:  Branch  [facility] Toileting:  Branch    LISA Bladder Management  Level of Assistance:  Branch  Frequency/Number of Accidents this Shift:  Branch    LISA Bowel Management  Level of Assistance: [place]  Frequency/Number of Accidents this Shift: Branch    LISA Bed/Chair/Wheelchair Transfer:  Bed/chair/wheelchair Transfer Score = 5.  Patient is supervision/set-up for transferring to and from the  bed/chair/wheelchair, requiring: No assistive devices were required.  LISA Toilet Transfer:  Branch  LISA Tub/Shower Transfer:  Branch    Previously Documented Mode of Locomotion at Discharge: Field  LISA Expected Mode of Locomotion at Discharge: Branch  [facility] Walk/Wheelchair:  WHEELCHAIR OBSERVATION   Activity was not observed.    WALK OBSERVATION   Walk Distance Scale = 3.  Distance walked is greater than 150 feet. Walk Score  = 5.  Patient requires supervision or set up for walking. Patient walked a  distance of  300 feet. No assistive devices were required.  LISA Stairs:  Stairs Score = 5.  Patient requires supervision or set up for  negotiating stairs. 12 stairs. Patient requires the following assistive  device(s): Handrail(s).    LISA Comprehension:  Branch  LISA Expression:  Branch  LISA Social Interaction:   Branch  [facility] Problem Solving:  Branch  [facility] Memory:  Branch    Therapy Mode Minutes  Occupational Therapy: Branch  Physical Therapy: Individual: 60 minutes.  Speech Language Pathology:  Branch    Signed by: Jemma Llanos, PT

## 2019-04-19 NOTE — PROGRESS NOTES
Inpatient Rehabilitation Plan of Care Note    Plan of Care  Care Plan Reviewed - No updates at this time.    Psychosocial    Performed Intervention(s)  encourage expression of feelings and concerns      Safety    Performed Intervention(s)  hourly rounding  items within reach      Pain    Performed Intervention(s)  meds PRN  Give medication as needed for pain  Assess pain level, notify with changes      Sphincter Control    Performed Intervention(s)  monitor intake and ouput      Body Systems    Performed Intervention(s)  monitor incision  monitor temperature  Inspect th incision every shift for drainage and s/s of infection    Signed by: Vernell Fink RN

## 2019-04-19 NOTE — PROGRESS NOTES
"Family conference held today with pt, pt's wife, PT,OT,ST ( report sent), RN and SW.  Discussed pt's current status and discharge recommendations.   PT reports pt is independent with bed mobility and sit-stand transfers. He ambulates greater than 300' without assistive device and supervision. He can ascend/descend 12 steps with rail or 8 steps without a rail and supervision only.  Pt has walked outside and on uneven surfaces with supervision only.    In OT, pt completes commode and shower transfers with supervision. He bathes, dresses and completes grooming and toileting tasks with SBA.    ST reports pt scored WNL on the CLQT and scored WNL on all cognitive domains verbally.     Nursing reviewed current medications and medical follow up with radiation oncology, oncology, and pt's PCP. Pt's incision is healing well and staples are out.  He is continent of bowel and bladder Wife asked what to do if pt has a seizure and nursing and therapists discussed protocol.     OT recommends pt use a a shower seat at home. No other DME is recommended at this time.  Pt has received a home exercise program from OT  Further PT,OT or ST is not recommended.     Discussed safety at home and encouraged pt to \"slow down\". He understands he is not able to drive until cleared by his MD and he should not ride his  bicycle for exercise at this time.    Pt expressed his gratitude to the team. Both he and his wife feel they are ready for discharge today.  "

## 2019-04-19 NOTE — PROGRESS NOTES
SECTION GG      Self Care Performance Discharge:   Oral Hygiene: Patient completed the activities by him/herself with no  assistance from a helper.   Toileting Hygiene: : Patient completed the activities by him/herself with no  assistance from a helper.   Shower/Bathe Self: Patient completed the activities by him/herself with no  assistance from a helper.   Upper Body Dressing: Patient completed the activities by him/herself with no  assistance from a helper.   Lower Body Dressing: Patient completed the activities by him/herself with no  assistance from a helper.   Putting On/Taking Off Footwear: Patient completed the activities by him/herself  with no assistance from a helper.    Mobility Toilet Transfer Discharge: Patient completed the activities by  him/herself with no assistance from a helper.    Signed by: NOLVIA Hatfield/ROSA

## 2019-04-19 NOTE — PROGRESS NOTES
SECTION GG    Eating Performance Discharge: Patient completed the activities by him/herself  with no assistance from a helper.    Signed by: Vernell Fink RN

## 2019-04-19 NOTE — PROGRESS NOTES
Inpatient Rehabilitation Functional Measures Assessment    Functional Measures  LISA Eating:  [facility] Grooming: [facility] Bathing:  [facility] Upper Body Dressing:  [facility] Lower Body Dressing:  [facility] Toileting:  [facility] Bladder Management  Level of Assistance:  [place]  Frequency/Number of Accidents this Shift:  [facility] Bowel Management  Level of Assistance: [place]  Frequency/Number of Accidents this Shift: [facility] Bed/Chair/Wheelchair Transfer:  [facility] Toilet Transfer:  [facility] Tub/Shower Transfer:  [place]    Previously Documented Mode of Locomotion at Discharge: Field  LISA Expected Mode of Locomotion at Discharge: [facility] Walk/Wheelchair:  [facility] Stairs:  [facility] Comprehension:  Auditory comprehension is the usual mode. Comprehension  Score = 7, Independent.  Patient comprehends complex/abstract information in  their primary language.  Patient is completely independent for auditory  comprehension.  There are no activity limitations.  LISA Expression:  Vocal expression is the usual mode. Expression Score = 7,  Independent.  Patient expresses complex/abstract information in their primary  language.  Patient is completely independent for vocal expression.  There are no  activity limitations.  LISA Social Interaction:  Social Interaction Score = 7, Independent. Patient is  completely independent for social interaction.  There are no activity  limitations.  LISA Problem Solving:  Problem Solving Score = 7, Independent.  Patient makes  appropriate decisions in order to solve complex problems.  Patient is completely  independent for problem solving.  There are no activity limitations.  LISA Memory:  Memory Score = 7, Independent.  Patient is completely independent  for memory.  There are no activity limitations.    Therapy Mode Minutes  Occupational Therapy: Branch  Physical Therapy: Branch  Speech Language Pathology:  Branch    Signed by: Vernell Fink RN

## 2019-04-19 NOTE — PLAN OF CARE
Problem: Patient Care Overview  Goal: Plan of Care Review  Outcome: Ongoing (interventions implemented as appropriate)   04/19/19 0144   Patient Care Overview   IRF Plan of Care Review progress ongoing, continue   Progress, Functional Goals demonstrating adequate progress   Coping/Psychosocial   Plan of Care Reviewed With patient   OTHER   Outcome Summary Pt. is calm and cooperative with staff. A&O X 4. Complained of any pain to head. Tylenol 650 mg PO PRN given at 2020, and then relieved well. Takes Meds whole with water. Family conference scheduled today at 11AM. Independent in the room. No alarm presented at night. Discharge is planning today. No new issues to note at this time.      Goal: Coping Plan  Outcome: Ongoing (interventions implemented as appropriate)   04/19/19 0144   Coping Plan   Demonstration of Effective Coping Strategies progressing to functional independence       Problem: Fall Risk (Adult)  Goal: Absence of Fall  Outcome: Ongoing (interventions implemented as appropriate)   04/19/19 0144   Fall Risk (Adult)   Absence of Fall making progress toward outcome       Problem: Pain, Acute (Adult)  Goal: Acceptable Pain Control/Comfort Level  Outcome: Ongoing (interventions implemented as appropriate)   04/19/19 0144   Pain, Acute (Adult)   Acceptable Pain Control/Comfort Level making progress toward outcome

## 2019-04-19 NOTE — PROGRESS NOTES
Inpatient Rehabilitation Plan of Care Note    Plan of Care  Care Plan Reviewed - No updates at this time.    Sphincter Control    Performed Intervention(s)  monitor intake and ouput    Signed by: Lonny Trevizo RN

## 2019-04-19 NOTE — DISCHARGE SUMMARY
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 Chemoradiation plan in progress.     HOSPITAL COURSE: Patient on evaluation was already a fairly high level in terms of mobility with some mild balance issues. It was felt that he would not need inpatient rehabilitation after today and he was discharged home in the care of his family with outpatient oncology appointment set for Monday, 04/22/2019. Patient will be on a weaning dose of Decadron. I have instructed the patient and family to give him one 6 mg Decadron tonight and then begin a twice a day regimen  by 12 hours for the remainder of the dose of 6-1/2 days.     DISCHARGE MEDICATIONS:  1. Tylenol 650 q.4 h. p.r.n.   2. TUMS as needed.   3. Keppra 500 take 1 tablet every 12 hours.   4. Zofran disintegrating tablet every 6 hours as needed.   5. Decadron as above 6 mg every 12 hours.  6. Ambien 5 mg at bedtime p.r.n.   7. Flomax 0.4 mg every evening.  8. Pepcid 20 mg 2 times daily.     CONDITION: Stable.

## 2019-04-19 NOTE — PROGRESS NOTES
Case Management  Inpatient Rehabilitation Team Conference    Conference Date/Time: 4/19/2019 7:32:55 AM    Team Conference Attendees:  Dr. Felipe Orourke, Pharmacist  Lyly Cabrera, UMUW  Jemma Llanos, PT  Maximo Oscar, OT  Aixa Atwood, SLP  Kie Ballard, RN  Vernell Fink, RN    Demographics            Age: 63Y            Gender: Male    Admission Date: 4/16/2019 3:48:00 PM  Rehabilitation Diagnosis:  Crani for tumor resection  Past Medical History: None      Plan of Care  Anticipated Discharge Date/Estimated Length of Stay: ELOS: 2 weeks  Anticipated Discharge Destination: Community discharge with assistance  Discharge Plan : Pt lives with his wife in a quad level home 3-4 steps to enter  and 8 steps to full bath. Pt will have 24 hour assistance at d/c. Family  conference 4/19 @ 11:00.  Medical Necessity Expected Level Rationale: MOD I  Intensity and Duration: an average of 3 hours/5 days per week  Medical Supervision and 24 Hour Rehab Nursing: x  Physical Therapy: x  PT Intensity/Duration: 60 minutes/day, 5 days/week  Occupational Therapy: x  OT Intensity/Duration: 60 minutes/day, 5 days/week  Speech and Language Therapy: x  SLP Intensity/Duration: 60 minutes/day, 5 days/week  Social Work: x  Therapeutic Recreation: x  Psychology: x  Updated (if changes indicated)    Anticipated Discharge Date/Estimated Length of Stay:   ELOS: DC 4/19    Based on the patient's medical and functional status, their prognosis and  expected level of functional improvement is: MOD I      Interdisciplinary Problem/Goals/Status    All Rehab Problems:  Body Systems    [RN] Integumentary(Active)  Current Status(04/15/2019): incision  healing with no infection  Weekly Goal(04/22/2019): Continues to heal with no infection  Discharge Goal: Incision will be healed 100%        Cognition    [ST] Executive Functions(Active)  Current Status(04/18/2019): Mild Impairment  Weekly Goal(04/25/2019): Follow therapy schedule  I'ly  Discharge Goal: Improve executive function for increased independence at home.        Mobility    [PT] Bed/Chair/Wheelchair(Active)  Current Status(04/18/2019): supervision  Weekly Goal(04/19/2019): supervision  Discharge Goal: Supervision    [PT] Stairs(Active)  Current Status(04/18/2019): 12 steps, 2 HR, supervision  Weekly Goal(04/19/2019): PT only  Discharge Goal: 12 steps, 1 HR, Supervision    [PT] Walk(Active)  Current Status(04/18/2019): 300` supervision  Weekly Goal(04/19/2019): to and from BR supervision  Discharge Goal: 200`Supervision    [OT] Toilet Transfers(Active)  Current Status(04/18/2019): SBA/Mod Indep  Weekly Goal(04/19/2019): Mod Indep  Discharge Goal: Mod Indep    [OT] Tub/Shower Transfers(Active)  Current Status(04/18/2019): SBA  Weekly Goal(04/19/2019): SBA  Discharge Goal: SBA        Pain    [RN] Pain Management(Active)  Current Status(04/16/2019): Patient is experiencing headache due to brain tumor  and craniotomy.  Weekly Goal(04/23/2019): Patients pain will be well controlled.  Discharge Goal: Patients pain will be well controlled.        Psychosocial    [RN] Coping/Adjustment(Active)  Current Status(04/16/2019): Patient is at risk for pyschosocial issues related  to new diagnosis of Glioblastoma.  Weekly Goal(04/23/2019): Patient will demonstrate appropriate coping and be free  from pyschosocial issues.  Discharge Goal: Same as weekly.        Safety    [RN] Potential for Injury(Active)  Current Status(04/16/2019): Patient is at risk for falls related to decrease in  mobility due to recent surgery and new diagnosis.  Weekly Goal(04/23/2019): Patient will be free from falls and demonstrate  apporpriate safety techniques.  Discharge Goal: Same as weekly.        Self Care    [OT] Bathing(Active)  Current Status(04/18/2019): CGA refused 4/18  Weekly Goal(04/19/2019): Mod Indep  Discharge Goal: Mod Indep    [OT] Dressing (Lower)(Active)  Current Status(04/18/2019): SBA  Weekly  Goal(04/19/2019): Mod Indep  Discharge Goal: Mod Indep    [OT] Dressing (Upper)(Active)  Current Status(04/18/2019): SBA  Weekly Goal(04/19/2019): SBA  Discharge Goal: SBA    [OT] Grooming(Active)  Current Status(04/18/2019): Mod  Indep  Weekly Goal(04/19/2019): Mod Indep  Discharge Goal: Mod Indep    [OT] Toileting(Active)  Current Status(04/18/2019): SBA  Weekly Goal(04/19/2019): Mod Indep  Discharge Goal: Mod Indep        Sphincter Control    [RN] Bowel and Bladder Management(Active)  Current Status(04/16/2019): Patient is continent of bowel and bladder 100% of  the time.  Weekly Goal(04/23/2019): Patient will be continue to be continent of bowel and  bladder and be free from any elimination issues.  Discharge Goal: Same as weekly.        Comments: 4/19: indep in room yesterday;    Signed by: Kei Ballard RN    Physician CoSigned By: Felipe Demarco 04/19/2019 08:46:57

## 2019-04-19 NOTE — PROGRESS NOTES
"Adult Nutrition  Assessment/PES    Patient Name:  Hernandez Waterman  YOB: 1955  MRN: [id number]  Admit Date:  4/16/2019    Assessment Date:  4/19/2019    Comments:  Met with pt & wife before discharge today. She had asked about nutrition info for neoplastic disease when we met 2 days ago. I provided them with a 30-page \"Heal Well\" guide as recommended by our OP oncology dietitian, Berenice Leiva. I provided her contact info as well and explained her availability to help them during his treatment, if needed.     Reason for Assessment     Row Name 04/19/19 1218          Reason for Assessment    Reason For Assessment  follow-up protocol           Problem/Interventions:    Problem 2     Row Name 04/19/19 1220          Nutrition Diagnoses Problem 2    Problem 2  Knowledge Deficit     Etiology (related to)  Medical Diagnosis     Oncology  Head/neck cancer GBM     Signs/Symptoms (evidenced by)  Reported Information Deficit           Intervention Goal     Row Name 04/19/19 1221          Intervention Goal    General  Provide information regarding MNT for treatment/condition             Education/Evaluation     Row Name 04/19/19 1221          Education    Education  Other (comment) Met with pt & wife before DC. Provided Heal Well Guide (30 pages) and OP oncology RD info. He will begin tx at [facility] soon.            Electronically signed by:  Binta Knapp RD  04/19/19 12:22 PM  "

## 2019-04-19 NOTE — THERAPY DISCHARGE NOTE
Inpatient Rehabilitation - Occupational Therapy Treatment Note/Discharge  [facility]     Patient Name: Hernandez Waterman  : 1955  MRN: [id number]  Today's Date: 2019               Admit Date: 2019    Visit Dx:   No diagnosis found.  Patient Active Problem List   Diagnosis   • Pure hypercholesterolemia   • Testicular hypofunction   • ED (erectile dysfunction)   • Frequency of urination   • Other hyperlipidemia   • Nocturia   • Primary insomnia   • Chronic fatigue   • Night sweat   • Acute intractable headache   • 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 (CMS/HCC)   • Cerebral edema (CMS/HCC)   • GBM (glioblastoma multiforme) (CMS/HCC)   • Leukemoid reaction   • Anemia   • Constipation   • Glioblastoma multiforme (CMS/HCC)       Therapy Treatment  IRF Treatment Summary     Row Name 19 1135             Evaluation/Treatment Time and Intent    Subjective Information  no complaints  -DN      Existing Precautions/Restrictions  fall  -DN      Document Type  discharge evaluation/summary;discharge treatment  -DN      Mode of Treatment  occupational therapy  -DN      Patient/Family Observations  pt declined adls this am however wife states independent with bathing and dressing, toileting since breakfast, and states she has no concerns  -DN      Recorded by [DN] Maximo Oscar, OT      Row Name 19 1135             Toilet Transfer    Type (Toilet Transfer)  stand pivot/stand step  -DN      [place] Level (Toilet Transfer)  conditional independence  -DN      Assistive Device (Toilet Transfer)  raised toilet seat  -DN      Recorded by [DN] Maximo Oscar, OT      Row Name 19 1135             Bathtub Transfer    Type (Bathtub Transfer)  stand pivot/stand step  -DN      [place] Level (Bathtub Transfer)  supervision  -DN      Assistive Device (Bathtub Transfer)  shower chair  -DN      Recorded by [DN] Maximo Oscar, OT      Row Name 19 1135             Bathing  Assessment/Treatment    Bathing [place] Level  bathing skills;conditional independence  -DN      Comment (Bathing)  all per wife and pt report  -DN      Recorded by [CHANNING] Maximo Oscar OT      Row Name 04/19/19 1135             Upper Body Dressing Assessment/Treatment    Upper Body Dressing Task  upper body dressing skills;conditional independence  -DN      Recorded by [CHANNING] Maximo Oscar OT      Row Name 04/19/19 1135             Lower Body Dressing Assessment/Treatment    Lower Body Dressing [place] Level  doff;don;pants/bottoms;socks;shoes/slippers;underwear;conditional independence  -DN      Recorded by [CHANNING] Maximo Oscar OT      Row Name 04/19/19 1135             Grooming Assessment/Treatment    Grooming [place] Level  grooming skills;conditional independence  -DN      Recorded by [CHANNING] Maximo Oscar OT      Row Name 04/19/19 1135             Toileting Assessment/Treatment    Toileting [place] Level  toileting skills;adjust/manage clothing;perform perineal hygiene;conditional independence  -DN      Recorded by [CHANNING] Maximo Oscar OT      Row Name 04/19/19 1135             Positioning and Restraints    Pre-Treatment Position  sitting in chair/recliner  -DN      Recorded by [CHANNING] Maximo Oscar OT        User Key  (r) = Recorded By, (t) = Taken By, (c) = Cosigned By    Initials Name Effective Dates    DN Maximo Oscar OT 06/08/18 -           Wound 04/07/19 1233 Right head incision (Active)   Dressing Appearance dry;intact 4/18/2019  8:20 PM   Closure Other (Comment);Sutures 4/19/2019  8:26 AM   Base clean;dry 4/19/2019  8:26 AM   Periwound intact;dry 4/18/2019  8:20 PM   Periwound Temperature warm 4/18/2019  8:20 PM   Periwound Skin Turgor soft 4/18/2019  8:20 PM   Drainage Amount none 4/18/2019  8:20 PM   Dressing Care, Wound open to air 4/19/2019  8:26 AM         OT IRF GOALS     Row Name 04/19/19 1100 04/17/19 1640          Bathing Goal 1 (OT-IRF)    Activity/Device (Bathing Goal 1,  OT-IRF)  bathing skills, all  -DN  bathing skills, all;hand-held shower spray hose;shower chair  -DN     [place] Level (Bathing Goal 1, OT-IRF)  conditional independence  -DN  supervision required  -DN     Time Frame (Bathing Goal 1, OT-IRF)  --  short term goal (STG)  -DN     Progress/Outcomes (Bathing Goal 1, OT-IRF)  goal met  -DN  continuing progress toward goal  -DN        Bathing Goal 2 (OT-IRF)    Activity/Device (Bathing Goal 2, OT-IRF)  bathing skills, all  -DN  bathing skills, all;hand-held shower spray hose;shower chair  -DN     [place] Level (Bathing Goal 2, OT-IRF)  conditional independence  -DN  supervision required  -DN     Time Frame (Bathing Goal 2, OT-IRF)  --  long term goal (LTG)  -DN     Progress/Outcomes (Bathing Goal 2, OT-IRF)  goal met  -DN  continuing progress toward goal  -DN        UB Dressing Goal 1 (OT-IRF)    Activity/Device (UB Dressing Goal 1, OT-IRF)  upper body dressing  -DN  upper body dressing  -DN     [place] (UB Dress Goal 1, OT-IRF)  conditional independence  -DN  supervision required  -DN     Time Frame (UB Dressing Goal 1, OT-IRF)  --  short term goal (STG)  -DN     Progress/Outcomes (UB Dressing Goal 1, OT-IRF)  goal met  -DN  continuing progress toward goal  -DN        UB Dressing Goal 2 (OT-IRF)    Activity/Device (UB Dressing Goal 2, OT-IRF)  upper body dressing  -DN  upper body dressing  -DN     [place] (UB Dress Goal 2, OT-IRF)  conditional independence  -DN  supervision required  -DN     Time Frame (UB Dressing Goal 2, OT-IRF)  --  long term goal (LTG)  -DN     Progress/Outcomes (UB Dressing Goal 2, OT-IRF)  goal met  -DN  continuing progress toward goal  -DN        LB Dressing Goal 1 (OT-IRF)    Activity/Device (LB Dressing Goal 1, OT-IRF)  lower body dressing  -DN  lower body dressing  -DN     [place] (LB Dressing Goal 1, OT-IRF)  conditional independence  -DN  supervision required  -DN     Time Frame (LB Dressing Goal 1, OT-IRF)  --  short  term goal (STG)  -DN     Progress/Outcomes (LB Dressing Goal 1, OT-IRF)  goal met  -DN  continuing progress toward goal  -DN        LB Dressing Goal 2 (OT-IRF)    Activity/Device (LB Dressing Goal 2, OT-IRF)  lower body dressing  -DN  lower body dressing  -DN     [place] (LB Dressing Goal 2, OT-IRF)  conditional independence  -DN  supervision required  -DN     Time Frame (LB Dressing Goal 2, OT-IRF)  --  long term goal (LTG)  -DN     Progress/Outcomes (LB Dressing Goal 2, OT-IRF)  goal met  -DN  continuing progress toward goal  -DN        Grooming Goal 1 (OT-IRF)    Activity/Device (Grooming Goal 1, OT-IRF)  grooming skills, all  -DN  grooming skills, all  -DN     [place] (Grooming Goal 1, OT-IRF)  conditional independence  -DN  supervision required  -DN     Time Frame (Grooming Goal 1, OT-IRF)  --  short term goal (STG)  -DN     Progress/Outcomes (Grooming Goal 1, OT-IRF)  goal met  -DN  continuing progress toward goal  -DN        Grooming Goal 2 (OT-IRF)    Activity/Device (Grooming Goal 2, OT-IRF)  grooming skills, all  -DN  grooming skills, all  -DN     [place] (Grooming Goal 2, OT-IRF)  conditional independence  -DN  supervision required  -DN     Time Frame (Grooming Goal 2, OT-IRF)  --  long term goal (LTG)  -DN     Progress/Outcomes (Grooming Goal 2, OT-IRF)  goal met  -DN  continuing progress toward goal  -DN        Toileting Goal 1 (OT-IRF)    Activity/Device (Toileting Goal 1, OT-IRF)  toileting skills, all  -DN  toileting skills, all  -DN     [place] Level (Toileting Goal 1, OT-IRF)  conditional independence  -DN  supervision required  -DN     Time Frame (Toileting Goal 1, OT-IRF)  --  short term goal (STG)  -DN     Progress/Outcomes (Toileting Goal 1, OT-IRF)  goal met  -DN  continuing progress toward goal  -DN        Toileting Goal 2 (OT-IRF)    Activity/Device (Toileting Goal 2, OT-IRF)  toileting skills, all  -DN  toileting skills, all  -DN     [place] Level (Toileting Goal 2,  OT-IRF)  conditional independence  -DN  supervision required  -DN     Time Frame (Toileting Goal 2, OT-IRF)  --  long term goal (LTG)  -DN     Progress/Outcomes (Toileting Goal 2, OT-IRF)  goal met  -DN  continuing progress toward goal  -DN        Caregiver Training Goal 2 (OT-IRF)    Caregiver Training Goal 2 (OT-IRF)  --  pt wife to be independent with vc needed for pt supervision in d/c environment for adls and functional transfers  -DN     Time Frame (Caregiver Training Goal 2, OT-IRF)  --  long term goal (LTG)  -DN     Progress/Outcomes (Caregiver Training Goal 2, OT-IRF)  --  continuing progress toward goal  -DN       User Key  (r) = Recorded By, (t) = Taken By, (c) = Cosigned By    Initials Name Provider Type    Maximo Mtaute OT Occupational Therapist          Occupational Therapy Education     Title: PT OT SLP Therapies (Done)     Topic: Occupational Therapy (Done)     Point: ADL training (Done)     Description: Instruct learner(s) on proper safety adaptation and remediation techniques during self care or transfers.   Instruct in proper use of assistive devices.    Learning Progress Summary           Patient Acceptance, E, VU by DN at 4/19/2019 11:39 AM    Comment:  pt and wife attended family conferance to discuss current status with adls, commode/tub transfers, HEP and no need for further OT after d/c.    Acceptance, E, VU,DU by DN at 4/18/2019  3:48 PM    Comment:  tub transfer to shower chair with back, BUE HEP    Acceptance, E, VU,NR by DN at 4/17/2019  5:03 PM    Comment:  pt wife educated on OT Role and supervision needed during adls and transfers due to balance and sequencing issues noted during eval   Family Acceptance, E, VU,NR by DN at 4/17/2019  5:03 PM    Comment:  pt wife educated on OT Role and supervision needed during adls and transfers due to balance and sequencing issues noted during eval   Significant Other Acceptance, E, VU by DN at 4/19/2019 11:39 AM    Comment:  pt and wife  attended family conferance to discuss current status with adls, commode/tub transfers, HEP and no need for further OT after d/c.                   Point: Precautions (Done)     Description: Instruct learner(s) on prescribed precautions during self-care and functional transfers.    Learning Progress Summary           Patient Acceptance, E, VU by DN at 4/19/2019 11:39 AM    Comment:  pt and wife attended family conferance to discuss current status with adls, commode/tub transfers, HEP and no need for further OT after d/c.    Acceptance, E, VU,DU by DN at 4/18/2019  3:48 PM    Comment:  tub transfer to shower chair with back, BUE HEP    Acceptance, E, VU,NR by DN at 4/17/2019  5:03 PM    Comment:  pt wife educated on OT Role and supervision needed during adls and transfers due to balance and sequencing issues noted during eval   Family Acceptance, E, VU,NR by DN at 4/17/2019  5:03 PM    Comment:  pt wife educated on OT Role and supervision needed during adls and transfers due to balance and sequencing issues noted during eval   Significant Other Acceptance, E, VU by DN at 4/19/2019 11:39 AM    Comment:  pt and wife attended family conferance to discuss current status with adls, commode/tub transfers, HEP and no need for further OT after d/c.                   Point: Body mechanics (Done)     Description: Instruct learner(s) on proper positioning and spine alignment during self-care, functional mobility activities and/or exercises.    Learning Progress Summary           Patient Acceptance, E, VU by DN at 4/19/2019 11:39 AM    Comment:  pt and wife attended family conferance to discuss current status with adls, commode/tub transfers, HEP and no need for further OT after d/c.    Acceptance, E, VU,DU by DN at 4/18/2019  3:48 PM    Comment:  tub transfer to shower chair with back, BUE HEP    Acceptance, E, VU,NR by DN at 4/17/2019  5:03 PM    Comment:  pt wife educated on OT Role and supervision needed during adls and  transfers due to balance and sequencing issues noted during eval   Family Acceptance, LORIE GOODMNA,NR by CHANNING at 4/17/2019  5:03 PM    Comment:  pt wife educated on OT Role and supervision needed during adls and transfers due to balance and sequencing issues noted during eval   Significant Other Acceptance, LORIE GOODMAN by CHANNING at 4/19/2019 11:39 AM    Comment:  pt and wife attended family conferance to discuss current status with adls, commode/tub transfers, HEP and no need for further OT after d/c.                               User Key     Initials Effective Dates Name Provider Type Discipline    CHANNING 06/08/18 -  Maximo Oscar, OT Occupational Therapist OT                  OT Recommendation and Plan  Anticipated Equipment Needs At Discharge (OT Eval): shower chair  Anticipated Discharge Disposition (OT): home          Outcome Measures     Row Name 04/18/19 1400 04/18/19 0900 04/16/19 1400       How much difficulty does the patient currently have...    Turning from your back to your side while in flat bed without using bedrails?  --  --  4  -JM    Standing up from a chair using your arms (e.g., wheelchair, bedside chair)?  --  --  3  -JM    Moving from lying on back to sitting on the side of a flat bed without bedrails?  --  --  4  -JM       How much help from another person do you currently need...    Moving to and from a bed to a chair (including a wheelchair)?  --  --  3  -JM    Climbing 3-5 steps with a railing?  --  --  3  -JM    To walk in hospital room?  --  --  3  -JM    AM-PAC 6 Clicks Score  --  --  20  -JM       Ariza Balance Scale    Sitting to Standing  --  4  -MD  --    Standing Unsupported  --  4  -MD  --    Sitting with Back Unsupported but Feet Supported on Floor or on Stool  --  4  -MD  --    Standing to Sitting  --  4  -MD  --    Transfers  --  4  -MD  --    Standing Unsupported with Eyes Closed  --  4  -MD  --    Standing Unsupported with Feet Together  --  4  -MD  --    Reaching Forward with Outstretched Arm  While Standing  --  3  -MD  --     Object From the Floor From a Standing Position  --  4  -MD  --    Turning to Look Behind Over Left and Right Shoulders While Standing  --  4  -MD  --    Turn 360 Degrees  --  4  -MD  --    Place Alternate Foot on Step or Stool While Standing Unsupported  --  4  -MD  --    Standing Unsupported with One Foot in Front  --  4  -MD  --    Standing on One Leg  --  4  -MD  --    Ariza Total Score  --  55  -MD  --       Dynamic Gait Index (DGI)    Gait Level Surface  3  -MD  --  --    Change in Gait Speed  3  -MD  --  --    Gait with Horizontal Head Turns  3  -MD  --  --    Gait with Vertical Head Turns  2  -MD  --  --    Gait and Pivot Turn  3  -MD  --  --    Step Over Obstacle  3  -MD  --  --    Step Around Obstacles  3  -MD  --  --    Steps  3  -MD  --  --    Dynamic Gait Index Score  23  -MD  --  --       Functional Assessment    Outcome Measure Options  Dynamic Gait Index  -MD  Ariza Balance  -MD  --      User Key  (r) = Recorded By, (t) = Taken By, (c) = Cosigned By    Initials Name Provider Type    Jemma Vasquez, PT Physical Therapist    Chelo Andrews, PTA Physical Therapy Assistant           Time Calculation:    Time Calculation- OT     Row Name 04/19/19 1143             Time Calculation- OT    OT Non-Billable Time (min)  45 min team rounds and family conferance  -CHANNING        User Key  (r) = Recorded By, (t) = Taken By, (c) = Cosigned By    Initials Name Provider Type    Maximo Matute OT Occupational Therapist          Therapy Charges for Today     Code Description Service Date Service Provider Modifiers Qty    31917407486 HC OT SELF CARE/MGMT/TRAIN EA 15 MIN 4/18/2019 Maximo Oscar OT GO 1    73990622438 HC OT THER PROC EA 15 MIN 4/18/2019 Maximo Oscar OT GO 3    33149265828 HC OT CARE PLAN EA 15 MIN 4/19/2019 Maximo Oscar OT GO 3               OT Discharge Summary  Anticipated Discharge Disposition (OT): home  Reason for Discharge: All goals  achieved  Outcomes Achieved: Able to achieve all goals within established timeline    Maximo Oscar, OT  4/19/2019

## 2019-04-19 NOTE — PROGRESS NOTES
Inpatient Rehabilitation Functional Measures Assessment    Functional Measures  LISA Eating:  [facility] Grooming: [facility] Bathing:  [facility] Upper Body Dressing:  [facility] Lower Body Dressing:  [facility] Toileting:  [facility] Bladder Management  Level of Assistance:  [place]  Frequency/Number of Accidents this Shift:  [facility] Bowel Management  Level of Assistance: [place]  Frequency/Number of Accidents this Shift: [facility] Bed/Chair/Wheelchair Transfer:  [facility] Toilet Transfer:  [facility] Tub/Shower Transfer:  [place]    Previously Documented Mode of Locomotion at Discharge: Field  LISA Expected Mode of Locomotion at Discharge: [facility] Walk/Wheelchair:  [facility] Stairs:  [facility] Comprehension:  Auditory comprehension is the usual mode. Comprehension  Score = 7, Independent.  Patient comprehends complex/abstract information in  their primary language.  Patient is completely independent for auditory  comprehension.  There are no activity limitations.  LISA Expression:  Vocal expression is the usual mode. Expression Score = 7,  Independent.  Patient expresses complex/abstract information in their primary  language.  Patient is completely independent for vocal expression.  There are no  activity limitations.  LISA Social Interaction:  Social Interaction Score = 7, Independent. Patient is  completely independent for social interaction.  There are no activity  limitations.  LISA Problem Solving:  Problem Solving Score = 7, Independent.  Patient makes  appropriate decisions in order to solve complex problems.  Patient is completely  independent for problem solving.  There are no activity limitations.  LISA Memory:  Memory Score = 6, Modified [place].  Patient is modified  independent for memory, requiring: Requires additional time. forgetful sometimes      Therapy Mode Minutes  Occupational Therapy: Branch  Physical Therapy: Branch  Speech Language Pathology:  Branch    [facility]  by: Lonny Trevizo RN

## 2019-04-19 NOTE — PLAN OF CARE
Problem: Patient Care Overview  Goal: Plan of Care Review  Outcome: Outcome(s) achieved Date Met: 04/19/19 04/19/19 1118   Patient Care Overview   IRF Plan of Care Review discharge pending   Progress, Functional Goals preparing for discharge   Coping/Psychosocial   Plan of Care Reviewed With patient;spouse   OTHER   Outcome Summary Patient alert and oriented, continent of bowel and bladder, and takes medication with water. Incision to head is clean, dry and intact- steristrips and suture in place. Family conference held today, appointments and medications discussed. He had been independent in his room and wife is at bedside. Will continue to monitor until discharged.        Problem: Fall Risk (Adult)  Goal: Absence of Fall  Outcome: Outcome(s) achieved Date Met: 04/19/19      Problem: Pain, Acute (Adult)  Goal: Acceptable Pain Control/Comfort Level  Outcome: Outcome(s) achieved Date Met: 04/19/19 04/19/19 1118   Pain, Acute (Adult)   Acceptable Pain Control/Comfort Level making progress toward outcome       Problem: Nutrition, Imbalanced: Inadequate Oral Intake (Adult)  Goal: Improved Oral Intake  Outcome: Outcome(s) achieved Date Met: 04/19/19 04/19/19 1118   Nutrition, Imbalanced: Inadequate Oral Intake (Adult)   Improved Oral Intake achieves outcome

## 2019-04-19 NOTE — PROGRESS NOTES
Inpatient Rehabilitation Functional Measures Assessment and Plan of Care    Plan of Care  Updated Problems/Interventions  Field    Functional Measures  LISA Eating:  Eating Score = 7. Patient is completely independent for eating.  There are no activity limitations.  LISA Grooming: Grooming Score = 6.  Patient is modified independent for grooming,  requiring: More than reasonable time.  LISA Bathing:  Patient took sponge bath. Bathing Score = 6.  Patient is modified  independent for bathing, requiring: More than reasonable time.  LISA Upper Body Dressing:  Upper Body Dressing Score = 6 Patient is modified  independent for upper body dressing, requiring:  LISA Lower Body Dressing:  Lower Body Dressing  Score = 6. Patient is modified  independent for lower body dressing, requiring:  LISA Toileting:  Toileting Score = 6.  Patient is modified independent for  toileting, requiring:    LISA Bladder Management  Level of Assistance:  [place]  Frequency/Number of Accidents this Shift:  [facility] Bowel Management  Level of Assistance: [place]  Frequency/Number of Accidents this Shift: Branch    [facility] Bed/Chair/Wheelchair Transfer:  [facility] Toilet Transfer:  Toilet Transfer Score = 7.  Patient is completely  independent for transferring to and from the toilet/commode. There are no  activity limitations.  LISA Tub/Shower Transfer:    Previously Documented Mode of Locomotion at Discharge: Field  LISA Expected Mode of Locomotion at Discharge: Branch  [facility] Walk/Wheelchair:  Branch  [facility] Stairs:  Branch    [facility] Comprehension:  Br[place]  [facility] Expression:  Branch  [facility] Social Interaction:  Branch  [facility] Problem Solving:  [facility] Memory:  Branch    Therapy Mode Minutes  Occupational Therapy: Individual: 0 minutes.  Physical Therapy: Branch  Speech Language Pathology:  Branch    Signed by: Maximo Oscar OTR/L

## 2019-04-22 NOTE — PROGRESS NOTES
PPS CMG Coordinator  Inpatient Rehabilitation Admission    Ethnic Group: White.  Marital Status:  Marital Status: .    IRF Admission Date:  04/16/2019  Admission Class: Initial Rehab.  Admit From:  [facility]    Pre-Hospital Living: Home. Pre-Hospital Living  With: (2) Family/Relatives.    Payment Sources: Primary: Not Listed.  Secondary: Not Listed.  Impairment Group: 02.1 Non-traumatic  Date of Onset of Impairment: 04/07/2019    Etiologic Diagnosis Code(s):  Rank Code      Description  1    C71.9     Malignant neoplasm of brain, unspecified    Comorbidities:      Are there any arthritis conditions recorded for Impairment Group, Etiologic  Diagnosis, or Comorbid Conditions that meet all of the regulatory requirements  for IRF classification (in 42 .29(b)(2)(x), (xi), and xii))? No    LISA Bladder Accidents:  1 - Accidents.  Bladder Score = 6. Patient has not had an accident, but uses a  device/medication.  LISA Bowel Accident: 1 -Accidents.  Bowel Score = 6. Patient has no accidents, but uses a device/medications.    Presence of Pressure Ulcer:  No observed/documented pressure ulcers.    MEDICAL NEEDS  Height on Admission:  73 inches.  Weight on Admission:  147 pounds.    QUALITY INDICATORS  Prior Functioning:  Self Care: Patient completed the activities by him/herself, with or without an  assistive device, with no assistance from a helper.  Indoor Mobility: Patient completed the activities by him/herself, with or  without an assistive device, with no assistance from a helper.  Stairs: Patient completed the activities by him/herself, with or without an  assistive device, with no assistance from a helper.  Functional Cognition: Patient completed the activities by him/herself, with or  without an assistive device, with no assistance from a helper.  Prior Device Use: Patient does not use manual or motorized wheelchair or  scooter, mechanical lift, walker, or an  orthotic/prosthesis.    Bladder and Bowel: Bladder Continence: Always continent (no documented  incontinence).  Bowel Continence: Always continent (no documented incontinence).  Swallowing/Nutritional Status: Regular food (solids and liquids swallowed safely  without supervision or modified food or liquid consistency).  Special Conditions: Patient did not receive total parenteral nutrition treatment  at the time of admission.    Section I. Active Diagnosis: Comorbidities and Co-existing Conditions:   Patient  does not have PAD, PVD, or Diabetes Mellitus  Section J. Health Conditions: Patient has had two or more falls, or a fall with  injury, in the past year. Patient has had major surgery during the 100 days  prior to admission.  Section M. Skin Conditions  Unhealed Pressure Ulcer/Injuries at Stage 1 or  Higher on Admission:  No.  Section N. Medication:  Potential Clinically Significant Medication Issues: No issues found during  review    Signed by: Kei Ballard RN

## 2019-04-22 NOTE — PROGRESS NOTES
Subjective     No ref. provider found     Diagnosis Plan   1. Glioblastoma multiforme (CMS/HCC)       Chief Complaint   Patient presents with   • Radiation     ReEval     CC: GBM right frontal lobe                                Dear Dr. Brower:    Mr. Waterman returns today for re-evaluation for his newly diagnosed GBM.  He is a 63 y.o. male who presented to the ER on 4/5/18 with a 3 week hx of headaches and 3-4 day hx of confusion, nausea and vomiting.  He had a head CT in the ER which showed a right frontal lobe mass with significant vasogenic edema.  He then had a brain MRI on 4/5/18 which showed a necrotic heterogenously enhancing mass in the anterior right frontal lobe, 6.8 x 4.3 x 4.6cm with areas of T2 hyperintensity compatible with hyperintense signal abnormality in the right frontal lobe extending into the genu of corpus callosum as well.  An 8mm midline shift was noted as well as effacement and entrapment of left lateral ventricle.       He had a CT chest, abd and pelvis on 4/5/19 with no evidence of primary or distant metastatic disease.  A 6mm noncalcified nodule was ntoed in the right upper lobe and ill defined mesenteric stranding and fluid layering suggestive of diffuse anasarca.       He was started on IV decadron, 6mg every 6 hours and keppra 500mg bid.  He was evaluated by Dr. Brower with neurosurgery and underwent right frontal craniotomy and debulking/resection of the tumor on 4/7/19 with frozen path positive for GBM.  He had a CT head on 4/8/19 which showed s/p right frontal craniotomy with right frontal extra-axial hemorrhage, largest collection measuring 3.7 x 1.1cm, persistant vasogenic edema right frontal lobe, stable, and hemorrhage within operative bed, unchanged, with persistant midline shift of 1cm. His final pathology revealed GBM, IDH wildtype and MGMT promotor methylation was absent.  His tumor was sent to [facility] for a second opinion and these were the findings.  He is  on dexamethasone 6mg bid as well as pepcid and keppra.       He is recovering well from his surgery.  He denies any headaches, nausea or vomiting but does report some pressure behind his right eye.  He denies any weakness of his extremities or numbness.  He is a [employer]  and works full time.  He is very active, exercises routinely and denies any significant medical comorbidities.                  Review of Systems   Constitutional: Positive for fatigue.   HENT: Positive for tinnitus.    Eyes: Negative.    Respiratory: Negative.    Cardiovascular: Negative.    Gastrointestinal: Positive for vomiting.   Genitourinary: Positive for frequency.   Musculoskeletal: Negative.    Neurological: Positive for weakness and headaches.   Hematological: Negative.    Psychiatric/Behavioral: Positive for confusion and decreased concentration.         Past Medical History:   Diagnosis Date   • ED (erectile dysfunction)    • Glioblastoma (CMS/HCC)          Past Surgical History:   Procedure Laterality Date   • CRANIOTOMY FOR TUMOR Right 4/7/2019    Procedure: RIGHT SIDE CRANIOTOMY FOR TUMOR REMOVAL AND  BIOPSY  WITH SIDNEY NAVIGATTION AND PARTIAL RIGHT FRONTAL LOBE LOBECTOMY;  Surgeon: Keshawn Brower MD;  Location: [facility];  Service: Neurosurgery   • CRANIOTOMY FOR TUMOR  04/07/2019         Social History     Socioeconomic History   • Marital status:      Spouse name: Not on file   • Number of children: Not on file   • Years of education: Not on file   • Highest education level: Not on file   Tobacco Use   • Smoking status: Never Smoker   • Smokeless tobacco: Never Used   Substance and Sexual Activity   • Alcohol use: Yes     Comment: rarely   • Drug use: No   • Sexual activity: Yes     Partners: Female         Family History   Problem Relation Age of Onset   • Hypertension Mother    • Osteoporosis Mother    • Hypertension Father           Objective    Physical Exam   Constitutional: He is oriented to person, place,  and time. He appears well-developed and well-nourished.   HENT:   Head:       Well healed incision right frontal scalp, no erythema, no drainage   Eyes: EOM are normal.   Neck: Neck supple.   Musculoskeletal: Normal range of motion.   Neurological: He is alert and oriented to person, place, and time. He displays normal reflexes. No sensory deficit. He exhibits normal muscle tone. Coordination normal.   Skin: Skin is warm.   Psychiatric: He has a normal mood and affect. His behavior is normal. Judgment and thought content normal.         Current Outpatient Medications on File Prior to Visit   Medication Sig Dispense Refill   • dexamethasone (DECADRON) 6 MG tablet Take 1 tablet by mouth Every 12 (Twelve) Hours. 14 tablet 0   • famotidine (PEPCID) 20 MG tablet Take 1 tablet by mouth 2 (Two) Times a Day. 60 tablet 0   • levETIRAcetam (KEPPRA) 500 MG tablet Take 1 tablet by mouth Every 12 (Twelve) Hours. 60 tablet 0   • ondansetron ODT (ZOFRAN-ODT) 4 MG disintegrating tablet Take 1 tablet by mouth Every 6 (Six) Hours As Needed for Nausea or Vomiting. 30 tablet 0   • tamsulosin (FLOMAX) 0.4 MG capsule 24 hr capsule Take 1 capsule by mouth Daily. 30 capsule 0     No current facility-administered medications on file prior to visit.        ALLERGIES:    Allergies   Allergen Reactions   • Sulfa Antibiotics Unknown (See Comments)     Unknown       There were no vitals taken for this visit.     No flowsheet data found.      Assessment/Plan     63 year old male with GBM of right frontal lobe, MGMT promotor methylation absent, IDH wildtype     Plan:   I again discussed with Mr. Velazquez and his wife the role of radiation, likely with concurrent temodar, but this will ultimately be determined by Dr. Caroline Luther, his neurooncologist.    I reviewed the risks, benefits and rationale of brain radiation for the treatment of GBM to include but not limited to the following:                                                               Acute:   skin erythema, loss of hair, nausea, vomiting, fatigue, headaches, ringing in the ears, and the possibility of increased edema resulting in seizures or death, loss of vision in right eye     Late:  permanent skin changes, alopecia, remote risk of loss of vision in right eye, cataracts, radionecrosis,, neurocognitive deficits including short term memory loss or difficulty concentrating, and the remote risk of late edema or hemorrhage resulting in seizures or death, or second malignancies.       Mr. Waterman and his wife voiced understanding and were given an opportunity to ask questions which were answered to their satisfaction.  He would like to proceed with radiation treatment planning today here at [facility].  He will see Dr. Luther tomorrow and we will await his recommendation for possible concurrent temodar and recommendation regarding the need for second opinion at possibly [facility] or [facility].  I will plan to begin his radiation treatments on Monday, May 6.  I plan to deliver a dose of 60Gy in 30 fractions.  I also instructed him to decrease his decadron to 6mg in am and 4mg in the pm starting today and then 4mg bid in 5 days and I sent a rx for decadron 4mg into his pharmacy.  Finally, we will send a consult request into our , [facility], today for supportive services.  They have already been in contact with our nutritionist, Berenice.  I asked them to call with any questions or concerns.             Thank you very much for allowing me to participate in the care of this very pleasant patient.    Sincerely,      Shanti Philippe MD

## 2019-04-23 NOTE — PROGRESS NOTES
Hernandez Waterman is a 63 y.o. male presents for   Chief Complaint   Patient presents with   • Brain Tumor          Summary: A 63 YOM, right-handed, previously in good health with 5-6 week history of gradual confusion, mild headaches and left sided weakness. Brain MRI on April 5/2019 showed a 6.8 x 4.3 x 4.6 cms enhancing mas right frontal region involving genu of corpus callosum with vasogenic edema and 8 mm right-to-left shift. Has GTR by DR Brower on 04/07/2019 with postop charbel MRI showing total resection of enhancing mass with new diffusion restricted ischemic change anterior tot he resection cavity. Pathology from [facility] [id number] is that of GBM with mutated P53 and intact ATRX and negative IDH1 by IHC. MGMT methylation is negative  Current KPS 90. On decadron 10 mg daily with Pepcid 20/20 mg daily,and Keppra 500/500 mg daily apophylactically    Hernandez is here with his wife  This is a scheduled visit to discuss chemotherapy  Yesterday he saw Dr Shanti Philippe in Radiation Oncology: he is scheduled to start radiation two weeks from yesterday    Neurological review: mild headcahes  No new symptoms since I last saw him in the hospital on 04/17/2019  No seizures  Hemiparesis is much better  Review of Systems   Constitutional: Positive for fatigue. Negative for chills and fever.   HENT: Positive for sinus pressure, sneezing and tinnitus.    Eyes: Positive for photophobia and discharge. Negative for visual disturbance.   Respiratory: Negative for cough, shortness of breath and wheezing.    Cardiovascular: Negative for chest pain, palpitations and leg swelling.   Gastrointestinal: Positive for constipation. Negative for diarrhea, nausea and vomiting.   Endocrine: Negative for cold intolerance, heat intolerance and polydipsia.   Genitourinary: Negative for decreased urine volume, difficulty urinating and urgency.   Musculoskeletal: Negative for gait problem, neck pain and neck stiffness.   Skin: Negative for color  change, rash and wound.   Allergic/Immunologic: Negative for environmental allergies, food allergies and immunocompromised state.   Neurological: Positive for headaches. Negative for dizziness, tremors, seizures, syncope, facial asymmetry, speech difficulty, weakness, light-headedness and numbness.   Hematological: Negative for adenopathy. Does not bruise/bleed easily.   Psychiatric/Behavioral: Positive for agitation. Negative for behavioral problems, confusion, decreased concentration, dysphoric mood, hallucinations, self-injury, sleep disturbance and suicidal ideas. The patient is not nervous/anxious and is not hyperactive.          Past Medical History:   Diagnosis Date   • ED (erectile dysfunction)    • Glioblastoma (CMS/HCC)          Social History     Socioeconomic History   • Marital status:      Spouse name: Not on file   • Number of children: Not on file   • Years of education: Not on file   • Highest education level: Not on file   Tobacco Use   • Smoking status: Never Smoker   • Smokeless tobacco: Never Used   Substance and Sexual Activity   • Alcohol use: Yes     Comment: rarely   • Drug use: No   • Sexual activity: Yes     Partners: Female          Family History   Problem Relation Age of Onset   • Hypertension Mother    • Osteoporosis Mother    • Hypertension Father            Current Outpatient Medications:   •  dexamethasone (DECADRON) 4 MG tablet, Take 1 tablet by mouth 2 (Two) Times a Day., Disp: 40 tablet, Rfl: 1  •  dexamethasone (DECADRON) 6 MG tablet, Take 1 tablet by mouth Every 12 (Twelve) Hours., Disp: 14 tablet, Rfl: 0  •  famotidine (PEPCID) 20 MG tablet, Take 1 tablet by mouth 2 (Two) Times a Day., Disp: 60 tablet, Rfl: 0  •  levETIRAcetam (KEPPRA) 500 MG tablet, Take 1 tablet by mouth Every 12 (Twelve) Hours., Disp: 60 tablet, Rfl: 0  •  ondansetron ODT (ZOFRAN-ODT) 4 MG disintegrating tablet, Take 1 tablet by mouth Every 6 (Six) Hours As Needed for Nausea or Vomiting., Disp: 30  tablet, Rfl: 0  •  tamsulosin (FLOMAX) 0.4 MG capsule 24 hr capsule, Take 1 capsule by mouth Daily., Disp: 30 capsule, Rfl: 0      Vitals:    04/23/19 0757   BP: 110/68   Pulse: 59   SpO2: 98%         Physical Exam  Alert, oriented with normal mental status  Normal speech and normal language  Visual fields are full  EOMs are full w/o diplopia or nystagmus  Cranial nerves are normal w/o facial asymmetry with normal swallow and lower cranila nerves  Cerebellar function normal  Brigida normal  Gait normal  No pronator drift  Oral cavity clear  Chest clear to auscultation cardiac auscultation normal                           DIAGNOSIS, IMPRESSION AND PLAN:   Right frontal GBM crossing corpus callosum s/p resection of enhancing tumor  MGMT not methylated  KPS 90  Decadron 10 mg daily  Keppra prophylactically    PLAN:  Concurrent standard RT/TMZ as we had reviewed in details during hospital consultation  I reviewed the plan in details including the review of TMZ potential adverse side effects  I wrote out the schedule for them  He signed the consent  I will reconnect with him once he is ready to start  Decadron: I have asked him to take 8 mg daily for one week then 6 mg daily for one week then stay on 4 mg until DR Cadena directs him what to do.    Finally he had considered second opinion: I offered to send him to [facility], [facility], [facility] for consideration of a clinical trial. He decided to proceed with standard Tx for now.  WE had a detailed discussion about the role of TMZ in GBM with unmethylated MGMT.

## 2019-04-24 NOTE — PROGRESS NOTES
Subjective   Patient ID: Hernandez Waterman is a 63 y.o. male is here today for follow-up.  He is 2 weeks out from an right frontal craniotomy for biopsy by Dr. Brower 4/7/19. He is doing well.  He denies any incision problems.  He is taking Keppra 500 mg BID and Decadron 8 mg.      He is to see Neurologist today.      Brain Tumor   The current episode started 1 to 4 weeks ago. Associated symptoms include fatigue. Pertinent negatives include no fever, headaches, nausea, numbness, rash, vertigo, visual change, vomiting or weakness. The treatment provided moderate relief.       The following portions of the patient's history were reviewed and updated as appropriate: allergies, current medications, past family history, past medical history, past social history, past surgical history and problem list.    Review of Systems   Constitutional: Positive for fatigue. Negative for fever.   Gastrointestinal: Negative for nausea and vomiting.   Genitourinary: Negative for difficulty urinating.   Skin: Negative for rash.   Neurological: Negative for dizziness, vertigo, seizures, speech difficulty, weakness, light-headedness, numbness and headaches.   Psychiatric/Behavioral: Negative for confusion.   All other systems reviewed and are negative.      Objective   Physical Exam   Constitutional: He is oriented to person, place, and time. He appears well-developed and well-nourished.   HENT:   Head: Normocephalic and atraumatic.   Eyes: EOM are normal. Pupils are equal, round, and reactive to light.   Neck: Normal range of motion.   Pulmonary/Chest: Effort normal.   Neurological: He is alert and oriented to person, place, and time. No cranial nerve deficit.   Incision healing well.    Skin: Skin is warm and dry.   Psychiatric: He has a normal mood and affect.   Affect less flat     Neurologic Exam     Mental Status   Oriented to person, place, and time.     Cranial Nerves     CN III, IV, VI   Pupils are equal, round, and reactive to  light.  Extraocular motions are normal.       Assessment/Plan   Independent Review of Radiographic Studies:      Medical Decision Making:    Mr. Waterman almost 3 weeks out from a right frontal craniotomy for resection of a GBM. He is doing well and denies HA or dizziness or nausea or vomiting.  He reports just some fatigue and decreased stamina.  His affect is better and less flat.       His postoperative MRI did not reveal any residual tumor.  He has been followed by Dr. Luther and Dr. Philippe postoperatively.  Dr. Luther was kind enough to call me this morning to discuss his treatment plans.  Evidently they had discussed the possibility of enrollment in a clinical trial and the patient had initially decided against it but is now questioning whether or not that is the right choice.  He is going to go back to see Dr. Luther this afternoon to again discuss the various trials that are available.  If he decides not to enroll in a trial then he is already scheduled to begin radiation on May 6 under the care of Dr. Philippe and has already been seen by Dr. Medley as well and would soon start Temodar as well.  Of course if he does enroll in a trial then that treatment plan will likely change.    Dr. Luther is also currently weaning his steroids. He is on 8mg a day as of today.     He is also on Keppra 500 mg twice daily.  The Keppra was purely prophylactic as we do not think he has had any seizures either prior to surgery or after.  His liver enzymes have increased significantly.  His ALT on April 5 was 111 and his ALT yesterday was 403.  His AST similarly increased from 69 to 91.  This may certainly be related to the Keppra and when I spoke to Dr. Luther we both agreed that we will need to either change him to a different antiepileptic medication or get him off of the Keppra.  I will defer this decision to Dr. Luther who said he would discuss it with him this afternoon.     His incision is currently healing  very nicely.  He will call with any incisional redness swelling or drainage or dehiscence.    He will follow-up in 1 month.  Depending upon what treatment he is undergoing we can determine the timing of his next MRI.          Hernandez was seen today for post-op.    Diagnoses and all orders for this visit:    Surgery follow-up examination      Return in about 4 weeks (around 5/24/2019).

## 2019-04-24 NOTE — TELEPHONE ENCOUNTER
----- Message from Renetta Saunders sent at 4/24/2019  8:07 AM EDT -----  Contact: Maryuri Waterman (pt's wife)  Mrs. Waterman wants to know if Dr. Luther wants pt to start the different direction of his Decadron today or wait until Monday, the new directions are 8 mg for one week, 6 mg for one week and then stay on 4 mg until directed by Dr. Philippe what to do.  Please call her at [phone].    ThanksAn

## 2019-04-24 NOTE — TELEPHONE ENCOUNTER
Spoke with Dr Luther pt is to start this morning 4- taking 8 mg in am for 1 week, then 6 mg am for 1 week and 4 mg am the next week.  Called pts spouse and gave her instructions and told her if she needed anything else to call me anytime.

## 2019-04-25 PROBLEM — R74.8 ELEVATED LIVER ENZYMES: Status: ACTIVE | Noted: 2019-01-01

## 2019-04-25 PROBLEM — C71.9 GBM (GLIOBLASTOMA MULTIFORME) (HCC): Status: RESOLVED | Noted: 2019-01-01 | Resolved: 2019-01-01

## 2019-04-26 PROBLEM — Z09 SURGERY FOLLOW-UP EXAMINATION: Status: ACTIVE | Noted: 2019-01-01

## 2019-04-26 NOTE — PROGRESS NOTES
Hernandez was seen as an add on to   1. Assist  him with decisions regarding referral to a clinical trial  2. Manage elevated ALT    Hernandez is here with his wife.  1. They were seen yesterday in [facility] and have been encouraged to seek second opinion at [facility]. Again I reiterated as I had done few times in the past that they should pursue this route with the help of Dr. Medley as they had discussed yesterday. I have no objections if that is what they want to do. They already have the needed  at [facility].  By their request I did call [facility] and spoke to one of the attendings: for his disease they have 2 clinical trials. Both are Phase II trials. She gave me her mobile number and said she would be willing to talk to the patient and his wife. I mentioned this to Hernandez and his wife.  I have also reached out to [facility]: their preference understandably is not to travel a lot. I am yet to hear from DR Gunnar Tenorio at .  For the time being they are going to pursue [facility] .    2. Elevated ALT  He is on Keppra 500 mg bid and probably this is the likely culprit although his was mildly elevated before.  I reviewed standards of EBM recommendation for anticonvulsants in patients with brain tumors from the AAN. He has not had a seizure and the standard practice is to not start AED.  I wrote out a schedule for them to cut the dose by 250 mg every 3 days until he is off it. If he has seizures I would preferably start him on Depakote (has sone oncologic properties)    No charge

## 2019-04-29 NOTE — PROGRESS NOTES
On 4/23/19, I was notified by Katina BRITO, Nurse Manager that Dr Luther has a new Temodar pt. See her email below.    Tomas Ambriz  I have a new GBM patient ready to start RT/TMZ in 2 weeks from yesterday  Hernandez Waterman 1955  I have his consent    Temodor 75 mg/m2/day x 42 days  He will need Septra DS prophylactically MWF weekly    Thanks    I submitted a PA to [facility] Janelle CORMIER and I have rec the approval. The request is approved from 4/26/2019-4/    The rx has been Escribed to [facility] for processing.

## 2019-04-30 NOTE — PROGRESS NOTES
I have been notified by Katina BRITO Nurse manager that Dr Luther relayed to her that pt is going to [facility].     The Temozolomide rx has been canceled.

## 2019-05-01 NOTE — TELEPHONE ENCOUNTER
Oncology Social Work  Radiation Center - [facility]    Referral received from Radiation Center -  Patient scored a 7 on his Distress Screening tool.  Chart reviewed.  Patient is a newly diagnosed GBM.  S/P Acute rehab stay.  Plans Temador and XRT.  OSW called and spoke to patient.  Attempted to set up an initial eval/assessment.  Patient states that he hopes to go to [facility] for a second opinion.  And would like to wait before meeting with OSW.    I emailed my contact information and encouraged to call if future needs arise.  Thank you  Radha Merino, UMUW, CSW, OSW-C

## 2019-05-02 NOTE — TELEPHONE ENCOUNTER
----- Message from Rose Mary Thomason sent at 5/2/2019 11:09 AM EDT -----  Regarding: PT LEFT  MSG FOR YOU  THE PT LEFT A VOICEMAIL MSG STATING HE HAS BEEN COMMUNICATING WITH YOU REGARDING HIS RECORDS TO [facility] - STATES HE WANTED ANY UPDATE.    PLEASE CALL: [phone]    5-2-19  10:10 A.MPercy ARCHER

## 2019-05-03 NOTE — TELEPHONE ENCOUNTER
Phone calls  Yesterday I placed 3 phone calls, 2 to Hernandez and one to his wife.  I did not hear back from them until this morning when they called and spoke to my manager wanting to know what the plan was.   My manager relayed message to me and I called Hernandez.  Last time we talked he told me he was wworking with [facility] with the help of Dr. Medley. The decision was to be plans on hold until he ahs heard from [facility]. I did that.  I had to put his Temodar pharmacy order on hold as well until I get the green light from them what it is they want to do.  In the meantime I had reached to [facility] and Dr. Sanford offered to allow me to give her mobile number to Hernandez and his wife to discuss two studies they have he may qualify for. These are Phase II studies. Hernandez and his wife at the time still wanted to talk to [facility].  I just spoke with Dr Medley: I offered either he or I be the  working with Hernandez and his wife: we are both very eager to help them but I am afraid we may be confusing them.  As soon as I hear from them about their decision I will help them accordingly.  Hernandez assured me once again that he has a contact name at [facility].

## 2019-05-03 NOTE — PROGRESS NOTES
"VM rec from pts wife about the chemo pill that Dr Medley ordered and sent to  [facility]. She wanted to know if they were open on weekends because \"if he is going to take this he would need to start Sunday night with radiation on Monday\" she also wanted to know where they were located.    I attempted to contact her back at the number she provided in the message [phone] but rec no answer. I did leave a detailed message with the following information:    I let her know that Dr Luther has ordered the chemotherapy. I said that the rx was sent to  [facility] originally but they are not able to dispense. Insurance is dictating the rx to be filled through [facility]. I informed her that the rx was put on hold until we knew of the definite plan.    My contact number was provided in the message.       "

## 2019-05-03 NOTE — TELEPHONE ENCOUNTER
"A short while ago my manager received a call from Mrs. Waterman and I called back and spoke with the patient, her .  He clearly told me that he has not made up his mind wether he is going to [facility] or whether he wants to be treated here. He told me he will call me as soon as he ahs plans from [facility].  I just received a message, \"the wife is in arms\" about the chemotherapy and is demanding a call.  I called and both of them were on the line: he asked her to let him speak. He reconfirmed our earlier conversation. He is still waiting to receive message from [facility].  AS for the chemotherapy he understands that once he starts treatment here he will be disqualified from a trial at [facility] for newly diagnosed GBM : he told me [facility] has told him the same.  The chemo was written for by me sometime herminia and was put on hold. I informed the wife I cannot release chemotherapy unless we have a plan.  Apparently the chemo was sent to [facility] according to the wife. I have reached out to Katina Rogers at [facility] to tract this.  "

## 2019-05-06 PROBLEM — C71.1: Status: ACTIVE | Noted: 2019-01-01

## 2019-05-06 PROBLEM — C71.9 GLIOBLASTOMA (HCC): Status: ACTIVE | Noted: 2019-01-01

## 2019-05-06 NOTE — PROGRESS NOTES
"Sharita Waterman is a 63 y.o. male here for   Chief Complaint   Patient presents with   • Post-op     S/P craniotomy w/ r/s on 4/7/19 for right frontal lobe mass-glioblastoma   • Hyperlipidemia   .    Vitals:    05/06/19 1118   BP: 122/78   BP Location: Left arm   Patient Position: Sitting   Cuff Size: Adult   Pulse: 56   Resp: 15   Temp: 96.8 °F (36 °C)   TempSrc: Temporal   SpO2: 99%   Weight: 74 kg (163 lb 3.2 oz)   Height: 182 cm (71.65\")       Body mass index is 22.35 kg/m².    Hyperlipidemia   This is a chronic problem. The current episode started more than 1 year ago. The problem is controlled. Recent lipid tests were reviewed and are normal. He has no history of diabetes. Pertinent negatives include no shortness of breath.        The following portions of the patient's history were reviewed and updated as appropriate: allergies, current medications, past social history and problem list.    Review of Systems   Respiratory: Negative for shortness of breath and wheezing.    Cardiovascular: Negative for palpitations and leg swelling.   Genitourinary: Positive for enuresis. Negative for hematuria.   Psychiatric/Behavioral: Negative for dysphoric mood and sleep disturbance. The patient is not nervous/anxious.        Objective   Physical Exam   Constitutional: He appears well-developed and well-nourished. No distress.   Cardiovascular: Normal rate, regular rhythm and normal heart sounds.   Pulmonary/Chest: No respiratory distress. He has no wheezes. He has no rales. He exhibits no tenderness.   Musculoskeletal: He exhibits no edema.   Psychiatric: He has a normal mood and affect. His behavior is normal.   Nursing note and vitals reviewed.    Healed scar of scalp    Assessment/Plan   Diagnoses and all orders for this visit:    Nocturia  Comments:  still with sx - trial of incr flomax to 2qhs - call if problems  Orders:  -     tamsulosin (FLOMAX) 0.4 MG capsule 24 hr capsule; 2 daily    Glioblastoma " (CMS/HCC)  Comments:  s/p craniotomy - f/u with oncology     Other hyperlipidemia  Comments:  eat healthy    Elevated liver enzymes  Comments:  need rechk    Glioblastoma of frontal lobe (CMS/HCC)

## 2019-05-06 NOTE — TELEPHONE ENCOUNTER
----- Message from Celeste Sanchez sent at 5/6/2019 11:36 AM EDT -----  Contact: [phone]  The pill for Hernandez will be in tomorrow at 9.  You are to take in on an empty stomach.  Should he not eat breakfast before he comes to his appointment with you?

## 2019-05-06 NOTE — TELEPHONE ENCOUNTER
Per Dr Luther pt is to do what he normally does in the morning. Called and spoke with pt and spouse was on the other line.

## 2019-05-07 NOTE — TELEPHONE ENCOUNTER
----- Message from Caroline Luther MD sent at 5/7/2019  2:42 PM EDT -----  Contact: [phone] PT  AS discussed, instruction addressing this question were just given to them this morning and they were asked to repeat and they did: Take daily Temodar 1-3 before Radiation time on empty stomach. Empty means at least one hour before or after food intake  ----- Message -----  From: Cindi Gregorio MA  Sent: 5/7/2019   2:27 PM  To: Caroline Luther MD        ----- Message -----  From: Jenelle Eddy RegSched Rep  Sent: 5/7/2019  11:55 AM  To: Cindi Gregorio MA    Pt had first chemo and radition today for the next six weeks it is at 1:45  When do they chemo pill? Does he take it the night before or three hours before? And when can the pt eat?  Should he take on an empty stomach or should he not eat after her takes it? Please advise  Pt has taken the pill at 11 this morning does he need to take this tonight?

## 2019-05-07 NOTE — PROGRESS NOTES
"Follow Up Visit:ANTON Ng is ehre with his wife: he is being seen as an add-on.  After expereincing frustrations getting to be seen at [facility] he decided to go for the standard treatment.  Currently he is on Decadron 6 mg daily but tomorrow he sgoes down to 4 mg daily    He has no new symptoms  No cough no fever no sore throat no GI or  symptoms  His ROS is negative except for the generalized weakness.    Review of Systems   Constitutional: Negative for chills, fatigue and fever.   HENT: Negative for hearing loss, tinnitus and trouble swallowing.    Eyes: Negative for pain, redness and visual disturbance.   Respiratory: Negative for cough, shortness of breath and wheezing.    Cardiovascular: Negative for chest pain, palpitations and leg swelling.   Gastrointestinal: Negative for constipation, diarrhea, nausea and vomiting.   Endocrine: Negative for cold intolerance, heat intolerance and polydipsia.   Genitourinary: Negative for decreased urine volume, difficulty urinating and urgency.   Musculoskeletal: Negative for back pain, neck pain and neck stiffness.   Skin: Negative for color change, rash and wound.   Allergic/Immunologic: Negative for environmental allergies, food allergies and immunocompromised state.   Neurological: Positive for weakness. Negative for dizziness, tremors, seizures, syncope, facial asymmetry, speech difficulty, light-headedness, numbness and headaches.   Hematological: Negative for adenopathy.   Psychiatric/Behavioral: Negative for agitation, behavioral problems, confusion, decreased concentration, dysphoric mood, hallucinations, self-injury, sleep disturbance and suicidal ideas. The patient is not nervous/anxious and is not hyperactive.            Vitals:    05/07/19 0826   BP: 118/64   Pulse: 74   SpO2: 94%   Weight: 73.9 kg (163 lb)   Height: 182 cm (71.65\")         Physical/Neurological Examination  Brief neurological examination is unchanged from 2 weeks ago  No sore throat  Chest is " clear  No abdominal tednerness    KPS 90       Summary:   1. A 63 YOM, right-handed, previously in good health with 5-6 week history of gradual confusion, mild headaches and left sided weakness. Brain MRI on April 5/2019 showed a 6.8 x 4.3 x 4.6 cms enhancing mas right frontal region involving genu of corpus callosum with vasogenic edema and 8 mm right-to-left shift. Has GTR by DR Brower on 04/07/2019 with postop charbel MRI showing total resection of enhancing mass with new diffusion restricted ischemic change anterior tot he resection cavity. Pathology from [facility] [id number] is that of GBM with mutated P53 and intact ATRX and negative IDH1 by IHC. MGMT methylation is negative  Current KPS 90.     2. Elevated liver enzymes most likely from Pepcid; Keppra goes through the kidney not the liver and it is unlikely to be the cause of elevated liver enzymes.  PLAN: I have taken him off Keppra already since he has been on it prophylactically  Labs pending today    PLAN:  1. CBC/diff and CMP now: hold off on taking Temodar until CMP is back  CBC: 10.2/13/40.4.288 with ALC 1690  2. He has signed the consent for the chemo and has a copy of it  3. Decadron dose: goes down to 4 mg daily tomorrow: stay on it until directed otherwise by DR Cheek. Rad Onc will manage decadron during RT: can call with any questions  4. Weekly CBC/diff and Q4week CMP: to be drawn and monitored by Rad Oncology: can call with any questions  5. I will see him once during Radiation and arrange for his follow up including blood work and MRI: will coordinate with DR Cheek follow up.    Plan was reviewed in details with them  Addendum: CMP comes back with ALT down to 79 from 400 and AST  Is normal  Patient has been called to start the Temodar  Per oncology treatment plan he is to take the Septra DS during RT/TMZ  ADDENDUM  It has become necessary that we document the fact that our clinic time  Is being heavily taxed: we give instructions, we  meet and do not place charges for every visit when we should be,, we answer all phone calls and yet it is like we never done any of this. Just earlier today I saw them as an add on. We went over the chemo in great details with instructions to take it 1-3 hours before RT on empty stomach and not to take it the night before. We just received another phone call and it was as if we had never discussed this.  I am writing things down for them or asking them to write down and ask them to repeat.  Going forward they will be given detailed written instructions.

## 2019-05-07 NOTE — PROGRESS NOTES
VM rec from Katina Rogers, RN Manager-she got a VM from Dr Luther and pt is to proceed with Temodar.     I sent the original rx to [facility] but unfortunately they were unable to fill-Per Amanda Reynaga-it needs to be filled through [facility].    Upon trying to print the rx to sent to [facility]-the rx was dispensed through [facility] today.

## 2019-05-08 NOTE — PROGRESS NOTES
"Oncology Nutrition    Patient Name:  Hernandez Waterman  YOB: 1955  MRN: [id number]  Date:  05/08/19  Physician: Homa    Type of Cancer Treatment:   Surgery: s/p craniotomy 4/7/19  Radiation/Cyberknife: started 5/6  Chemotherapy:  Temodar    Patient Active Problem List   Diagnosis   • Pure hypercholesterolemia   • Testicular hypofunction   • ED (erectile dysfunction)   • Frequency of urination   • Other hyperlipidemia   • Nocturia   • Primary insomnia   • Chronic fatigue   • Night sweat   • Acute intractable headache   • 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 (CMS/HCC)   • Cerebral edema (CMS/HCC)   • Leukemoid reaction   • Anemia   • Constipation   • Glioblastoma of frontal lobe (CMS/HCC)   • Elevated liver enzymes   • Surgery follow-up examination   • Glioblastoma (CMS/HCC)       Current Outpatient Medications   Medication Sig Dispense Refill   • dexamethasone (DECADRON) 4 MG tablet Take 1 tablet by mouth 2 (Two) Times a Day. 40 tablet 1   • dexamethasone (DECADRON) 6 MG tablet Take 1 tablet by mouth Every 12 (Twelve) Hours. 14 tablet 0   • famotidine (PEPCID) 20 MG tablet Take 1 tablet by mouth 2 (Two) Times a Day. 60 tablet 0   • ondansetron ODT (ZOFRAN-ODT) 4 MG disintegrating tablet Take 1 tablet by mouth Every 6 (Six) Hours As Needed for Nausea or Vomiting. 30 tablet 0   • tamsulosin (FLOMAX) 0.4 MG capsule 24 hr capsule 2 daily 60 capsule 12   • temozolomide (TEMODAR) 140 MG chemo capsule Take 1 capsule by mouth with temozolomide 5mg for 145 mg total Daily. 42 capsule 0   • temozolomide (TEMODAR) 5 MG chemo capsule Take 1 capsule by mouth with 140mg temozolomide for 145 mg total Daily. 42 capsule 0     No current facility-administered medications for this visit.        Glycemic Risk:   Steriods- weaning decadron    Weight:   Height: 71\"  Weight: 163 lbs.  Usual Body Weight: 153 lbs.   BMI: 22.3  Weight has increased 10 pounds over last month       Oral Food " Intake:  Regular Diet - No Restrictions  Compared to normal intake, current food intake is more than normal    Hydration Status:   How many 8 ounce glass of water of fluid do you drink per day?  8    Nutrition Symptoms:   Altered Apetite  Altered Taste Perception    Hungry due to steroids    Activity:   Normal with no limitations     reports that he has never smoked. He has never used smokeless tobacco. He reports that he drinks alcohol. He reports that he does not use drugs.    Evaluation of Nutritional Risk:   Patient identified to be at nutritional risk and/or for nutritional consultation; will follow patient through course of treatment.   Glycemic Risk  Weight Change  Nutrition Impact Symptoms  Patient Education    Notes: Met patient and his wife in radiation oncology today. He reports good appetite. He has gained about 10#. Decadron is being weaned. The wife read through nutrition info provided to her when patient was on the rehab unit. Asked questions about vitamin supplements. I told them that a vitamin supplement probably is not necessary but if he does take one to make sure that it is not one that is high in antioxidants. Encouraged a variety of foods, high fiber, fruits and vegetables, healthy fats, whole grains, lean proteins etc. Will be available for any questions.     Electronically signed by:  Berenice Leiva RD, LD  2:04 PM

## 2019-05-09 NOTE — PROGRESS NOTES
Subjective   Patient ID: Hernandez Waterman is a 63 y.o. male is here today for follow-up. He is 6 weeks out from a right frontal craniotomy for biopsy by Dr. Brower 4/7/19. He is doing well. He denies and HA, dizziness or vision changes.  He takes Decadron 2 mg BID.      Brain Tumor   Pertinent negatives include no headaches or numbness.       The following portions of the patient's history were reviewed and updated as appropriate: allergies, current medications, past family history, past medical history, past social history, past surgical history and problem list.    Review of Systems   Genitourinary: Negative for difficulty urinating.   Neurological: Negative for dizziness, speech difficulty, light-headedness, numbness and headaches.   All other systems reviewed and are negative.      Objective   Physical Exam   Constitutional: He is oriented to person, place, and time. He appears well-developed and well-nourished.   HENT:   Head: Normocephalic and atraumatic.   Right Ear: External ear normal.   Left Ear: External ear normal.   Eyes: Conjunctivae and EOM are normal. Pupils are equal, round, and reactive to light. Right eye exhibits no discharge. Left eye exhibits no discharge.   Neck: Normal range of motion. Neck supple. No tracheal deviation present.   Pulmonary/Chest: Effort normal. No stridor. No respiratory distress.   Musculoskeletal: Normal range of motion. He exhibits no edema, tenderness or deformity.   Neurological: He is alert and oriented to person, place, and time. He has normal strength and normal reflexes. He displays no atrophy, no tremor and normal reflexes. No cranial nerve deficit or sensory deficit. He exhibits normal muscle tone. He displays a negative Romberg sign. He displays no seizure activity. Coordination and gait normal.   No long tract signs   Skin: Skin is warm and dry.   Psychiatric: He has a normal mood and affect. His behavior is normal. Judgment and thought content normal.   Nursing  note and vitals reviewed.    Neurologic Exam     Mental Status   Oriented to person, place, and time.     Cranial Nerves     CN III, IV, VI   Pupils are equal, round, and reactive to light.  Extraocular motions are normal.     Motor Exam     Strength   Strength 5/5 throughout.       Assessment/Plan   Independent Review of Radiographic Studies:      Medical Decision Making:    Mr. Waterman is 6 weeks out from a right frontal craniotomy for gross total resection of GBM.  His postoperative scan showed complete he is resection of the tumor.  On his 14th day out of 30 radiation treatments.  He is currently also taking Temodar.  His wound is healing very nicely and other than some fatigue he really does not have any complaints.  He is on Decadron 2 mg/day at this point.  His Keppra was stopped after his last appointment by Dr. Luther due to elevated LFTs.    He will finish radiation on June 17.    He will call in the interim with any questions or concerns or incisional changes and otherwise follow-up at the end of July with a repeat brain MRI with and without contrast.  Hernandez was seen today for post-op.    Diagnoses and all orders for this visit:    Surgery follow-up examination  -     MRI Brain With & Without Contrast; Future      Return in about 2 months (around 7/24/2019) for with Dr. Brower (MRI 7/22, f/u later that week). .

## 2019-05-13 NOTE — PROGRESS NOTES
Called [facility] and made an appt for labs every Monday at 1:00. Called pt and let him know and he verbalized understanding.

## 2019-05-13 NOTE — PROGRESS NOTES
Physician Weekly Management Note    Diagnosis:     Diagnosis Plan   1. Glioblastoma (CMS/HCC)         RT Details:  fx 5/30 frontal lobe   Notes on Treatment course, Films, Medical progress:  Doing ok, temodar daily decadron 4mg in am, start 2mg daily on Wednesday, may 15 per dr. pillai instructions, no headaches, no n/v, cont on, will check cbc every monday    Weekly Management:  Medication reviewed?   Yes  New medications given?   No  Problemlist reviewed?   Yes  Problem added?   No  Issues raised requiring referral to support services - task assigned to:  na    Technical aspects reviewed:  Weekly OBI approved?   Yes  Weekly port films approved?   Yes  Change requests noted on port film?   No  Patient setup and plan reviewed?   Yes    Chart Reviewed:  Continue current treatment plan?   Yes  Treatment plan change requested?   No  CBC reviewed?   Yes  Concurrent Chemo?   Yes    Objective     Toxicities:   none     Review of Systems   Constitutional: Negative.    HENT: Negative.    Neurological: Negative.           Vitals:    05/13/19 1345   BP: 110/65   Pulse: 57   SpO2: 97%       No flowsheet data found.    Physical Exam   Constitutional: He appears well-developed and well-nourished.           Problem Summary List    Diagnosis:     Diagnosis Plan   1. Glioblastoma (CMS/HCC)       Pathology:   gbm    Past Medical History:   Diagnosis Date   • ED (erectile dysfunction)    • Glioblastoma (CMS/HCC) 04/2019    Right frontal         Past Surgical History:   Procedure Laterality Date   • COLONOSCOPY  2005   • CRANIOTOMY FOR TUMOR Right 4/7/2019    Procedure: RIGHT SIDE CRANIOTOMY FOR TUMOR REMOVAL AND  BIOPSY  WITH SIDNEY NAVIGATTION AND PARTIAL RIGHT FRONTAL LOBE LOBECTOMY;  Surgeon: Keshawn Brower MD;  Location: [facility];  Service: Neurosurgery   • CRANIOTOMY FOR TUMOR  04/07/2019         Current Outpatient Medications on File Prior to Visit   Medication Sig Dispense Refill   • dexamethasone (DECADRON) 4 MG  tablet Take 1 tablet by mouth 2 (Two) Times a Day. (Patient taking differently: Take 4 mg by mouth Daily With Breakfast.) 40 tablet 1   • famotidine (PEPCID) 20 MG tablet Take 1 tablet by mouth 2 (Two) Times a Day. 60 tablet 0   • ondansetron ODT (ZOFRAN-ODT) 4 MG disintegrating tablet Take 1 tablet by mouth Every 6 (Six) Hours As Needed for Nausea or Vomiting. 30 tablet 0   • tamsulosin (FLOMAX) 0.4 MG capsule 24 hr capsule 2 daily 60 capsule 12   • temozolomide (TEMODAR) 140 MG chemo capsule Take 1 capsule by mouth with temozolomide 5mg for 145 mg total Daily. 42 capsule 0   • dexamethasone (DECADRON) 6 MG tablet Take 1 tablet by mouth Every 12 (Twelve) Hours. 14 tablet 0   • temozolomide (TEMODAR) 5 MG chemo capsule Take 1 capsule by mouth with 140mg temozolomide for 145 mg total Daily. 42 capsule 0     No current facility-administered medications on file prior to visit.        Allergies   Allergen Reactions   • Sulfa Antibiotics Unknown (See Comments)     Unknown         Referring Provider:    No referring provider defined for this encounter.    Oncologist:  No primary care provider on file.      Seen and approved by:  Shanti Philippe MD  05/13/2019

## 2019-05-13 NOTE — TELEPHONE ENCOUNTER
Dr Luther stated pt can continue Septra. I called pt and explained this to him and pt stated side effects were dizziness. Pt is to call back if he has any problems at all with medicine

## 2019-05-13 NOTE — TELEPHONE ENCOUNTER
I called patient on Saturday 05/11/2019: I noted tht he was not taking Septra DS.  Hernandez informed me that [facility] with his Teomdar.  I called his [facility] with RX for Septra DS to take one every Monday, Wednesday and Friday of every week while on RT and Temodar : stay on it until he is told to stop it.  I called Ehrnandez back and confirmed that he got the medication and he understood the instructions.

## 2019-05-13 NOTE — TELEPHONE ENCOUNTER
I called and spoke with Hernandez regarding the  presumed allergy to Sulfa: he developed dizzin ess on a sulfa antibiotic 15 years ago: that was it

## 2019-05-13 NOTE — TELEPHONE ENCOUNTER
----- Message from Cindi Gregorio MA sent at 5/13/2019  9:25 AM EDT -----  Pt called stating 15 years ago he had a reaction to Septra. Pt took 1st dose on Saturday 5- and has had no side effects. Should he continue to take or should something else be taken

## 2019-05-17 NOTE — PROGRESS NOTES
.     REASON FOR FOLLOWUP:     1.  Glioblastoma, status post right craniotomy on 4/7/2019.  Negative for MGMT promotor methylation. Negative for IDH1-R132H expression by IHC method.                                 HISTORY OF PRESENT ILLNESS:  The patient is a 63 y.o. year old male who presented today for re-evaluation.   Patient is accompanied by his wife who helped with history and discussion.     This patient was started on standard chemoradiation therapy on 5/7/2019.  So far patient has been tolerating well, with no nausea no vomiting no significant fatigue.  He is on low-dose dexamethasone 10 mg twice a day directed by radiation oncologist Dr. hPilippe.  Patient also was started on Bactrim for PCP prophylaxis per protocol. Patient reports that he has no apparent side effects.  Patient reports previously he was allowed to sulfa-containing antibiotic (no skin rash).      Patient reports he is doing better. Denies any headaches, vision changes, tinnitus and no weakness in extremities.  His performance status is ECOG 1-0.    Patient reports that he eventually decided not to waiting longer for evaluation at [facility], because the process was prolonged and [facility] eventually received and reviewed the imaging studies he had here at [facility], and provided him an appointment for initial evaluation on 5/20/2019, not even starting clinical trial.  Patient had communication with [facility], and was told after initial combined chemoradiation therapy, he would have repeated a brain MRI examination and then they will decide whether he would be candidate for any trials at that point.      In the meantime, neuro-oncologist Dr. Luther is also looking for clinical trial at other [facility] for this patient.    Laboratory study today reported elevated WBC 16,020, including ANC 12,580 and monocytes 1420 lymphocytes 1240.  Normal platelets 300,000 and further  improved hemoglobin 13.8, MCV 96.7.    Past Medical History:   Diagnosis Date   • ED (erectile dysfunction)    • Glioblastoma (CMS/HCC) 04/2019    Right frontal     Past Surgical History:   Procedure Laterality Date   • COLONOSCOPY  2005   • CRANIOTOMY FOR TUMOR Right 4/7/2019    Procedure: RIGHT SIDE CRANIOTOMY FOR TUMOR REMOVAL AND  BIOPSY  WITH SIDNEY NAVIGATTION AND PARTIAL RIGHT FRONTAL LOBE LOBECTOMY;  Surgeon: Keshawn Brower MD;  Location: [facility];  Service: Neurosurgery   • CRANIOTOMY FOR TUMOR  04/07/2019       HEMATOLOGIC/ONCOLOGIC HISTORY: The patient is a 63 y.o. year old male who is a previously healthy male, [employer] , who presented to the emergency room on 04/05/2019 because of new onset right sided headache in the orbital area for about 2 weeks. This was progressively getting worse. The patient originally thought this was due to sinusitis which he had previously. He denies vision changes however, he does have significant nausea and with a few episodes of vomiting with clear gastric content. He denies fever, sweating or chills. Nevertheless this patient had imaging studies obtained in the early morning on 04/05/2019 initially with CT of head without contrast and this described a large right frontal lobe mass measuring 6.1 x 3.9 cm and midline shift 1.3 cm. He had brain MRI examination with and without contrast a few hours later, which showed necrotic enhancing right frontal lobe mass measuring 6.8 x 4.3 x 4.6 cm, with vasogenic edema, extending into the genu of the corpus collosum. There is a marked mass effect in addition to midline shift by 8 mm and entrapment of the left lateral ventricle, subfalcine herniation of the anterior and medial portion of the right frontal lobe. This is highly suspicious for glioblastoma multiforme. The patient subsequently had a CT of the abdomen and pelvis with IV contrast and there was no definite evidence of primary or metastatic disease.      The patient  was seen by Neurosurgeon, Dr. Brower and started on dexamethasone and Keppra for anti-seizure purpose. This patient went on to have right craniotomy on 04/07/2019.      Preliminary pathology evaluation reported high grade glioma, fits with GBM. According to pathologist Dr. Macias, final pathology evaluation is still pending and likely the sample will be sent to [facility] for further molecular study.      Prior to this admission, patient was physically active, has regular exercise on a daily basis prior to this hospitalization.  Does not take prescribed medication.     No family history of malignancy.    He was tested negative for MGMT promotor methylation. He was also tested negative for IDH1-R132H expression by IHC method.    Laboratory study on 4/25/2019 reported stable mild anemia with hemoglobin 12.7 but normal WBC 9850 and platelets 286,000. Chemistry lab reported elevated , AST 91 and normal alkaline phosphatase 77 and total bilirubin 0.3. He otherwise has normal renal function with creatinine 0.83 and normal electrolytes; glucose 105.     Upon questioning, patient reports he previously had elevated liver enzymes periodically. I reviewed his previous lab results on 02/25/2019 when he had completely normal liver function panel.  But in the morning on 04/05/2019 when he was in the emergency room, laboratory study obtained at 4:54 a.m. already showed elevated  and AST 69 but normal bilirubin 0.5 and alkaline phosphatase 71. He denies pain in the right upper quadrant of abdomen. No nausea. No vomiting.     MEDICATIONS    Current Outpatient Medications:   •  dexamethasone (DECADRON) 4 MG tablet, Take 1 tablet by mouth 2 (Two) Times a Day. (Patient taking differently: Take 4 mg by mouth Daily With Breakfast.), Disp: 40 tablet, Rfl: 1  •  dexamethasone (DECADRON) 6 MG tablet, Take 1 tablet by mouth Every 12 (Twelve) Hours., Disp: 14 tablet, Rfl: 0  •  famotidine (PEPCID) 20 MG tablet, Take 1 tablet  by mouth 2 (Two) Times a Day., Disp: 60 tablet, Rfl: 0  •  ondansetron ODT (ZOFRAN-ODT) 4 MG disintegrating tablet, Take 1 tablet by mouth Every 6 (Six) Hours As Needed for Nausea or Vomiting., Disp: 30 tablet, Rfl: 0  •  tamsulosin (FLOMAX) 0.4 MG capsule 24 hr capsule, 2 daily, Disp: 60 capsule, Rfl: 12  •  temozolomide (TEMODAR) 140 MG chemo capsule, Take 1 capsule by mouth with temozolomide 5mg for 145 mg total Daily., Disp: 42 capsule, Rfl: 0  •  temozolomide (TEMODAR) 5 MG chemo capsule, Take 1 capsule by mouth with 140mg temozolomide for 145 mg total Daily., Disp: 42 capsule, Rfl: 0    ALLERGIES:     Allergies   Allergen Reactions   • Sulfa Antibiotics Unknown (See Comments)     Unknown       SOCIAL HISTORY:       Social History     Socioeconomic History   • Marital status:      Spouse name: Mony   • Number of children: 1   • Years of education: Not on file   • Highest education level: Not on file   Occupational History   • Occupation: /Instructor     Employer: [employer]   Tobacco Use   • Smoking status: Never Smoker   • Smokeless tobacco: Never Used   Substance and Sexual Activity   • Alcohol use: Yes     Comment: rarely   • Drug use: No   • Sexual activity: Yes     Partners: Female         FAMILY HISTORY:  Family History   Problem Relation Age of Onset   • Hypertension Mother    • Osteoporosis Mother    • Heart failure Mother    • Hypertension Father        REVIEW OF SYSTEMS:  Review of Systems   Constitutional: Positive for appetite change (His excellent appetite secondary to steroid) and unexpected weight change (Weight gain secondary to steroids). Negative for chills, diaphoresis, fatigue and fever.   HENT: Negative for drooling, hearing loss, sinus pressure and trouble swallowing.    Eyes: Negative for visual disturbance.   Respiratory: Negative for cough and shortness of breath.    Cardiovascular: Negative for chest pain, palpitations and leg swelling.   Gastrointestinal: Negative for  "abdominal pain, constipation and nausea.   Endocrine: Negative for cold intolerance.   Genitourinary: Negative for dysuria and frequency.   Musculoskeletal: Negative for arthralgias, back pain and joint swelling.   Skin: Negative for rash.   Neurological: Negative for dizziness, seizures, facial asymmetry, speech difficulty, weakness, numbness and headaches.   Hematological: Negative for adenopathy. Does not bruise/bleed easily.   Psychiatric/Behavioral: Negative for agitation, confusion and sleep disturbance.              Vitals:    05/17/19 1509   BP: 98/60   Pulse: 60   Resp: 16   Temp: 98.4 °F (36.9 °C)   SpO2: 98%   Weight: 74.6 kg (164 lb 8 oz)   Height: 182 cm (71.65\")  Comment: new ht.   PainSc: 0-No pain     Current Status 5/17/2019   ECOG score 0      PHYSICAL EXAM:    GENERAL:  Well-developed, well-nourished in no acute distress.   SKIN:  Warm, dry without rashes, purpura or petechiae.  EYES:  Pupils equal, round.  EOMs intact.  Conjunctivae normal.  EARS:  Hearing intact.  NECK:  Supple with good range of motion; no thyromegaly or masses.  CHEST:  Lungs clear to auscultation. Good airflow.  CARDIAC:  Regular rate and rhythm without murmurs, rubs or gallops. Normal S1,S2.  ABDOMEN:  Soft, nontender with no hepatosplenomegaly or masses.  EXTREMITIES:  No clubbing, cyanosis or edema.  NEUROLOGICAL:  Cranial Nerves II-XII grossly intact.  No focal neurological deficits.  PSYCHIATRIC:  Normal affect and mood.       RECENT LABS:  Lab Results   Component Value Date    WBC 16.02 (H) 05/13/2019    HGB 13.8 05/13/2019    HCT 41.5 05/13/2019    MCV 96.7 05/13/2019     05/13/2019     Lab Results   Component Value Date    NEUTROABS 12.58 (H) 05/13/2019     Lab Results   Component Value Date    GLUCOSE 95 05/07/2019    BUN 21 05/07/2019    CREATININE 0.76 05/07/2019    EGFRIFNONA 103 05/07/2019    EGFRIFAFRI 101 02/14/2018    BCR 27.6 (H) 05/07/2019    K 3.7 05/07/2019    CO2 29.7 (H) 05/07/2019    CALCIUM 8.9 " 05/07/2019    PROTENTOTREF 6.8 02/14/2018    ALBUMIN 3.80 05/07/2019    LABIL2 1.4 02/14/2018    AST 22 05/07/2019    ALT 78 (H) 05/07/2019     Sodium   Date Value Ref Range Status   05/07/2019 134 (L) 136 - 145 mmol/L Final     Potassium   Date Value Ref Range Status   05/07/2019 3.7 3.5 - 5.2 mmol/L Final     Total Bilirubin   Date Value Ref Range Status   05/07/2019 0.2 0.2 - 1.2 mg/dL Final     Alkaline Phosphatase   Date Value Ref Range Status   05/07/2019 52 39 - 117 U/L Final   ]      Assessment/Plan     1.  Glioblastoma status post left craniotomy on 4/7/2019.  Molecular study at [facility] reported negative for MGMT promotor methylation. He was also tested negative for IDH1-R132H expression by IHC method.     Patient was started on standard chemoradiotherapy with oral temozolomide on 5/7/2019.  He is on low-dose steroids dexamethasone 2 mg daily.      Patient reports overall he has been doing well, no nausea but no vomiting.  No significant fatigue.  He denies fever sweating chills.  No evidence for infection.  He has been taking Bactrim 3 times a week for PCP prophylaxis.  No skin rashes or other apparent allergic reaction to Bactrim.       2.  Elevated liver enzymes on 4/25/2019.  I strongly suspect Keppra was the culprit.  That was discontinued.  However Dr. Luther suspected the Pepcid was the culprit.       His liver function panel improved on 5/7/2019.       We will monitor CMP every 2 weeks.    3.  Anemia.  This is gradually improving.  Patient is asymptomatic.  Continue to monitor weekly during radiation therapy.     PLAN:   1.  Continue daily radiation therapy, supervised by Dr. Philippe.    2.  Continue to follow-up with neuro oncologist Dr. Homa MD, for ongoing care.   3.  Continue Bactrim 3 times a week for PCP prophylaxis.  4.  Monitor CBC weekly.   5.  Returns in 2 weeks CMP, get the labs CBC CMP.  6.  Patient then will return to see me on 6/17/2019 with repeat labs CBC and CMP.  He will  finish radiation therapy by that day.        More than 30 min were used for patient care, over 20 minutes of that time were for counseling to patient and his wife.        JUSTIN FROST M.D., Ph.D.    5/17/2019    CC:   MD Caroline Briceno MD Crystal McMahan, MD Melissa T. Barrett, MD

## 2019-05-20 NOTE — TELEPHONE ENCOUNTER
Pt wife said he only has 2 weeks left of the chemo pills and 4 weeks left of chemo and radiation. She wanted know after he's done with everything if the chemo is going to be sent to the pharmacy or to their house.

## 2019-05-20 NOTE — PROGRESS NOTES
Physician Weekly Management Note    Diagnosis:     Diagnosis Plan   1. Glioblastoma (CMS/HCC)         RT Details:  Treatment #10/30    Notes on Treatment course, Films, Medical progress:  Describes some moderate fatigue, no headaches, no new neuro signs or symptoms.  Continues planned.    Weekly Management:  Medication reviewed?   Yes  New medications given?   No  Problemlist reviewed?   Yes  Problem added?   No       Technical aspects reviewed:  Weekly OBI approved?   Yes  Weekly port films approved?   Yes  Change requests noted on port film?   No  Patient setup and plan reviewed?   Yes    Chart Reviewed:  Continue current treatment plan?   Yes  Treatment plan change requested?   No  CBC reviewed?   Yes  Concurrent Chemo?   Yes    Objective     Toxicities:   As above     Review of Systems   As above    Vitals:    05/20/19 1404   BP: 110/77   Pulse: 56   Temp: 97.2 °F (36.2 °C)   SpO2: 95%       Current Status 5/17/2019   ECOG score 0       Physical Exam  As above      Problem Summary List    Diagnosis:     Diagnosis Plan   1. Glioblastoma (CMS/HCC)       Pathology:       Past Medical History:   Diagnosis Date   • ED (erectile dysfunction)    • Glioblastoma (CMS/HCC) 04/2019    Right frontal         Past Surgical History:   Procedure Laterality Date   • COLONOSCOPY  2005   • CRANIOTOMY FOR TUMOR Right 4/7/2019    Procedure: RIGHT SIDE CRANIOTOMY FOR TUMOR REMOVAL AND  BIOPSY  WITH SIDNEY NAVIGATTION AND PARTIAL RIGHT FRONTAL LOBE LOBECTOMY;  Surgeon: Keshawn Brower MD;  Location: [facility];  Service: Neurosurgery   • CRANIOTOMY FOR TUMOR  04/07/2019         Current Outpatient Medications on File Prior to Visit   Medication Sig Dispense Refill   • dexamethasone (DECADRON) 4 MG tablet Take 1 tablet by mouth 2 (Two) Times a Day. (Patient taking differently: Take 2 mg by mouth Daily With Breakfast.) 40 tablet 1   • famotidine (PEPCID) 20 MG tablet Take 1 tablet by mouth 2 (Two) Times a Day. 60 tablet 0   •  sulfamethoxazole-trimethoprim (BACTRIM DS,SEPTRA DS) 800-160 MG per tablet Take 1 tablet by mouth Every Other Day.     • tamsulosin (FLOMAX) 0.4 MG capsule 24 hr capsule 2 daily 60 capsule 12   • temozolomide (TEMODAR) 140 MG chemo capsule Take 1 capsule by mouth with temozolomide 5mg for 145 mg total Daily. 42 capsule 0   • temozolomide (TEMODAR) 5 MG chemo capsule Take 1 capsule by mouth with 140mg temozolomide for 145 mg total Daily. 42 capsule 0   • ondansetron ODT (ZOFRAN-ODT) 4 MG disintegrating tablet Take 1 tablet by mouth Every 6 (Six) Hours As Needed for Nausea or Vomiting. 30 tablet 0     No current facility-administered medications on file prior to visit.        Allergies   Allergen Reactions   • Sulfa Antibiotics Unknown (See Comments)     Unknown         Primary care MD:    Megha Gant MD    Oncologist:     KATHY Medley MD    Seen and approved by:  Lane Bassett MD  05/20/2019

## 2019-05-21 NOTE — TELEPHONE ENCOUNTER
Left a message for pt's spouse that RX was sent to [facility] mail order,

## 2019-05-22 NOTE — PROGRESS NOTES
Pt asked to see nurse due to a very slight amount of swelling to his scar on his head, about quarter size. Discussed how it could be some swelling due to the XRT or steroids. He sees his surgeon in 2 days and told him to make a note of it to them. He verbalized understanding.

## 2019-05-26 NOTE — PROGRESS NOTES
Telephone conversation with the patient 5/26/2019.  He called stating that he was having some swelling in the right eyelid (the side of his craniotomy).  The area is nonpainful and there is no change in vision.  We instructed him to try to sit up as much as possible and use warm compress on the eye.  He was instructed to call if he has any change in vision, fevers, or significant worsening of the swelling or any redness of the area.

## 2019-05-28 NOTE — PROGRESS NOTES
Physician Weekly Management Note    Diagnosis:     Diagnosis Plan   1. Glioblastoma (CMS/HCC)         RT Details:  fx 15/30 imrt brain  Notes on Treatment course, Films, Medical progress:  Doing ok, increased swelling over forehead this weekend and irritation of his eye but better today, still with some edema around incision, on decadron 4mg in the am, mild erythema of right forehead and scalp,     Weekly Management:  Medication reviewed?   Yes  New medications given?   No  Problemlist reviewed?   Yes  Problem added?   No  Issues raised requiring referral to support services - task assigned to:  angel    Technical aspects reviewed:  Weekly OBI approved?   Yes  Weekly port films approved?   Yes  Change requests noted on port film?   No  Patient setup and plan reviewed?   Yes    Chart Reviewed:  Continue current treatment plan?   Yes  Treatment plan change requested?   No  CBC reviewed?   Yes  Concurrent Chemo?   Yes    Objective     Toxicities:   Grade 1 erythema, edema     Review of Systems   Constitutional: Positive for fatigue.   Skin: Positive for color change.   Neurological: Negative for weakness and headaches.   Hematological: Negative.    Psychiatric/Behavioral: Negative.           Vitals:    05/28/19 1419   BP: 108/72   Pulse: 54   Temp: 98.6 °F (37 °C)   SpO2: 98%       Current Status 5/17/2019   ECOG score 0       Physical Exam   Constitutional: He appears well-developed and well-nourished.   HENT:   Head:       Mild edema surrounding incision, mild erythema           Problem Summary List    Diagnosis:     Diagnosis Plan   1. Glioblastoma (CMS/HCC)       Pathology:   gbm    Past Medical History:   Diagnosis Date   • ED (erectile dysfunction)    • Glioblastoma (CMS/HCC) 04/2019    Right frontal         Past Surgical History:   Procedure Laterality Date   • COLONOSCOPY  2005   • CRANIOTOMY FOR TUMOR Right 4/7/2019    Procedure: RIGHT SIDE CRANIOTOMY FOR TUMOR REMOVAL AND  BIOPSY  WITH [email or web address] NAVIGATTION AND  PARTIAL RIGHT FRONTAL LOBE LOBECTOMY;  Surgeon: Keshawn Brower MD;  Location: [facility];  Service: Neurosurgery   • CRANIOTOMY FOR TUMOR  04/07/2019         Current Outpatient Medications on File Prior to Visit   Medication Sig Dispense Refill   • dexamethasone (DECADRON) 4 MG tablet Take 1 tablet by mouth 2 (Two) Times a Day. (Patient taking differently: Take 2 mg by mouth Daily With Breakfast.) 40 tablet 1   • famotidine (PEPCID) 20 MG tablet Take 1 tablet by mouth 2 (Two) Times a Day. 60 tablet 0   • ondansetron ODT (ZOFRAN-ODT) 4 MG disintegrating tablet Take 1 tablet by mouth Every 6 (Six) Hours As Needed for Nausea or Vomiting. 30 tablet 0   • sulfamethoxazole-trimethoprim (BACTRIM DS,SEPTRA DS) 800-160 MG per tablet Take 1 tablet by mouth Every Other Day.     • tamsulosin (FLOMAX) 0.4 MG capsule 24 hr capsule 2 daily 60 capsule 12   • temozolomide (TEMODAR) 140 MG chemo capsule Take 1 capsule by mouth with temozolomide 5mg for 145 mg total Daily. 42 capsule 0   • temozolomide (TEMODAR) 5 MG chemo capsule Take 1 capsule by mouth with 140mg temozolomide for 145 mg total Daily. 42 capsule 0     No current facility-administered medications on file prior to visit.        Allergies   Allergen Reactions   • Sulfa Antibiotics Unknown (See Comments)     Unknown         Referring Provider:    No referring provider defined for this encounter.    Oncologist:  No primary care provider on file.      Seen and approved by:  Shanti Philippe MD  05/28/2019

## 2019-05-28 NOTE — TELEPHONE ENCOUNTER
----- Message from Renetta Saunders sent at 5/24/2019  1:15 PM EDT -----  Contact: Susan (pt's wife)  Susan said pt had a conversation with Dr. Luther about directions on his chemo and radiation but she didn't hear the conversation so she wants to know if he's supposed to take his chemo 1-3 hrs prior to taking radiation? Also the directions say take the chemo pill on an empty stomach does that mean he waits 1 hr after taking the pill before he eats.  Please call her at [phone].    Thanks,  An

## 2019-05-28 NOTE — TELEPHONE ENCOUNTER
Per Dr Luther pt is to take RX on empty stomach 1 hr before or 1 hr after food.Also taking Temador is best 1 hr before RT.  Called and left message with all information for pt's spouse.

## 2019-05-31 NOTE — PROGRESS NOTES
.     REASON FOR FOLLOWUP:     1.  Glioblastoma, status post right craniotomy on 4/7/2019.  Negative for MGMT promotor methylation. Negative for IDH1-R132H expression by IHC method.                                 HISTORY OF PRESENT ILLNESS:  The patient is a 63 y.o. year old male the above-mentioned history who is here today for follow-up visit.  He continues on Temodar along with concurrent radiation.  Overall, he feels like he is tolerating things quite well.  He denies any significant issues with nausea or vomiting.  He also continues on dexamethasone 2 mg daily.  He has a small fluid collection around his incision.  No skin rash.  No headaches.      Past Medical History:   Diagnosis Date   • ED (erectile dysfunction)    • Glioblastoma (CMS/HCC) 04/2019    Right frontal     Past Surgical History:   Procedure Laterality Date   • COLONOSCOPY  2005   • CRANIOTOMY FOR TUMOR Right 4/7/2019    Procedure: RIGHT SIDE CRANIOTOMY FOR TUMOR REMOVAL AND  BIOPSY  WITH SIDNEY NAVIGATTION AND PARTIAL RIGHT FRONTAL LOBE LOBECTOMY;  Surgeon: Keshawn Brower MD;  Location: [facility];  Service: Neurosurgery   • CRANIOTOMY FOR TUMOR  04/07/2019       HEMATOLOGIC/ONCOLOGIC HISTORY: The patient is a 63 y.o. year old male who is a previously healthy male, [employer] , who presented to the emergency room on 04/05/2019 because of new onset right sided headache in the orbital area for about 2 weeks. This was progressively getting worse. The patient originally thought this was due to sinusitis which he had previously. He denies vision changes however, he does have significant nausea and with a few episodes of vomiting with clear gastric content. He denies fever, sweating or chills. Nevertheless this patient had imaging studies obtained in the early morning on 04/05/2019 initially with CT of head without contrast and this described a large right frontal lobe mass measuring 6.1 x 3.9 cm and midline shift 1.3 cm. He had brain MRI  examination with and without contrast a few hours later, which showed necrotic enhancing right frontal lobe mass measuring 6.8 x 4.3 x 4.6 cm, with vasogenic edema, extending into the genu of the corpus collosum. There is a marked mass effect in addition to midline shift by 8 mm and entrapment of the left lateral ventricle, subfalcine herniation of the anterior and medial portion of the right frontal lobe. This is highly suspicious for glioblastoma multiforme. The patient subsequently had a CT of the abdomen and pelvis with IV contrast and there was no definite evidence of primary or metastatic disease.      The patient was seen by Neurosurgeon, Dr. Brower and started on dexamethasone and Keppra for anti-seizure purpose. This patient went on to have right craniotomy on 04/07/2019.      Preliminary pathology evaluation reported high grade glioma, fits with GBM. According to pathologist Dr. Macias, final pathology evaluation is still pending and likely the sample will be sent to [facility] for further molecular study.      Prior to this admission, patient was physically active, has regular exercise on a daily basis prior to this hospitalization.  Does not take prescribed medication.     No family history of malignancy.    He was tested negative for MGMT promotor methylation. He was also tested negative for IDH1-R132H expression by IHC method.    Laboratory study on 4/25/2019 reported stable mild anemia with hemoglobin 12.7 but normal WBC 9850 and platelets 286,000. Chemistry lab reported elevated , AST 91 and normal alkaline phosphatase 77 and total bilirubin 0.3. He otherwise has normal renal function with creatinine 0.83 and normal electrolytes; glucose 105.     Upon questioning, patient reports he previously had elevated liver enzymes periodically. I reviewed his previous lab results on 02/25/2019 when he had completely normal liver function panel.  But in the morning on 04/05/2019 when he was in the  emergency room, laboratory study obtained at 4:54 a.m. already showed elevated  and AST 69 but normal bilirubin 0.5 and alkaline phosphatase 71. He denies pain in the right upper quadrant of abdomen. No nausea. No vomiting.     MEDICATIONS    Current Outpatient Medications:   •  dexamethasone (DECADRON) 4 MG tablet, Take 1 tablet by mouth 2 (Two) Times a Day. (Patient taking differently: Take 2 mg by mouth Daily With Breakfast.), Disp: 40 tablet, Rfl: 1  •  famotidine (PEPCID) 20 MG tablet, Take 1 tablet by mouth 2 (Two) Times a Day., Disp: 60 tablet, Rfl: 0  •  ondansetron ODT (ZOFRAN-ODT) 4 MG disintegrating tablet, Take 1 tablet by mouth Every 6 (Six) Hours As Needed for Nausea or Vomiting., Disp: 30 tablet, Rfl: 0  •  sulfamethoxazole-trimethoprim (BACTRIM DS,SEPTRA DS) 800-160 MG per tablet, Take 1 tablet by mouth Every Other Day., Disp: , Rfl:   •  tamsulosin (FLOMAX) 0.4 MG capsule 24 hr capsule, 2 daily, Disp: 60 capsule, Rfl: 12  •  temozolomide (TEMODAR) 140 MG chemo capsule, Take 1 capsule by mouth with temozolomide 5mg for 145 mg total Daily., Disp: 42 capsule, Rfl: 0  •  temozolomide (TEMODAR) 5 MG chemo capsule, Take 1 capsule by mouth with 140mg temozolomide for 145 mg total Daily., Disp: 42 capsule, Rfl: 0    ALLERGIES:     Allergies   Allergen Reactions   • Sulfa Antibiotics Unknown (See Comments)     Unknown       SOCIAL HISTORY:       Social History     Socioeconomic History   • Marital status:      Spouse name: Mony   • Number of children: 1   • Years of education: Not on file   • Highest education level: Not on file   Occupational History   • Occupation: /Instructor     Employer: [employer]   Tobacco Use   • Smoking status: Never Smoker   • Smokeless tobacco: Never Used   Substance and Sexual Activity   • Alcohol use: Yes     Comment: rarely   • Drug use: No   • Sexual activity: Yes     Partners: Female         FAMILY HISTORY:  Family History   Problem Relation Age of Onset   •  "Hypertension Mother    • Osteoporosis Mother    • Heart failure Mother    • Hypertension Father        REVIEW OF SYSTEMS:  Review of Systems   Constitutional: Negative for activity change, appetite change, chills, fatigue and fever.   HENT: Negative for mouth sores, nosebleeds and trouble swallowing.    Eyes: Positive for discharge (occasional from right eye).   Respiratory: Negative for cough and shortness of breath.    Cardiovascular: Negative for chest pain and leg swelling.   Gastrointestinal: Negative for abdominal pain, constipation, diarrhea, nausea and vomiting.   Genitourinary: Negative for difficulty urinating.   Skin: Negative for rash.   Neurological: Negative for dizziness, weakness and numbness.   Hematological: Negative for adenopathy. Does not bruise/bleed easily.   Psychiatric/Behavioral: Negative for sleep disturbance.          Vitals:    05/31/19 1315   BP: 112/74   Pulse: 57   Resp: 16   Temp: 97.9 °F (36.6 °C)   TempSrc: Oral   SpO2: 99%   Weight: 76.8 kg (169 lb 6.4 oz)   Height: 182 cm (71.65\")   PainSc: 0-No pain     Current Status 5/31/2019   ECOG score 0     Physical Exam   Constitutional: He is oriented to person, place, and time. He appears well-developed and well-nourished. No distress.   HENT:   Head: Normocephalic and atraumatic.       Mouth/Throat: Oropharynx is clear and moist and mucous membranes are normal. No oropharyngeal exudate.   Mild edema surrounding incision   Eyes: Pupils are equal, round, and reactive to light.   Neck: Normal range of motion.   Cardiovascular: Normal rate, regular rhythm and normal heart sounds.   No murmur heard.  Pulmonary/Chest: Effort normal and breath sounds normal. No respiratory distress. He has no wheezes. He has no rhonchi. He has no rales.   Abdominal: Soft. Normal appearance and bowel sounds are normal. He exhibits no distension. There is no hepatosplenomegaly.   Musculoskeletal: Normal range of motion. He exhibits no edema.   Neurological: He " is alert and oriented to person, place, and time.   Skin: Skin is warm and dry. No rash noted.   Psychiatric: He has a normal mood and affect.   Vitals reviewed.       RECENT LABS:  Lab Results   Component Value Date    WBC 12.47 (H) 05/31/2019    HGB 14.0 05/31/2019    HCT 42.7 05/31/2019    MCV 99.3 (H) 05/31/2019     05/31/2019     Lab Results   Component Value Date    NEUTROABS 10.08 (H) 05/31/2019     Lab Results   Component Value Date    GLUCOSE 104 05/31/2019    BUN 19 05/31/2019    CREATININE 0.96 05/31/2019    EGFRIFNONA 79 05/31/2019    EGFRIFAFRI 101 02/14/2018    BCR 19.8 05/31/2019    K 4.8 (H) 05/31/2019    CO2 28.3 05/31/2019    CALCIUM 9.8 05/31/2019    PROTENTOTREF 6.8 02/14/2018    ALBUMIN 4.10 05/31/2019    LABIL2 1.4 02/14/2018    AST 24 05/31/2019    ALT 47 (H) 05/31/2019     Sodium   Date Value Ref Range Status   05/31/2019 138 134 - 145 mmol/L Final     Potassium   Date Value Ref Range Status   05/31/2019 4.8 (H) 3.5 - 4.7 mmol/L Final     Total Bilirubin   Date Value Ref Range Status   05/31/2019 0.2 0.2 - 1.2 mg/dL Final     Alkaline Phosphatase   Date Value Ref Range Status   05/31/2019 53 38 - 116 U/L Final   ]      Assessment/Plan     1.  Glioblastoma status post left craniotomy on 4/7/2019.  Molecular study at [facility] reported negative for MGMT promotor methylation. He was also tested negative for IDH1-R132H expression by IHC method.     Patient was started on standard chemoradiotherapy with oral temozolomide on 5/7/2019.  He is on low-dose steroids dexamethasone 2 mg daily.      As of 5/31/2019 patient reports that he continues to tolerate Temodar well without any issues with nausea or vomiting.  No skin rash.  He continues on Bactrim 3 times a week for PCP prophylaxis.      2.  Elevated liver enzymes on 4/25/2019.  I strongly suspect Keppra was the culprit.  That was discontinued.  However Dr. Luther suspected the Pepcid was the culprit.       His liver function panel  improved on 5/7/2019.       We will monitor CMP every 2 weeks.    3.  Anemia.  This is gradually improving.  Hemoglobin 14 today.  Continue to monitor.     PLAN:   1.  Continue Temodar.  2.  Continue radiation therapy under the care of Dr. Cheek.  3.  Continue Bactrim 3 times weekly for PCP prophylaxis.  4.  Continue dexamethasone 2 mg daily.  5.  Continue to follow-up with neuro-oncology as Dr. Luther  6.  Continue to monitor weekly CBC.  Patient just had CBC today, he will therefore not require a repeat  CBC this coming Monday.  7.  Return in about 2 weeks for follow-up visit with Dr. Hector with repeat CBC and CMP.

## 2019-06-03 NOTE — PROGRESS NOTES
Physician Weekly Management Note    Diagnosis:     Diagnosis Plan   1. Glioblastoma (CMS/HCC)         RT Details:  fx 18/30 right frontal lobe gbm  Notes on Treatment course, Films, Medical progress:  Doing well, mild edema of right scalp, no change, mild erythema and alopecia, no headaches, no n/v on decadron 2mg in the am managed by Dr. Steel    Weekly Management:  Medication reviewed?   Yes  New medications given?   No  Problemlist reviewed?   Yes  Problem added?   No  Issues raised requiring referral to support services - task assigned to:  na    Technical aspects reviewed:  Weekly OBI approved?   Yes  Weekly port films approved?   Yes  Change requests noted on port film?   No  Patient setup and plan reviewed?   Yes    Chart Reviewed:  Continue current treatment plan?   Yes  Treatment plan change requested?   No  CBC reviewed?   Yes  Concurrent Chemo?   Yes    Objective     Toxicities:   Alopecia, erythema     Review of Systems   Constitutional: Positive for fatigue.   Skin: Positive for color change.   Neurological: Negative.    Psychiatric/Behavioral: Negative.           Vitals:    06/03/19 1330   BP: 111/73   Pulse: 52   Resp: 16   Temp: 97 °F (36.1 °C)   SpO2: 98%       Current Status 5/31/2019   ECOG score 0       Physical Exam   Constitutional: He appears well-developed and well-nourished.   HENT:   Head:       Mild edema of scalp near scar           Problem Summary List    Diagnosis:     Diagnosis Plan   1. Glioblastoma (CMS/HCC)       Pathology:   gbm    Past Medical History:   Diagnosis Date   • ED (erectile dysfunction)    • Glioblastoma (CMS/HCC) 04/2019    Right frontal         Past Surgical History:   Procedure Laterality Date   • COLONOSCOPY  2005   • CRANIOTOMY FOR TUMOR Right 4/7/2019    Procedure: RIGHT SIDE CRANIOTOMY FOR TUMOR REMOVAL AND  BIOPSY  WITH SIDNEY NAVIGATTION AND PARTIAL RIGHT FRONTAL LOBE LOBECTOMY;  Surgeon: Keshawn Brower MD;  [facility] OR;  Service:  Neurosurgery   • CRANIOTOMY FOR TUMOR  04/07/2019         Current Outpatient Medications on File Prior to Visit   Medication Sig Dispense Refill   • dexamethasone (DECADRON) 4 MG tablet Take 1 tablet by mouth 2 (Two) Times a Day. (Patient taking differently: Take 2 mg by mouth Daily With Breakfast.) 40 tablet 1   • famotidine (PEPCID) 20 MG tablet Take 1 tablet by mouth 2 (Two) Times a Day. 60 tablet 0   • ondansetron ODT (ZOFRAN-ODT) 4 MG disintegrating tablet Take 1 tablet by mouth Every 6 (Six) Hours As Needed for Nausea or Vomiting. 30 tablet 0   • sulfamethoxazole-trimethoprim (BACTRIM DS,SEPTRA DS) 800-160 MG per tablet Take 1 tablet by mouth Every Other Day.     • tamsulosin (FLOMAX) 0.4 MG capsule 24 hr capsule 2 daily 60 capsule 12   • temozolomide (TEMODAR) 140 MG chemo capsule TAKE 1 CAPSULE OF (1-140MG+1-5MG) BY MOUTH DAILY. TOTAL OF (145 MG) 12 capsule 0   • temozolomide (TEMODAR) 5 MG chemo capsule TAKE 1 CAPSULE OF (1-5MG +1-140MG) BY MOUTH DAILY. TOTAL OF (145 MG) 12 capsule 0     No current facility-administered medications on file prior to visit.        Allergies   Allergen Reactions   • Sulfa Antibiotics Unknown (See Comments)     Unknown         Referring Provider:    No referring provider defined for this encounter.    Oncologist:  No primary care provider on file.      Seen and approved by:  Shanti Philippe MD  06/03/2019

## 2019-06-10 NOTE — PROGRESS NOTES
Follow Up Visit:GBM    Hernandez is here with his wife  He abiels finished 22 session of standatd RT/TMZ under the direction of DR Shanti Philippe  He si doing well  He has ahd no seizures  He is currently on decadron 2 mg daily: Dr Philippe according to them would like him to stay on that does until he is done with RT    Comprehensive neuro review is negative except for generalized sense of wekaness  Systemic review is negative except for sleep disturbance: late insomnia    Summary:    1. A 63 YOM, right-handed, previously in good health with 5-6 week history of gradual confusion, mild headaches and left sided weakness. Brain MRI on April 5/2019 showed a 6.8 x 4.3 x 4.6 cms enhancing mas right frontal region involving genu of corpus callosum with vasogenic edema and 8 mm right-to-left shift. Has GTR by DR Brower on 04/07/2019 with postop charbel MRI showing total resection of enhancing mass with new diffusion restricted ischemic change anterior tot he resection cavity. Pathology from [facility] [id number] is that of GBM with mutated P53 and intact ATRX and negative IDH1 by IHC. MGMT methylation is negative  Current KPS 90.   Current decadron dose 2 mg daily  RT/TMZ on treatment day 22/30 doing well. He will be one day of RT w/o TMZ but would have compleetd 6 weeks of TMZ when he is doen with RT: no need to add day 43 of TMZ     2. Elevated liver enzymes  PLAN: I had taken him off Keppra already since he has been on it prophylactically  AST reverted to normal  ALT remained elevated at at 47            Review of Systems   Constitutional: Positive for fatigue. Negative for chills and fever.   HENT: Positive for sinus pressure (headache  ). Negative for hearing loss and tinnitus.    Eyes: Negative for pain, redness and itching.   Respiratory: Negative for cough, shortness of breath and wheezing.    Cardiovascular: Negative for chest pain, palpitations and leg swelling.   Gastrointestinal: Negative for constipation, diarrhea,  "nausea and vomiting.   Endocrine: Negative for cold intolerance, heat intolerance and polydipsia.   Genitourinary: Negative for decreased urine volume, difficulty urinating and urgency.   Musculoskeletal: Negative for back pain, neck pain and neck stiffness.   Skin: Negative for color change, rash and wound.   Allergic/Immunologic: Negative for environmental allergies, food allergies and immunocompromised state.   Neurological: Positive for weakness. Negative for dizziness, tremors, seizures, syncope, facial asymmetry, speech difficulty, light-headedness, numbness and headaches.   Hematological: Negative for adenopathy. Does not bruise/bleed easily.   Psychiatric/Behavioral: Positive for sleep disturbance. Negative for agitation, behavioral problems, confusion, decreased concentration, dysphoric mood, hallucinations, self-injury and suicidal ideas. The patient is not nervous/anxious and is not hyperactive.        ROS Update      Vitals:    06/10/19 1132   BP: 110/70   Pulse: 66   SpO2: 97%   Weight: 77.1 kg (170 lb)   Height: 182 cm (71.65\")         Physical/Neurological Examination  Pleasant, alert, oriented  Normal speech and language  Cranial nerves are normal  Motor function normal  Cortical sensory is normal  Gait normal  Cerebellar and coordination are normal    He ahs mild swelling over the craniotomy site w/o redness or drainage    Review Results-Workup/Diagnoses/Plan  His labs are being followed by DR Medley in [facility]    IMPRESSION/ PLAN  GBM with details as above    PLAN: he finished RT/ TMZ on June 18  He stays on Decadron 2mg daily per DR Cheek: on June 19 he goes down to 1 mg/day for 7 days then I mg QOD x 7 days and steop. 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 I wrote hims barb for Decadron  I called in e-RX for Decadron  He takes Pepcid OTC      Brain MRI as scheduled by DR Brower for July 24  I will see him after MRI and CBC/CMP (orders " placed)

## 2019-06-11 NOTE — PROGRESS NOTES
Physician Weekly Management Note    Diagnosis:     Diagnosis Plan   1. Glioblastoma (CMS/HCC)         RT Details: fx 25/30 imrt right frontal brain gbm    Notes on Treatment course, Films, Medical progress:  Doing ok on decadron 2mg in the am, on temodar, cbc from yesterday shows wbc a little elevated, hbg and platelets ok, mri already scheduled for July 22 and follow up with saumya and han on July 24    Weekly Management:  Medication reviewed?   Yes  New medications given?   No  Problemlist reviewed?   Yes  Problem added?   No  Issues raised requiring referral to support services - task assigned to:  angel    Technical aspects reviewed:  Weekly OBI approved?   Yes  Weekly port films approved?   Yes  Change requests noted on port film?   No  Patient setup and plan reviewed?   Yes    Chart Reviewed:  Continue current treatment plan?   Yes  Treatment plan change requested?   No  CBC reviewed?   Yes  Concurrent Chemo?   Yes    Objective     Toxicities:   Fatigue, erythema, scalp edema     Review of Systems   Constitutional: Positive for fatigue.   Neurological: Negative.           Vitals:    06/11/19 1400   BP: 125/78   Pulse: 54   Temp: 97.3 °F (36.3 °C)   SpO2: 98%       Current Status 5/31/2019   ECOG score 0       Physical Exam   Constitutional: He appears well-developed and well-nourished.   HENT:   Head:       Mild erythema, patchy alopecia, edema much improved           Problem Summary List    Diagnosis:     Diagnosis Plan   1. Glioblastoma (CMS/HCC)       Pathology:   gbm    Past Medical History:   Diagnosis Date   • ED (erectile dysfunction)    • Glioblastoma (CMS/HCC) 04/2019    Right frontal         Past Surgical History:   Procedure Laterality Date   • COLONOSCOPY  2005   • CRANIOTOMY FOR TUMOR Right 4/7/2019    Procedure: RIGHT SIDE CRANIOTOMY FOR TUMOR REMOVAL AND  BIOPSY  WITH SIDNEY NAVIGATTION AND PARTIAL RIGHT FRONTAL LOBE LOBECTOMY;  Surgeon: Keshawn Brower MD;  Location: [facility] OR;   Service: Neurosurgery   • CRANIOTOMY FOR TUMOR  04/07/2019         Current Outpatient Medications on File Prior to Visit   Medication Sig Dispense Refill   • dexamethasone (DECADRON) 4 MG tablet Take 0.5 tablets by mouth Daily With Breakfast. 60 tablet 1   • famotidine (PEPCID) 20 MG tablet Take 1 tablet by mouth 2 (Two) Times a Day. 60 tablet 0   • ondansetron ODT (ZOFRAN-ODT) 4 MG disintegrating tablet Take 1 tablet by mouth Every 6 (Six) Hours As Needed for Nausea or Vomiting. 30 tablet 0   • sulfamethoxazole-trimethoprim (BACTRIM DS,SEPTRA DS) 800-160 MG per tablet Take 1 tablet by mouth Every Other Day.     • tamsulosin (FLOMAX) 0.4 MG capsule 24 hr capsule 2 daily 60 capsule 12   • temozolomide (TEMODAR) 140 MG chemo capsule TAKE 1 CAPSULE OF (1-140MG+1-5MG) BY MOUTH DAILY. TOTAL OF (145 MG) 12 capsule 0   • temozolomide (TEMODAR) 5 MG chemo capsule TAKE 1 CAPSULE OF (1-5MG +1-140MG) BY MOUTH DAILY. TOTAL OF (145 MG) 12 capsule 0     No current facility-administered medications on file prior to visit.        Allergies   Allergen Reactions   • Sulfa Antibiotics Unknown (See Comments)     Unknown         Referring Provider:    No referring provider defined for this encounter.    Oncologist:  No primary care provider on file.      Seen and approved by:  Shanti Philippe MD  06/11/2019

## 2019-06-12 NOTE — TELEPHONE ENCOUNTER
Per Dr Luther, we can not cancel another Drs appointment.  I called and spoke with pt's spouse about Drs appointment and also let her know pt it to stop Bactrim the last day of radiation.

## 2019-06-12 NOTE — TELEPHONE ENCOUNTER
----- Message from Cathie Newberry RN sent at 2019 10:31 AM EDT -----  Regarding: pt questions  Cindi,  This patient's wife called askin) Yesterday Dr. Luther said that the patient does not need to see Dr. Medley any more. Well the patient has an apt with Dr Hector on  and Dr. Hector works with Dr. Medley. The patient is wondering if she needs to cancel that apt, or is Dr. Luther going to notify their office?    2) The patient wife also wants to know if the patient needs to remain on Bactrim? She states he typically is taking it for his chemo treatments.   However his last chemo treatment is on , she is asking if he need to continue to take it.     Thank you

## 2019-06-13 NOTE — TELEPHONE ENCOUNTER
Will patient have to take a chemo pill during his one month of no radiation until the time of his MRI? Pharmacy is trying to fill the medication is the reason for asking. Mony, patients wife can be reached at [phone]

## 2019-06-13 NOTE — TELEPHONE ENCOUNTER
Pharmacy should check with the patient as he ahs been instructed: Last Temodar is the day before alst day of Radiation.

## 2019-06-14 NOTE — TELEPHONE ENCOUNTER
----- Message from Renetta Saunders sent at 6/14/2019  1:49 PM EDT -----  Contact: Susan (pt's wife)  Susan wants to speak with Dr. Luther to make sure they are on the same page about pt's meds.  She said she understood Dr. Luther to say Monday that Hernandez is to be off all meds one month prior to his MRI, well she got a call from [facility] mail order saying his meds are ready to be shipped, she said she called a couple days ago and hasn't heard from us.  Please call her today at [phone].    Thanks.,  An

## 2019-06-14 NOTE — TELEPHONE ENCOUNTER
Called wife and instructed her that per Dr. Luther, no more Temador once he finishes.  Wife expressed understanding.

## 2019-06-14 NOTE — PROGRESS NOTES
Physician Weekly Management Note    Diagnosis:     Diagnosis Plan   1. Glioblastoma (CMS/HCC)         RT Details:  fx 28/30 right frontal lobe gbm  Notes on Treatment course, Films, Medical progress:  Doing well overall, mild erythema over scalp, edema improved, no headahces, on decadron 2mg daily completes on Tuesday,    Weekly Management:  Medication reviewed?   Yes  New medications given?   No  Problemlist reviewed?   Yes  Problem added?   No  Issues raised requiring referral to support services - task assigned to:  na    Technical aspects reviewed:  Weekly OBI approved?   Yes  Weekly port films approved?   No  Change requests noted on port film?   Yes  Patient setup and plan reviewed?   Yes    Chart Reviewed:  Continue current treatment plan?   Yes  Treatment plan change requested?   No  CBC reviewed?   Yes  Concurrent Chemo?   Yes    Objective     Toxicities:   none     Review of Systems   Constitutional: Negative.    Eyes: Negative.    Neurological: Negative.           There were no vitals filed for this visit.    Current Status 5/31/2019   ECOG score 0       Physical Exam   Constitutional: He appears well-developed and well-nourished.   HENT:   Head:       Mild erythema, mild edema           Problem Summary List    Diagnosis:     Diagnosis Plan   1. Glioblastoma (CMS/HCC)       Pathology:   gbm    Past Medical History:   Diagnosis Date   • ED (erectile dysfunction)    • Glioblastoma (CMS/HCC) 04/2019    Right frontal         Past Surgical History:   Procedure Laterality Date   • COLONOSCOPY  2005   • CRANIOTOMY FOR TUMOR Right 4/7/2019    Procedure: RIGHT SIDE CRANIOTOMY FOR TUMOR REMOVAL AND  BIOPSY  WITH SIDNEY NAVIGATTION AND PARTIAL RIGHT FRONTAL LOBE LOBECTOMY;  Surgeon: Keshawn Brower MD;  Location: [facility];  Service: Neurosurgery   • CRANIOTOMY FOR TUMOR  04/07/2019         Current Outpatient Medications on File Prior to Visit   Medication Sig Dispense Refill   • dexamethasone (DECADRON) 4  MG tablet Take 0.5 tablets by mouth Daily With Breakfast. 60 tablet 1   • famotidine (PEPCID) 20 MG tablet Take 1 tablet by mouth 2 (Two) Times a Day. 60 tablet 0   • ondansetron ODT (ZOFRAN-ODT) 4 MG disintegrating tablet Take 1 tablet by mouth Every 6 (Six) Hours As Needed for Nausea or Vomiting. 30 tablet 0   • sulfamethoxazole-trimethoprim (BACTRIM DS,SEPTRA DS) 800-160 MG per tablet Take 1 tablet by mouth Every Other Day.     • tamsulosin (FLOMAX) 0.4 MG capsule 24 hr capsule 2 daily 60 capsule 12   • temozolomide (TEMODAR) 140 MG chemo capsule TAKE 1 CAPSULE OF (1-140MG+1-5MG) BY MOUTH DAILY. TOTAL OF (145 MG) 12 capsule 0   • temozolomide (TEMODAR) 5 MG chemo capsule TAKE 1 CAPSULE OF (1-5MG +1-140MG) BY MOUTH DAILY. TOTAL OF (145 MG) 12 capsule 0     No current facility-administered medications on file prior to visit.        Allergies   Allergen Reactions   • Sulfa Antibiotics Unknown (See Comments)     Unknown         Referring Provider:    No referring provider defined for this encounter.    Oncologist:  No primary care provider on file.      Seen and approved by:  Shanti Philippe MD  06/14/2019

## 2019-06-18 NOTE — TELEPHONE ENCOUNTER
----- Message from Caroline Luther MD sent at 6/18/2019  7:20 AM EDT -----  Contact: [phone]  Cindi, please call him Dexamethsaone 1 mg #60 with 2 refills Take one per day  thanks  ----- Message -----  From: Cindi Gregorio MA  Sent: 6/17/2019  10:44 AM  To: Caroline Luther MD        ----- Message -----  From: Jenelle Eddy RegSched Rep  Sent: 6/17/2019  10:09 AM  To: LUMA Pickering was calling to see if Dr. Luther could resend over a prescpritipn of dexamethasone (DECADRON) 4 MG tablet. The patient stated that he will be taking 1mg instead of the 4mg. The pharmacy said they have the 1mg so he wouldn't need to break the medication up so many times. He would like it sent to [place] [address] [address] AT [facility] [place] [address]  [facility]  - [phone]  - [phone] FX. Hernandez can be reached at [phone]. Thanks so much.

## 2019-06-19 NOTE — PROGRESS NOTES
Patient: Hernandez Waterman  YOB: 1955    RADIATION THERAPY TREATMENT SUMMARY  Diagnosis:    Glioblastoma (CMS/HCC)       Patient Care Team:  Megha Gant MD as PCP - General (Internal Medicine)  Keshawn Brower MD as Surgeon (Neurosurgery)  Beata Porter APRN as Referring Physician (Neurosurgery)  Shanti Philippe MD as Consulting Physician (Radiation Oncology)  Antolin Medley MD PhD as Consulting Physician (Hematology and Oncology)  Caroline Luther MD as Consulting Physician (Neurology)    Hernandez Waterman has recently completed the course of radiation therapy prescribed for the above-mentioned diagnosis.  Below, please find the specifics of the course of treatment delivered:    Radiation Details:    Dates of treatment:  5/7/2019 - 6/18/2019.  Treatment Site - R FRONTAL BRAIN  Treatment Intent - Postoperative  Total Dose in cGy - 4600  Number of Treatments - 23  Dose per fraction - 200 cGy per fraction  Fractions per day - 1 fx/day  Fractions per week - 5 fx/week  Treatment Type - Rapid Arc  Energy - 6 MVP  Normalization - Volumetric Normalization-- see below  Imaging/Field Verification - IGRT using CBCT daily  Additional comments: - 100% COVERS 95%PTV    Treatment Site - RT BRAIN BOOST  Treatment Intent - Curative, Postoperative  Total Dose in cGy - 1400  Number of Treatments - 7  Dose per fraction - 200 cGy per fraction  Fractions per day - 1 fx/day  Fractions per week - 5 fx/week  Treatment Type - Rapid Arc  Energy - 6 MVP  Normalization - Volumetric Normalization-- see below  Imaging/Field Verification - IGRT using CBCT daily  Additional comments: - 100% COVERS 95%PTVBOOST CBCT DAILY BONY MATCH THEN SOFT TISSUE GVT BOOST ,PTV BOOST,BRAINSTEM    Tolerance and Toxicities: he tolerated the treatments well , requiring no treatment breaks. he completed the treatments in 42 elapsed days.  He had some mild edema of the scalp in the area of hte incision, moderate erythema and patchy  alopecia.  He denied any headaches throughout or signficant fatigue.     Follow-up Plans:  I have asked the patient to return to see me in approximately 5 weeks .  I have also made a referral to our [facility].

## 2019-06-25 NOTE — TELEPHONE ENCOUNTER
----- Message from Caroline Luther MD sent at 6/24/2019  4:24 PM EDT -----  Contact: Susan Waterman  Yes  ----- Message -----  From: Cindi Gregorio MA  Sent: 6/24/2019   9:15 AM  To: Caroline Luther MD    Pt's spouse is wondering if pt can take muilt vitamin and fish oil.

## 2019-07-05 NOTE — PROGRESS NOTES
Received a call from patient the night of July 3, wanting to discuss his progress since completing his radiation treatments. He was tapering off his steroid and at the last taper point, began having some slight increased headache when he got up in the morning and other times when rising quickly out of a chair. VS have been stable. No other neuro symptoms. Has follow up scans and visits all set. Recommended we resume the steroid at a very low dose and attempt taper again. Start 2 mg decadron BID for 4 days, then 2 mg QD for 4 days then 1 mg QD for 4 days and then stop. That will cover him until his next follow up visit. He knows to call if further problems prior.     He then called me back with his pill count and I will send in a refill of his 1 mg pills.

## 2019-07-08 NOTE — TELEPHONE ENCOUNTER
Pt called wanting to verify his Decadron tapering schedule. We went over it and he verbalized understanding. Told him that Dr Luther wanted to manage his taper dose though, and he said he has already called him and he said it was ok with him.

## 2019-07-18 NOTE — TELEPHONE ENCOUNTER
----- Message from Renetta Saunders sent at 7/18/2019  8:14 AM EDT -----  Contact: Susan (pt's wife)  Pt has been off steroids two days yesterday he started noticing his kidneys hurting, he's never experienced this before when coming off steroids so he's wondering if this is a new problem or if it's normal, says his right hurts more than the the left.  Please call him or his wife at [phone] either asap or after 11.      ThanksAn

## 2019-07-18 NOTE — TELEPHONE ENCOUNTER
I have called him  By review of his symptoms he does have what expected from UTI  I am concerned he might have kidney stones and advised him to see his PCP

## 2019-07-19 NOTE — PROGRESS NOTES
"Sharita Waterman is a 63 y.o. male here for   Chief Complaint   Patient presents with   • Hyperlipidemia     5 month follow-up   • Back Pain   • Urinary Frequency   .    Vitals:    07/19/19 1311   BP: 110/72   BP Location: Left arm   Patient Position: Sitting   Cuff Size: Adult   Weight: 79.4 kg (175 lb)   Height: 182 cm (71.65\")       Body mass index is 23.97 kg/m².    Hyperlipidemia   This is a chronic problem. The problem is controlled. Recent lipid tests were reviewed and are normal. Pertinent negatives include no chest pain or shortness of breath.   Urinary Frequency    This is a chronic problem. The current episode started more than 1 year ago. The problem has been gradually worsening. Associated symptoms include frequency and urgency. Pertinent negatives include no chills or hematuria.   Back Pain   This is a new problem. The current episode started in the past 7 days. The problem occurs intermittently. The problem has been waxing and waning since onset. The pain is present in the lumbar spine. The quality of the pain is described as aching. The pain does not radiate. The pain is moderate. The symptoms are aggravated by lying down. Pertinent negatives include no chest pain, dysuria or fever.        The following portions of the patient's history were reviewed and updated as appropriate: allergies, current medications, past social history and problem list.    Review of Systems   Constitutional: Positive for fatigue. Negative for chills and fever.   Respiratory: Positive for cough. Negative for shortness of breath and wheezing.    Cardiovascular: Negative for chest pain, palpitations and leg swelling.   Genitourinary: Positive for enuresis, frequency and urgency. Negative for dysuria and hematuria.   Musculoskeletal: Positive for back pain.   Allergic/Immunologic: Positive for environmental allergies.   Psychiatric/Behavioral: Negative for dysphoric mood and sleep disturbance. The patient is not " nervous/anxious.        Objective   Physical Exam   Constitutional: He appears well-developed and well-nourished. No distress.   Cardiovascular: Normal rate, regular rhythm and normal heart sounds.   Pulmonary/Chest: No respiratory distress. He has no wheezes. He has no rales. He exhibits no tenderness.   Abdominal: Soft. He exhibits no distension and no mass. There is no tenderness. There is no rebound and no guarding. No hernia.   Musculoskeletal: Normal range of motion. He exhibits no edema, tenderness or deformity.   Neurological: He is alert. He displays normal reflexes. No cranial nerve deficit or sensory deficit. He exhibits normal muscle tone. Coordination normal.   Psychiatric: He has a normal mood and affect. His behavior is normal.   Nursing note and vitals reviewed.      Assessment/Plan   Diagnoses and all orders for this visit:    Acute bilateral low back pain without sciatica  Comments:  no signif blood in urine - is pain from arthritis? - consider PT - need to call if sx persist (he declines further eval for now)  Orders:  -     Urinalysis With Microscopic If Indicated (No Culture) - Urine, Clean Catch; Future  -     Cancel: Urinalysis With Microscopic If Indicated (No Culture) - Urine, Clean Catch  -     Urinalysis, Microscopic Only - Urine, Clean Catch; Future  -     Cancel: Urinalysis, Microscopic Only - Urine, Clean Catch  -     Urinalysis With Culture If Indicated - Urine, Clean Catch; Future  -     Urinalysis With Culture If Indicated - Urine, Clean Catch  -     XR Spine Lumbar 4+ View    Other hyperlipidemia  Comments:  ocntinue diet/ex    Glioblastoma (CMS/HCC)  Comments:  f/u with oncologist     Frequent urination  Comments:  He has a long history of symptoms of enlarged prostate-urine appears benign-call if symptoms persist or worsen.  Orders:  -     Urinalysis With Microscopic If Indicated (No Culture) - Urine, Clean Catch; Future  -     Cancel: Urinalysis With Microscopic If Indicated (No  Culture) - Urine, Clean Catch  -     Urinalysis, Microscopic Only - Urine, Clean Catch; Future  -     Cancel: Urinalysis, Microscopic Only - Urine, Clean Catch  -     Urinalysis With Culture If Indicated - Urine, Clean Catch; Future  -     Urinalysis With Culture If Indicated - Urine, Clean Catch    Heartburn  Comments:  Okay to use Pepcid daily.  Orders:  -     famotidine (PEPCID) 20 MG tablet; Take 20 mg by mouth 2 (Two) Times a Day.

## 2019-07-23 NOTE — PROGRESS NOTES
Subjective   Patient ID: Hernandez Waterman is a 63 y.o. male is here today for follow-up with a new Brain MRI that was ordered at his last office visit 5/24/2019. He is 3 months and 17 days out from a right frontal craniotomy for biopsy done on 4/7/19. After being tapered off Decadron he began to have HA he was put back on Decadron.  He recently completed Decadron last week. He states he still has a little HA.      It has been a little over 3 months since I did a right frontal craniotomy for debulking of a very large glioblastoma of the right frontal lobe.  He is with his wife today.  He has been undergoing standard first line therapy with radiation, fractionated and oral Temodar.  He has been on steroids and recently came off of them.  This is his first post treatment study other than the postoperative MRI.  Unfortunately, it did show some recurrence locally crossing the midline through the corpus collosum to the other side into the left frontal lobe.  He actually is doing quite well and feels that he has been getting his energy back so this is obviously a setback.  He will be seeing Dr. Wallace this afternoon.  It is probably time for him to go get enrolled in an investigational protocol and probably get some additional stereotactic radiation.  I will leave that to Dr. Wallace to discuss with him.      Headache    The problem occurs intermittently. The quality of the pain is described as aching. Pertinent negatives include no dizziness or seizures.       The following portions of the patient's history were reviewed and updated as appropriate: allergies, current medications, past family history, past medical history, past social history, past surgical history and problem list.    Review of Systems   Neurological: Positive for headaches. Negative for dizziness, seizures and light-headedness.   All other systems reviewed and are negative.      Objective   Physical Exam   Constitutional: He is oriented to person,  place, and time. He appears well-developed and well-nourished.   HENT:   Head: Normocephalic and atraumatic.   Eyes: Conjunctivae and EOM are normal. Pupils are equal, round, and reactive to light.   Fundoscopic exam:       The right eye shows no papilledema. The right eye shows venous pulsations.        The left eye shows no papilledema. The left eye shows venous pulsations.   Neck: Carotid bruit is not present.   Neurological: He is oriented to person, place, and time. He has a normal Finger-Nose-Finger Test and a normal Heel to Shin Test. Gait normal.   Reflex Scores:       Tricep reflexes are 2+ on the right side and 2+ on the left side.       Bicep reflexes are 2+ on the right side and 2+ on the left side.       Brachioradialis reflexes are 2+ on the right side and 2+ on the left side.       Patellar reflexes are 2+ on the right side and 2+ on the left side.       Achilles reflexes are 2+ on the right side and 2+ on the left side.  Psychiatric: His speech is normal.     Neurologic Exam     Mental Status   Oriented to person, place, and time.   Registration of memory: Good recent and remote memory.   Attention: normal. Concentration: normal.   Speech: speech is normal   Level of consciousness: alert  Knowledge: consistent with education.     Cranial Nerves     CN II   Visual fields full to confrontation.   Visual acuity: normal    CN III, IV, VI   Pupils are equal, round, and reactive to light.  Extraocular motions are normal.     CN V   Facial sensation intact.   Right corneal reflex: normal  Left corneal reflex: normal    CN VII   Facial expression full, symmetric.   Right facial weakness: none  Left facial weakness: none    CN VIII   Hearing: intact    CN IX, X   Palate: symmetric    CN XI   Right sternocleidomastoid strength: normal  Left sternocleidomastoid strength: normal    CN XII   Tongue: not atrophic  Tongue deviation: none    Motor Exam   Muscle bulk: normal  Right arm tone: normal  Left arm tone:  normal  Right leg tone: normal  Left leg tone: normal    Strength   Strength 5/5 except as noted.     Sensory Exam   Light touch normal.     Gait, Coordination, and Reflexes     Gait  Gait: normal    Coordination   Finger to nose coordination: normal  Heel to shin coordination: normal    Reflexes   Right brachioradialis: 2+  Left brachioradialis: 2+  Right biceps: 2+  Left biceps: 2+  Right triceps: 2+  Left triceps: 2+  Right patellar: 2+  Left patellar: 2+  Right achilles: 2+  Left achilles: 2+  Right : 2+  Left : 2+      Assessment/Plan   Independent Review of Radiographic Studies:    Brain MRI done 7/22/2019 shows evidence of local recurrence of the known glioblastoma.  There does seem to be recurrence within the corpus callosum and even in the left frontal region.  There is some local recurrence in the periventricular area on the right.  The edema is better.  Agree with the report.      Medical Decision Making:    He will be seeing Dr. Wallace's afternoon.  I think it is probably time to consider investigational protocols which Dr. Wallace and arrange.  Stereotactic radiation could also be considered, but I would leave that to his discretion at this time.  We will arrange for him to be seen in 3 months.      Hernandez was seen today for follow-up.    Diagnoses and all orders for this visit:    Glioblastoma of frontal lobe (CMS/HCC)      Return in about 3 months (around 10/24/2019).

## 2019-07-24 NOTE — TELEPHONE ENCOUNTER
Received a call from Dr. Harris earlier today regarding the MRI brain with and without contrast results.  The MRI showed recurrence of GBM. Deep to the craniotomy flap there is some evidence of restricted diffusion which could be consistent with blood products or possible developing abscess.  Please review images.

## 2019-07-24 NOTE — PROGRESS NOTES
Follow Up Visit:ANTON Ng comes accompanied by his wife  Interim review:  He called me on July 04 as he had been having headaches and DR Valadez from [facility] had started him back on decadron 4 mg daily and he wanted to know if tat was OK: I ajit him it was the right thing to do  He took it on a tapering schedule from July 04 through July 16 when he stopped it at 1 mg daily x 3 days  He ahs been having twinges of right temporal parietal pain best described as mild.  His memory  maybe slightly impaired but he is fatigues with ongoing sleep disturbance unrelated to the decadrn. He ahs also been irritable  No seziures    His ROS: back pain, new with bilateral in the kidney area; He was evaluated for UTI and he was negative  ROS else negative    On examination he is alert oriented with  normal speech and language  Cranial nerves normal including visual fields  No facial asymmetry  No pronator drift  Gait is normal  Cerebellar function is normal  SHILOH's and coordination is normal  Motor function normal  No percussion tenderness over lower spine    I brought up his recent brain MRI and reviewed with them. My independent interpretation Unfortunately there is increased T2 flair as well as contrast enhancing tumor crossing the corpus callosum: thisis new. Also of concern is the subependymal proximity of the enhancing tumor. In general I agree with the official reading             Summary:  1. A 63 YOM, right-handed, previously in good health with 5-6 week history of gradual confusion, mild headaches and left sided weakness. Brain MRI on April 5/2019 showed a 6.8 x 4.3 x 4.6 cms enhancing mas right frontal region involving genu of corpus callosum with vasogenic edema and 8 mm right-to-left shift. Has GTR by DR Brower on 04/07/2019 with postop charbel MRI showing total resection of enhancing mass with new diffusion restricted ischemic change anterior tot he resection cavity. Pathology from [facility] [id number] is that of GBM  "with mutated P53 and intact ATRX and negative IDH1 by IHC. MGMT methylation is negative  Current KPS 90.   Current decadron dose o mg daily  RT/TMZ on treatment compleetd four weeks ago: MRI shows definite progression with concernd for subependymal spread with new onset back apin       2. Elevated liver enzymes  PLAN: I had taken him off Keppra already since he has been on it prophylactically  AST reverted to normal  ALT remained elevated at at 47        Review of Systems   Constitutional: Negative for chills, fatigue and fever.   HENT: Negative for hearing loss, tinnitus and trouble swallowing.    Eyes: Negative for pain, redness and itching.   Respiratory: Negative for cough, shortness of breath and wheezing.    Cardiovascular: Negative for chest pain, palpitations and leg swelling.   Gastrointestinal: Negative for diarrhea, nausea and vomiting.   Endocrine: Negative for cold intolerance, heat intolerance and polydipsia.   Genitourinary: Negative for decreased urine volume, difficulty urinating and urgency.   Musculoskeletal: Negative for back pain, neck pain and neck stiffness.   Skin: Negative for color change, rash and wound.   Allergic/Immunologic: Negative for environmental allergies, food allergies and immunocompromised state.   Neurological: Positive for headaches. Negative for dizziness, tremors, seizures, syncope, facial asymmetry, speech difficulty, weakness, light-headedness and numbness.   Hematological: Negative for adenopathy. Does not bruise/bleed easily.   Psychiatric/Behavioral: Negative for agitation, behavioral problems, confusion, decreased concentration, dysphoric mood, hallucinations, self-injury, sleep disturbance and suicidal ideas. The patient is not nervous/anxious and is not hyperactive.              Vitals:    07/24/19 1425   BP: 128/74   Pulse: 67   SpO2: 93%   Weight: 79.4 kg (175 lb)   Height: 180.3 cm (71\")             Review Results-Workup/Diagnoses/Plan  Right temporal frontal GBM " with summary above: recurrent after RT/TMZ standard; MGMT non-methylated  New onset michelle castellanos    I reviewed the MRI scans with them  He needs CNS staging     Referral MRI w/o and w. Cervical/thoracic/lumbar MRI  He needs to be considered for an investigational study: I am reaching out to colleagues at [facility] as well as other close proximity centers for investigational study  I will see him after MRI spine  He will call if he gets worsening headaches or back pain  If he needs disability papers done I asked them to bring them to me.  Total time 40 minutes more than 50% in reviewing studies and discussing more needed wokup and options for treatment

## 2019-07-29 NOTE — TELEPHONE ENCOUNTER
Spoke with patient and gave him the information that Dr. Luther left in his note attached to this phone message.  I informed patient that as soon as Dr. Luther hears from dr. Encinas, he would get in touch with the patient or have an assistant contact him.  Patient expressed understanding.

## 2019-07-29 NOTE — TELEPHONE ENCOUNTER
----- Message from Cathie Newberry RN sent at 7/29/2019  9:56 AM EDT -----  Regarding: starting the referring process  This patient is a Dr. Luther patient, who had seen Dr. Luther 7/24/19.   During this office visit Dr. Luther had mentioned considering an investigational study. Dr. Luther writes he is going to read out to colleagues at [facility] as well as other close proximity centers for investigational study    Patient is requesting to Dr. Luther please call him to discuss this, Patient does not know if Dr. Luther is going to start this process or is the patient suppose to?  [phone]  Thank you

## 2019-07-29 NOTE — TELEPHONE ENCOUNTER
I called the patient and got his voicemail and left him a reminder that I was going to take acre of the referral process.  I reached out to Dr Tonny Encinas right away after the patient had left my office.  I have sent him pictures of the recent charbel MRI via/ text followed by an e-mail of his clinical summary.  Dr Encinas told me he will have his [employer] nurse look into the qualification for the study. I have not heard back from him.  I just tested the doctor again asking for a follow up.  As soon as I hear back I will let patient know.  .

## 2019-07-31 NOTE — TELEPHONE ENCOUNTER
Wife called requesting we send July office notes to [employer] for short term disability.  I faxed July office note to [employer], care of Sada, at [phone].

## 2019-08-07 NOTE — TELEPHONE ENCOUNTER
----- Message from Caroline Luther MD sent at 8/5/2019  1:09 PM EDT -----  Hernandez called me last week: he has a friend with GBM who has been treated at [facility] with Dr Braun. He asked me if I would make contact for him.  I have been trying to find him a clinical trial at [facility]: they offered tos ee him but no clinical trial available until he si 6 months post-RT.  I reached out to other centers and the challenge is finding him a clinical trial for recurrent GBM so soon after standard RT.  With his request I reached out to [facility] and shared needed information: their nurse navigator Nelly will be calling the patient.  I have called Hernandez back and let him know that his request has been honored.

## 2019-08-19 NOTE — TELEPHONE ENCOUNTER
----- Message from Randy Garrido sent at 8/9/2019  3:17 PM EDT -----  Contact: pt [phone]  Hernandez was calling because his skin is very dry, especially on his legs. He cant sleep because of being super itchy. He stated that it is awful. He was wondering if this could be a side effect of the steroids he was on for so long. He wanted to know if Dr. Luther could recommend something he could do to help. Hernandez can be reached at [phone]. Thanks so much.

## 2019-08-25 NOTE — PROGRESS NOTES
.     REASON FOR FOLLOWUP:     Provide an opinion on newly diagnosed glioblastoma multiform, GBM status post right craniotomy on 4/7/2019.                                 HISTORY OF PRESENT ILLNESS:  The patient is a 63 y.o. year old male who presented today for re-evaluation to discuss management options for his glioblastoma multiforme. Patient is accompanied by his wife who helped with history and discussion.     I saw this patient originally on 04/08/2019 shortly after his surgery. At that time, pathology results were preliminary. Further molecular study was pending at [facility]. We also tried to make him appointment with [facility] for 2nd opinion evaluation. However, we were told by [facility] that they would not make appointment for this patient until he is discharged from the hospital. Recently this patient was discharged from [facility] on 04/22/2019 after rehabilitation.  Office already contacted [facility] again on 4/23/2019.    Patient reports he is doing better. Denies any headaches, vision changes, tinnitus and no weakness in extremities. He is on tapering dose of prednisone 8 mg daily this week. He is also taking Keppra 500 mg b.i.d. for prevention of seizure since he was admitted to the hospital on 04/05/2019. His performance status is ECOG 1-0.     Laboratory study today reported stable mild anemia with hemoglobin 12.7 but normal WBC 9850 and platelets 286,000. Chemistry lab reported elevated , AST 91 and normal alkaline phosphatase 77 and total bilirubin 0.3. He otherwise has normal renal function with creatinine 0.83 and normal electrolytes; glucose 105.     Upon questioning, patient reports he previously had elevated liver enzymes periodically. I reviewed his previous lab results on 02/25/2019. He had completely normal liver function panel but in the morning on 04/05/2019 when he was in the emergency room, laboratory  study obtained at 4:54 a.m. that already showed elevated  and AST 69 but normal bilirubin 0.5 and alkaline phosphatase 71. He denies pain in the right upper quadrant of abdomen. No nausea. No vomiting.         Past Medical History:   Diagnosis Date   • ED (erectile dysfunction)    • Glioblastoma (CMS/HCC) 04/2019    Right frontal     Past Surgical History:   Procedure Laterality Date   • COLONOSCOPY  2005   • CRANIOTOMY FOR TUMOR Right 4/7/2019    Procedure: RIGHT SIDE CRANIOTOMY FOR TUMOR REMOVAL AND  BIOPSY  WITH SIDNEY NAVIGATTION AND PARTIAL RIGHT FRONTAL LOBE LOBECTOMY;  Surgeon: Keshawn Brower MD;  Location: [facility];  Service: Neurosurgery   • CRANIOTOMY FOR TUMOR  04/07/2019       HEMATOLOGIC/ONCOLOGIC HISTORY: The patient is a 63 y.o. 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 This was progressively getting worse. The patient originally thought this was due to sinusitis which he had previously. He denies vision changes however, he does have significant nausea and with a few episodes of vomiting with clear gastric content. He denies fever, sweating or chills. Nevertheless this patient had imaging studies obtained in the early morning on 04/05/2019 initially with CT of head without contrast and this described a large right frontal lobe mass measuring 6.1 x 3.9 cm and midline shift 1.3 cm. He had brain MRI examination with and without contrast a few hours later, which showed necrotic enhancing right frontal lobe mass measuring 6.8 x 4.3 x 4.6 cm, with vasogenic edema, extending into the genu of the corpus collosum. There is a marked mass effect in addition to midline shift by 8 mm and entrapment of the left lateral ventricle, subfalcine herniation of the anterior and medial portion of the right frontal lobe. This is highly suspicious for  glioblastoma multiforme. The patient subsequently had a CT of the abdomen and pelvis with IV contrast and there was no definite evidence of primary or metastatic disease.      The patient was seen by Neurosurgeon, Dr. Brower and started on dexamethasone and Keppra for anti-seizure purpose. This patient went on to have right craniotomy on 04/07/2019.      Preliminary pathology evaluation reported high grade glioma, fits with GBM. According to pathologist Dr. Macias, final pathology evaluation is still pending and likely the sample will be sent to [facility] for further molecular study.      Prior to this admission, patient was physically active, has regular exercise on a daily basis prior to this hospitalization.  Does not take prescribed medication.     No family history of malignancy.          MEDICATIONS    Current Outpatient Medications:   •  dexamethasone (DECADRON) 4 MG tablet, Take 1 tablet by mouth 2 (Two) Times a Day., Disp: 40 tablet, Rfl: 1  •  dexamethasone (DECADRON) 6 MG tablet, Take 1 tablet by mouth Every 12 (Twelve) Hours., Disp: 14 tablet, Rfl: 0  •  famotidine (PEPCID) 20 MG tablet, Take 1 tablet by mouth 2 (Two) Times a Day., Disp: 60 tablet, Rfl: 0  •  levETIRAcetam (KEPPRA) 500 MG tablet, Take 1 tablet by mouth Every 12 (Twelve) Hours., Disp: 60 tablet, Rfl: 0  •  ondansetron ODT (ZOFRAN-ODT) 4 MG disintegrating tablet, Take 1 tablet by mouth Every 6 (Six) Hours As Needed for Nausea or Vomiting., Disp: 30 tablet, Rfl: 0  •  tamsulosin (FLOMAX) 0.4 MG capsule 24 hr capsule, Take 1 capsule by mouth Daily., Disp: 30 capsule, Rfl: 0    ALLERGIES:     Allergies   Allergen Reactions   • Sulfa Antibiotics Unknown (See Comments)     Unknown       SOCIAL HISTORY:       Social History     Socioeconomic History   • Marital status:      Spouse name: Mony   • Number of children: 1   • Years of education: Not on file   • Highest education level: Not on file   Occupational History   • Occupation:  "/Instructor     Employer: [employer]   Tobacco Use   • Smoking status: Never Smoker   • Smokeless tobacco: Never Used   Substance and Sexual Activity   • Alcohol use: Yes     Comment: rarely   • Drug use: No   • Sexual activity: Yes     Partners: Female         FAMILY HISTORY:  Family History   Problem Relation Age of Onset   • Hypertension Mother    • Osteoporosis Mother    • Heart failure Mother    • Hypertension Father        REVIEW OF SYSTEMS:  Review of Systems   Constitutional: Negative for chills, diaphoresis and fever.   HENT: Negative for drooling, hearing loss and trouble swallowing.    Eyes: Negative for photophobia and visual disturbance.   Respiratory: Negative for cough and shortness of breath.    Cardiovascular: Negative for chest pain, palpitations and leg swelling.   Gastrointestinal: Negative for abdominal pain, nausea and vomiting.   Endocrine: Negative for cold intolerance.   Genitourinary: Negative for dysuria and frequency.   Musculoskeletal: Negative for back pain and joint swelling.   Skin: Negative for pallor and wound.   Neurological: Negative for seizures, facial asymmetry, speech difficulty, weakness and headaches.   Hematological: Negative for adenopathy. Does not bruise/bleed easily.   Psychiatric/Behavioral: Negative for agitation and confusion.              Vitals:    04/25/19 1335   BP: 102/69   Pulse: 59   Resp: 16   Temp: 98.1 °F (36.7 °C)   SpO2: 100%   Weight: 70.3 kg (155 lb)   Height: 182 cm (71.65\")   PainSc: 0-No pain     No flowsheet data found.   PHYSICAL EXAM:      CONSTITUTIONAL:  Well-developed well-nourished  male.  Vital signs reviewed.  No distress, looks comfortable.  EYES:  Conjunctiva and lids unremarkable.   EARS,NOSE,MOUTH,THROAT:  Ears and nose appear unremarkable.  Lips appear unremarkable.  RESPIRATORY:  Normal respiratory effort.  Lungs clear to auscultation bilaterally.  CARDIOVASCULAR:  Regular rhythm and rate.  Normal S1, S2.  No murmurs rubs or " gallops.  No significant lower extremity edema.  GASTROINTESTINAL: Abdomen appears unremarkable.  Nontender.  No hepatomegaly.  No splenomegaly.  LYMPHATIC:  No cervical, supraclavicular, axillary lymphadenopathy.  MUSCULOSKELETAL:  Unremarkable gait and station.  Unremarkable digits/nails.  No cyanosis or clubbing.  SKIN:  Warm.  No rashes.  post left craniotomy, scar present without infection.  PSYCHIATRIC:  Normal judgment and insight.  Normal mood and affect.      RECENT LABS:  Lab Results   Component Value Date    WBC 9.85 04/25/2019    HGB 12.7 (L) 04/25/2019    HCT 38.5 04/25/2019    MCV 96.7 04/25/2019     04/25/2019     Lab Results   Component Value Date    NEUTROABS 8.70 (H) 04/25/2019     Lab Results   Component Value Date    GLUCOSE 105 (H) 04/25/2019    BUN 28 (H) 04/25/2019    CREATININE 0.83 04/25/2019    EGFRIFNONA 93 04/25/2019    EGFRIFAFRI 101 02/14/2018    BCR 33.7 (H) 04/25/2019    K 4.3 04/25/2019    CO2 29.1 (H) 04/25/2019    CALCIUM 8.8 04/25/2019    PROTENTOTREF 6.8 02/14/2018    ALBUMIN 3.70 04/25/2019    LABIL2 1.4 02/14/2018    AST 91 (H) 04/25/2019     (H) 04/25/2019     Sodium   Date Value Ref Range Status   04/25/2019 137 136 - 145 mmol/L Final     Potassium   Date Value Ref Range Status   04/25/2019 4.3 3.5 - 5.2 mmol/L Final     Total Bilirubin   Date Value Ref Range Status   04/25/2019 0.3 0.1 - 1.2 mg/dL Final     Alkaline Phosphatase   Date Value Ref Range Status   04/25/2019 77 39 - 117 U/L Final   ]    PATHOLOGY:   Tissue Pathology Exam: [id number]   Order: [id number]   Collected:  4/7/2019 11:43 Status:  Edited Result - FINAL   Visible to patient:  Yes (MyChart) Dx:  Right frontal lobe mass   Component    Addendum 2   Please see the completely scanned MGMT Promoter Methylation report from [facility] below.      Addendum electronically signed by Ronnie Macias MD on 4/22/2019 at 0924   Addendum   Please see below the attached Consultation Report from Jada  CHACHA Briones. at [facility].   Addendum electronically signed by Ronnie Macias MD on 4/16/2019 at 1410   Final Diagnosis   1. Brain, Right Frontal, Biopsy:                A. Glioblastoma, WHO Grade 4.     2.   Brain, Right Frontal Lobe Mass:               A. Glioblastoma, WHO Grade 4.     Mec/jse/brb   Electronically signed by Ronnie Macias MD on 4/9/2019 at 1209   Comment    This material will be sent to [facility] for further testing.  The results will follow in an addendum.     This case is discussed with Dr. Medley, oncology, on 04/08/2019.      mec/jse            Negative for MGMT promoter methylation.     Negative for IDH1-R132H by IHC method        Assessment/Plan     1.  Glioblastoma multiforme, GBM status post left craniotomy on 4/7/2019.  I discussed with the patient and his wife the molecular test results of his tumor, evaluated at [facility]. This study reported negative for MGMT promotor methylation. He was also tested negative for IDH1-R132H expression by IHC method. I pointed out that according to published literature, this patient would likely not have as good response to temozolomide chemotherapy (standard first-line chemotherapy)  compared to patient having MGMT promoter methylation.  I strongly encouraged the patient to participate in a clinical trial. I explained to patient again that we are in the process to make him an appointment with [facility]. My office already contacted [facility] a couple days ago and [facility] obtained records from us. The patient checked his cell phone today and found he missed a phone call from [facility] and he said he would call them later today. His wife also reports that they will seek a 2nd opinion evaluation.     They also reported Dr. Philippe is preparing to start radiation therapy on 05/06/2019 and Dr. Luther already saw patient and started processing order for temozolomide.  I explained to patient and his wife that they should not start treatment before they are seen by 2nd opinion consultant so that will not be disqualified for participation of clinical trials and they voiced understanding.     After the patient left the clinic, I did speak to Neuro-Oncologist, Dr. Luther, and I was told by Dr. Luther that the patient refused 2nd opinion evaluation as recommended by Dr. Luther at [facility], [facility] or [facility]. According to Dr. Luther, the patient prefers to have standard chemotherapy with concomitant chemoradiation therapy using temozolomide. Patient certainly needs to make a decision for that.     2.  Elevated liver enzymes.  I reviewed all his laboratory results dating back to February 2019.  It appears he already had mildly elevated AST and ALT at the time of his ER evaluation on 4/5/2019 but certainly his liver function is getting worse especially his ALT is now above 400.  I strongly suspect Keppra is the culprit.  I asked patient to discuss this with neurosurgeon Dr. Brower tomorrow since he already has appointment tomorrow, to see if he needs to be switched to a different antiseizure medication.    PLAN:   1. Patient will contact [facility] as he already missed a phone call.   2.  Keep appointment with neurosurgeon Dr. Brower tomorrow.  3. Patient will follow up with Shanti Philippe MD, and Caroline Luther MD, for ongoing care.   4. I will arrange patient come back to see me in 3 weeks for reevaluation. If needed I can see him earlier.     More than 45 min were used for patient care, over half of that time were for counseling to patient and his wife.    I spoke with Dr. Luther today about the patient’s care.      JUSTIN FROST M.D., Ph.D.    4/25/2019      CC:   MD Caroline Briceno MD Crystal McMahan, MD Melissa T.  MD Stalin                no

## 2019-08-27 NOTE — PROGRESS NOTES
Follow Up Visit:GBM  Hernandez comes acompanied by his wife  Background summary:      1. A 63 YOM, right-handed, previously in good health with 5-6 week history of gradual confusion, mild headaches and left sided weakness.   2. Brain MRI on April 5/2019 showed a 6.8 x 4.3 x 4.6 cms enhancing mas right frontal region involving genu of corpus callosum with vasogenic edema and 8 mm right-to-left shift.   3. GTR by DR Brower on 04/07/2019 with postop brain MRI showing total resection of enhancing mass with new diffusion restricted ischemic change anterior to the resection cavity. Pathology from [facility] [id number] is that of GBM with mutated P53 and intact ATRX and negative IDH1 by IHC. MGMT methylation is negative  4. He completed standard RT/TMZ and first post RT/TMZ  brain MRI on 7/22/2019 showing definite recurrence with new lesion in the contralateral left frontal lobe and and tumor close to right periventricular area  At the time of the MRI the patient was doing well clinically , but of interest he had on July 04-05 called with new onset headaches and Dr Vasquez had put him back on the decadron which resolved his headaches  At the time of that visit, although he was doing well, the new lesion outside the radiation field was worrisome for new disease but him being clinically well except for decadron-responsive headcahe argued of recurrence versu pseudotumor. I was on the basis of clinical grounds leaning towards recurrence.  At the same time he had complained of new onset bilateral thoracic back pain and his PCP had done negative evaluation for UTI.   Because of the proximity of the tumor to periventicualr region I felt, as I wa spursing a clinical trial for him to rule out remote possibility of metastatic disease and [facility] set him for MRI spine.  Hernandez, after I had been working on referral to [facility] or [facility] for a clinical trial, called me and asked if I would refer him to [facility] by recommendation of a friend of  his,  I made the referral and he was sen by DR Robb: please see below for his impression. Dr Robb entertained the possibility that due to relative lack of symptoms that patient may have pseudo-progression and started patient on decadron with intent to repeat MRI imaging::    .     ADDENDUM: The patient was seen and briefly examined. His MRI from 24 July 2019 was reviewed with them. The possibility of pseudo-progression was addressed especially in light of the paucity of evolving neurologic symptoms. The patient does have some degree of apathy and this confirmed by his spouse. He tends to minimize depression. He is receptive to a behavioral oncology evaluation. The rationale for a course of Decadron with repeat neuroimaging was discussed with him. His low back symptoms tend to wax and wane and plain x-rays are consistent with some degree of degenerative joint disease. We will defer imaging of his spine for now. Worse symptoms to progress significantly this would then be addressed. The palliative intent of our treatment endeavors was reviewed again with them. Both the patient and spouse are interested in delay testing for possible eventual eligibility to enter the [place] CNS 17-13 trial evaluating Avastin with/without DSP [id number]. The treatment rationale and single arm data available regarding this vaccine was discussed with them. Multiple questions were answered. By time with the patient was approximately 30 minutes of which 75% was spent in direct discussion with him and his spouse and in coordination of care. Plan as outlined above.    Today patient is essentially asymptomatic    In answer to their quations the following swer done:  1. I called and discussed with Dr Robb  2. I reviewed the first post-op charbel MRI from April as well as the last charbel MRI immedaite post RT/TMZ. I pointe dout to them why I still believe the MRI is c/w progresion rather than pseudo=progression.   I told him  I have confidence  with their seeing Dr Robb and have no doubt about his skills, however, we are at disagreement with the Diagnosis.  I cited as before several reasons why I believe this is progression not pseudo=progression and told them I do hope for patient's sake that I am wrong and DR Robb is correct.  3. The role of decadron: Patient is asymptomatic and as such I would not have started him on decadron but would have done the follow up charbel MRI as before discussed. Adding decadron is going to blur the decision whether he has progression or pseudo-progression as usually patient with the latter gradually get better. I would have, if I thought this was pseudo=progression and he is asymptomatic, restarted the standard adjuvant Temador and see how he does.    4. I agree with canceling the spine MRI as his pain has resolve don it sown.:  The above discussion was with patient and his wife as will with Dr Robb.  His planned repeeat MRI is on September 04.  I told patient and wife I am leaving [facility] on October 04 as my last day.  I encouraged them to follow with Dr Robb and if they have questions before I leave my door is open to them. I told patient and wife once again I had no reservations about them seeing DR Robb.  In case they decide to come back here he has been seen with Dr Medley at [facility]: I have called DR Medley and reviewed the same with him. Dr Rivera will always be his Neurosurgeon in charge and I have a call in to him as well.        2. Elevated liver enzymes  PLAN: I had taken him off Keppra already since he has been on it prophylactically  AST reverted to normal          Review of Systems   Constitutional: Negative for chills, fatigue and fever.   HENT: Negative for hearing loss, tinnitus and trouble swallowing.    Eyes: Negative for pain, redness and itching.   Respiratory: Negative for cough, shortness of breath and wheezing.    Cardiovascular: Negative for chest pain, palpitations and leg  "swelling.   Gastrointestinal: Negative for diarrhea, nausea and vomiting.   Endocrine: Negative for cold intolerance, heat intolerance and polydipsia.   Genitourinary: Negative for decreased urine volume, difficulty urinating and urgency.   Musculoskeletal: Negative for back pain, neck pain and neck stiffness.   Skin: Negative for color change, rash and wound.   Allergic/Immunologic: Negative for environmental allergies, food allergies and immunocompromised state.   Neurological: Negative for dizziness, tremors, seizures, syncope, facial asymmetry, speech difficulty, weakness, light-headedness, numbness and headaches.   Hematological: Negative for adenopathy. Does not bruise/bleed easily.   Psychiatric/Behavioral: Negative for agitation, behavioral problems, confusion, decreased concentration, dysphoric mood, hallucinations, self-injury, sleep disturbance and suicidal ideas. The patient is not nervous/anxious and is not hyperactive.        ROS Update      Vitals:    08/27/19 1118   Weight: 78 kg (172 lb)   Height: 180.3 cm (71\")         Physical/Neurological Examination      Review Results-Workup/Diagnoses/Plan  "

## 2019-08-27 NOTE — TELEPHONE ENCOUNTER
----- Message from Randy Oshea sent at 8/26/2019  1:45 PM EDT -----  Contact: [phone]  PT CALLING BECAUSE HE SPOKE TO A [place] NEUROLOGIST ABOUT GETTING THE MRI AND THE NEUROLOGIST DID NOT THINK THAT THE MRI WAS NECESSARY. PT WOULD LIKE TO KNOW IF DR MUNOZ IS AWARE OF THIS OR IF PT NEED TO GET THE MRI. PLEASE ADVISE AND CALL PT [phone]

## 2019-08-28 NOTE — TELEPHONE ENCOUNTER
I had nothing to do with the MRI order: they need to talk to Dr Robb as he is managing his GBM and this was by their request

## 2019-11-12 NOTE — TELEPHONE ENCOUNTER
Hub Transfer Susan (wife)  Susan calling to ask if we take out catheters.  I explained that I thought that would be the specialty office like urology, would get a message back to see what we could do for patient.    Pt# [phone]    Pt had went to [facility] in [place].   Yesterday, had a catheter put in, given direction to have PCP remove in 3 days.

## 2019-11-12 NOTE — TELEPHONE ENCOUNTER
pls see if urology can see him this week (NP is also fine) - he had urinary retention & had catheter placed yesterday in ER

## 2019-11-12 NOTE — TELEPHONE ENCOUNTER
Patient went to [facility] yesterday and had a urine cath placed. I've updated the Care Everywhere.    There is a note in the chart where patient contacted Dr. Velázquez's office in regards to this since the catheter is to be removed on Wednesday and he has an appointment with Dr. Velázquez and Karina Staton PA-C in their office stated that he need to see urology prior to having the chapman catheter removed as they can assess Ms. Waterman and decide if the catheter should be removed.    They instructed them to call our office since he was placed on Flomax by Dr. Gant, not a urologist.    I spoke to Mrs. Waterman and he does not have urologist.    Please further advise.

## 2019-11-12 NOTE — TELEPHONE ENCOUNTER
I spoke to Mrs. Franklin to inform her that Dr. Gant was referring Mr. Waterman out to urology to be seen this week. She asked if Mr. Waterman can have a late appointment this Thursday or this Friday anytime with the urologist she would greatly appreciate it.    Thank you

## 2019-12-25 NOTE — TELEPHONE ENCOUNTER
Wife called 12/24/2019. Patient just had been released from observation at [facility] - treated for UTI with Cipro, also had urinary retention, and his catheter was irrigated, and he passes urine w/o difficulties now. He is extremely weak, and has an episode of vomiting as he got up at about 11 am that day.  Had only one episode, ans was able to eat some toast and tea later that day. Wife wanted to know is she still has to give him Cipro, and if vomiting might be a side effect of this medication.   PMH: glioblastoma, he is under the care of .     I had advised to continue Cipro, it is unlikely that vomiting was due to this medication. May use Zofran, that they have at home if any nausea/vomiting. I had encouraged the wife to follow with his oncologist.

## 2020-01-14 NOTE — PROGRESS NOTES
Per team, pt now functioning at a MOD-I -SBA level.  Pt and wife expect pt will be discharged tomorrow.  Discharge family conference planned for tomorrow at 11:00.  Will make community referrals based on the team's recommendation.   no

## 2021-10-12 NOTE — ANESTHESIA POSTPROCEDURE EVALUATION
Patient: Hernandez Waterman    Procedure Summary     Date:  04/07/19 Room / Location:  [facility] OR [address] [place] [address] MAIN OR    Anesthesia Start:  1026 Anesthesia Stop:  1350    Procedure:  RIGHT SIDE CRANIOTOMY FOR TUMOR REMOVAL AND  BIOPSY  WITH SIDNEY NAVIGATTION AND PARTIAL RIGHT FRONTAL LOBE LOBECTOMY (Right Head) Diagnosis:      Surgeon:  Keshawn Brower MD Provider:  Jannet Turner MD    Anesthesia Type:  general ASA Status:  3          Anesthesia Type: general  Last vitals  BP   126/73 (04/07/19 1630)   Temp   36.7 °C (98.1 °F) (04/07/19 1615)   Pulse   61 (04/07/19 1630)   Resp   16 (04/07/19 1630)     SpO2   96 % (04/07/19 1630)     Post Anesthesia Care and Evaluation    Patient location during evaluation: PACU  Patient participation: complete - patient participated  Level of consciousness: awake  Pain management: adequate  Airway patency: patent  Anesthetic complications: No anesthetic complications  PONV Status: none  Cardiovascular status: acceptable  Respiratory status: acceptable  Hydration status: acceptable       12-Oct-2021 10:05

## (undated) DEVICE — 3M™ STERI-STRIP™ REINFORCED ADHESIVE SKIN CLOSURES, R1547, 1/2 IN X 4 IN (12 MM X 100 MM), 6 STRIPS/ENVELOPE: Brand: 3M™ STERI-STRIP™

## (undated) DEVICE — DRP MICROSCOPE 4 BINOCULAR CV 54X150IN

## (undated) DEVICE — GOWN,NON-REINFORCED,SIRUS,SET IN SLV,XXL: Brand: MEDLINE

## (undated) DEVICE — MAYFIELD® DISPOSABLE ADULT SKULL PIN (PLASTIC BASE): Brand: MAYFIELD®

## (undated) DEVICE — 6.0MM PRECISION ACORN

## (undated) DEVICE — SUT NUROLON 4/0 TF18 CR8 I8IN C584D

## (undated) DEVICE — JACKSON-PRATT 100CC BULB RESERVOIR: Brand: CARDINAL HEALTH

## (undated) DEVICE — STRAIGHT TIP, UNIVERSAL

## (undated) DEVICE — BANDAGE,GAUZE,BULKEE II,4.5"X4.1YD,STRL: Brand: MEDLINE

## (undated) DEVICE — DRAPE,REIN 53X77,STERILE: Brand: MEDLINE

## (undated) DEVICE — DIFFUSER: Brand: CORE, MAESTRO

## (undated) DEVICE — SOL IRR PHYSIOSOL BTL 1000ML

## (undated) DEVICE — PK CRANI 40

## (undated) DEVICE — CVR PROB 96IN LF STRL

## (undated) DEVICE — DERMAHOOK 1/2HOOK PK/6

## (undated) DEVICE — COTTON BALLS, DOUBLE STRUNG: Brand: DEROYAL

## (undated) DEVICE — CODMAN® ISOCOOL® AHT® BIPOLAR FORCEPS TIPS 8 1/2" (22CM) TOTAL LENGTH 1MM TIP DIAMETER 3 1/2" (8.9CM) WORKING LENGTH: Brand: CODMAN® ISOCOOL® AHT®

## (undated) DEVICE — DISPOSABLE BIPOLAR FORCEPS 7 3/4" (19.7CM) SCOVILLE BAYONET, 1.5MM TIP AND 12 FT. (3.6M) CABLE: Brand: KIRWAN

## (undated) DEVICE — CLIP RANEY

## (undated) DEVICE — DRSNG TELFA PAD NONADH STR 1S 3X8IN

## (undated) DEVICE — PREMIUM WET SKIN PREP TRAY: Brand: MEDLINE INDUSTRIES, INC.

## (undated) DEVICE — 1.1MM X 6.0MM STRAIGHT ROUTER

## (undated) DEVICE — GLV SURG BIOGEL LTX PF 7

## (undated) DEVICE — INSTRUMENT BATTERY

## (undated) DEVICE — 2.3MM TAPERED ROUTER

## (undated) DEVICE — COVER,MAYO STAND,STERILE: Brand: MEDLINE

## (undated) DEVICE — SMOKE EVACUATION TUBING WITH 7/8 IN TO 1/4 IN REDUCER: Brand: BUFFALO FILTER

## (undated) DEVICE — GAUZE,SPONGE,FLUFF,6"X6.75",STRL,10/TRAY: Brand: MEDLINE

## (undated) DEVICE — ANTIBACTERIAL UNDYED BRAIDED (POLYGLACTIN 910), SYNTHETIC ABSORBABLE SUTURE: Brand: COATED VICRYL

## (undated) DEVICE — DISPOSABLE TUBING SET AND EXTENDER FILTER TUBING

## (undated) DEVICE — PAD,ABDOMINAL,8"X10",ST,LF: Brand: MEDLINE

## (undated) DEVICE — OIL CARTRIDGE: Brand: CORE, MAESTRO

## (undated) DEVICE — ENCORE® LATEX ORTHO SIZE 8, STERILE LATEX POWDER-FREE SURGICAL GLOVE: Brand: ENCORE